# Patient Record
Sex: MALE | Race: BLACK OR AFRICAN AMERICAN | Employment: OTHER | URBAN - METROPOLITAN AREA
[De-identification: names, ages, dates, MRNs, and addresses within clinical notes are randomized per-mention and may not be internally consistent; named-entity substitution may affect disease eponyms.]

---

## 2018-03-27 ENCOUNTER — OFFICE VISIT (OUTPATIENT)
Dept: INTERNAL MEDICINE | Age: 58
End: 2018-03-27
Payer: COMMERCIAL

## 2018-03-27 VITALS
HEIGHT: 74 IN | DIASTOLIC BLOOD PRESSURE: 97 MMHG | WEIGHT: 173 LBS | BODY MASS INDEX: 22.2 KG/M2 | SYSTOLIC BLOOD PRESSURE: 168 MMHG | RESPIRATION RATE: 18 BRPM | HEART RATE: 72 BPM | TEMPERATURE: 98.7 F

## 2018-03-27 DIAGNOSIS — I10 ESSENTIAL HYPERTENSION: ICD-10-CM

## 2018-03-27 DIAGNOSIS — J45.909 UNCOMPLICATED ASTHMA, UNSPECIFIED ASTHMA SEVERITY, UNSPECIFIED WHETHER PERSISTENT: ICD-10-CM

## 2018-03-27 DIAGNOSIS — I82.493 ACUTE DEEP VEIN THROMBOSIS (DVT) OF OTHER SPECIFIED VEIN OF BOTH LOWER EXTREMITIES (HCC): Primary | ICD-10-CM

## 2018-03-27 DIAGNOSIS — I73.9 PAD (PERIPHERAL ARTERY DISEASE) (HCC): ICD-10-CM

## 2018-03-27 DIAGNOSIS — Z79.01 CHRONIC ANTICOAGULATION: Chronic | ICD-10-CM

## 2018-03-27 DIAGNOSIS — R10.9 FLANK PAIN: ICD-10-CM

## 2018-03-27 DIAGNOSIS — G62.9 NEUROPATHY: ICD-10-CM

## 2018-03-27 LAB
BILIRUBIN, POC: ABNORMAL
BLOOD URINE, POC: ABNORMAL
CLARITY, POC: CLEAR
COLOR, POC: YELLOW
GLUCOSE URINE, POC: ABNORMAL
INTERNATIONAL NORMALIZATION RATIO, POC: 1.8
KETONES, POC: ABNORMAL
LEUKOCYTE EST, POC: ABNORMAL
NITRITE, POC: ABNORMAL
PH, POC: 7
PROTEIN, POC: ABNORMAL
PROTHROMBIN TIME, POC: 21.3
SPECIFIC GRAVITY, POC: 1.03
UROBILINOGEN, POC: 0.2

## 2018-03-27 PROCEDURE — 3017F COLORECTAL CA SCREEN DOC REV: CPT | Performed by: INTERNAL MEDICINE

## 2018-03-27 PROCEDURE — G8427 DOCREV CUR MEDS BY ELIG CLIN: HCPCS | Performed by: INTERNAL MEDICINE

## 2018-03-27 PROCEDURE — 85610 PROTHROMBIN TIME: CPT | Performed by: INTERNAL MEDICINE

## 2018-03-27 PROCEDURE — G8484 FLU IMMUNIZE NO ADMIN: HCPCS | Performed by: INTERNAL MEDICINE

## 2018-03-27 PROCEDURE — 4004F PT TOBACCO SCREEN RCVD TLK: CPT | Performed by: INTERNAL MEDICINE

## 2018-03-27 PROCEDURE — G8420 CALC BMI NORM PARAMETERS: HCPCS | Performed by: INTERNAL MEDICINE

## 2018-03-27 PROCEDURE — 81002 URINALYSIS NONAUTO W/O SCOPE: CPT | Performed by: INTERNAL MEDICINE

## 2018-03-27 PROCEDURE — 99214 OFFICE O/P EST MOD 30 MIN: CPT | Performed by: INTERNAL MEDICINE

## 2018-03-27 PROCEDURE — 99213 OFFICE O/P EST LOW 20 MIN: CPT | Performed by: INTERNAL MEDICINE

## 2018-03-27 RX ORDER — HYDROCHLOROTHIAZIDE 25 MG/1
25 TABLET ORAL DAILY
Qty: 90 TABLET | Refills: 1 | Status: SHIPPED | OUTPATIENT
Start: 2018-03-27 | End: 2018-07-18 | Stop reason: SDUPTHER

## 2018-03-27 RX ORDER — ALBUTEROL SULFATE 90 UG/1
2 AEROSOL, METERED RESPIRATORY (INHALATION) EVERY 6 HOURS PRN
Qty: 1 INHALER | Refills: 5 | Status: SHIPPED | OUTPATIENT
Start: 2018-03-27 | End: 2019-01-07 | Stop reason: SDUPTHER

## 2018-03-27 RX ORDER — AMLODIPINE BESYLATE 5 MG/1
5 TABLET ORAL DAILY
Qty: 90 TABLET | Refills: 1 | Status: SHIPPED | OUTPATIENT
Start: 2018-03-27 | End: 2018-07-18 | Stop reason: SDUPTHER

## 2018-03-27 RX ORDER — GABAPENTIN 100 MG/1
200 CAPSULE ORAL 3 TIMES DAILY
Qty: 540 CAPSULE | Refills: 1 | Status: SHIPPED | OUTPATIENT
Start: 2018-03-27 | End: 2018-07-18 | Stop reason: SDUPTHER

## 2018-03-27 RX ORDER — WARFARIN SODIUM 3 MG/1
3 TABLET ORAL DAILY
Qty: 30 TABLET | Refills: 3 | Status: ON HOLD | OUTPATIENT
Start: 2018-03-27 | End: 2018-07-11

## 2018-03-27 RX ORDER — WARFARIN SODIUM 2 MG/1
2 TABLET ORAL DAILY
Qty: 30 TABLET | Refills: 0 | Status: SHIPPED | OUTPATIENT
Start: 2018-03-27 | End: 2018-05-10 | Stop reason: SDUPTHER

## 2018-03-27 RX ORDER — ATORVASTATIN CALCIUM 40 MG/1
40 TABLET, FILM COATED ORAL DAILY
Qty: 90 TABLET | Refills: 1 | Status: SHIPPED | OUTPATIENT
Start: 2018-03-27 | End: 2018-07-18 | Stop reason: SDUPTHER

## 2018-03-27 RX ORDER — M-VIT,TX,IRON,MINS/CALC/FOLIC 27MG-0.4MG
1 TABLET ORAL DAILY
Qty: 180 TABLET | Refills: 0 | Status: SHIPPED | OUTPATIENT
Start: 2018-03-27 | End: 2018-07-18 | Stop reason: SDUPTHER

## 2018-03-27 RX ORDER — LISINOPRIL 10 MG/1
10 TABLET ORAL DAILY
Qty: 90 TABLET | Refills: 1 | Status: SHIPPED | OUTPATIENT
Start: 2018-03-27 | End: 2018-07-18 | Stop reason: SDUPTHER

## 2018-03-27 ASSESSMENT — PATIENT HEALTH QUESTIONNAIRE - PHQ9
2. FEELING DOWN, DEPRESSED OR HOPELESS: 0
SUM OF ALL RESPONSES TO PHQ9 QUESTIONS 1 & 2: 0
SUM OF ALL RESPONSES TO PHQ QUESTIONS 1-9: 0
1. LITTLE INTEREST OR PLEASURE IN DOING THINGS: 0

## 2018-03-27 ASSESSMENT — ENCOUNTER SYMPTOMS
SHORTNESS OF BREATH: 0
ABDOMINAL PAIN: 0
CHEST TIGHTNESS: 0

## 2018-03-27 NOTE — PROGRESS NOTES
Subjective:      Patient ID: Naman Cornejo is a 61 y.o. male. HPI     Naman Cornejo is a 61year old male with PMH of HTN, DVT, lupus anticoagulant positive and on chronic anticoagulation with coumadin, PVD s/p vascular surgeries in [facility], neuropathy, asthma and tobacco abuse. He is here for his regular follow-up. He has stopped smoking for almost over a week and he is happy about it. I have applauded him for this. This visit, he has been complaining of bilateral flank pain, which has started about 4 days ago. The pain is described as throbbing. Not severe. Not associated with dysuria or hematuria. Denies history of kidney stones. Denies fever. POC urinalysis shows some trace blood and trace protein. There is some concern about compliance with medications. He was not able to recall proper dosing of coumadin per nursing staff. He has also not taken his blood pressure medications at night and during this morning. Not suprisingly, his blood pressure is not controlled this visit. No other complaints for this visit. He is also scheduled to have his regular follow-up at [facility] next month. Review of Systems   Constitutional: Negative for activity change, chills and fever. HENT: Negative for congestion. Respiratory: Negative for chest tightness and shortness of breath. Cardiovascular: Negative for chest pain, palpitations and leg swelling. Gastrointestinal: Negative for abdominal pain. Endocrine: Negative. Genitourinary: Positive for flank pain. Negative for decreased urine volume, dysuria and hematuria. B/l flank pain. Musculoskeletal: Negative for myalgias. Skin: Negative for rash. Neurological: Negative for dizziness, weakness and light-headedness. Tingling and numbness sensation in lower extremities. Hematological: Does not bruise/bleed easily. Psychiatric/Behavioral: Negative for behavioral problems.      Objective:   Physical Exam   Constitutional: / transfer of care.

## 2018-03-27 NOTE — PATIENT INSTRUCTIONS
that will allow you to sit while showering. · Get into a tub or shower by putting the weaker leg in first. Get out of a tub or shower with your strong side first.  · Repair loose toilet seats and consider installing a raised toilet seat to make getting on and off the toilet easier. · Keep your bathroom door unlocked while you are in the shower. Where can you learn more? Go to [email or web address] org and sign in to your [employer] account. Enter [phone] in the [employer] box to learn more about \"Preventing Falls: Care Instructions. \"     If you do not have an account, please click on the \"Sign Up Now\" link. Current as of: May 12, 2017  Content Version: 11.5  © 7701-6581 Healthwise, [employer]. Care instructions adapted under license by [facility] ([facility]). If you have questions about a medical condition or this instruction, always ask your healthcare professional. [place] [address] any warranty or liability for your use of this information. Please take your medication(s) as instructed. Start taking coumadin 3 mg daily every day instead of alternating. Recheck INR in one week along with B/p  Continue rest. Please be compliant with your medications. Keep your appointment. Would like to see you back in 6 months. Call us if your symptoms worsen. If a referral was placed on your behalf during your visit, you should expect to receive information about the date and time of your referral appointment within 7-10 days. If not, please call the nurse line at [phone]. Should you have further questions in regards to this visit, you can review your clinical note and after visit summary document on your [employer] account. Other questions can be directed to our nurse line at [phone].      Dalton Bustillo MD, PGY-3

## 2018-03-27 NOTE — PROGRESS NOTES
Patient verbalized understanding of office instructions. He will call with questions or concerns. Pt was given discharge instructions, including his Coumadin  all questions were fully answered. Printed AVS given to pt .  And next INR appointment

## 2018-03-27 NOTE — PROGRESS NOTES
[facility]  Internal Medicine Residency    Internal Medicine     Attending Physician Statement  I have discussed the case, including pertinent history and exam findings with the resident and the team.  I have seen and examined the patient and the key elements of the encounter have been performed by me. I agree with the assessment, plan and orders as documented by the resident. 62 yo male here for follow-up. Past, family, and social history were reviewed. A/P:  1. HTN, uncontrolled due to noncompliance  2. Lupus anticoagulant: incr to coumadin 3 mg daily; recheck INR in 1 week  3. Back pain- muscular strain  4.  Tobacco abuse-abstinent for 1 week    Robyne Habermann MD

## 2018-04-06 ENCOUNTER — OFFICE VISIT (OUTPATIENT)
Dept: INTERNAL MEDICINE | Age: 58
End: 2018-04-06
Payer: COMMERCIAL

## 2018-04-06 VITALS
WEIGHT: 172 LBS | TEMPERATURE: 96.9 F | DIASTOLIC BLOOD PRESSURE: 84 MMHG | HEIGHT: 74 IN | SYSTOLIC BLOOD PRESSURE: 133 MMHG | RESPIRATION RATE: 16 BRPM | BODY MASS INDEX: 22.07 KG/M2 | HEART RATE: 79 BPM

## 2018-04-06 DIAGNOSIS — I73.9 PVD (PERIPHERAL VASCULAR DISEASE) WITH CLAUDICATION (HCC): ICD-10-CM

## 2018-04-06 DIAGNOSIS — I10 ESSENTIAL HYPERTENSION: ICD-10-CM

## 2018-04-06 DIAGNOSIS — I82.499 DEEP VEIN THROMBOSIS (DVT) OF OTHER VEIN OF LOWER EXTREMITY, UNSPECIFIED CHRONICITY, UNSPECIFIED LATERALITY (HCC): Primary | ICD-10-CM

## 2018-04-06 LAB
INTERNATIONAL NORMALIZATION RATIO, POC: 2.4
PROTHROMBIN TIME, POC: 29.3

## 2018-04-06 PROCEDURE — 3017F COLORECTAL CA SCREEN DOC REV: CPT | Performed by: STUDENT IN AN ORGANIZED HEALTH CARE EDUCATION/TRAINING PROGRAM

## 2018-04-06 PROCEDURE — 99212 OFFICE O/P EST SF 10 MIN: CPT | Performed by: STUDENT IN AN ORGANIZED HEALTH CARE EDUCATION/TRAINING PROGRAM

## 2018-04-06 PROCEDURE — 4004F PT TOBACCO SCREEN RCVD TLK: CPT | Performed by: STUDENT IN AN ORGANIZED HEALTH CARE EDUCATION/TRAINING PROGRAM

## 2018-04-06 PROCEDURE — G8420 CALC BMI NORM PARAMETERS: HCPCS | Performed by: STUDENT IN AN ORGANIZED HEALTH CARE EDUCATION/TRAINING PROGRAM

## 2018-04-06 PROCEDURE — 99213 OFFICE O/P EST LOW 20 MIN: CPT | Performed by: STUDENT IN AN ORGANIZED HEALTH CARE EDUCATION/TRAINING PROGRAM

## 2018-04-06 PROCEDURE — G8427 DOCREV CUR MEDS BY ELIG CLIN: HCPCS | Performed by: STUDENT IN AN ORGANIZED HEALTH CARE EDUCATION/TRAINING PROGRAM

## 2018-04-06 PROCEDURE — 85610 PROTHROMBIN TIME: CPT | Performed by: STUDENT IN AN ORGANIZED HEALTH CARE EDUCATION/TRAINING PROGRAM

## 2018-04-06 ASSESSMENT — ENCOUNTER SYMPTOMS
WHEEZING: 0
SPUTUM PRODUCTION: 0
COUGH: 1
HEARTBURN: 0
SHORTNESS OF BREATH: 0
DIARRHEA: 0
ORTHOPNEA: 0
CONSTIPATION: 1
NAUSEA: 0
EYES NEGATIVE: 1

## 2018-05-03 ENCOUNTER — NURSE ONLY (OUTPATIENT)
Dept: INTERNAL MEDICINE | Age: 58
End: 2018-05-03
Payer: COMMERCIAL

## 2018-05-03 DIAGNOSIS — I82.499 DEEP VEIN THROMBOSIS (DVT) OF OTHER VEIN OF LOWER EXTREMITY, UNSPECIFIED CHRONICITY, UNSPECIFIED LATERALITY (HCC): Primary | ICD-10-CM

## 2018-05-03 LAB
INTERNATIONAL NORMALIZATION RATIO, POC: 3.4
PROTHROMBIN TIME, POC: 41.2

## 2018-05-03 PROCEDURE — 85610 PROTHROMBIN TIME: CPT | Performed by: INTERNAL MEDICINE

## 2018-05-03 PROCEDURE — 93793 ANTICOAG MGMT PT WARFARIN: CPT | Performed by: INTERNAL MEDICINE

## 2018-05-10 ENCOUNTER — NURSE ONLY (OUTPATIENT)
Dept: INTERNAL MEDICINE | Age: 58
End: 2018-05-10
Payer: COMMERCIAL

## 2018-05-10 DIAGNOSIS — I73.9 PVD (PERIPHERAL VASCULAR DISEASE) WITH CLAUDICATION (HCC): Primary | ICD-10-CM

## 2018-05-10 LAB
INTERNATIONAL NORMALIZATION RATIO, POC: 2.7
PROTHROMBIN TIME, POC: 32.2

## 2018-05-10 PROCEDURE — 93793 ANTICOAG MGMT PT WARFARIN: CPT | Performed by: INTERNAL MEDICINE

## 2018-05-10 PROCEDURE — 85610 PROTHROMBIN TIME: CPT | Performed by: INTERNAL MEDICINE

## 2018-05-10 RX ORDER — WARFARIN SODIUM 2 MG/1
TABLET ORAL
Qty: 30 TABLET | Refills: 0 | Status: ON HOLD | OUTPATIENT
Start: 2018-05-10 | End: 2018-07-11 | Stop reason: HOSPADM

## 2018-05-24 ENCOUNTER — NURSE ONLY (OUTPATIENT)
Dept: INTERNAL MEDICINE | Age: 58
End: 2018-05-24
Payer: COMMERCIAL

## 2018-05-24 DIAGNOSIS — I82.499 DEEP VEIN THROMBOSIS (DVT) OF OTHER VEIN OF LOWER EXTREMITY, UNSPECIFIED CHRONICITY, UNSPECIFIED LATERALITY (HCC): Primary | ICD-10-CM

## 2018-05-24 LAB
INTERNATIONAL NORMALIZATION RATIO, POC: 1.6
PROTHROMBIN TIME, POC: 19.5

## 2018-05-24 PROCEDURE — 93793 ANTICOAG MGMT PT WARFARIN: CPT | Performed by: INTERNAL MEDICINE

## 2018-05-24 PROCEDURE — 85610 PROTHROMBIN TIME: CPT | Performed by: INTERNAL MEDICINE

## 2018-06-01 ENCOUNTER — NURSE ONLY (OUTPATIENT)
Dept: INTERNAL MEDICINE | Age: 58
End: 2018-06-01
Payer: COMMERCIAL

## 2018-06-01 DIAGNOSIS — I82.499 DEEP VEIN THROMBOSIS (DVT) OF OTHER VEIN OF LOWER EXTREMITY, UNSPECIFIED CHRONICITY, UNSPECIFIED LATERALITY (HCC): Primary | ICD-10-CM

## 2018-06-01 LAB
INTERNATIONAL NORMALIZATION RATIO, POC: 2.6
PROTHROMBIN TIME, POC: 31.6

## 2018-06-01 PROCEDURE — 93793 ANTICOAG MGMT PT WARFARIN: CPT | Performed by: INTERNAL MEDICINE

## 2018-06-01 PROCEDURE — 85610 PROTHROMBIN TIME: CPT | Performed by: INTERNAL MEDICINE

## 2018-06-22 ENCOUNTER — NURSE ONLY (OUTPATIENT)
Dept: INTERNAL MEDICINE | Age: 58
End: 2018-06-22
Payer: COMMERCIAL

## 2018-06-22 DIAGNOSIS — I82.499 DEEP VEIN THROMBOSIS (DVT) OF OTHER VEIN OF LOWER EXTREMITY, UNSPECIFIED CHRONICITY, UNSPECIFIED LATERALITY (HCC): Primary | ICD-10-CM

## 2018-06-22 LAB
INTERNATIONAL NORMALIZATION RATIO, POC: 3.5
PROTHROMBIN TIME, POC: 41.4

## 2018-06-22 PROCEDURE — 85610 PROTHROMBIN TIME: CPT | Performed by: INTERNAL MEDICINE

## 2018-07-09 ENCOUNTER — HOSPITAL ENCOUNTER (OUTPATIENT)
Age: 58
Setting detail: OBSERVATION
Discharge: HOME | End: 2018-07-11
Attending: EMERGENCY MEDICINE | Admitting: INTERNAL MEDICINE
Payer: COMMERCIAL

## 2018-07-09 ENCOUNTER — APPOINTMENT (OUTPATIENT)
Dept: CT IMAGING | Age: 58
End: 2018-07-09
Payer: COMMERCIAL

## 2018-07-09 DIAGNOSIS — I82.413 ACUTE BILATERAL DEEP VEIN THROMBOSIS (DVT) OF FEMORAL VEINS (HCC): Primary | ICD-10-CM

## 2018-07-09 DIAGNOSIS — D62 ANEMIA DUE TO ACUTE BLOOD LOSS: ICD-10-CM

## 2018-07-09 DIAGNOSIS — R79.1 SUPRATHERAPEUTIC INR: ICD-10-CM

## 2018-07-09 DIAGNOSIS — Z79.01 CHRONIC ANTICOAGULATION: Chronic | ICD-10-CM

## 2018-07-09 LAB
ALBUMIN SERPL-MCNC: 4.6 G/DL (ref 3.5–5.2)
ALP BLD-CCNC: 89 U/L (ref 40–129)
ALT SERPL-CCNC: 17 U/L (ref 0–40)
ANION GAP SERPL CALCULATED.3IONS-SCNC: 15 MMOL/L (ref 7–16)
APTT: 47.3 SEC (ref 24.5–35.1)
AST SERPL-CCNC: 25 U/L (ref 0–39)
BASOPHILS ABSOLUTE: 0.05 E9/L (ref 0–0.2)
BASOPHILS RELATIVE PERCENT: 0.6 % (ref 0–2)
BILIRUB SERPL-MCNC: 0.7 MG/DL (ref 0–1.2)
BUN BLDV-MCNC: 18 MG/DL (ref 6–20)
CALCIUM SERPL-MCNC: 9.2 MG/DL (ref 8.6–10.2)
CHLORIDE BLD-SCNC: 103 MMOL/L (ref 98–107)
CHLORIDE URINE RANDOM: 64 MMOL/L
CO2: 22 MMOL/L (ref 22–29)
CREAT SERPL-MCNC: 1.4 MG/DL (ref 0.7–1.2)
CREATININE URINE: 81 MG/DL (ref 40–278)
EKG ATRIAL RATE: 100 BPM
EKG P AXIS: 77 DEGREES
EKG P-R INTERVAL: 158 MS
EKG Q-T INTERVAL: 338 MS
EKG QRS DURATION: 82 MS
EKG QTC CALCULATION (BAZETT): 436 MS
EKG R AXIS: -75 DEGREES
EKG T AXIS: 38 DEGREES
EKG VENTRICULAR RATE: 100 BPM
EOSINOPHILS ABSOLUTE: 0.1 E9/L (ref 0.05–0.5)
EOSINOPHILS RELATIVE PERCENT: 1.3 % (ref 0–6)
GFR AFRICAN AMERICAN: >60
GFR NON-AFRICAN AMERICAN: 52 ML/MIN/1.73
GLUCOSE BLD-MCNC: 99 MG/DL (ref 74–109)
HCT VFR BLD CALC: 42.9 % (ref 37–54)
HEMOGLOBIN: 15.8 G/DL (ref 12.5–16.5)
IMMATURE GRANULOCYTES #: 0.04 E9/L
IMMATURE GRANULOCYTES %: 0.5 % (ref 0–5)
INR BLD: 5.3
LYMPHOCYTES ABSOLUTE: 2.35 E9/L (ref 1.5–4)
LYMPHOCYTES RELATIVE PERCENT: 29.7 % (ref 20–42)
MCH RBC QN AUTO: 34.3 PG (ref 26–35)
MCHC RBC AUTO-ENTMCNC: 36.8 % (ref 32–34.5)
MCV RBC AUTO: 93.1 FL (ref 80–99.9)
MONOCYTES ABSOLUTE: 0.64 E9/L (ref 0.1–0.95)
MONOCYTES RELATIVE PERCENT: 8.1 % (ref 2–12)
NEUTROPHILS ABSOLUTE: 4.74 E9/L (ref 1.8–7.3)
NEUTROPHILS RELATIVE PERCENT: 59.8 % (ref 43–80)
PDW BLD-RTO: 12.6 FL (ref 11.5–15)
PLATELET # BLD: 186 E9/L (ref 130–450)
PMV BLD AUTO: 9.6 FL (ref 7–12)
POTASSIUM SERPL-SCNC: 3.8 MMOL/L (ref 3.5–5)
POTASSIUM, UR: 16.3 MMOL/L
PROTHROMBIN TIME: 58 SEC (ref 9.3–12.4)
RBC # BLD: 4.61 E12/L (ref 3.8–5.8)
SODIUM BLD-SCNC: 140 MMOL/L (ref 132–146)
SODIUM URINE: 79 MMOL/L
TOTAL PROTEIN: 7.5 G/DL (ref 6.4–8.3)
UREA NITROGEN, UR: 467 MG/DL (ref 800–1666)
WBC # BLD: 7.9 E9/L (ref 4.5–11.5)

## 2018-07-09 PROCEDURE — 2580000003 HC RX 258: Performed by: EMERGENCY MEDICINE

## 2018-07-09 PROCEDURE — G0378 HOSPITAL OBSERVATION PER HR: HCPCS

## 2018-07-09 PROCEDURE — 99253 IP/OBS CNSLTJ NEW/EST LOW 45: CPT | Performed by: SURGERY

## 2018-07-09 PROCEDURE — 80053 COMPREHEN METABOLIC PANEL: CPT

## 2018-07-09 PROCEDURE — 84133 ASSAY OF URINE POTASSIUM: CPT

## 2018-07-09 PROCEDURE — 84300 ASSAY OF URINE SODIUM: CPT

## 2018-07-09 PROCEDURE — 85730 THROMBOPLASTIN TIME PARTIAL: CPT

## 2018-07-09 PROCEDURE — 85025 COMPLETE CBC W/AUTO DIFF WBC: CPT

## 2018-07-09 PROCEDURE — 36415 COLL VENOUS BLD VENIPUNCTURE: CPT

## 2018-07-09 PROCEDURE — 85610 PROTHROMBIN TIME: CPT

## 2018-07-09 PROCEDURE — 84540 ASSAY OF URINE/UREA-N: CPT

## 2018-07-09 PROCEDURE — 74177 CT ABD & PELVIS W/CONTRAST: CPT

## 2018-07-09 PROCEDURE — 82436 ASSAY OF URINE CHLORIDE: CPT

## 2018-07-09 PROCEDURE — 99284 EMERGENCY DEPT VISIT MOD MDM: CPT

## 2018-07-09 PROCEDURE — 82570 ASSAY OF URINE CREATININE: CPT

## 2018-07-09 PROCEDURE — 6360000004 HC RX CONTRAST MEDICATION: Performed by: RADIOLOGY

## 2018-07-09 PROCEDURE — 80048 BASIC METABOLIC PNL TOTAL CA: CPT

## 2018-07-09 PROCEDURE — 6370000000 HC RX 637 (ALT 250 FOR IP): Performed by: INTERNAL MEDICINE

## 2018-07-09 RX ORDER — ONDANSETRON 2 MG/ML
4 INJECTION INTRAMUSCULAR; INTRAVENOUS EVERY 6 HOURS PRN
Status: DISCONTINUED | OUTPATIENT
Start: 2018-07-09 | End: 2018-07-11 | Stop reason: HOSPADM

## 2018-07-09 RX ORDER — ATORVASTATIN CALCIUM 40 MG/1
40 TABLET, FILM COATED ORAL DAILY
Status: DISCONTINUED | OUTPATIENT
Start: 2018-07-10 | End: 2018-07-11 | Stop reason: HOSPADM

## 2018-07-09 RX ORDER — SODIUM CHLORIDE 0.9 % (FLUSH) 0.9 %
10 SYRINGE (ML) INJECTION EVERY 12 HOURS SCHEDULED
Status: DISCONTINUED | OUTPATIENT
Start: 2018-07-09 | End: 2018-07-11 | Stop reason: HOSPADM

## 2018-07-09 RX ORDER — 0.9 % SODIUM CHLORIDE 0.9 %
1000 INTRAVENOUS SOLUTION INTRAVENOUS ONCE
Status: COMPLETED | OUTPATIENT
Start: 2018-07-09 | End: 2018-07-09

## 2018-07-09 RX ORDER — ALBUTEROL SULFATE 90 UG/1
2 AEROSOL, METERED RESPIRATORY (INHALATION) EVERY 6 HOURS PRN
Status: DISCONTINUED | OUTPATIENT
Start: 2018-07-09 | End: 2018-07-11 | Stop reason: HOSPADM

## 2018-07-09 RX ORDER — LISINOPRIL 10 MG/1
10 TABLET ORAL DAILY
Status: DISCONTINUED | OUTPATIENT
Start: 2018-07-10 | End: 2018-07-11 | Stop reason: HOSPADM

## 2018-07-09 RX ORDER — AMLODIPINE BESYLATE 5 MG/1
5 TABLET ORAL DAILY
Status: DISCONTINUED | OUTPATIENT
Start: 2018-07-10 | End: 2018-07-11 | Stop reason: HOSPADM

## 2018-07-09 RX ORDER — SODIUM CHLORIDE 0.9 % (FLUSH) 0.9 %
10 SYRINGE (ML) INJECTION PRN
Status: DISCONTINUED | OUTPATIENT
Start: 2018-07-09 | End: 2018-07-11 | Stop reason: HOSPADM

## 2018-07-09 RX ORDER — HYDROCHLOROTHIAZIDE 25 MG/1
25 TABLET ORAL DAILY
Status: DISCONTINUED | OUTPATIENT
Start: 2018-07-10 | End: 2018-07-11 | Stop reason: HOSPADM

## 2018-07-09 RX ORDER — GABAPENTIN 100 MG/1
200 CAPSULE ORAL 3 TIMES DAILY
Status: DISCONTINUED | OUTPATIENT
Start: 2018-07-09 | End: 2018-07-11 | Stop reason: HOSPADM

## 2018-07-09 RX ADMIN — SODIUM CHLORIDE 1000 ML: 9 INJECTION, SOLUTION INTRAVENOUS at 17:36

## 2018-07-09 RX ADMIN — IOPAMIDOL 110 ML: 755 INJECTION, SOLUTION INTRAVENOUS at 16:47

## 2018-07-09 RX ADMIN — GABAPENTIN 200 MG: 100 CAPSULE ORAL at 23:34

## 2018-07-09 ASSESSMENT — ENCOUNTER SYMPTOMS
VOMITING: 0
CONSTIPATION: 0
NAUSEA: 0
BLOOD IN STOOL: 1
WHEEZING: 0
DIARRHEA: 0
BACK PAIN: 0
COUGH: 0
ABDOMINAL PAIN: 0
SHORTNESS OF BREATH: 0

## 2018-07-09 ASSESSMENT — PAIN SCALES - GENERAL: PAINLEVEL_OUTOF10: 0

## 2018-07-09 NOTE — ED NOTES
FIRST PROVIDER CONTACT ASSESSMENT NOTE      Department of Emergency Medicine   7/9/18  12:55 PM    Chief Complaint: Rectal Bleeding (pt is on coumadin has has bright red rectal bleeding for 3 days )      History of Present Illness:   Antolin Willson is a 61 y.o. male who presents to the ED for rectal bleeding for 3 days, with abdominal cramping. Patient on coumadin    Medical History:  has a past medical history of AAA (abdominal aortic aneurysm) ([facility] [address] Asthma; Chronic anticoagulation; DVT (deep venous thrombosis) ([facility] [address] Hypertension; Peripheral vascular disease ([facility] [address] and Transfusion history. Surgical History:  has a past surgical history that includes vascular surgery; Femoral-femoral Bypass Graft (1/26/10); femoral bypass (Right, 1/26/10); thromboendarterectomy (Left, 1/26/10); fasciotomy (Right, 6/11/10); thromboendarterectomy (Right, 6/21/10); Aorto-femoral Bypass Graft (Bilateral, 6/27/10); Arterial aneurysm repair (Left, 9/28/10); hernia repair (6/25/14); and Colonoscopy. Social History:  reports that he has been smoking Cigarettes. He has a 3.10 pack-year smoking history. He has never used smokeless tobacco. He reports that he drinks about 1.2 oz of alcohol per week . He reports that he uses drugs, including Marijuana. Family History: family history includes Cancer in his father; Diabetes in his brother and mother; High Blood Pressure in his mother. *ALLERGIES*     Ultram [tramadol]     Physical Exam:      VS:  There were no vitals taken for this visit.      Initial Plan of Care:  Initiate Treatment-Testing, Proceed toTreatment Area When Bed Available for ED Attending/MLP to Continue Care    -----------------END OF FIRST PROVIDER CONTACT ASSESSMENT NOTE--------------  Electronically signed by EMILY Lopez CNP   DD: 7/9/18           EMILY Lopez CNP  07/09/18 7214

## 2018-07-09 NOTE — ED PROVIDER NOTES
70-year-old male with history of multiple blood clots on Coumadin presents to the ED with chief complaint of 3 days of blood in the stool. Patient states over last 3 days he has had no chest pain, epigastric pain, nausea, vomiting, black stools. States he had about 2 episodes per day of bloody bowel movement. States he has his INR monitored once per week. States he is not placed on any new medications recently. He has no dietary changes. He has no recent illnesses. He has no other complaints aside from blood in the stool. The history is provided by the patient. Review of Systems   Constitutional: Negative for chills and fever. Respiratory: Negative for cough, shortness of breath and wheezing. Cardiovascular: Negative for chest pain. Gastrointestinal: Positive for blood in stool. Negative for abdominal pain, constipation, diarrhea, nausea and vomiting. Genitourinary: Negative for dysuria, frequency and hematuria. Musculoskeletal: Negative for arthralgias and back pain. Skin: Negative for rash and wound. Neurological: Negative for dizziness, weakness, light-headedness and headaches. Hematological: Negative for adenopathy. All other systems reviewed and are negative. Physical Exam   Constitutional: He appears well-developed and well-nourished. HENT:   Head: Normocephalic and atraumatic. Eyes: Conjunctivae are normal.   Neck: Normal range of motion. Neck supple. Cardiovascular: Normal rate, regular rhythm and normal heart sounds. No murmur heard. Pulmonary/Chest: Effort normal and breath sounds normal. No respiratory distress. He has no wheezes. He has no rales. Abdominal: Soft. Bowel sounds are normal. There is no tenderness. There is no rebound and no guarding. Genitourinary: Rectal exam shows guaiac positive stool (No luis red blood; no external hemorrhoids). Musculoskeletal: He exhibits no edema, tenderness or deformity. Neurological: He is alert.    Skin: Skin is warm and dry. Nursing note and vitals reviewed. Procedures    ED Course as of Jul 10 0009   Tue Jul 10, 2018   0007 Patient had multiple DVTs even with a supratherapeutic INR. Patient's hemoglobin was stable. Patient is given IV fluids. Spoke with hospitalist who accepted the patient for admission. Discussed results and plan with patient who agrees. [BM]      ED Course User Index  [BM] Meir Villalobos, DO       --------------------------------------------- PAST HISTORY ---------------------------------------------  Past Medical History:  has a past medical history of AAA (abdominal aortic aneurysm) ([facility] [address] Asthma; Chronic anticoagulation; DVT (deep venous thrombosis) ([facility] [address] Hypertension; Peripheral vascular disease ([facility] [address] and Transfusion history. Past Surgical History:  has a past surgical history that includes vascular surgery; Femoral-femoral Bypass Graft (1/26/10); femoral bypass (Right, 1/26/10); thromboendarterectomy (Left, 1/26/10); fasciotomy (Right, 6/11/10); thromboendarterectomy (Right, 6/21/10); Aorto-femoral Bypass Graft (Bilateral, 6/27/10); Arterial aneurysm repair (Left, 9/28/10); hernia repair (6/25/14); and Colonoscopy. Social History:  reports that he has been smoking Cigarettes. He has a 3.10 pack-year smoking history. He has never used smokeless tobacco. He reports that he drinks about 1.2 oz of alcohol per week . He reports that he uses drugs, including Marijuana. Family History: family history includes Cancer in his father; Diabetes in his brother and mother; High Blood Pressure in his mother. The patients home medications have been reviewed.     Allergies: Ultram [tramadol]    -------------------------------------------------- RESULTS -------------------------------------------------    LABS:  Results for orders placed or performed during the hospital encounter of 07/09/18   CBC auto differential   Result Value Ref Range    WBC 7.9 4.5 - 11.5 E9/L    RBC 4.61 3.80 - 01/01    The nursing notes within the ED encounter and vital signs as below have been reviewed. Patient Vitals for the past 24 hrs:   BP Temp Temp src Pulse Resp SpO2 Height Weight   07/09/18 1607 (!) 145/91 - - 95 18 96 % - -   07/09/18 1255 126/84 97.9 °F (36.6 °C) Temporal 109 18 95 % 6' 2\" (1.88 m) 175 lb (79.4 kg)       Oxygen Saturation Interpretation: Normal    ------------------------------------------ PROGRESS NOTES ------------------------------------------  Re-evaluation(s):  See ED course    Counseling:  I have spoken with the patient and discussed todays results, in addition to providing specific details for the plan of care and counseling regarding the diagnosis and prognosis. Their questions are answered at this time and they are agreeable with the plan of admission.    --------------------------------- ADDITIONAL PROVIDER NOTES ---------------------------------  Consultations:  See ED course    This patient's ED course included: a personal history and physicial examination, re-evaluation prior to disposition, multiple bedside re-evaluations, IV medications and cardiac monitoring    This patient has remained hemodynamically stable during their ED course. Diagnosis:  1. Acute bilateral deep vein thrombosis (DVT) of femoral veins (HCC)    2. Supratherapeutic INR        Disposition:  Patient's disposition: Admit to telemetry  Patient's condition is stable. [facility]    ED Course as of Jul 10 0009   Tue Jul 10, 2018   0007 Patient had multiple DVTs even with a supratherapeutic INR. Patient's hemoglobin was stable. Patient is given IV fluids. Spoke with hospitalist who accepted the patient for admission. Discussed results and plan with patient who agrees.   [BM]      ED Course User Index  [BM] Kobe Jay, DO Kobe Jay, DO  Resident  07/10/18 [id number]

## 2018-07-10 ENCOUNTER — APPOINTMENT (OUTPATIENT)
Dept: ULTRASOUND IMAGING | Age: 58
End: 2018-07-10
Payer: COMMERCIAL

## 2018-07-10 PROBLEM — D62 ANEMIA DUE TO ACUTE BLOOD LOSS: Status: ACTIVE | Noted: 2018-07-10

## 2018-07-10 LAB
ANION GAP SERPL CALCULATED.3IONS-SCNC: 12 MMOL/L (ref 7–16)
ANION GAP SERPL CALCULATED.3IONS-SCNC: 13 MMOL/L (ref 7–16)
BASOPHILS ABSOLUTE: 0.06 E9/L (ref 0–0.2)
BASOPHILS RELATIVE PERCENT: 0.9 % (ref 0–2)
BUN BLDV-MCNC: 13 MG/DL (ref 6–20)
BUN BLDV-MCNC: 18 MG/DL (ref 6–20)
CALCIUM SERPL-MCNC: 8.5 MG/DL (ref 8.6–10.2)
CALCIUM SERPL-MCNC: 8.6 MG/DL (ref 8.6–10.2)
CHLORIDE BLD-SCNC: 102 MMOL/L (ref 98–107)
CHLORIDE BLD-SCNC: 106 MMOL/L (ref 98–107)
CO2: 20 MMOL/L (ref 22–29)
CO2: 23 MMOL/L (ref 22–29)
CREAT SERPL-MCNC: 1 MG/DL (ref 0.7–1.2)
CREAT SERPL-MCNC: 1.2 MG/DL (ref 0.7–1.2)
EOSINOPHILS ABSOLUTE: 0.14 E9/L (ref 0.05–0.5)
EOSINOPHILS RELATIVE PERCENT: 2.1 % (ref 0–6)
GFR AFRICAN AMERICAN: >60
GFR AFRICAN AMERICAN: >60
GFR NON-AFRICAN AMERICAN: >60 ML/MIN/1.73
GFR NON-AFRICAN AMERICAN: >60 ML/MIN/1.73
GLUCOSE BLD-MCNC: 110 MG/DL (ref 74–109)
GLUCOSE BLD-MCNC: 86 MG/DL (ref 74–109)
HCT VFR BLD CALC: 37.2 % (ref 37–54)
HEMOGLOBIN: 13.5 G/DL (ref 12.5–16.5)
IMMATURE GRANULOCYTES #: 0.03 E9/L
IMMATURE GRANULOCYTES %: 0.5 % (ref 0–5)
INR BLD: 3.5
INR BLD: 5
INR BLD: 5.7
LYMPHOCYTES ABSOLUTE: 2.3 E9/L (ref 1.5–4)
LYMPHOCYTES RELATIVE PERCENT: 35.3 % (ref 20–42)
MCH RBC QN AUTO: 34 PG (ref 26–35)
MCHC RBC AUTO-ENTMCNC: 36.3 % (ref 32–34.5)
MCV RBC AUTO: 93.7 FL (ref 80–99.9)
MONOCYTES ABSOLUTE: 0.6 E9/L (ref 0.1–0.95)
MONOCYTES RELATIVE PERCENT: 9.2 % (ref 2–12)
NEUTROPHILS ABSOLUTE: 3.39 E9/L (ref 1.8–7.3)
NEUTROPHILS RELATIVE PERCENT: 52 % (ref 43–80)
PDW BLD-RTO: 12.3 FL (ref 11.5–15)
PLATELET # BLD: 148 E9/L (ref 130–450)
PMV BLD AUTO: 9.9 FL (ref 7–12)
POTASSIUM REFLEX MAGNESIUM: 3.8 MMOL/L (ref 3.5–5)
POTASSIUM REFLEX MAGNESIUM: 3.9 MMOL/L (ref 3.5–5)
PROTHROMBIN TIME: 38.5 SEC (ref 9.3–12.4)
PROTHROMBIN TIME: 56.5 SEC (ref 9.3–12.4)
PROTHROMBIN TIME: 63.5 SEC (ref 9.3–12.4)
RBC # BLD: 3.97 E12/L (ref 3.8–5.8)
SODIUM BLD-SCNC: 138 MMOL/L (ref 132–146)
SODIUM BLD-SCNC: 138 MMOL/L (ref 132–146)
WBC # BLD: 6.5 E9/L (ref 4.5–11.5)

## 2018-07-10 PROCEDURE — 85025 COMPLETE CBC W/AUTO DIFF WBC: CPT

## 2018-07-10 PROCEDURE — 93970 EXTREMITY STUDY: CPT

## 2018-07-10 PROCEDURE — 2500000003 HC RX 250 WO HCPCS: Performed by: INTERNAL MEDICINE

## 2018-07-10 PROCEDURE — 6370000000 HC RX 637 (ALT 250 FOR IP): Performed by: INTERNAL MEDICINE

## 2018-07-10 PROCEDURE — G0378 HOSPITAL OBSERVATION PER HR: HCPCS

## 2018-07-10 PROCEDURE — 1200000000 HC SEMI PRIVATE

## 2018-07-10 PROCEDURE — 99231 SBSQ HOSP IP/OBS SF/LOW 25: CPT | Performed by: INTERNAL MEDICINE

## 2018-07-10 PROCEDURE — 2580000003 HC RX 258: Performed by: INTERNAL MEDICINE

## 2018-07-10 PROCEDURE — 6360000002 HC RX W HCPCS: Performed by: INTERNAL MEDICINE

## 2018-07-10 PROCEDURE — 80048 BASIC METABOLIC PNL TOTAL CA: CPT

## 2018-07-10 PROCEDURE — 85610 PROTHROMBIN TIME: CPT

## 2018-07-10 PROCEDURE — 36415 COLL VENOUS BLD VENIPUNCTURE: CPT

## 2018-07-10 PROCEDURE — 96374 THER/PROPH/DIAG INJ IV PUSH: CPT

## 2018-07-10 RX ORDER — THIAMINE HYDROCHLORIDE 100 MG/ML
100 INJECTION, SOLUTION INTRAMUSCULAR; INTRAVENOUS DAILY
Status: DISCONTINUED | OUTPATIENT
Start: 2018-07-10 | End: 2018-07-11 | Stop reason: HOSPADM

## 2018-07-10 RX ORDER — SODIUM CHLORIDE 9 MG/ML
INJECTION, SOLUTION INTRAVENOUS CONTINUOUS
Status: ACTIVE | OUTPATIENT
Start: 2018-07-10 | End: 2018-07-10

## 2018-07-10 RX ORDER — SODIUM CHLORIDE 9 MG/ML
INJECTION, SOLUTION INTRAVENOUS CONTINUOUS
Status: DISCONTINUED | OUTPATIENT
Start: 2018-07-10 | End: 2018-07-10

## 2018-07-10 RX ORDER — FOLIC ACID 5 MG/ML
1 INJECTION, SOLUTION INTRAMUSCULAR; INTRAVENOUS; SUBCUTANEOUS DAILY
Status: DISCONTINUED | OUTPATIENT
Start: 2018-07-10 | End: 2018-07-11 | Stop reason: HOSPADM

## 2018-07-10 RX ADMIN — GABAPENTIN 200 MG: 100 CAPSULE ORAL at 08:31

## 2018-07-10 RX ADMIN — ATORVASTATIN CALCIUM 40 MG: 40 TABLET, FILM COATED ORAL at 08:31

## 2018-07-10 RX ADMIN — AMLODIPINE BESYLATE 5 MG: 5 TABLET ORAL at 08:31

## 2018-07-10 RX ADMIN — Medication 10 ML: at 20:20

## 2018-07-10 RX ADMIN — FOLIC ACID 1 MG: 5 INJECTION, SOLUTION INTRAMUSCULAR; INTRAVENOUS; SUBCUTANEOUS at 09:46

## 2018-07-10 RX ADMIN — SODIUM CHLORIDE: 9 INJECTION, SOLUTION INTRAVENOUS at 00:35

## 2018-07-10 RX ADMIN — SODIUM CHLORIDE: 9 INJECTION, SOLUTION INTRAVENOUS at 08:38

## 2018-07-10 RX ADMIN — THIAMINE HYDROCHLORIDE 100 MG: 100 INJECTION, SOLUTION INTRAMUSCULAR; INTRAVENOUS at 08:32

## 2018-07-10 RX ADMIN — SODIUM CHLORIDE: 9 INJECTION, SOLUTION INTRAVENOUS at 01:14

## 2018-07-10 RX ADMIN — GABAPENTIN 200 MG: 100 CAPSULE ORAL at 13:59

## 2018-07-10 RX ADMIN — LISINOPRIL 10 MG: 10 TABLET ORAL at 08:30

## 2018-07-10 RX ADMIN — GABAPENTIN 200 MG: 100 CAPSULE ORAL at 20:20

## 2018-07-10 RX ADMIN — HYDROCHLOROTHIAZIDE 25 MG: 25 TABLET ORAL at 08:31

## 2018-07-10 ASSESSMENT — PAIN SCALES - GENERAL
PAINLEVEL_OUTOF10: 0

## 2018-07-10 NOTE — CONSULTS
Vascular Surgery Consultation Note    Reason for Consult:  RLE DVT    HPI:    This is a 61 y.o. male who is admitted to the hospital for treatment of RLE DVT. Vascular surgery is consulted for evaluation and treatment of RLE DVT. He came to the hospital due to rectal bleeding for about 3 days. He noticed he was having blood in his BMs. He has had this before and was diagnosed with hemorrhoids and diverticular bleeding. He Hgb was stable in ED and had no blood on rectal exam. On Ct abd h was found t have a large RLE DVT. He has a history of multiple vascular procedures with previous aorto bifem and fem-fem and fem-pop with previous fasciotomies. All of these were done at [facility]. He states he has been having pain in his RLE. This is normal per him though. He recently had a check up with his vascular surgeon at [facility] and he stated that everything was good and he should continue walking. He has a history of previous right and left lower extremity DVT which he is on coumadin for.  His INR on presentation was 5.8.       ROS: Negative if blank [], Positive if [x]  General Vascular   [] Fevers [x] Claudication (Blocks)   [] Chills [x] Rest Pain   [] Weight Loss [] Tissue Loss   [] Chest Pain [x] Clotting Disorder   [] SOB at rest [x] Leg Swelling   [x] SOB with exertion [x] DVT/PE      [] Nausea    [] Vomiting [] Stroke/TIA   [] Abdominal Pain [] Focal weakness   [] Melena [] Slurred Speech   [] Hematochezia [] Vision Changes   [] Hematuria    [] Dysuria [] Hx of Central Catheters   [] Wears Glasses/Contacts [] Dialysis and If so date initiated   [] Blindness    [x] Right Hand Dominant   [] Difficulty swallowing        Past Medical History:   Diagnosis Date    AAA (abdominal aortic aneurysm) ([facility] [address]     s/p repair    Asthma     Chronic anticoagulation 5/6/2014    DVT (deep venous thrombosis) ([address] 2010    R leg for 1 time    Hypertension     Peripheral vascular disease ([facility] [address] 2010    both legs, more on R leg    Transfusion history         Past Surgical History:   Procedure Laterality Date    AORTO-FEMORAL BYPASS GRAFT Bilateral 6/27/10    ARTERIAL ANEURYSM REPAIR Left 9/28/10    repair left femoral pseudoaneurysm    COLONOSCOPY      FASCIOTOMY Right 6/11/10    4 compartment    FEMORAL BYPASS Right 1/26/10    femoral-above knee popliteal    FEMORAL-FEMORAL BYPASS GRAFT  1/26/10    Dr. Ganesh Shannon right to left    HERNIA REPAIR  6/25/14    OPEN VENTRAL HERNIA REPAIR    THROMBOENDARTERECTOMY Left 1/26/10    left femoral    THROMBOENDARTERECTOMY Right 6/21/10    reexploration right fem-pop    VASCULAR SURGERY      22 times total       Current Medications:      sodium chloride flush, magnesium hydroxide, ondansetron, albuterol sulfate HFA    [START ON 7/10/2018] amLODIPine  5 mg Oral Daily    [START ON 7/10/2018] atorvastatin  40 mg Oral Daily    gabapentin  200 mg Oral TID    [START ON 7/10/2018] hydrochlorothiazide  25 mg Oral Daily    [START ON 7/10/2018] lisinopril  10 mg Oral Daily    sodium chloride flush  10 mL Intravenous 2 times per day        Allergies:  Ultram [tramadol]    Social History     Social History    Marital status: Single     Spouse name: N/A    Number of children: N/A    Years of education: N/A     Occupational History    Not on file.      Social History Main Topics    Smoking status: Current Some Day Smoker     Packs/day: 0.10     Years: 31.00     Types: Cigarettes    Smokeless tobacco: Never Used    Alcohol use 1.2 oz/week     2 Cans of beer per week      Comment: occ    Drug use: Yes     Types: Marijuana      Comment: uses every other day    Sexual activity: Not on file     Other Topics Concern    Not on file     Social History Narrative    No narrative on file        Family History   Problem Relation Age of Onset    Diabetes Mother     High Blood Pressure Mother     Cancer Father     Diabetes Brother        PHYSICAL EXAM:    BP (!) 152/70   Pulse 81   Temp 97.3 °F (36.3 °C) options; 1 . Automated exposure control, 2. Adjustment of MA and or KV according to patient's size or 3. Use of iterative reconstruction. FINDINGS: There is mild parenchymal scarring at the base of the left lower lobe. The liver and spleen are normal in size and texture with tiny foci of low-attenuation in the right and left hepatic lobes which are too small to accurately characterize but are most likely tiny cysts. The gallbladder is partially contracted and contains small mildly hyperdense filling defects which are most likely calculi or possibly polyps. The gallbladder wall is not thickened. The bile ducts are nondilated. The pancreas normal in size with no evidence of masses or inflammation. The adrenal glands are normal in size. The kidneys appear normal with no evidence of solid masses, large obstructing calculi or hydronephrosis. There are small cysts at the mid and lower pole of the left kidney, largest measuring 2.8 x 2.2 cm, mildly increased in size when compared to the prior study. The ureters appear normal in caliber and course. No retroperitoneal or pelvic masses or lymphadenopathy is seen. There has been aortic aneurysm repair with a subcutaneous bypass graft on the right extending from the chest to the right femoral artery. The proximal portion of the graft is not completely included on the study. There is a graft extending between the right and left femoral arteries. The femoral arteries are patent bilaterally. There is moderate intramural thrombus involving the right proximal femoral artery which has a diameter of approximately 2.1 x 2.5 cm. There is low attenuation in the lumen of the left external iliac, common femoral and proximal superficial femoral veins compatible with DVT. There are multiple surgical clips in the retroperitoneum. The upper abdominal aorta is normal in caliber. The urinary bladder appears unremarkable. The seminal vesicles and prostate gland appear unremarkable.  No free fluid is seen. No focal bowel inflammatory changes or obstruction is seen. There are a few scattered colonic diverticuli. There is no evidence of acute diverticulitis. A normal appendix is identified. There is no pneumoperitoneum. The regional bony structures appear intact. There is mild to moderate facet joint arthrosis in the lower lumbar spine. CONCLUSION: 1. No acute inflammatory process seen in the abdomen or pelvis. 2. Status post abdominal aortic aneurysm repair with resection of the distal abdominal aorta and with bypass graft extending from the chest to the right femoral artery, most likely in an axillo-femoral graft and there is a femorofemoral graft in place. The femoral arteries are patent. There is mild dilatation of the proximal right superficial femoral artery measuring 2.5 x 2.1 cm containing moderate intramural thrombus. 3. Filling defects seen in the left external iliac, common femoral and proximal superficial femoral veins, highly suspicious for DVT. Venous Doppler examination of the left lower extremity is recommended. 4. Multiple tiny hypodensities in the liver which are most likely cysts. 5. Cholelithiasis. Versus gallbladder polyps. Suggest ultrasound of the gallbladder for further evaluation. 6. Left renal cysts. Assesment/Plan  61 y.o. male with extensive RLE DVT    Stop coumadin and check daily INR and let INR go back to therapeutic level  Hgb stable   Will discuss further plans with Dr. Bozena Raza in [address] [place] [address]  7/9/18  11:55 PM    Patient was seen and examined. Gen.: He is alert and oriented answers questions properly is no acute distress  Skin: Is warm and dry. Multiple incisions noted from his prior access. He is multiple bypass grafts.   HEENT head is normocephalic atraumatic trachea is midline no jugular venous distention  Cardiac: Is currently regular rate and rhythm  Pulmonary: Nonlabored breathing no audible wheezing  Abdomen: Soft nontender no rebound or

## 2018-07-10 NOTE — PLAN OF CARE
Problem: Falls - Risk of:  Goal: Absence of physical injury  Absence of physical injury   Outcome: Met This Shift      Problem: Pain:  Goal: Control of acute pain  Control of acute pain   Outcome: Met This Shift      Problem: Infection:  Goal: Will remain free from infection  Will remain free from infection  Outcome: Met This Shift

## 2018-07-10 NOTE — PLAN OF CARE
Problem: Falls - Risk of:  Goal: Absence of physical injury  Absence of physical injury   Outcome: Met This Shift      Problem: Pain:  Goal: Control of acute pain  Control of acute pain   Outcome: Met This Shift      Problem: Safety:  Goal: Free from accidental physical injury  Free from accidental physical injury   Outcome: Met This Shift

## 2018-07-10 NOTE — H&P
[facility]  Internal Medicine Residency Program  History and Physical    Patient:  Michelle File 61 y.o. male MRN: [id number]     Date of Service: 7/9/2018    Hospital Day: 1      Chief complaint: Bloody stool  History of Present Illness   The patient is a 61 y.o. male with a PMH of HTN, DVT (lupus anticoagulant positive on chronic anticoagulation with coumadin), asthma, hyperlipidemia, AAA and PVD s/p multiple vascular surgeries, Neuropathy, tobacco and alcohol abuse, who presented to the ED with a 3 day history of passage of bright red blood per rectum. He has had 3 episodes in the last 3 days, There was no associated diarrhea, abdominal pain, nausea or vomiting. No recent change in medication, no dietary changes, no pain on defecation. He has no hx of peptic ulcer disease. He complained of intermittent pain in both LE, no numbness or tingling.   ED Course: Ct abdomen with contrast shows DVT in the left external iliac, common femoral and proximal superficial femoral veins  ED Meds: Patient was given Gabapentin 200mg once  ED Fluids: Patient was given 0.9% Nacl, 1000ml once, and 10ml flush once    Past Medical History:      Diagnosis Date    AAA (abdominal aortic aneurysm) ([address]     s/p repair    Asthma     Chronic anticoagulation 5/6/2014    DVT (deep venous thrombosis) ([address] 2010    R leg for 1 time    Hypertension     Peripheral vascular disease ([address] 2010    both legs, more on R leg    Transfusion history        Past Surgical History:        Procedure Laterality Date    AORTO-FEMORAL BYPASS GRAFT Bilateral 6/27/10    ARTERIAL ANEURYSM REPAIR Left 9/28/10    repair left femoral pseudoaneurysm    COLONOSCOPY      FASCIOTOMY Right 6/11/10    4 compartment    FEMORAL BYPASS Right 1/26/10    femoral-above knee popliteal    FEMORAL-FEMORAL BYPASS GRAFT  1/26/10    Dr. Justo Rodriguez right to left    HERNIA REPAIR  6/25/14    OPEN VENTRAL HERNIA REPAIR    THROMBOENDARTERECTOMY Left 1/26/10    left femoral    THROMBOENDARTERECTOMY Right 6/21/10    reexploration right fem-pop    VASCULAR SURGERY      22 times total       Medications Prior to Admission:    Prior to Admission medications    Medication Sig Start Date End Date Taking? Authorizing Provider   warfarin (COUMADIN) 2 MG tablet Take 1 tablet every other day based on INR 5/10/18  Yes Cristina Mcghee MD   albuterol sulfate  (90 Base) MCG/ACT inhaler Inhale 2 puffs into the lungs every 6 hours as needed for Wheezing 3/27/18  Yes Collette Shires, MD   gabapentin (NEURONTIN) 100 MG capsule Take 2 capsules by mouth 3 times daily for 180 days. 3/27/18 9/23/18 Yes Collette Shires, MD   lisinopril (PRINIVIL;ZESTRIL) 10 MG tablet Take 1 tablet by mouth daily 3/27/18 9/23/18 Yes Timothy Terry MD   hydrochlorothiazide (HYDRODIURIL) 25 MG tablet Take 1 tablet by mouth daily 3/27/18 9/23/18 Yes Timothy Terry MD   amLODIPine (NORVASC) 5 MG tablet Take 1 tablet by mouth daily 3/27/18 9/23/18 Yes Collette Shires, MD   Multiple Vitamins-Minerals (THERAPEUTIC MULTIVITAMIN-MINERALS) tablet Take 1 tablet by mouth daily 3/27/18 9/23/18 Yes Collette Shires, MD   warfarin (COUMADIN) 3 MG tablet Take 1 tablet by mouth daily 3/27/18  Yes Collette Shires, MD   atorvastatin (LIPITOR) 40 MG tablet Take 1 tablet by mouth daily 3/27/18 9/23/18  Timothy Terry MD       Allergies:  Ultram [tramadol]    Social History:   TOBACCO:   reports that he has been smoking Cigarettes. He has a 3.10 pack-year smoking history. He has never used smokeless tobacco.  ETOH:   reports that he drinks about 1.2 oz of alcohol per week .       Family History:   Family History   Problem Relation Age of Onset    Diabetes Mother     High Blood Pressure Mother     Cancer Father     Diabetes Brother        REVIEW OF SYSTEMS:    · Constitutional: No fever, no chills, no change in weight; increased in size when compared to the prior study. The ureters appear normal in caliber and course. No retroperitoneal or pelvic masses or lymphadenopathy is seen. There has been aortic aneurysm repair with a subcutaneous bypass graft on the right extending from the chest to the right femoral artery. The proximal portion of the graft is not completely included on the study. There is a graft extending between the right and left femoral arteries. The femoral arteries are patent bilaterally. There is moderate intramural thrombus involving the right proximal femoral artery which has a diameter of approximately 2.1 x 2.5 cm. There is low attenuation in the lumen of the left external iliac, common femoral and proximal superficial femoral veins compatible with DVT. There are multiple surgical clips in the retroperitoneum. The upper abdominal aorta is normal in caliber. The urinary bladder appears unremarkable. The seminal vesicles and prostate gland appear unremarkable. No free fluid is seen. No focal bowel inflammatory changes or obstruction is seen. There are a few scattered colonic diverticuli. There is no evidence of acute diverticulitis. A normal appendix is identified. There is no pneumoperitoneum. The regional bony structures appear intact. There is mild to moderate facet joint arthrosis in the lower lumbar spine. CONCLUSION: 1. No acute inflammatory process seen in the abdomen or pelvis. 2. Status post abdominal aortic aneurysm repair with resection of the distal abdominal aorta and with bypass graft extending from the chest to the right femoral artery, most likely in an axillo-femoral graft and there is a femorofemoral graft in place. The femoral arteries are patent. There is mild dilatation of the proximal right superficial femoral artery measuring 2.5 x 2.1 cm containing moderate intramural thrombus.  3. Filling defects seen in the left external iliac, common femoral and proximal superficial femoral veins,

## 2018-07-11 VITALS
SYSTOLIC BLOOD PRESSURE: 122 MMHG | HEIGHT: 74 IN | TEMPERATURE: 97.5 F | HEART RATE: 76 BPM | BODY MASS INDEX: 22.46 KG/M2 | WEIGHT: 175 LBS | OXYGEN SATURATION: 96 % | RESPIRATION RATE: 20 BRPM | DIASTOLIC BLOOD PRESSURE: 80 MMHG

## 2018-07-11 PROBLEM — I82.413 ACUTE BILATERAL DEEP VEIN THROMBOSIS (DVT) OF FEMORAL VEINS (HCC): Status: RESOLVED | Noted: 2018-07-09 | Resolved: 2018-07-11

## 2018-07-11 PROBLEM — D62 ANEMIA DUE TO ACUTE BLOOD LOSS: Status: RESOLVED | Noted: 2018-07-10 | Resolved: 2018-07-11

## 2018-07-11 LAB
ANION GAP SERPL CALCULATED.3IONS-SCNC: 14 MMOL/L (ref 7–16)
BASOPHILS ABSOLUTE: 0.04 E9/L (ref 0–0.2)
BASOPHILS RELATIVE PERCENT: 0.5 % (ref 0–2)
BUN BLDV-MCNC: 12 MG/DL (ref 6–20)
CALCIUM SERPL-MCNC: 9.2 MG/DL (ref 8.6–10.2)
CHLORIDE BLD-SCNC: 105 MMOL/L (ref 98–107)
CO2: 22 MMOL/L (ref 22–29)
CREAT SERPL-MCNC: 1 MG/DL (ref 0.7–1.2)
EOSINOPHILS ABSOLUTE: 0.21 E9/L (ref 0.05–0.5)
EOSINOPHILS RELATIVE PERCENT: 2.6 % (ref 0–6)
GFR AFRICAN AMERICAN: >60
GFR NON-AFRICAN AMERICAN: >60 ML/MIN/1.73
GLUCOSE BLD-MCNC: 102 MG/DL (ref 74–109)
HCT VFR BLD CALC: 41 % (ref 37–54)
HEMOGLOBIN: 14.3 G/DL (ref 12.5–16.5)
IMMATURE GRANULOCYTES #: 0.03 E9/L
IMMATURE GRANULOCYTES %: 0.4 % (ref 0–5)
INR BLD: 2.2
LYMPHOCYTES ABSOLUTE: 2.57 E9/L (ref 1.5–4)
LYMPHOCYTES RELATIVE PERCENT: 32.3 % (ref 20–42)
MCH RBC QN AUTO: 33.3 PG (ref 26–35)
MCHC RBC AUTO-ENTMCNC: 34.9 % (ref 32–34.5)
MCV RBC AUTO: 95.6 FL (ref 80–99.9)
MONOCYTES ABSOLUTE: 0.61 E9/L (ref 0.1–0.95)
MONOCYTES RELATIVE PERCENT: 7.7 % (ref 2–12)
NEUTROPHILS ABSOLUTE: 4.5 E9/L (ref 1.8–7.3)
NEUTROPHILS RELATIVE PERCENT: 56.5 % (ref 43–80)
PDW BLD-RTO: 12.4 FL (ref 11.5–15)
PLATELET # BLD: 151 E9/L (ref 130–450)
PMV BLD AUTO: 10.5 FL (ref 7–12)
POTASSIUM REFLEX MAGNESIUM: 3.7 MMOL/L (ref 3.5–5)
PROTHROMBIN TIME: 25.3 SEC (ref 9.3–12.4)
RBC # BLD: 4.29 E12/L (ref 3.8–5.8)
SODIUM BLD-SCNC: 141 MMOL/L (ref 132–146)
WBC # BLD: 8 E9/L (ref 4.5–11.5)

## 2018-07-11 PROCEDURE — 2500000003 HC RX 250 WO HCPCS: Performed by: INTERNAL MEDICINE

## 2018-07-11 PROCEDURE — 36415 COLL VENOUS BLD VENIPUNCTURE: CPT

## 2018-07-11 PROCEDURE — 6360000002 HC RX W HCPCS: Performed by: INTERNAL MEDICINE

## 2018-07-11 PROCEDURE — 85025 COMPLETE CBC W/AUTO DIFF WBC: CPT

## 2018-07-11 PROCEDURE — 99231 SBSQ HOSP IP/OBS SF/LOW 25: CPT | Performed by: INTERNAL MEDICINE

## 2018-07-11 PROCEDURE — G0378 HOSPITAL OBSERVATION PER HR: HCPCS

## 2018-07-11 PROCEDURE — 2580000003 HC RX 258: Performed by: INTERNAL MEDICINE

## 2018-07-11 PROCEDURE — 80048 BASIC METABOLIC PNL TOTAL CA: CPT

## 2018-07-11 PROCEDURE — 85610 PROTHROMBIN TIME: CPT

## 2018-07-11 PROCEDURE — 6370000000 HC RX 637 (ALT 250 FOR IP): Performed by: INTERNAL MEDICINE

## 2018-07-11 RX ORDER — WARFARIN SODIUM 2 MG/1
2 TABLET ORAL DAILY
Qty: 30 TABLET | Refills: 0 | Status: SHIPPED | OUTPATIENT
Start: 2018-07-11 | End: 2018-12-31

## 2018-07-11 RX ORDER — WARFARIN SODIUM 2 MG/1
2 TABLET ORAL DAILY
Status: DISCONTINUED | OUTPATIENT
Start: 2018-07-11 | End: 2018-07-11 | Stop reason: HOSPADM

## 2018-07-11 RX ADMIN — GABAPENTIN 200 MG: 100 CAPSULE ORAL at 14:05

## 2018-07-11 RX ADMIN — GABAPENTIN 200 MG: 100 CAPSULE ORAL at 10:21

## 2018-07-11 RX ADMIN — FOLIC ACID 1 MG: 5 INJECTION, SOLUTION INTRAMUSCULAR; INTRAVENOUS; SUBCUTANEOUS at 10:25

## 2018-07-11 RX ADMIN — AMLODIPINE BESYLATE 5 MG: 5 TABLET ORAL at 10:21

## 2018-07-11 RX ADMIN — LISINOPRIL 10 MG: 10 TABLET ORAL at 10:21

## 2018-07-11 RX ADMIN — HYDROCHLOROTHIAZIDE 25 MG: 25 TABLET ORAL at 10:20

## 2018-07-11 RX ADMIN — THIAMINE HYDROCHLORIDE 100 MG: 100 INJECTION, SOLUTION INTRAMUSCULAR; INTRAVENOUS at 10:22

## 2018-07-11 RX ADMIN — ATORVASTATIN CALCIUM 40 MG: 40 TABLET, FILM COATED ORAL at 10:20

## 2018-07-11 RX ADMIN — Medication 10 ML: at 10:22

## 2018-07-11 ASSESSMENT — PAIN SCALES - GENERAL
PAINLEVEL_OUTOF10: 0
PAINLEVEL_OUTOF10: 0

## 2018-07-11 NOTE — DISCHARGE SUMMARY
Internal Medicine Department   Discharge summary    Patient ID:  Delmy Ragland  61 y.o.  1960    Admission Date: 7/9/2018     Discharge Date:      Clinic doctor:   Dr. Pollack Poster team:  1    Discharge Dx:  1.GI bleeding? ? - hemoccult (-) - Rectal Exam: no hemorrhoids, FOB neg  2. Subtherapeutic INR - resolved  3. JOSE- resolved  4. Peripheral vascular disease, s/p multiple vascular surgeries with several grafts in situ (aortabifemoral, fem-fem, fem popliteal right side)   5. Essential hypertension  6. Neuropathy  7. Abdominal aortic anuerysm: s/p repair  8. Asthma: No acute exacerbation  9. Tobacco Abuse  10. Alcohol abuse    HPI:  The patient is a 61 y.o. male with a PMH of HTN, DVT (lupus anticoagulant positive on chronic anticoagulation with coumadin), asthma, hyperlipidemia, AAA and PVD s/p multiple vascular surgeries, Neuropathy, tobacco and alcohol abuse, who presented to the ED with a 3 day history of passage of bright red blood per rectum. He has had 3 episodes in the last 3 days, There was no associated diarrhea, abdominal pain, nausea or vomiting. No recent change in medication, no dietary changes, no pain on defecation. He has no hx of peptic ulcer disease. He complained of intermittent pain in both LE, no numbness or tingling. ED Course: Ct abdomen with contrast shows DVT in the left external iliac, common femoral and proximal superficial femoral veins  ED Meds: Patient was given Gabapentin 200mg once  ED Fluids: Patient was given 0.9% Nacl, 1000ml once, and 10ml flush once    Consults:  - Vascular surgery     Procedures:  No discharge procedures on file. Brief summary of the patient course: The patient was admitted as mentioned in HPI. Vascular surgery was consulted for susptected DVT on Ab CT, but Duplex US showed (-) DVT. The patient also denied IVC filter procedure. Coumadin was held. INR resolved to 2.2 today. The patient's hemoccult was negative. He was stable clinically.  The times daily for 180 days.      hydrochlorothiazide 25 MG tablet  Commonly known as:  HYDRODIURIL  Take 1 tablet by mouth daily     lisinopril 10 MG tablet  Commonly known as:  PRINIVIL;ZESTRIL  Take 1 tablet by mouth daily     therapeutic multivitamin-minerals tablet  Take 1 tablet by mouth daily           Where to Get Your Medications      These medications were sent [facility] [address] [address] [facility] [phone] [address] [address]    Phone:  [phone]   · warfarin 2 MG tablet         Discharge Instructions:   Diet: general   Activity: as tolerated  Be compliant with medication    Disposition: home    Attending Physician: Dr. Gera Bridges MD PGY2    7/11/2018 10:09 AM

## 2018-07-11 NOTE — PROGRESS NOTES
[address] [facility]  Internal Medicine Residency / [address]    Attending Physician Statement  I have discussed the case, including pertinent history and exam findings with the resident and the team.  I have seen and examined the patient and the key elements of the encounter have been performed by me. I agree with the assessment, plan and orders as documented by the resident. No further GI bleeding  VS stable  INR remains supra therapeutic for DVT at 5.0  Colonoscopy 2 years ago  FOB negative? ?  Plan; Consider early discharge planning    Remainder of medical problems as per resident note.       Michael Newsome  Internal Medicine Residency Faculty
[address] [facility]  Internal Medicine Residency / [address]    Attending Physician Statement  I have discussed the case, including pertinent history and exam findings with the resident and the team.  I have seen and examined the patient and the key elements of the encounter have been performed by me. I agree with the assessment, plan and orders as documented by the resident. UP AND AROUND  INR 2.2 AND Hb stable  VS stable  FOB negative? Plan: Discharge on same warfarin            Follow up in clinic    Remainder of medical problems as per resident note.       Black Garcia  Internal Medicine Residency Faculty
Vascular surgery consult sent through [address] [place] [address] at  on 7/9/18.
INR    Resume AC as able per primary service  No vascular intervention planned. OK for DC from vascular standpoint. Please call as needed.     Electronically signed by Abdulaziz Marroquin DO on 7/11/2018 at 10:28 AM
wants no part of a filter. From our standpoint secondary to his multiple bypasses as well quite neuropathy history anticoagulation would be in his best interest. As long as there is no urgency or active bleeding I would allow the INR to return to a reasonable number followed by continued anticoagulation and monitoring.     Carisa Miles

## 2018-07-18 ENCOUNTER — OFFICE VISIT (OUTPATIENT)
Dept: INTERNAL MEDICINE | Age: 58
End: 2018-07-18
Payer: COMMERCIAL

## 2018-07-18 VITALS
DIASTOLIC BLOOD PRESSURE: 72 MMHG | TEMPERATURE: 97.8 F | BODY MASS INDEX: 21.34 KG/M2 | HEART RATE: 90 BPM | RESPIRATION RATE: 16 BRPM | WEIGHT: 166.3 LBS | HEIGHT: 74 IN | SYSTOLIC BLOOD PRESSURE: 112 MMHG

## 2018-07-18 DIAGNOSIS — I73.9 PAD (PERIPHERAL ARTERY DISEASE) (HCC): ICD-10-CM

## 2018-07-18 DIAGNOSIS — I82.499 DEEP VEIN THROMBOSIS (DVT) OF OTHER VEIN OF LOWER EXTREMITY, UNSPECIFIED CHRONICITY, UNSPECIFIED LATERALITY (HCC): ICD-10-CM

## 2018-07-18 DIAGNOSIS — G62.9 NEUROPATHY: ICD-10-CM

## 2018-07-18 DIAGNOSIS — I10 ESSENTIAL HYPERTENSION: ICD-10-CM

## 2018-07-18 LAB
INTERNATIONAL NORMALIZATION RATIO, POC: 1.2
PROTHROMBIN TIME, POC: 14.9

## 2018-07-18 PROCEDURE — 99213 OFFICE O/P EST LOW 20 MIN: CPT | Performed by: INTERNAL MEDICINE

## 2018-07-18 PROCEDURE — 4004F PT TOBACCO SCREEN RCVD TLK: CPT | Performed by: INTERNAL MEDICINE

## 2018-07-18 PROCEDURE — G8420 CALC BMI NORM PARAMETERS: HCPCS | Performed by: INTERNAL MEDICINE

## 2018-07-18 PROCEDURE — 85610 PROTHROMBIN TIME: CPT | Performed by: INTERNAL MEDICINE

## 2018-07-18 PROCEDURE — 3017F COLORECTAL CA SCREEN DOC REV: CPT | Performed by: INTERNAL MEDICINE

## 2018-07-18 PROCEDURE — G8427 DOCREV CUR MEDS BY ELIG CLIN: HCPCS | Performed by: INTERNAL MEDICINE

## 2018-07-18 PROCEDURE — 99212 OFFICE O/P EST SF 10 MIN: CPT | Performed by: INTERNAL MEDICINE

## 2018-07-18 RX ORDER — LISINOPRIL 10 MG/1
10 TABLET ORAL DAILY
Qty: 90 TABLET | Refills: 2 | Status: SHIPPED | OUTPATIENT
Start: 2018-07-18 | End: 2019-07-22 | Stop reason: SDUPTHER

## 2018-07-18 RX ORDER — GABAPENTIN 100 MG/1
200 CAPSULE ORAL 3 TIMES DAILY
Qty: 540 CAPSULE | Refills: 2 | Status: SHIPPED
Start: 2018-07-18 | End: 2019-01-14

## 2018-07-18 RX ORDER — HYDROCHLOROTHIAZIDE 25 MG/1
25 TABLET ORAL DAILY
Qty: 90 TABLET | Refills: 2 | Status: SHIPPED | OUTPATIENT
Start: 2018-07-18 | End: 2019-01-14

## 2018-07-18 RX ORDER — ATORVASTATIN CALCIUM 40 MG/1
40 TABLET, FILM COATED ORAL DAILY
Qty: 90 TABLET | Refills: 2 | Status: SHIPPED | OUTPATIENT
Start: 2018-07-18 | End: 2019-01-14

## 2018-07-18 RX ORDER — M-VIT,TX,IRON,MINS/CALC/FOLIC 27MG-0.4MG
1 TABLET ORAL DAILY
Qty: 180 TABLET | Refills: 1 | Status: SHIPPED
Start: 2018-07-18 | End: 2019-01-14

## 2018-07-18 RX ORDER — AMLODIPINE BESYLATE 5 MG/1
5 TABLET ORAL DAILY
Qty: 90 TABLET | Refills: 2 | Status: SHIPPED | OUTPATIENT
Start: 2018-07-18 | End: 2019-01-14

## 2018-07-18 ASSESSMENT — ENCOUNTER SYMPTOMS
SHORTNESS OF BREATH: 0
COUGH: 0
BLOOD IN STOOL: 0
CHEST TIGHTNESS: 0
ABDOMINAL PAIN: 0
ANAL BLEEDING: 0
VOMITING: 0
NAUSEA: 0
DIARRHEA: 0
BACK PAIN: 0

## 2018-07-18 NOTE — PROGRESS NOTES
for dizziness (Increased with reading). Negative for tremors, seizures and headaches. Objective:   Physical Exam   Constitutional: He is oriented to person, place, and time. He appears well-developed and well-nourished. HENT:   Head: Normocephalic and atraumatic. Eyes: Pupils are equal, round, and reactive to light. Patient was squinting on physical exam.   Neck: Normal range of motion. Neck supple. Cardiovascular: Normal rate, regular rhythm, normal heart sounds and intact distal pulses. Pulmonary/Chest: Effort normal and breath sounds normal.   Abdominal: Soft. Bowel sounds are normal.   Musculoskeletal: Normal range of motion. Neurological: He is alert and oriented to person, place, and time. Assessment:    Dizziness  Hx of DVT / lupus anticoagulant positive  Hypertension  INR (1.2 from 2.2)   Tobacco abuse   PVD  Plan:    Dizziness:    - Patient said his dizziness increases with reading, following with ophthalmologist but does not wear his glasses. - Discussed with the pt the importance of wearing his glasses    Hypertension:   - BP is at 112/72   - Blood pressure meds to be continued    Hx of DVT / lupus anticoagulant positive, INR (1.2 from 2.2)   - Patient was on 2 mg Coumadin after hospital discharge   - Patient ate lots of grapes last night   - Change to alternating doses (starting on 3 mg today)   - Will be back in 1 week to re-check on INR    Tobacco abuse   - Patient went back to smoking after he lost his mom   - Discussed with him the importance of quitting and offered programs to help quitting, he wants to try one more time on his own    Health maintenance   - Patient agreed to get his shingles today.    - Will think about HIV and HCV screening for his next apointment        I have reviewed my findings and recommendations Dr Mike Dakin, MD PGY-1

## 2018-07-25 ENCOUNTER — NURSE ONLY (OUTPATIENT)
Dept: INTERNAL MEDICINE | Age: 58
End: 2018-07-25
Payer: COMMERCIAL

## 2018-07-25 DIAGNOSIS — Z79.01 CHRONIC ANTICOAGULATION: Chronic | ICD-10-CM

## 2018-07-25 DIAGNOSIS — I82.499 DEEP VEIN THROMBOSIS (DVT) OF OTHER VEIN OF LOWER EXTREMITY, UNSPECIFIED CHRONICITY, UNSPECIFIED LATERALITY (HCC): ICD-10-CM

## 2018-07-25 LAB
INTERNATIONAL NORMALIZATION RATIO, POC: 1.3
PROTHROMBIN TIME, POC: 15.1

## 2018-07-25 PROCEDURE — 93793 ANTICOAG MGMT PT WARFARIN: CPT | Performed by: INTERNAL MEDICINE

## 2018-07-25 PROCEDURE — 85610 PROTHROMBIN TIME: CPT | Performed by: INTERNAL MEDICINE

## 2018-07-25 RX ORDER — WARFARIN SODIUM 3 MG/1
TABLET ORAL DAILY
COMMUNITY
End: 2018-08-01 | Stop reason: SDUPTHER

## 2018-07-25 RX ORDER — WARFARIN SODIUM 3 MG/1
3 TABLET ORAL DAILY
Qty: 30 TABLET | Refills: 1 | Status: CANCELLED | OUTPATIENT
Start: 2018-07-25

## 2018-07-25 RX ORDER — WARFARIN SODIUM 2 MG/1
2 TABLET ORAL DAILY
Qty: 30 TABLET | Refills: 1 | Status: CANCELLED | OUTPATIENT
Start: 2018-07-25

## 2018-07-25 NOTE — PATIENT INSTRUCTIONS
are pregnant or planning to get pregnant. Talk to your doctor about how you can prevent getting pregnant while you are taking it. · Don't change your dose or stop taking warfarin unless your doctor tells you to. Effects of medicines and food on warfarin  · Don't start or stop taking any medicines, vitamins, or natural remedies unless you first talk to your doctor. Many medicines can affect how warfarin works. These include aspirin and other pain relievers, over-the-counter medicines, multivitamins, dietary supplements, and herbal products. · Tell all of your doctors and pharmacists that you take warfarin. Some prescription medicines can affect how warfarin works. · Keep the amount of vitamin K in your diet about the same from day to day. Do not suddenly eat a lot more or a lot less food that is rich in vitamin K than you usually do. Vitamin K affects how warfarin works and how your blood clots. Talk with your doctor before making big changes in your diet. Foods that have a lot of vitamin K include cooked green vegetables, such as:  ¨ Kale, spinach, turnip greens, josé greens, Swiss chard, and mustard greens. ¨ [place] sprouts, broccoli, and asparagus. · Limit your use of alcohol. Avoid bleeding by preventing falls and injuries  · Wear slippers or shoes with nonskid soles. · Remove throw rugs and clutter. · Rearrange furniture and electrical cords to keep them out of walking paths. · Keep stairways, porches, and outside walkways well lit. Use night-lights in hallways and bathrooms. · Be extra careful when you work with sharp tools or knives. When should you call for help? Call 911 anytime you think you may need emergency care. For example, call if:    · You have a sudden, severe headache that is different from past headaches.    Call your doctor now or seek immediate medical care if:    · You have any abnormal bleeding, such as:  ¨ Nosebleeds.   ¨ Vaginal bleeding that is different (heavier, more

## 2018-07-25 NOTE — PROGRESS NOTES
Patient instructed to take coumadin 3 mg mon/wed/fri/sat and take coumadin 2 mg on tues/thurs/sun  Patient to repeat INR in 1 week  Bleeding precautions reviewed  Patient verbalizes understanding  AVS mailed

## 2018-07-26 ENCOUNTER — TELEPHONE (OUTPATIENT)
Dept: INTERNAL MEDICINE | Age: 58
End: 2018-07-26

## 2018-08-01 ENCOUNTER — NURSE ONLY (OUTPATIENT)
Dept: INTERNAL MEDICINE | Age: 58
End: 2018-08-01
Payer: COMMERCIAL

## 2018-08-01 DIAGNOSIS — I82.499 DEEP VEIN THROMBOSIS (DVT) OF OTHER VEIN OF LOWER EXTREMITY, UNSPECIFIED CHRONICITY, UNSPECIFIED LATERALITY (HCC): ICD-10-CM

## 2018-08-01 LAB
INTERNATIONAL NORMALIZATION RATIO, POC: 1.4
PROTHROMBIN TIME, POC: 16.7

## 2018-08-01 PROCEDURE — 93793 ANTICOAG MGMT PT WARFARIN: CPT | Performed by: INTERNAL MEDICINE

## 2018-08-01 PROCEDURE — 85610 PROTHROMBIN TIME: CPT | Performed by: INTERNAL MEDICINE

## 2018-08-01 RX ORDER — WARFARIN SODIUM 3 MG/1
3 TABLET ORAL DAILY
Qty: 30 TABLET | Refills: 3 | Status: SHIPPED | OUTPATIENT
Start: 2018-08-01 | End: 2018-12-31

## 2018-08-09 ENCOUNTER — NURSE ONLY (OUTPATIENT)
Dept: INTERNAL MEDICINE | Age: 58
End: 2018-08-09
Payer: COMMERCIAL

## 2018-08-09 DIAGNOSIS — I82.499 DEEP VEIN THROMBOSIS (DVT) OF OTHER VEIN OF LOWER EXTREMITY, UNSPECIFIED CHRONICITY, UNSPECIFIED LATERALITY (HCC): ICD-10-CM

## 2018-08-09 LAB
INTERNATIONAL NORMALIZATION RATIO, POC: 1.9
PROTHROMBIN TIME, POC: 23.4

## 2018-08-09 PROCEDURE — 85610 PROTHROMBIN TIME: CPT | Performed by: INTERNAL MEDICINE

## 2018-08-09 PROCEDURE — 93793 ANTICOAG MGMT PT WARFARIN: CPT | Performed by: INTERNAL MEDICINE

## 2018-08-09 NOTE — PROGRESS NOTES
Coumadin instructions given per Dr. Carrie Lugo. Pt left will call with instructions and next INR appointment. Printed AVS mailed to pt.

## 2018-08-16 ENCOUNTER — NURSE ONLY (OUTPATIENT)
Dept: INTERNAL MEDICINE | Age: 58
End: 2018-08-16
Payer: COMMERCIAL

## 2018-08-16 DIAGNOSIS — I82.499 DEEP VEIN THROMBOSIS (DVT) OF OTHER VEIN OF LOWER EXTREMITY, UNSPECIFIED CHRONICITY, UNSPECIFIED LATERALITY (HCC): ICD-10-CM

## 2018-08-16 LAB
INTERNATIONAL NORMALIZATION RATIO, POC: 1.9
PROTHROMBIN TIME, POC: 23.3

## 2018-08-16 PROCEDURE — 85610 PROTHROMBIN TIME: CPT | Performed by: INTERNAL MEDICINE

## 2018-08-16 PROCEDURE — 93793 ANTICOAG MGMT PT WARFARIN: CPT | Performed by: INTERNAL MEDICINE

## 2018-08-16 NOTE — PROGRESS NOTES
Patient notified to Continue coumadin 3 mg daily and repeat INR in 2 weeks  Bleeding precautions reviewed  AVS mailed

## 2018-08-16 NOTE — PATIENT INSTRUCTIONS
continue coumadin 3 mg daily and repeat INR in 2 weeks  Bleeding precautions as reviewed  repeat INR in 2 weeks  Please call if any questions or concerns  Patient Education        Taking Warfarin Safely: Care Instructions  Your Care Instructions    Warfarin is a medicine that you take to prevent blood clots. It is often called a blood thinner. Doctors give warfarin (such as Coumadin) to reduce the risk of blood clots. You may be at risk for blood clots if you have atrial fibrillation or deep vein thrombosis. Some other health problems may also put you at risk. Warfarin slows the amount of time it takes for your blood to clot. It can cause bleeding problems. Even if you've been taking warfarin for a while, it's important to know how to take it safely. Foods and other medicines can affect the way warfarin works. Some can make warfarin work too well. This can cause bleeding problems. And some can make it work poorly, so that it does not prevent blood clots very well. You will need regular blood tests to check how long it takes for your blood to form a clot. This test is called a PT or prothrombin time test. The result of the test is called an INR level. Depending on the test results, your doctor or anticoagulation clinic may adjust your dose of warfarin. Follow-up care is a key part of your treatment and safety. Be sure to make and go to all appointments, and call your doctor if you are having problems. It's also a good idea to know your test results and keep a list of the medicines you take. How can you care for yourself at home? Take warfarin safely  · Take your warfarin at the same time each day. · If you miss a dose of warfarin, don't take an extra dose to make up for it. Your doctor can tell you exactly what to do so you don't take too much or too little. · Wear medical alert jewelry that lets others know that you take warfarin. You can buy this at most drugstores.   · Don't take warfarin if you are pregnant or planning to get pregnant. Talk to your doctor about how you can prevent getting pregnant while you are taking it. · Don't change your dose or stop taking warfarin unless your doctor tells you to. Effects of medicines and food on warfarin  · Don't start or stop taking any medicines, vitamins, or natural remedies unless you first talk to your doctor. Many medicines can affect how warfarin works. These include aspirin and other pain relievers, over-the-counter medicines, multivitamins, dietary supplements, and herbal products. · Tell all of your doctors and pharmacists that you take warfarin. Some prescription medicines can affect how warfarin works. · Keep the amount of vitamin K in your diet about the same from day to day. Do not suddenly eat a lot more or a lot less food that is rich in vitamin K than you usually do. Vitamin K affects how warfarin works and how your blood clots. Talk with your doctor before making big changes in your diet. Foods that have a lot of vitamin K include cooked green vegetables, such as:  ¨ Kale, spinach, turnip greens, josé greens, Swiss chard, and mustard greens. ¨ [place] sprouts, broccoli, and asparagus. · Limit your use of alcohol. Avoid bleeding by preventing falls and injuries  · Wear slippers or shoes with nonskid soles. · Remove throw rugs and clutter. · Rearrange furniture and electrical cords to keep them out of walking paths. · Keep stairways, porches, and outside walkways well lit. Use night-lights in hallways and bathrooms. · Be extra careful when you work with sharp tools or knives. When should you call for help? Call 911 anytime you think you may need emergency care. For example, call if:    · You have a sudden, severe headache that is different from past headaches.    Call your doctor now or seek immediate medical care if:    · You have any abnormal bleeding, such as:  ¨ Nosebleeds.   ¨ Vaginal bleeding that is different (heavier, more

## 2018-08-30 ENCOUNTER — NURSE ONLY (OUTPATIENT)
Dept: INTERNAL MEDICINE | Age: 58
End: 2018-08-30
Payer: COMMERCIAL

## 2018-08-30 DIAGNOSIS — I82.499 DEEP VEIN THROMBOSIS (DVT) OF OTHER VEIN OF LOWER EXTREMITY, UNSPECIFIED CHRONICITY, UNSPECIFIED LATERALITY (HCC): ICD-10-CM

## 2018-08-30 LAB
INTERNATIONAL NORMALIZATION RATIO, POC: 1.8
PROTHROMBIN TIME, POC: 22.1

## 2018-08-30 PROCEDURE — 85610 PROTHROMBIN TIME: CPT | Performed by: INTERNAL MEDICINE

## 2018-08-30 RX ORDER — WARFARIN SODIUM 4 MG/1
4 TABLET ORAL DAILY
Qty: 30 TABLET | Refills: 0 | Status: SHIPPED | OUTPATIENT
Start: 2018-08-30 | End: 2018-12-31 | Stop reason: SDUPTHER

## 2018-08-30 NOTE — PATIENT INSTRUCTIONS
Your new dosing of coumadin is to alternate 3mgs and 4 mgs of coumadin and you will need to recheck your inr in one week

## 2018-08-30 NOTE — PROGRESS NOTES
Called pt and instructed him to start taking alternating doses of 3 and 4 mgs of coumadin and recheck inr in one week  Order placed for 4mgs of coumadin per v/o from dr Manas Otero and pt informed of this understanding verbalized avs mailed

## 2018-09-06 ENCOUNTER — NURSE ONLY (OUTPATIENT)
Dept: INTERNAL MEDICINE | Age: 58
End: 2018-09-06
Payer: COMMERCIAL

## 2018-09-06 DIAGNOSIS — I82.499 DEEP VEIN THROMBOSIS (DVT) OF OTHER VEIN OF LOWER EXTREMITY, UNSPECIFIED CHRONICITY, UNSPECIFIED LATERALITY (HCC): ICD-10-CM

## 2018-09-06 LAB
INTERNATIONAL NORMALIZATION RATIO, POC: 2.8
PROTHROMBIN TIME, POC: 33.9

## 2018-09-06 PROCEDURE — 93793 ANTICOAG MGMT PT WARFARIN: CPT | Performed by: INTERNAL MEDICINE

## 2018-09-06 PROCEDURE — 85610 PROTHROMBIN TIME: CPT | Performed by: INTERNAL MEDICINE

## 2018-09-14 ENCOUNTER — APPOINTMENT (OUTPATIENT)
Dept: ULTRASOUND IMAGING | Age: 58
End: 2018-09-14
Payer: COMMERCIAL

## 2018-09-14 ENCOUNTER — HOSPITAL ENCOUNTER (EMERGENCY)
Age: 58
Discharge: HOME | End: 2018-09-14
Attending: EMERGENCY MEDICINE
Payer: COMMERCIAL

## 2018-09-14 VITALS
SYSTOLIC BLOOD PRESSURE: 189 MMHG | DIASTOLIC BLOOD PRESSURE: 101 MMHG | OXYGEN SATURATION: 94 % | HEART RATE: 104 BPM | HEIGHT: 74 IN | TEMPERATURE: 97.8 F | RESPIRATION RATE: 20 BRPM | WEIGHT: 170 LBS | BODY MASS INDEX: 21.82 KG/M2

## 2018-09-14 DIAGNOSIS — M79.605 PAIN OF LEFT LOWER EXTREMITY: ICD-10-CM

## 2018-09-14 DIAGNOSIS — I16.0 HYPERTENSIVE URGENCY: ICD-10-CM

## 2018-09-14 DIAGNOSIS — M54.30 SCIATICA, UNSPECIFIED LATERALITY: Primary | ICD-10-CM

## 2018-09-14 LAB
ALBUMIN SERPL-MCNC: 4.4 G/DL (ref 3.5–5.2)
ALP BLD-CCNC: 85 U/L (ref 40–129)
ALT SERPL-CCNC: 13 U/L (ref 0–40)
ANION GAP SERPL CALCULATED.3IONS-SCNC: 16 MMOL/L (ref 7–16)
AST SERPL-CCNC: 25 U/L (ref 0–39)
BASOPHILS ABSOLUTE: 0.03 E9/L (ref 0–0.2)
BASOPHILS RELATIVE PERCENT: 0.3 % (ref 0–2)
BILIRUB SERPL-MCNC: 0.5 MG/DL (ref 0–1.2)
BUN BLDV-MCNC: 16 MG/DL (ref 6–20)
CALCIUM SERPL-MCNC: 10 MG/DL (ref 8.6–10.2)
CHLORIDE BLD-SCNC: 102 MMOL/L (ref 98–107)
CO2: 21 MMOL/L (ref 22–29)
CREAT SERPL-MCNC: 1.2 MG/DL (ref 0.7–1.2)
EKG ATRIAL RATE: 94 BPM
EKG P AXIS: 83 DEGREES
EKG P-R INTERVAL: 162 MS
EKG Q-T INTERVAL: 372 MS
EKG QRS DURATION: 94 MS
EKG QTC CALCULATION (BAZETT): 465 MS
EKG R AXIS: -40 DEGREES
EKG T AXIS: 28 DEGREES
EKG VENTRICULAR RATE: 94 BPM
EOSINOPHILS ABSOLUTE: 0.06 E9/L (ref 0.05–0.5)
EOSINOPHILS RELATIVE PERCENT: 0.6 % (ref 0–6)
GFR AFRICAN AMERICAN: >60
GFR NON-AFRICAN AMERICAN: >60 ML/MIN/1.73
GLUCOSE BLD-MCNC: 126 MG/DL (ref 74–109)
HCT VFR BLD CALC: 42.7 % (ref 37–54)
HEMOGLOBIN: 14.9 G/DL (ref 12.5–16.5)
IMMATURE GRANULOCYTES #: 0.05 E9/L
IMMATURE GRANULOCYTES %: 0.5 % (ref 0–5)
INR BLD: 2.3
LYMPHOCYTES ABSOLUTE: 1.52 E9/L (ref 1.5–4)
LYMPHOCYTES RELATIVE PERCENT: 14.2 % (ref 20–42)
MCH RBC QN AUTO: 33.6 PG (ref 26–35)
MCHC RBC AUTO-ENTMCNC: 34.9 % (ref 32–34.5)
MCV RBC AUTO: 96.2 FL (ref 80–99.9)
MONOCYTES ABSOLUTE: 0.66 E9/L (ref 0.1–0.95)
MONOCYTES RELATIVE PERCENT: 6.2 % (ref 2–12)
NEUTROPHILS ABSOLUTE: 8.37 E9/L (ref 1.8–7.3)
NEUTROPHILS RELATIVE PERCENT: 78.2 % (ref 43–80)
PDW BLD-RTO: 12.5 FL (ref 11.5–15)
PLATELET # BLD: 158 E9/L (ref 130–450)
PMV BLD AUTO: 11.1 FL (ref 7–12)
POTASSIUM SERPL-SCNC: 3.9 MMOL/L (ref 3.5–5)
PROTHROMBIN TIME: 26.2 SEC (ref 9.3–12.4)
RBC # BLD: 4.44 E12/L (ref 3.8–5.8)
SODIUM BLD-SCNC: 139 MMOL/L (ref 132–146)
TOTAL PROTEIN: 7.7 G/DL (ref 6.4–8.3)
TROPONIN: <0.01 NG/ML (ref 0–0.03)
WBC # BLD: 10.7 E9/L (ref 4.5–11.5)

## 2018-09-14 PROCEDURE — 93005 ELECTROCARDIOGRAM TRACING: CPT | Performed by: PHYSICIAN ASSISTANT

## 2018-09-14 PROCEDURE — 85610 PROTHROMBIN TIME: CPT

## 2018-09-14 PROCEDURE — 85025 COMPLETE CBC W/AUTO DIFF WBC: CPT

## 2018-09-14 PROCEDURE — 80053 COMPREHEN METABOLIC PANEL: CPT

## 2018-09-14 PROCEDURE — 99284 EMERGENCY DEPT VISIT MOD MDM: CPT

## 2018-09-14 PROCEDURE — 93971 EXTREMITY STUDY: CPT

## 2018-09-14 PROCEDURE — 6370000000 HC RX 637 (ALT 250 FOR IP): Performed by: PHYSICIAN ASSISTANT

## 2018-09-14 PROCEDURE — 6360000002 HC RX W HCPCS: Performed by: EMERGENCY MEDICINE

## 2018-09-14 PROCEDURE — 84484 ASSAY OF TROPONIN QUANT: CPT

## 2018-09-14 PROCEDURE — 96374 THER/PROPH/DIAG INJ IV PUSH: CPT

## 2018-09-14 RX ORDER — CLONIDINE HYDROCHLORIDE 0.1 MG/1
0.2 TABLET ORAL ONCE
Status: COMPLETED | OUTPATIENT
Start: 2018-09-14 | End: 2018-09-14

## 2018-09-14 RX ORDER — ACETAMINOPHEN 500 MG
1000 TABLET ORAL EVERY 6 HOURS PRN
Qty: 30 TABLET | Refills: 0 | Status: SHIPPED | OUTPATIENT
Start: 2018-09-14 | End: 2020-07-21 | Stop reason: ALTCHOICE

## 2018-09-14 RX ORDER — HYDROCODONE BITARTRATE AND ACETAMINOPHEN 5; 325 MG/1; MG/1
1 TABLET ORAL ONCE
Status: COMPLETED | OUTPATIENT
Start: 2018-09-14 | End: 2018-09-14

## 2018-09-14 RX ORDER — CYCLOBENZAPRINE HCL 10 MG
10 TABLET ORAL 3 TIMES DAILY PRN
Qty: 30 TABLET | Refills: 0 | Status: SHIPPED | OUTPATIENT
Start: 2018-09-14 | End: 2018-09-24

## 2018-09-14 RX ORDER — METHYLPREDNISOLONE 4 MG/1
TABLET ORAL
Qty: 1 KIT | Status: SHIPPED | OUTPATIENT
Start: 2018-09-14 | End: 2018-09-20

## 2018-09-14 RX ADMIN — CLONIDINE HYDROCHLORIDE 0.2 MG: 0.1 TABLET ORAL at 15:24

## 2018-09-14 RX ADMIN — HYDROCODONE BITARTRATE AND ACETAMINOPHEN 1 TABLET: 5; 325 TABLET ORAL at 15:31

## 2018-09-14 RX ADMIN — HYDROMORPHONE HYDROCHLORIDE 1 MG: 1 INJECTION, SOLUTION INTRAMUSCULAR; INTRAVENOUS; SUBCUTANEOUS at 17:37

## 2018-09-14 ASSESSMENT — PAIN SCALES - GENERAL
PAINLEVEL_OUTOF10: 10

## 2018-09-14 ASSESSMENT — PAIN DESCRIPTION - LOCATION: LOCATION: LEG

## 2018-09-14 ASSESSMENT — PAIN DESCRIPTION - FREQUENCY: FREQUENCY: CONTINUOUS

## 2018-09-14 ASSESSMENT — PAIN DESCRIPTION - PROGRESSION: CLINICAL_PROGRESSION: NOT CHANGED

## 2018-09-14 ASSESSMENT — PAIN DESCRIPTION - DESCRIPTORS: DESCRIPTORS: ACHING

## 2018-09-14 ASSESSMENT — PAIN DESCRIPTION - ONSET: ONSET: SUDDEN

## 2018-09-14 ASSESSMENT — PAIN DESCRIPTION - PAIN TYPE: TYPE: ACUTE PAIN

## 2018-09-14 ASSESSMENT — PAIN DESCRIPTION - ORIENTATION: ORIENTATION: LEFT

## 2018-09-14 NOTE — ED NOTES
Discharge instructions, prescriptions and follow up explained, patient verbalizes understanding     Jono Ram RN  09/14/18 [id number]

## 2018-09-14 NOTE — ED PROVIDER NOTES
AST 25 0 - 39 U/L   Troponin   Result Value Ref Range    Troponin <0.01 0.00 - 0.03 ng/mL   Protime-INR   Result Value Ref Range    Protime 26.2 (H) 9.3 - 12.4 sec    INR 2.3    EKG 12 Lead   Result Value Ref Range    Ventricular Rate 94 BPM    Atrial Rate 94 BPM    P-R Interval 162 ms    QRS Duration 94 ms    Q-T Interval 372 ms    QTc Calculation (Bazett) 465 ms    P Axis 83 degrees    R Axis -40 degrees    T Axis 28 degrees       RADIOLOGY:  Interpreted by Radiologist.  US DUP LOWER EXTREMITY LEFT PEDRO   Final Result   Negative for evidence of deep venous thrombosis in the left lower   extremity by color and spectral Doppler, as well as 2-D grayscale   ultrasound imaging.                   ------------------------- NURSING NOTES AND VITALS REVIEWED ---------------------------   The nursing notes within the ED encounter and vital signs as below have been reviewed. BP (!) 189/101   Pulse 104   Temp 97.8 °F (36.6 °C) (Oral)   Resp 20   Ht 6' 2\" (1.88 m)   Wt 170 lb (77.1 kg)   SpO2 94%   BMI 21.83 kg/m²   Oxygen Saturation Interpretation: Normal      ---------------------------------------------------PHYSICAL EXAM--------------------------------------      Constitutional/General: Alert and oriented x3, well appearing, non toxic in NAD. Head: Normocephalic and atraumatic  Eyes: PERRL, EOMI  Mouth: Oropharynx clear, handling secretions, no trismus  Neck: Supple, full ROM,   Pulmonary: Lungs clear to auscultation bilaterally, no wheezes, rales, or rhonchi. Not in respiratory distress  Cardiovascular:  Regular rate and rhythm, no murmurs, gallops, or rubs. 2+ distal pulses  Abdomen: Soft, non tender, non distended,   Extremities: Moves all extremities x 4. Warm and well perfused. Mild tenderness palpation over the posterior fossa of the left knee. Bilateral calves without tenderness to palpation. There is no palpable cord noted bilaterally. There is no erythema noted bilaterally.  Negative Homans sign bilaterally. Distal pulse in the left foot is 2+ on the dorsum of the foot. Cap refill less than 2 sec in all toes. Skin: warm and dry without rash  Neurologic: GCS 15,  Psych: Normal Affect      ------------------------------ ED COURSE/MEDICAL DECISION MAKING----------------------  Medications   cloNIDine (CATAPRES) tablet 0.2 mg (0.2 mg Oral Given 9/14/18 1524)   HYDROcodone-acetaminophen (NORCO) 5-325 MG per tablet 1 tablet (1 tablet Oral Given 9/14/18 1531)   HYDROmorphone (DILAUDID) injection 1 mg (1 mg Intravenous Given 9/14/18 6877)         ED COURSE:       Medical Decision Making:    Dr June Posada in to evaluate patient. Pt without any calf tenderness, erythema to the left calf or posterior fossa of the left knee, and pulses palpated in left DP. Pt initially given Norco with mild relief of the pain, Dr June Posada gave Dilaudid with moderate of pain. Pt ambulating in the dept without difficulty. Labs with an INR of 2.3, BP improving with use of Catapres. Pt continued to complain of mild left lower back pain and left anterior and lateral thigh pain, continued to have warm and well-profused lower leg with repeat exams. Dr June Posada reexamined patient several times as did I. Pt to be discharged home and follow up immediately with any acute change in condition, worsening condition, fever, or LE discoloration. Counseling: The emergency provider has spoken with the patient and family member patient, sister and spouse and discussed todays results, in addition to providing specific details for the plan of care and counseling regarding the diagnosis and prognosis. Questions are answered at this time and they are agreeable with the plan.      --------------------------------- IMPRESSION AND DISPOSITION ---------------------------------    IMPRESSION  1. Sciatica, unspecified laterality    2. Hypertensive urgency    3.  Pain of left lower extremity        DISPOSITION  Disposition: Discharge to home  Patient condition is good      NOTE: This report was transcribed using voice recognition software.  Every effort was made to ensure accuracy; however, inadvertent computerized transcription errors may be present       Ladonna Vasquez  09/19/18 1934

## 2018-09-15 ENCOUNTER — APPOINTMENT (OUTPATIENT)
Dept: CT IMAGING | Age: 58
End: 2018-09-15
Payer: COMMERCIAL

## 2018-09-15 ENCOUNTER — HOSPITAL ENCOUNTER (EMERGENCY)
Age: 58
Discharge: ANOTHER HOSPITAL | End: 2018-09-15
Attending: EMERGENCY MEDICINE
Payer: COMMERCIAL

## 2018-09-15 VITALS
DIASTOLIC BLOOD PRESSURE: 98 MMHG | SYSTOLIC BLOOD PRESSURE: 167 MMHG | WEIGHT: 175 LBS | RESPIRATION RATE: 16 BRPM | HEART RATE: 101 BPM | OXYGEN SATURATION: 97 % | HEIGHT: 74 IN | TEMPERATURE: 98.9 F | BODY MASS INDEX: 22.46 KG/M2

## 2018-09-15 DIAGNOSIS — M79.605 LEFT LEG PAIN: Primary | ICD-10-CM

## 2018-09-15 DIAGNOSIS — I73.9 PAD (PERIPHERAL ARTERY DISEASE) (HCC): ICD-10-CM

## 2018-09-15 LAB
ALBUMIN SERPL-MCNC: 4.6 G/DL (ref 3.5–5.2)
ALP BLD-CCNC: 89 U/L (ref 40–129)
ALT SERPL-CCNC: 16 U/L (ref 0–40)
ANION GAP SERPL CALCULATED.3IONS-SCNC: 16 MMOL/L (ref 7–16)
AST SERPL-CCNC: 33 U/L (ref 0–39)
BASOPHILS ABSOLUTE: 0.02 E9/L (ref 0–0.2)
BASOPHILS RELATIVE PERCENT: 0.2 % (ref 0–2)
BILIRUB SERPL-MCNC: 0.6 MG/DL (ref 0–1.2)
BUN BLDV-MCNC: 17 MG/DL (ref 6–20)
CALCIUM SERPL-MCNC: 9.9 MG/DL (ref 8.6–10.2)
CHLORIDE BLD-SCNC: 100 MMOL/L (ref 98–107)
CO2: 20 MMOL/L (ref 22–29)
CREAT SERPL-MCNC: 1.2 MG/DL (ref 0.7–1.2)
EKG ATRIAL RATE: 109 BPM
EKG P AXIS: 68 DEGREES
EKG P-R INTERVAL: 160 MS
EKG Q-T INTERVAL: 354 MS
EKG QRS DURATION: 92 MS
EKG QTC CALCULATION (BAZETT): 476 MS
EKG R AXIS: -32 DEGREES
EKG T AXIS: 38 DEGREES
EKG VENTRICULAR RATE: 109 BPM
EOSINOPHILS ABSOLUTE: 0.01 E9/L (ref 0.05–0.5)
EOSINOPHILS RELATIVE PERCENT: 0.1 % (ref 0–6)
GFR AFRICAN AMERICAN: >60
GFR NON-AFRICAN AMERICAN: >60 ML/MIN/1.73
GLUCOSE BLD-MCNC: 128 MG/DL (ref 74–109)
HCT VFR BLD CALC: 42.1 % (ref 37–54)
HEMOGLOBIN: 15.2 G/DL (ref 12.5–16.5)
IMMATURE GRANULOCYTES #: 0.07 E9/L
IMMATURE GRANULOCYTES %: 0.6 % (ref 0–5)
INR BLD: 2.5
LYMPHOCYTES ABSOLUTE: 1.3 E9/L (ref 1.5–4)
LYMPHOCYTES RELATIVE PERCENT: 10.7 % (ref 20–42)
MCH RBC QN AUTO: 33.6 PG (ref 26–35)
MCHC RBC AUTO-ENTMCNC: 36.1 % (ref 32–34.5)
MCV RBC AUTO: 93.1 FL (ref 80–99.9)
MONOCYTES ABSOLUTE: 0.44 E9/L (ref 0.1–0.95)
MONOCYTES RELATIVE PERCENT: 3.6 % (ref 2–12)
NEUTROPHILS ABSOLUTE: 10.28 E9/L (ref 1.8–7.3)
NEUTROPHILS RELATIVE PERCENT: 84.8 % (ref 43–80)
PDW BLD-RTO: 12.1 FL (ref 11.5–15)
PLATELET # BLD: 155 E9/L (ref 130–450)
PMV BLD AUTO: 10.9 FL (ref 7–12)
POTASSIUM SERPL-SCNC: 4.3 MMOL/L (ref 3.5–5)
PROTHROMBIN TIME: 28.2 SEC (ref 9.3–12.4)
RBC # BLD: 4.52 E12/L (ref 3.8–5.8)
SODIUM BLD-SCNC: 136 MMOL/L (ref 132–146)
TOTAL PROTEIN: 8.1 G/DL (ref 6.4–8.3)
WBC # BLD: 12.1 E9/L (ref 4.5–11.5)

## 2018-09-15 PROCEDURE — 2580000003 HC RX 258: Performed by: NURSE PRACTITIONER

## 2018-09-15 PROCEDURE — 75635 CT ANGIO ABDOMINAL ARTERIES: CPT

## 2018-09-15 PROCEDURE — 96375 TX/PRO/DX INJ NEW DRUG ADDON: CPT

## 2018-09-15 PROCEDURE — 96376 TX/PRO/DX INJ SAME DRUG ADON: CPT

## 2018-09-15 PROCEDURE — 6360000002 HC RX W HCPCS: Performed by: EMERGENCY MEDICINE

## 2018-09-15 PROCEDURE — 80053 COMPREHEN METABOLIC PANEL: CPT

## 2018-09-15 PROCEDURE — 36415 COLL VENOUS BLD VENIPUNCTURE: CPT

## 2018-09-15 PROCEDURE — 96374 THER/PROPH/DIAG INJ IV PUSH: CPT

## 2018-09-15 PROCEDURE — 93005 ELECTROCARDIOGRAM TRACING: CPT | Performed by: NURSE PRACTITIONER

## 2018-09-15 PROCEDURE — 6360000004 HC RX CONTRAST MEDICATION: Performed by: RADIOLOGY

## 2018-09-15 PROCEDURE — 85025 COMPLETE CBC W/AUTO DIFF WBC: CPT

## 2018-09-15 PROCEDURE — 85610 PROTHROMBIN TIME: CPT

## 2018-09-15 PROCEDURE — 99284 EMERGENCY DEPT VISIT MOD MDM: CPT

## 2018-09-15 PROCEDURE — 6360000002 HC RX W HCPCS: Performed by: NURSE PRACTITIONER

## 2018-09-15 RX ORDER — FENTANYL CITRATE 50 UG/ML
50 INJECTION, SOLUTION INTRAMUSCULAR; INTRAVENOUS ONCE
Status: COMPLETED | OUTPATIENT
Start: 2018-09-15 | End: 2018-09-15

## 2018-09-15 RX ORDER — METOCLOPRAMIDE HYDROCHLORIDE 5 MG/ML
10 INJECTION INTRAMUSCULAR; INTRAVENOUS ONCE
Status: COMPLETED | OUTPATIENT
Start: 2018-09-15 | End: 2018-09-15

## 2018-09-15 RX ORDER — ONDANSETRON 2 MG/ML
4 INJECTION INTRAMUSCULAR; INTRAVENOUS ONCE
Status: COMPLETED | OUTPATIENT
Start: 2018-09-15 | End: 2018-09-15

## 2018-09-15 RX ORDER — 0.9 % SODIUM CHLORIDE 0.9 %
1000 INTRAVENOUS SOLUTION INTRAVENOUS ONCE
Status: COMPLETED | OUTPATIENT
Start: 2018-09-15 | End: 2018-09-15

## 2018-09-15 RX ORDER — HYDRALAZINE HYDROCHLORIDE 20 MG/ML
10 INJECTION INTRAMUSCULAR; INTRAVENOUS ONCE
Status: COMPLETED | OUTPATIENT
Start: 2018-09-15 | End: 2018-09-15

## 2018-09-15 RX ADMIN — HYDRALAZINE HYDROCHLORIDE 10 MG: 20 INJECTION INTRAMUSCULAR; INTRAVENOUS at 07:25

## 2018-09-15 RX ADMIN — IOPAMIDOL 120 ML: 755 INJECTION, SOLUTION INTRAVENOUS at 04:10

## 2018-09-15 RX ADMIN — FENTANYL CITRATE 50 MCG: 50 INJECTION INTRAMUSCULAR; INTRAVENOUS at 06:47

## 2018-09-15 RX ADMIN — SODIUM CHLORIDE 1000 ML: 9 INJECTION, SOLUTION INTRAVENOUS at 04:55

## 2018-09-15 RX ADMIN — HYDROMORPHONE HYDROCHLORIDE 1 MG: 1 INJECTION, SOLUTION INTRAMUSCULAR; INTRAVENOUS; SUBCUTANEOUS at 05:21

## 2018-09-15 RX ADMIN — METOCLOPRAMIDE 10 MG: 5 INJECTION, SOLUTION INTRAMUSCULAR; INTRAVENOUS at 05:19

## 2018-09-15 RX ADMIN — ONDANSETRON 4 MG: 2 SOLUTION INTRAMUSCULAR; INTRAVENOUS at 03:28

## 2018-09-15 RX ADMIN — HYDROMORPHONE HYDROCHLORIDE 1 MG: 1 INJECTION, SOLUTION INTRAMUSCULAR; INTRAVENOUS; SUBCUTANEOUS at 08:19

## 2018-09-15 RX ADMIN — HYDROMORPHONE HYDROCHLORIDE 1 MG: 1 INJECTION, SOLUTION INTRAMUSCULAR; INTRAVENOUS; SUBCUTANEOUS at 03:29

## 2018-09-15 ASSESSMENT — PAIN SCALES - GENERAL
PAINLEVEL_OUTOF10: 10
PAINLEVEL_OUTOF10: 8
PAINLEVEL_OUTOF10: 8
PAINLEVEL_OUTOF10: 10

## 2018-09-15 ASSESSMENT — PAIN DESCRIPTION - ORIENTATION
ORIENTATION: LEFT
ORIENTATION: LEFT

## 2018-09-15 ASSESSMENT — PAIN DESCRIPTION - PAIN TYPE
TYPE: ACUTE PAIN
TYPE: ACUTE PAIN

## 2018-09-15 ASSESSMENT — PAIN DESCRIPTION - DESCRIPTORS
DESCRIPTORS: ACHING;CONSTANT
DESCRIPTORS: CONSTANT

## 2018-09-15 ASSESSMENT — PAIN DESCRIPTION - PROGRESSION: CLINICAL_PROGRESSION: NOT CHANGED

## 2018-09-15 ASSESSMENT — PAIN DESCRIPTION - ONSET: ONSET: ON-GOING

## 2018-09-15 ASSESSMENT — PAIN DESCRIPTION - FREQUENCY
FREQUENCY: CONTINUOUS
FREQUENCY: CONTINUOUS

## 2018-09-15 ASSESSMENT — PAIN DESCRIPTION - LOCATION
LOCATION: LEG
LOCATION: LEG

## 2018-09-15 NOTE — ED PROVIDER NOTES
ED physician  HPI:  9/15/18, Time: 1:54 AM         Cindy Rueda is a 61 y.o. male presenting to the ED for  continuation of left lower extremity pain. Patient was actually seen earlier in the day of [facility] emergency department and did have labs as well as an ultrasound which was negative and discharged home with Flexeril and Medrol Dosepak and diagnosed with sciatica as well as hypertension. Patient does have a known history of a DVT and currently is on Coumadin. He reports that he took the medicine without any relief. He also has a history of a fem-pop bypass graft in 2010. Patient reports increased pain that is out of proportion primarily to his left posterior knee and left calf. Patient denies any injury or trauma he denies any pain at all being to his lower lumbar back he denies the pain radiating from the back and radiating downward. He denies numbness or tingling just only reporting excruciating pain. There is also no unusual lower extremity swelling noted either. Patient also denies any chest pain, shortness of breath, abdominal pain as well as no associated fevers, nausea, vomiting or diarrhea. Patient with  past surgical history that includes vascular surgery; Femoral-femoral Bypass Graft (1/26/10); femoral bypass (Right, 1/26/10); thromboendarterectomy (Left, 1/26/10); fasciotomy (Right, 6/11/10); thromboendarterectomy (Right, 6/21/10); Aorto-femoral Bypass Graft (Bilateral, 6/27/10); Arterial aneurysm repair (Left, 9/28/10); hernia repair (6/25/14); and Colonoscopy. Review of Systems:   Pertinent positives and negatives are stated within HPI, all other systems reviewed and are negative.          --------------------------------------------- PAST HISTORY ---------------------------------------------  Past Medical History:  has a past medical history of AAA (abdominal aortic [facility]) ([facility] Asthma; Chronic anticoagulation; DVT (deep venous thrombosis) ([facility]  Hypertension; office visit in April 2018 and is being followed by Dr. Mejia Magana. 12-lead EKG interpreted showing a heart rate of 109, sinus tachycardia with possible left atrial enlargement and left axis deviation. This is compared to most recent EKG from September 14, 2018 essentially unchanged. No acute ST elevation or depression noted. Counseling: The emergency provider has spoken with the patient and discussed todays results, in addition to providing specific details for the plan of care and counseling regarding the diagnosis and prognosis. Questions are answered at this time and they are agreeable with the plan.      --------------------------------- IMPRESSION AND DISPOSITION ---------------------------------    IMPRESSION  1. Left leg pain    2. PAD (peripheral artery disease) ([address]        DISPOSITION  Disposition: Transfer to [address] [place] [address]  Patient condition is fair      NOTE: This report was transcribed using voice recognition software. Every effort was made to ensure accuracy; however, inadvertent computerized transcription errors may be present      Seen with viraj eden    ATTENDING PROVIDER ATTESTATION:     I have personally performed and/or participated in the history, exam, medical decision making, and procedures and agree with all pertinent clinical information. I have also reviewed and agree with the past medical, family and social history unless otherwise noted. My findings/Plan: Patient presents because of leg pain this afternoon and worsened. He has been seen at outlying facility and had ultrasound. Patient is on Coumadin. Patient reporting no chest pain or difficulty breathing patient's lower extremities cool to touch, unable to palpate distal pulses. cta with runoff ordered  Patient rechecked and still having pain. Patient's labs were noted and CT report pending . I spoke to Dr Han Neves from [address] [place] [address] and will accept.  Patient was made aware of transfer      Elvira Ellison MD  09/15/18

## 2018-09-15 NOTE — ED NOTES
Multiple attempts made to obtain transport to [facility], unable to obtain ambulance transport, pt will go by private vehicle with spouse to drive, ok per Dr. Yogi Bazzi, instructed not to eat or drink anything and not to make any stops, pt and spouse verbalized understanding     Pati Slaughter RN  09/15/18 [id number]

## 2018-09-19 ENCOUNTER — TELEPHONE (OUTPATIENT)
Dept: ADMINISTRATIVE | Age: 58
End: 2018-09-19

## 2018-09-25 ENCOUNTER — HOSPITAL ENCOUNTER (EMERGENCY)
Age: 58
Discharge: HOME | End: 2018-09-25
Attending: EMERGENCY MEDICINE
Payer: COMMERCIAL

## 2018-09-25 VITALS
BODY MASS INDEX: 22.46 KG/M2 | OXYGEN SATURATION: 99 % | RESPIRATION RATE: 16 BRPM | HEART RATE: 92 BPM | WEIGHT: 175 LBS | SYSTOLIC BLOOD PRESSURE: 156 MMHG | HEIGHT: 74 IN | DIASTOLIC BLOOD PRESSURE: 93 MMHG | TEMPERATURE: 98.2 F

## 2018-09-25 DIAGNOSIS — Z51.89 VISIT FOR WOUND CHECK: Primary | ICD-10-CM

## 2018-09-25 PROCEDURE — 99282 EMERGENCY DEPT VISIT SF MDM: CPT

## 2018-09-25 ASSESSMENT — ENCOUNTER SYMPTOMS
COUGH: 0
SINUS PRESSURE: 0
PHOTOPHOBIA: 0
RHINORRHEA: 0
SORE THROAT: 0
VOMITING: 0
ABDOMINAL PAIN: 0
NAUSEA: 0
SHORTNESS OF BREATH: 0
DIARRHEA: 0
WHEEZING: 0
SINUS PAIN: 0
CONSTIPATION: 0
BACK PAIN: 0

## 2018-09-26 NOTE — ED PROVIDER NOTES
includes Cancer in his father; Diabetes in his brother and mother; High Blood Pressure in his mother. The patients home medications have been reviewed. Allergies: Ultram [tramadol]    -------------------------------------------------- RESULTS -------------------------------------------------  Labs:  No results found for this visit on 09/25/18. Radiology:  No orders to display       ------------------------- NURSING NOTES AND VITALS REVIEWED ---------------------------  Date / Time Roomed:  9/25/2018  8:55 PM  ED Bed Assignment:  16/16    The nursing notes within the ED encounter and vital signs as below have been reviewed. BP (!) 141/91   Pulse 88   Temp 98.1 °F (36.7 °C)   Resp 15   Ht 6' 2\" (1.88 m)   Wt 175 lb (79.4 kg)   SpO2 98%   BMI 22.47 kg/m²   Oxygen Saturation Interpretation: Normal      ------------------------------------------ PROGRESS NOTES ------------------------------------------  I have spoken with the patient and discussed todays results, in addition to providing specific details for the plan of care and counseling regarding the diagnosis and prognosis. Their questions are answered at this time and they are agreeable with the plan. I discussed at length with them reasons for immediate return here for re evaluation. They will followup with primary care by calling their office tomorrow. --------------------------------- ADDITIONAL PROVIDER NOTES ---------------------------------  At this time the patient is without objective evidence of an acute process requiring hospitalization or inpatient management. They have remained hemodynamically stable throughout their entire ED visit and are stable for discharge with outpatient follow-up. The plan has been discussed in detail and they are aware of the specific conditions for emergent return, as well as the importance of follow-up. MDM:  Patient presented with evaluation for wound vac not working.  He had accidentally torn the dressing and it was no longer providing suction. On arrival to the ED his vitals were stable. Tegaderm was placed and the wound vac started to work properly again. The patient just had the dressing replaced today, he has a home health nurse coming to his house to check it every other day. He will be discharged home. He has no further questions at this time. New Prescriptions    No medications on file       Diagnosis:  1. Visit for wound check        Disposition:  Patient's disposition: Discharge to home  Patient's condition is stable.            Uri Mcconnell DO  Resident  09/25/18 [id number]

## 2018-10-19 ENCOUNTER — APPOINTMENT (OUTPATIENT)
Dept: GENERAL RADIOLOGY | Age: 58
End: 2018-10-19
Payer: COMMERCIAL

## 2018-10-19 ENCOUNTER — HOSPITAL ENCOUNTER (EMERGENCY)
Age: 58
Discharge: ANOTHER HOSPITAL | End: 2018-10-20
Attending: EMERGENCY MEDICINE
Payer: COMMERCIAL

## 2018-10-19 VITALS
DIASTOLIC BLOOD PRESSURE: 77 MMHG | SYSTOLIC BLOOD PRESSURE: 136 MMHG | WEIGHT: 175 LBS | BODY MASS INDEX: 22.46 KG/M2 | OXYGEN SATURATION: 96 % | RESPIRATION RATE: 18 BRPM | TEMPERATURE: 99.6 F | HEART RATE: 97 BPM | HEIGHT: 74 IN

## 2018-10-19 DIAGNOSIS — T81.44XA SEPSIS FOLLOWING PROCEDURE, INITIAL ENCOUNTER (HCC): Primary | ICD-10-CM

## 2018-10-19 DIAGNOSIS — M79.605 LEFT LEG PAIN: ICD-10-CM

## 2018-10-19 DIAGNOSIS — R52 INTRACTABLE PAIN: ICD-10-CM

## 2018-10-19 LAB
ABO/RH: NORMAL
ALBUMIN SERPL-MCNC: 3 G/DL (ref 3.5–5.2)
ALP BLD-CCNC: 65 U/L (ref 40–129)
ALT SERPL-CCNC: 14 U/L (ref 0–40)
ANION GAP SERPL CALCULATED.3IONS-SCNC: 8 MMOL/L (ref 7–16)
ANTIBODY SCREEN: NORMAL
APTT: 32.4 SEC (ref 24.5–35.1)
AST SERPL-CCNC: 32 U/L (ref 0–39)
BASOPHILS ABSOLUTE: 0.04 E9/L (ref 0–0.2)
BASOPHILS RELATIVE PERCENT: 0.3 % (ref 0–2)
BILIRUB SERPL-MCNC: <0.2 MG/DL (ref 0–1.2)
BILIRUBIN URINE: NEGATIVE
BILIRUBIN URINE: NEGATIVE
BLOOD, URINE: NEGATIVE
BLOOD, URINE: NEGATIVE
BUN BLDV-MCNC: 8 MG/DL (ref 6–20)
CALCIUM SERPL-MCNC: 8.6 MG/DL (ref 8.6–10.2)
CHLORIDE BLD-SCNC: 105 MMOL/L (ref 98–107)
CLARITY: CLEAR
CLARITY: CLEAR
CO2: 23 MMOL/L (ref 22–29)
COLOR: YELLOW
COLOR: YELLOW
CREAT SERPL-MCNC: 0.8 MG/DL (ref 0.7–1.2)
EKG ATRIAL RATE: 109 BPM
EKG P AXIS: 75 DEGREES
EKG P-R INTERVAL: 148 MS
EKG Q-T INTERVAL: 334 MS
EKG QRS DURATION: 80 MS
EKG QTC CALCULATION (BAZETT): 449 MS
EKG R AXIS: -29 DEGREES
EKG T AXIS: 52 DEGREES
EKG VENTRICULAR RATE: 109 BPM
EOSINOPHILS ABSOLUTE: 0.35 E9/L (ref 0.05–0.5)
EOSINOPHILS RELATIVE PERCENT: 3 % (ref 0–6)
GFR AFRICAN AMERICAN: >60
GFR NON-AFRICAN AMERICAN: >60 ML/MIN/1.73
GLUCOSE BLD-MCNC: 103 MG/DL (ref 74–109)
GLUCOSE URINE: NEGATIVE MG/DL
GLUCOSE URINE: NEGATIVE MG/DL
HCT VFR BLD CALC: 34.2 % (ref 37–54)
HEMOGLOBIN: 11 G/DL (ref 12.5–16.5)
IMMATURE GRANULOCYTES #: 0.08 E9/L
IMMATURE GRANULOCYTES %: 0.7 % (ref 0–5)
INR BLD: 1.2
KETONES, URINE: NEGATIVE MG/DL
KETONES, URINE: NEGATIVE MG/DL
LACTIC ACID: 1.5 MMOL/L (ref 0.5–2.2)
LEUKOCYTE ESTERASE, URINE: NEGATIVE
LEUKOCYTE ESTERASE, URINE: NEGATIVE
LYMPHOCYTES ABSOLUTE: 1.51 E9/L (ref 1.5–4)
LYMPHOCYTES RELATIVE PERCENT: 12.7 % (ref 20–42)
MCH RBC QN AUTO: 29.8 PG (ref 26–35)
MCHC RBC AUTO-ENTMCNC: 32.2 % (ref 32–34.5)
MCV RBC AUTO: 92.7 FL (ref 80–99.9)
MONOCYTES ABSOLUTE: 0.89 E9/L (ref 0.1–0.95)
MONOCYTES RELATIVE PERCENT: 7.5 % (ref 2–12)
NEUTROPHILS ABSOLUTE: 8.99 E9/L (ref 1.8–7.3)
NEUTROPHILS RELATIVE PERCENT: 75.8 % (ref 43–80)
NITRITE, URINE: NEGATIVE
NITRITE, URINE: NEGATIVE
PDW BLD-RTO: 14.9 FL (ref 11.5–15)
PH UA: 6 (ref 5–9)
PH UA: 6 (ref 5–9)
PLATELET # BLD: 238 E9/L (ref 130–450)
PMV BLD AUTO: 9.5 FL (ref 7–12)
POTASSIUM SERPL-SCNC: 5.4 MMOL/L (ref 3.5–5)
PROTEIN UA: NEGATIVE MG/DL
PROTEIN UA: NEGATIVE MG/DL
PROTHROMBIN TIME: 13.8 SEC (ref 9.3–12.4)
RBC # BLD: 3.69 E12/L (ref 3.8–5.8)
SODIUM BLD-SCNC: 136 MMOL/L (ref 132–146)
SPECIFIC GRAVITY UA: 1.01 (ref 1–1.03)
SPECIFIC GRAVITY UA: 1.01 (ref 1–1.03)
TOTAL PROTEIN: 6.5 G/DL (ref 6.4–8.3)
TROPONIN: 0.02 NG/ML (ref 0–0.03)
UROBILINOGEN, URINE: 0.2 E.U./DL
UROBILINOGEN, URINE: 0.2 E.U./DL
WBC # BLD: 11.9 E9/L (ref 4.5–11.5)

## 2018-10-19 PROCEDURE — 81003 URINALYSIS AUTO W/O SCOPE: CPT

## 2018-10-19 PROCEDURE — 6360000002 HC RX W HCPCS: Performed by: EMERGENCY MEDICINE

## 2018-10-19 PROCEDURE — 86901 BLOOD TYPING SEROLOGIC RH(D): CPT

## 2018-10-19 PROCEDURE — 96365 THER/PROPH/DIAG IV INF INIT: CPT

## 2018-10-19 PROCEDURE — 83605 ASSAY OF LACTIC ACID: CPT

## 2018-10-19 PROCEDURE — 96366 THER/PROPH/DIAG IV INF ADDON: CPT

## 2018-10-19 PROCEDURE — 85025 COMPLETE CBC W/AUTO DIFF WBC: CPT

## 2018-10-19 PROCEDURE — 87186 SC STD MICRODIL/AGAR DIL: CPT

## 2018-10-19 PROCEDURE — 99283 EMERGENCY DEPT VISIT LOW MDM: CPT

## 2018-10-19 PROCEDURE — 87040 BLOOD CULTURE FOR BACTERIA: CPT

## 2018-10-19 PROCEDURE — 36415 COLL VENOUS BLD VENIPUNCTURE: CPT

## 2018-10-19 PROCEDURE — 87149 DNA/RNA DIRECT PROBE: CPT

## 2018-10-19 PROCEDURE — 87088 URINE BACTERIA CULTURE: CPT

## 2018-10-19 PROCEDURE — 96367 TX/PROPH/DG ADDL SEQ IV INF: CPT

## 2018-10-19 PROCEDURE — 86900 BLOOD TYPING SEROLOGIC ABO: CPT

## 2018-10-19 PROCEDURE — 71045 X-RAY EXAM CHEST 1 VIEW: CPT

## 2018-10-19 PROCEDURE — 96375 TX/PRO/DX INJ NEW DRUG ADDON: CPT

## 2018-10-19 PROCEDURE — 85730 THROMBOPLASTIN TIME PARTIAL: CPT

## 2018-10-19 PROCEDURE — 93005 ELECTROCARDIOGRAM TRACING: CPT | Performed by: EMERGENCY MEDICINE

## 2018-10-19 PROCEDURE — 85610 PROTHROMBIN TIME: CPT

## 2018-10-19 PROCEDURE — 87070 CULTURE OTHR SPECIMN AEROBIC: CPT

## 2018-10-19 PROCEDURE — 2500000003 HC RX 250 WO HCPCS: Performed by: EMERGENCY MEDICINE

## 2018-10-19 PROCEDURE — 96376 TX/PRO/DX INJ SAME DRUG ADON: CPT

## 2018-10-19 PROCEDURE — 80053 COMPREHEN METABOLIC PANEL: CPT

## 2018-10-19 PROCEDURE — 86850 RBC ANTIBODY SCREEN: CPT

## 2018-10-19 PROCEDURE — 84484 ASSAY OF TROPONIN QUANT: CPT

## 2018-10-19 PROCEDURE — 2580000003 HC RX 258: Performed by: EMERGENCY MEDICINE

## 2018-10-19 PROCEDURE — 96372 THER/PROPH/DIAG INJ SC/IM: CPT

## 2018-10-19 RX ORDER — KETAMINE HYDROCHLORIDE 10 MG/ML
20 INJECTION, SOLUTION INTRAMUSCULAR; INTRAVENOUS ONCE
Status: COMPLETED | OUTPATIENT
Start: 2018-10-19 | End: 2018-10-19

## 2018-10-19 RX ORDER — DIPHENHYDRAMINE HYDROCHLORIDE 50 MG/ML
25 INJECTION INTRAMUSCULAR; INTRAVENOUS ONCE
Status: COMPLETED | OUTPATIENT
Start: 2018-10-19 | End: 2018-10-19

## 2018-10-19 RX ORDER — 0.9 % SODIUM CHLORIDE 0.9 %
30 INTRAVENOUS SOLUTION INTRAVENOUS ONCE
Status: COMPLETED | OUTPATIENT
Start: 2018-10-19 | End: 2018-10-19

## 2018-10-19 RX ORDER — KETAMINE HYDROCHLORIDE 10 MG/ML
20 INJECTION, SOLUTION INTRAMUSCULAR; INTRAVENOUS ONCE
Status: DISCONTINUED | OUTPATIENT
Start: 2018-10-19 | End: 2018-10-19

## 2018-10-19 RX ORDER — KETAMINE HYDROCHLORIDE 10 MG/ML
30 INJECTION, SOLUTION INTRAMUSCULAR; INTRAVENOUS ONCE
Status: DISCONTINUED | OUTPATIENT
Start: 2018-10-19 | End: 2018-10-19

## 2018-10-19 RX ORDER — FENTANYL CITRATE 50 UG/ML
100 INJECTION, SOLUTION INTRAMUSCULAR; INTRAVENOUS ONCE
Status: COMPLETED | OUTPATIENT
Start: 2018-10-19 | End: 2018-10-19

## 2018-10-19 RX ORDER — LORAZEPAM 2 MG/ML
1 INJECTION INTRAMUSCULAR ONCE
Status: COMPLETED | OUTPATIENT
Start: 2018-10-19 | End: 2018-10-19

## 2018-10-19 RX ADMIN — KETAMINE HYDROCHLORIDE 20 MG: 10 INJECTION, SOLUTION INTRAMUSCULAR; INTRAVENOUS at 10:58

## 2018-10-19 RX ADMIN — CEFAZOLIN SODIUM 2 G: 2 SOLUTION INTRAVENOUS at 07:59

## 2018-10-19 RX ADMIN — HYDROMORPHONE HYDROCHLORIDE 1 MG: 1 INJECTION, SOLUTION INTRAMUSCULAR; INTRAVENOUS; SUBCUTANEOUS at 23:13

## 2018-10-19 RX ADMIN — SODIUM CHLORIDE 2382 ML: 9 INJECTION, SOLUTION INTRAVENOUS at 07:57

## 2018-10-19 RX ADMIN — HYDROMORPHONE HYDROCHLORIDE 0.5 MG: 1 INJECTION, SOLUTION INTRAMUSCULAR; INTRAVENOUS; SUBCUTANEOUS at 08:25

## 2018-10-19 RX ADMIN — HYDROMORPHONE HYDROCHLORIDE 1 MG: 1 INJECTION, SOLUTION INTRAMUSCULAR; INTRAVENOUS; SUBCUTANEOUS at 07:54

## 2018-10-19 RX ADMIN — LORAZEPAM 1 MG: 2 INJECTION INTRAMUSCULAR; INTRAVENOUS at 12:00

## 2018-10-19 RX ADMIN — DIPHENHYDRAMINE HYDROCHLORIDE 25 MG: 50 INJECTION INTRAMUSCULAR; INTRAVENOUS at 07:54

## 2018-10-19 RX ADMIN — FENTANYL CITRATE 100 MCG: 50 INJECTION, SOLUTION INTRAMUSCULAR; INTRAVENOUS at 09:23

## 2018-10-19 RX ADMIN — HYDROMORPHONE HYDROCHLORIDE 1 MG: 1 INJECTION, SOLUTION INTRAMUSCULAR; INTRAVENOUS; SUBCUTANEOUS at 16:24

## 2018-10-19 RX ADMIN — HYDROMORPHONE HYDROCHLORIDE 1 MG: 1 INJECTION, SOLUTION INTRAMUSCULAR; INTRAVENOUS; SUBCUTANEOUS at 21:00

## 2018-10-19 RX ADMIN — HYDROMORPHONE HYDROCHLORIDE 1 MG: 1 INJECTION, SOLUTION INTRAMUSCULAR; INTRAVENOUS; SUBCUTANEOUS at 14:02

## 2018-10-19 RX ADMIN — KETAMINE HYDROCHLORIDE 20 MG: 10 INJECTION, SOLUTION INTRAMUSCULAR; INTRAVENOUS at 18:33

## 2018-10-19 RX ADMIN — PIPERACILLIN SODIUM, TAZOBACTAM SODIUM 4.5 G: 4; .5 INJECTION, POWDER, LYOPHILIZED, FOR SOLUTION INTRAVENOUS at 14:01

## 2018-10-19 RX ADMIN — VANCOMYCIN HYDROCHLORIDE 2000 MG: 10 INJECTION, POWDER, LYOPHILIZED, FOR SOLUTION INTRAVENOUS at 09:59

## 2018-10-19 RX ADMIN — ENOXAPARIN SODIUM 80 MG: 80 INJECTION, SOLUTION INTRAVENOUS; SUBCUTANEOUS at 11:03

## 2018-10-19 ASSESSMENT — PAIN DESCRIPTION - PAIN TYPE
TYPE: ACUTE PAIN

## 2018-10-19 ASSESSMENT — PAIN SCALES - GENERAL
PAINLEVEL_OUTOF10: 10
PAINLEVEL_OUTOF10: 6
PAINLEVEL_OUTOF10: 10
PAINLEVEL_OUTOF10: 7
PAINLEVEL_OUTOF10: 10
PAINLEVEL_OUTOF10: 5
PAINLEVEL_OUTOF10: 6
PAINLEVEL_OUTOF10: 10
PAINLEVEL_OUTOF10: 4
PAINLEVEL_OUTOF10: 6

## 2018-10-19 ASSESSMENT — PAIN DESCRIPTION - LOCATION
LOCATION: LEG;FOOT
LOCATION: FOOT;TOE (COMMENT WHICH ONE)

## 2018-10-19 ASSESSMENT — PAIN DESCRIPTION - PROGRESSION: CLINICAL_PROGRESSION: GRADUALLY WORSENING

## 2018-10-19 ASSESSMENT — PAIN DESCRIPTION - ORIENTATION
ORIENTATION: LEFT

## 2018-10-19 ASSESSMENT — PAIN DESCRIPTION - DESCRIPTORS
DESCRIPTORS: ACHING;PINS AND NEEDLES
DESCRIPTORS: ACHING
DESCRIPTORS: ACHING;PINS AND NEEDLES

## 2018-10-19 ASSESSMENT — PAIN DESCRIPTION - FREQUENCY
FREQUENCY: CONTINUOUS

## 2018-10-19 NOTE — ED PROVIDER NOTES
bilaterally  Psychiatric: Normal Affect        **Informed Consent**    The patient has given verbal consent to have photos taken of leg wound and inserted into their ED Provider Note as part of their permanent medical record for purposes of documentation, treatment management and/or medical review. All Images taken on 10/19/18 of patient name: Donell Almendarez were transmitted and stored on secured [employer] located within [facility] by a registered [employer].       -------------------------------------------------- RESULTS -------------------------------------------------  I have personally reviewed all laboratory and imaging results for this patient. Results are listed below.      LABS:  Results for orders placed or performed during the hospital encounter of 10/19/18   Troponin   Result Value Ref Range    Troponin 0.02 0.00 - 0.03 ng/mL   CBC Auto Differential   Result Value Ref Range    WBC 11.9 (H) 4.5 - 11.5 E9/L    RBC 3.69 (L) 3.80 - 5.80 E12/L    Hemoglobin 11.0 (L) 12.5 - 16.5 g/dL    Hematocrit 34.2 (L) 37.0 - 54.0 %    MCV 92.7 80.0 - 99.9 fL    MCH 29.8 26.0 - 35.0 pg    MCHC 32.2 32.0 - 34.5 %    RDW 14.9 11.5 - 15.0 fL    Platelets 901 122 - 400 E9/L    MPV 9.5 7.0 - 12.0 fL    Neutrophils % 75.8 43.0 - 80.0 %    Immature Granulocytes % 0.7 0.0 - 5.0 %    Lymphocytes % 12.7 (L) 20.0 - 42.0 %    Monocytes % 7.5 2.0 - 12.0 %    Eosinophils % 3.0 0.0 - 6.0 %    Basophils % 0.3 0.0 - 2.0 %    Neutrophils # 8.99 (H) 1.80 - 7.30 E9/L    Immature Granulocytes # 0.08 E9/L    Lymphocytes # 1.51 1.50 - 4.00 E9/L    Monocytes # 0.89 0.10 - 0.95 E9/L    Eosinophils # 0.35 0.05 - 0.50 E9/L    Basophils # 0.04 0.00 - 0.20 E9/L   APTT   Result Value Ref Range    aPTT 32.4 24.5 - 35.1 sec   Protime-INR   Result Value Ref Range    Protime 13.8 (H) 9.3 - 12.4 sec    INR 1.2    Lactic Acid, Plasma   Result Value Ref Range    Lactic Acid 1.5 0.5 - 2.2 mmol/L   Comprehensive

## 2018-10-21 LAB
ORGANISM: ABNORMAL
ORGANISM: ABNORMAL
URINE CULTURE, ROUTINE: NORMAL
WOUND/ABSCESS: ABNORMAL

## 2018-10-22 LAB
BLOOD CULTURE, ROUTINE: ABNORMAL
BLOOD CULTURE, ROUTINE: ABNORMAL
CULTURE, BLOOD 2: ABNORMAL
CULTURE, BLOOD 2: ABNORMAL
ORGANISM: ABNORMAL
ORGANISM: ABNORMAL

## 2018-11-14 ENCOUNTER — HOSPITAL ENCOUNTER (EMERGENCY)
Age: 58
Discharge: HOME | End: 2018-11-14
Payer: COMMERCIAL

## 2018-11-14 ENCOUNTER — APPOINTMENT (OUTPATIENT)
Dept: GENERAL RADIOLOGY | Age: 58
End: 2018-11-14
Payer: COMMERCIAL

## 2018-11-14 VITALS
WEIGHT: 170 LBS | RESPIRATION RATE: 17 BRPM | HEART RATE: 99 BPM | TEMPERATURE: 97.3 F | HEIGHT: 74 IN | OXYGEN SATURATION: 97 % | BODY MASS INDEX: 21.82 KG/M2 | DIASTOLIC BLOOD PRESSURE: 84 MMHG | SYSTOLIC BLOOD PRESSURE: 142 MMHG

## 2018-11-14 DIAGNOSIS — M25.572 ACUTE LEFT ANKLE PAIN: ICD-10-CM

## 2018-11-14 DIAGNOSIS — M79.605 PAIN OF LEFT LOWER EXTREMITY: ICD-10-CM

## 2018-11-14 DIAGNOSIS — M79.672 LEFT FOOT PAIN: Primary | ICD-10-CM

## 2018-11-14 PROCEDURE — 73630 X-RAY EXAM OF FOOT: CPT

## 2018-11-14 PROCEDURE — 99283 EMERGENCY DEPT VISIT LOW MDM: CPT

## 2018-11-14 PROCEDURE — 73610 X-RAY EXAM OF ANKLE: CPT

## 2018-11-14 RX ORDER — OXYCODONE HYDROCHLORIDE 5 MG/1
5 TABLET ORAL EVERY 6 HOURS PRN
Qty: 12 TABLET | Refills: 0 | Status: SHIPPED | OUTPATIENT
Start: 2018-11-14 | End: 2018-11-17

## 2018-12-31 ENCOUNTER — NURSE ONLY (OUTPATIENT)
Dept: INTERNAL MEDICINE | Age: 58
End: 2018-12-31
Payer: COMMERCIAL

## 2018-12-31 DIAGNOSIS — I82.499 DEEP VEIN THROMBOSIS (DVT) OF OTHER VEIN OF LOWER EXTREMITY, UNSPECIFIED CHRONICITY, UNSPECIFIED LATERALITY (HCC): ICD-10-CM

## 2018-12-31 LAB
INTERNATIONAL NORMALIZATION RATIO, POC: 4.9
PROTHROMBIN TIME, POC: 59

## 2018-12-31 PROCEDURE — 85610 PROTHROMBIN TIME: CPT

## 2018-12-31 RX ORDER — WARFARIN SODIUM 5 MG/1
5 TABLET ORAL
COMMUNITY
End: 2018-12-31

## 2018-12-31 RX ORDER — CLOPIDOGREL BISULFATE 75 MG/1
75 TABLET ORAL
COMMUNITY
Start: 2018-12-28 | End: 2019-08-23 | Stop reason: SDUPTHER

## 2018-12-31 RX ORDER — WARFARIN SODIUM 5 MG/1
5 TABLET ORAL
COMMUNITY
Start: 2018-12-26 | End: 2018-12-31

## 2018-12-31 RX ORDER — DOXYCYCLINE 100 MG/1
100 CAPSULE ORAL DAILY
COMMUNITY
Start: 2018-11-21 | End: 2019-08-23 | Stop reason: SDUPTHER

## 2018-12-31 RX ORDER — WARFARIN SODIUM 4 MG/1
4 TABLET ORAL DAILY
Qty: 30 TABLET | Refills: 0 | Status: SHIPPED | OUTPATIENT
Start: 2018-12-31 | End: 2019-02-15 | Stop reason: SDUPTHER

## 2019-01-07 ENCOUNTER — NURSE ONLY (OUTPATIENT)
Dept: INTERNAL MEDICINE | Age: 59
End: 2019-01-07
Payer: COMMERCIAL

## 2019-01-07 DIAGNOSIS — I82.499 DEEP VEIN THROMBOSIS (DVT) OF OTHER VEIN OF LOWER EXTREMITY, UNSPECIFIED CHRONICITY, UNSPECIFIED LATERALITY (HCC): ICD-10-CM

## 2019-01-07 DIAGNOSIS — J45.909 UNCOMPLICATED ASTHMA, UNSPECIFIED ASTHMA SEVERITY, UNSPECIFIED WHETHER PERSISTENT: ICD-10-CM

## 2019-01-07 LAB
INTERNATIONAL NORMALIZATION RATIO, POC: 3.9
PROTHROMBIN TIME, POC: 46.5

## 2019-01-07 PROCEDURE — 85610 PROTHROMBIN TIME: CPT | Performed by: INTERNAL MEDICINE

## 2019-01-07 PROCEDURE — 93793 ANTICOAG MGMT PT WARFARIN: CPT | Performed by: INTERNAL MEDICINE

## 2019-01-07 RX ORDER — ALBUTEROL SULFATE 90 UG/1
2 AEROSOL, METERED RESPIRATORY (INHALATION) EVERY 6 HOURS PRN
Qty: 1 INHALER | Refills: 5 | Status: SHIPPED | OUTPATIENT
Start: 2019-01-07 | End: 2019-08-23 | Stop reason: SDUPTHER

## 2019-01-07 RX ORDER — VARENICLINE TARTRATE 25 MG
KIT ORAL
COMMUNITY
Start: 2018-12-28 | End: 2019-06-03

## 2019-01-16 ENCOUNTER — OFFICE VISIT (OUTPATIENT)
Dept: INTERNAL MEDICINE | Age: 59
End: 2019-01-16
Payer: COMMERCIAL

## 2019-01-16 VITALS
DIASTOLIC BLOOD PRESSURE: 92 MMHG | TEMPERATURE: 98.1 F | BODY MASS INDEX: 21.56 KG/M2 | WEIGHT: 168 LBS | HEART RATE: 94 BPM | HEIGHT: 74 IN | SYSTOLIC BLOOD PRESSURE: 152 MMHG | RESPIRATION RATE: 12 BRPM

## 2019-01-16 DIAGNOSIS — I10 ESSENTIAL HYPERTENSION: Primary | ICD-10-CM

## 2019-01-16 DIAGNOSIS — I82.499 DEEP VEIN THROMBOSIS (DVT) OF OTHER VEIN OF LOWER EXTREMITY, UNSPECIFIED CHRONICITY, UNSPECIFIED LATERALITY (HCC): ICD-10-CM

## 2019-01-16 DIAGNOSIS — Z79.01 CHRONIC ANTICOAGULATION: Chronic | ICD-10-CM

## 2019-01-16 DIAGNOSIS — I73.9 PAD (PERIPHERAL ARTERY DISEASE) (HCC): ICD-10-CM

## 2019-01-16 DIAGNOSIS — Z72.0 TOBACCO ABUSE: ICD-10-CM

## 2019-01-16 LAB
INTERNATIONAL NORMALIZATION RATIO, POC: 2.4
PROTHROMBIN TIME, POC: 28.2

## 2019-01-16 PROCEDURE — G8427 DOCREV CUR MEDS BY ELIG CLIN: HCPCS | Performed by: INTERNAL MEDICINE

## 2019-01-16 PROCEDURE — 3017F COLORECTAL CA SCREEN DOC REV: CPT | Performed by: INTERNAL MEDICINE

## 2019-01-16 PROCEDURE — 99212 OFFICE O/P EST SF 10 MIN: CPT | Performed by: INTERNAL MEDICINE

## 2019-01-16 PROCEDURE — G8484 FLU IMMUNIZE NO ADMIN: HCPCS | Performed by: INTERNAL MEDICINE

## 2019-01-16 PROCEDURE — G8420 CALC BMI NORM PARAMETERS: HCPCS | Performed by: INTERNAL MEDICINE

## 2019-01-16 PROCEDURE — 85610 PROTHROMBIN TIME: CPT | Performed by: INTERNAL MEDICINE

## 2019-01-16 PROCEDURE — 4004F PT TOBACCO SCREEN RCVD TLK: CPT | Performed by: INTERNAL MEDICINE

## 2019-01-16 PROCEDURE — 99213 OFFICE O/P EST LOW 20 MIN: CPT | Performed by: INTERNAL MEDICINE

## 2019-01-16 RX ORDER — DOXYCYCLINE HYCLATE 50 MG/1
324 CAPSULE, GELATIN COATED ORAL
COMMUNITY
End: 2020-02-20 | Stop reason: ALTCHOICE

## 2019-01-16 RX ORDER — ADHESIVE BANDAGE 3/4"
BANDAGE TOPICAL
Qty: 1 EACH | Refills: 0 | Status: SHIPPED | OUTPATIENT
Start: 2019-01-16

## 2019-01-16 RX ORDER — ATORVASTATIN CALCIUM 80 MG/1
80 TABLET, FILM COATED ORAL
COMMUNITY
Start: 2018-09-24 | End: 2019-04-02 | Stop reason: SDUPTHER

## 2019-01-16 RX ORDER — LISINOPRIL 10 MG/1
10 TABLET ORAL DAILY
COMMUNITY
End: 2019-08-23 | Stop reason: SDUPTHER

## 2019-01-16 RX ORDER — AMLODIPINE BESYLATE 5 MG/1
5 TABLET ORAL DAILY
COMMUNITY
End: 2019-03-29 | Stop reason: SDUPTHER

## 2019-01-16 RX ORDER — MAGNESIUM OXIDE 400 MG/1
400 TABLET ORAL DAILY
COMMUNITY
End: 2019-08-23 | Stop reason: SDUPTHER

## 2019-01-16 RX ORDER — WARFARIN SODIUM 5 MG/1
5 TABLET ORAL
COMMUNITY
End: 2019-03-06

## 2019-01-16 RX ORDER — GABAPENTIN 100 MG/1
300 CAPSULE ORAL 3 TIMES DAILY
COMMUNITY
End: 2019-02-15

## 2019-01-16 RX ORDER — HYDROCHLOROTHIAZIDE 25 MG/1
25 TABLET ORAL DAILY
COMMUNITY
End: 2019-03-29

## 2019-01-16 RX ORDER — ATORVASTATIN CALCIUM 40 MG/1
40 TABLET, FILM COATED ORAL DAILY
COMMUNITY
End: 2019-01-16

## 2019-01-16 ASSESSMENT — ENCOUNTER SYMPTOMS
WHEEZING: 0
ABDOMINAL PAIN: 0
DIARRHEA: 0
SINUS PAIN: 0
SORE THROAT: 0
RHINORRHEA: 0
SHORTNESS OF BREATH: 0
NAUSEA: 0
CHEST TIGHTNESS: 0
STRIDOR: 0
COUGH: 0

## 2019-01-23 ENCOUNTER — NURSE ONLY (OUTPATIENT)
Dept: INTERNAL MEDICINE | Age: 59
End: 2019-01-23
Payer: COMMERCIAL

## 2019-01-23 DIAGNOSIS — I82.499 DEEP VEIN THROMBOSIS (DVT) OF OTHER VEIN OF LOWER EXTREMITY, UNSPECIFIED CHRONICITY, UNSPECIFIED LATERALITY (HCC): Primary | ICD-10-CM

## 2019-01-23 LAB
INTERNATIONAL NORMALIZATION RATIO, POC: 4.2
PROTHROMBIN TIME, POC: 49.9

## 2019-01-23 PROCEDURE — 93793 ANTICOAG MGMT PT WARFARIN: CPT | Performed by: INTERNAL MEDICINE

## 2019-01-23 PROCEDURE — 85610 PROTHROMBIN TIME: CPT | Performed by: INTERNAL MEDICINE

## 2019-01-23 RX ORDER — WARFARIN SODIUM 3 MG/1
3 TABLET ORAL DAILY
Qty: 30 TABLET | Refills: 0 | Status: SHIPPED | OUTPATIENT
Start: 2019-01-23 | End: 2019-02-15 | Stop reason: SDUPTHER

## 2019-02-01 ENCOUNTER — NURSE ONLY (OUTPATIENT)
Dept: INTERNAL MEDICINE | Age: 59
End: 2019-02-01
Payer: COMMERCIAL

## 2019-02-01 DIAGNOSIS — I82.499 DEEP VEIN THROMBOSIS (DVT) OF OTHER VEIN OF LOWER EXTREMITY, UNSPECIFIED CHRONICITY, UNSPECIFIED LATERALITY (HCC): ICD-10-CM

## 2019-02-01 LAB
INTERNATIONAL NORMALIZATION RATIO, POC: 2.2
PROTHROMBIN TIME, POC: 26.8

## 2019-02-01 PROCEDURE — 93793 ANTICOAG MGMT PT WARFARIN: CPT | Performed by: INTERNAL MEDICINE

## 2019-02-01 PROCEDURE — 85610 PROTHROMBIN TIME: CPT | Performed by: INTERNAL MEDICINE

## 2019-02-08 ENCOUNTER — NURSE ONLY (OUTPATIENT)
Dept: INTERNAL MEDICINE | Age: 59
End: 2019-02-08
Payer: COMMERCIAL

## 2019-02-08 DIAGNOSIS — I82.499 DEEP VEIN THROMBOSIS (DVT) OF OTHER VEIN OF LOWER EXTREMITY, UNSPECIFIED CHRONICITY, UNSPECIFIED LATERALITY (HCC): Primary | ICD-10-CM

## 2019-02-08 LAB
INTERNATIONAL NORMALIZATION RATIO, POC: 2
PROTHROMBIN TIME, POC: 23.9

## 2019-02-08 PROCEDURE — 85610 PROTHROMBIN TIME: CPT | Performed by: INTERNAL MEDICINE

## 2019-02-08 PROCEDURE — 93793 ANTICOAG MGMT PT WARFARIN: CPT | Performed by: INTERNAL MEDICINE

## 2019-02-15 ENCOUNTER — OFFICE VISIT (OUTPATIENT)
Dept: INTERNAL MEDICINE | Age: 59
End: 2019-02-15
Payer: COMMERCIAL

## 2019-02-15 VITALS
TEMPERATURE: 96.8 F | WEIGHT: 164 LBS | DIASTOLIC BLOOD PRESSURE: 92 MMHG | RESPIRATION RATE: 14 BRPM | BODY MASS INDEX: 21.05 KG/M2 | HEART RATE: 79 BPM | HEIGHT: 74 IN | SYSTOLIC BLOOD PRESSURE: 165 MMHG

## 2019-02-15 DIAGNOSIS — M79.2 NEUROPATHIC PAIN: ICD-10-CM

## 2019-02-15 DIAGNOSIS — I82.499 DEEP VEIN THROMBOSIS (DVT) OF OTHER VEIN OF LOWER EXTREMITY, UNSPECIFIED CHRONICITY, UNSPECIFIED LATERALITY (HCC): Primary | ICD-10-CM

## 2019-02-15 DIAGNOSIS — Z79.01 CHRONIC ANTICOAGULATION: Chronic | ICD-10-CM

## 2019-02-15 LAB
INTERNATIONAL NORMALIZATION RATIO, POC: 2.1
PROTHROMBIN TIME, POC: 24.8

## 2019-02-15 PROCEDURE — G8484 FLU IMMUNIZE NO ADMIN: HCPCS | Performed by: HOSPITALIST

## 2019-02-15 PROCEDURE — 99212 OFFICE O/P EST SF 10 MIN: CPT | Performed by: HOSPITALIST

## 2019-02-15 PROCEDURE — 3017F COLORECTAL CA SCREEN DOC REV: CPT | Performed by: HOSPITALIST

## 2019-02-15 PROCEDURE — 99213 OFFICE O/P EST LOW 20 MIN: CPT | Performed by: HOSPITALIST

## 2019-02-15 PROCEDURE — G8427 DOCREV CUR MEDS BY ELIG CLIN: HCPCS | Performed by: HOSPITALIST

## 2019-02-15 PROCEDURE — 4004F PT TOBACCO SCREEN RCVD TLK: CPT | Performed by: HOSPITALIST

## 2019-02-15 PROCEDURE — G8420 CALC BMI NORM PARAMETERS: HCPCS | Performed by: HOSPITALIST

## 2019-02-15 PROCEDURE — 85610 PROTHROMBIN TIME: CPT | Performed by: HOSPITALIST

## 2019-02-15 RX ORDER — ASPIRIN 325 MG
325 TABLET ORAL EVERY 6 HOURS PRN
COMMUNITY
End: 2019-02-15 | Stop reason: SDUPTHER

## 2019-02-15 RX ORDER — GABAPENTIN 300 MG/1
900 CAPSULE ORAL 3 TIMES DAILY
Qty: 270 CAPSULE | Refills: 2 | Status: SHIPPED | OUTPATIENT
Start: 2019-02-15 | End: 2019-03-29 | Stop reason: DRUGHIGH

## 2019-02-15 RX ORDER — M-VIT,TX,IRON,MINS/CALC/FOLIC 27MG-0.4MG
1 TABLET ORAL DAILY
COMMUNITY
End: 2021-07-06 | Stop reason: SDUPTHER

## 2019-02-15 RX ORDER — WARFARIN SODIUM 4 MG/1
4 TABLET ORAL DAILY
Qty: 30 TABLET | Refills: 0 | Status: SHIPPED | OUTPATIENT
Start: 2019-02-15 | End: 2019-03-05 | Stop reason: SDUPTHER

## 2019-02-15 RX ORDER — GABAPENTIN 300 MG/1
CAPSULE ORAL 3 TIMES DAILY
Refills: 1 | COMMUNITY
Start: 2019-01-31 | End: 2019-02-15 | Stop reason: SDUPTHER

## 2019-02-15 ASSESSMENT — PATIENT HEALTH QUESTIONNAIRE - PHQ9
SUM OF ALL RESPONSES TO PHQ QUESTIONS 1-9: 0
SUM OF ALL RESPONSES TO PHQ9 QUESTIONS 1 & 2: 0
1. LITTLE INTEREST OR PLEASURE IN DOING THINGS: 0
2. FEELING DOWN, DEPRESSED OR HOPELESS: 0
SUM OF ALL RESPONSES TO PHQ QUESTIONS 1-9: 0

## 2019-02-15 ASSESSMENT — ENCOUNTER SYMPTOMS
RESPIRATORY NEGATIVE: 1
GASTROINTESTINAL NEGATIVE: 1
EYES NEGATIVE: 1

## 2019-02-22 ENCOUNTER — NURSE ONLY (OUTPATIENT)
Dept: INTERNAL MEDICINE | Age: 59
End: 2019-02-22
Payer: COMMERCIAL

## 2019-02-22 DIAGNOSIS — I82.499 DEEP VEIN THROMBOSIS (DVT) OF OTHER VEIN OF LOWER EXTREMITY, UNSPECIFIED CHRONICITY, UNSPECIFIED LATERALITY (HCC): ICD-10-CM

## 2019-02-22 LAB
INTERNATIONAL NORMALIZATION RATIO, POC: 2.8
PROTHROMBIN TIME, POC: 33.1

## 2019-02-22 PROCEDURE — 85610 PROTHROMBIN TIME: CPT | Performed by: INTERNAL MEDICINE

## 2019-02-22 PROCEDURE — 93793 ANTICOAG MGMT PT WARFARIN: CPT | Performed by: INTERNAL MEDICINE

## 2019-03-01 ENCOUNTER — NURSE ONLY (OUTPATIENT)
Dept: INTERNAL MEDICINE | Age: 59
End: 2019-03-01
Payer: COMMERCIAL

## 2019-03-01 DIAGNOSIS — I82.499 DEEP VEIN THROMBOSIS (DVT) OF OTHER VEIN OF LOWER EXTREMITY, UNSPECIFIED CHRONICITY, UNSPECIFIED LATERALITY (HCC): Primary | ICD-10-CM

## 2019-03-01 LAB
INTERNATIONAL NORMALIZATION RATIO, POC: 2.6
PROTHROMBIN TIME, POC: 31.7

## 2019-03-01 PROCEDURE — 85610 PROTHROMBIN TIME: CPT | Performed by: INTERNAL MEDICINE

## 2019-03-01 PROCEDURE — 93793 ANTICOAG MGMT PT WARFARIN: CPT | Performed by: INTERNAL MEDICINE

## 2019-03-05 DIAGNOSIS — I82.499 DEEP VEIN THROMBOSIS (DVT) OF OTHER VEIN OF LOWER EXTREMITY, UNSPECIFIED CHRONICITY, UNSPECIFIED LATERALITY (HCC): ICD-10-CM

## 2019-03-06 ENCOUNTER — TELEPHONE (OUTPATIENT)
Dept: INTERNAL MEDICINE | Age: 59
End: 2019-03-06

## 2019-03-06 RX ORDER — WARFARIN SODIUM 4 MG/1
TABLET ORAL
Qty: 30 TABLET | Refills: 0 | Status: SHIPPED | OUTPATIENT
Start: 2019-03-06 | End: 2019-04-30 | Stop reason: SDUPTHER

## 2019-03-20 ENCOUNTER — TELEPHONE (OUTPATIENT)
Dept: INTERNAL MEDICINE CLINIC | Age: 59
End: 2019-03-20

## 2019-03-29 ENCOUNTER — OFFICE VISIT (OUTPATIENT)
Dept: INTERNAL MEDICINE | Age: 59
End: 2019-03-29
Payer: COMMERCIAL

## 2019-03-29 VITALS
WEIGHT: 168 LBS | TEMPERATURE: 98.3 F | DIASTOLIC BLOOD PRESSURE: 96 MMHG | HEART RATE: 91 BPM | BODY MASS INDEX: 21.56 KG/M2 | HEIGHT: 74 IN | SYSTOLIC BLOOD PRESSURE: 191 MMHG | RESPIRATION RATE: 18 BRPM

## 2019-03-29 DIAGNOSIS — I10 ESSENTIAL HYPERTENSION: ICD-10-CM

## 2019-03-29 DIAGNOSIS — Z23 NEED FOR SHINGLES VACCINE: ICD-10-CM

## 2019-03-29 DIAGNOSIS — Z11.59 ENCOUNTER FOR HCV SCREENING TEST FOR LOW RISK PATIENT: ICD-10-CM

## 2019-03-29 DIAGNOSIS — Z11.4 ENCOUNTER FOR SCREENING FOR HIV: Primary | ICD-10-CM

## 2019-03-29 DIAGNOSIS — D68.61 ANTI-PHOSPHOLIPID ANTIBODY SYNDROME (HCC): ICD-10-CM

## 2019-03-29 LAB
INTERNATIONAL NORMALIZATION RATIO, POC: 3
PROTHROMBIN TIME, POC: NORMAL

## 2019-03-29 PROCEDURE — G8420 CALC BMI NORM PARAMETERS: HCPCS | Performed by: INTERNAL MEDICINE

## 2019-03-29 PROCEDURE — 3017F COLORECTAL CA SCREEN DOC REV: CPT | Performed by: INTERNAL MEDICINE

## 2019-03-29 PROCEDURE — 4004F PT TOBACCO SCREEN RCVD TLK: CPT | Performed by: INTERNAL MEDICINE

## 2019-03-29 PROCEDURE — 85610 PROTHROMBIN TIME: CPT | Performed by: INTERNAL MEDICINE

## 2019-03-29 PROCEDURE — 99213 OFFICE O/P EST LOW 20 MIN: CPT | Performed by: INTERNAL MEDICINE

## 2019-03-29 PROCEDURE — 99212 OFFICE O/P EST SF 10 MIN: CPT | Performed by: INTERNAL MEDICINE

## 2019-03-29 PROCEDURE — G8427 DOCREV CUR MEDS BY ELIG CLIN: HCPCS | Performed by: INTERNAL MEDICINE

## 2019-03-29 PROCEDURE — G8484 FLU IMMUNIZE NO ADMIN: HCPCS | Performed by: INTERNAL MEDICINE

## 2019-03-29 RX ORDER — GABAPENTIN 400 MG/1
400 CAPSULE ORAL 3 TIMES DAILY
COMMUNITY
Start: 2019-03-18 | End: 2021-09-27 | Stop reason: SDUPTHER

## 2019-03-29 RX ORDER — AMLODIPINE BESYLATE 5 MG/1
5 TABLET ORAL DAILY
Qty: 30 TABLET | Refills: 2 | Status: SHIPPED | OUTPATIENT
Start: 2019-03-29 | End: 2019-03-29

## 2019-03-29 RX ORDER — AMLODIPINE BESYLATE 5 MG/1
5 TABLET ORAL DAILY
Qty: 90 TABLET | Refills: 1 | Status: SHIPPED | OUTPATIENT
Start: 2019-03-29 | End: 2019-07-15

## 2019-03-29 ASSESSMENT — ENCOUNTER SYMPTOMS
BACK PAIN: 0
ABDOMINAL PAIN: 0
CHEST TIGHTNESS: 0
SHORTNESS OF BREATH: 0
RHINORRHEA: 0
NAUSEA: 0
ABDOMINAL DISTENTION: 0
CONSTIPATION: 0
SORE THROAT: 0
SINUS PAIN: 0
COUGH: 1
VOMITING: 0
DIARRHEA: 0

## 2019-03-29 NOTE — PATIENT INSTRUCTIONS
toes.  How can you prevent PAD? · Quit smoking. Quitting smoking is one of the best things you can do to help prevent PAD. If you need help quitting, talk to your doctor about stop-smoking programs and medicines. These can increase your chances of quitting for good. · Stay at a healthy weight. · Manage other health problems, including diabetes, high blood pressure, and high cholesterol. · Be physically active. Get at least 30 minutes of exercise on most days of the week. Walking is a good choice. You also may want to do other activities, such as running, swimming, cycling, or playing tennis or team sports. · Eat a variety of heart-healthy foods. ? Eat fruits, vegetables, whole grains (like oatmeal), dried beans and peas, nuts and seeds, soy products (like tofu), and fat-free or low-fat dairy products. ? Replace butter, margarine, and hydrogenated or partially hydrogenated oils with olive and canola oils. (Canola oil margarine without trans fat is fine.)  ? Replace red meat with fish, poultry, and soy protein (like tofu). ? Limit processed and packaged foods like chips, crackers, and cookies. How is PAD treated? Your doctor may suggest ways to relieve symptoms and lower your risk of heart attack and stroke. These may include:  · Quitting smoking. It's one of the most important things you can do. If you need help quitting, talk to your doctor about stop-smoking programs and medicines. These can increase your chances of quitting for good. · Eating heart-healthy foods. · Staying at a healthy weight. Lose weight if you need to. · Regular exercise (if your doctor says it's safe). Try walking, swimming, or biking for at least 30 minutes on most, if not all, days of the week. If you have leg symptoms when you exercise, your doctor might recommend a specialized exercise program that may relieve symptoms. The goal is to be able to walk farther without pain.   · Medicines that help manage other problems such as

## 2019-03-29 NOTE — PROGRESS NOTES
[facility]  Internal[facility]  ACC Note      SUBJECTIVE:  CC: had concerns including Office Visit for Anticoagulation Management and Hypertension. Floresita Juarez is a 61 yo M with PMH PVD, HTN, HLP, remote aortobiliac bypass for AAA complicated by graft infection and recurrent bilateral limb occlusion, bilateral ax-profunda BPG with hx of infected left ax-profunda bypass presented to the [facility] for a routine visit with INR check.    -Still complaining of tingling and pain in his Bilateral feet , gabapentin was changed to 400mg TID and per the patient helping,Tylenol also  Helps and he is going to get OTC.  -Still smoking, 1/2 ppd, thinks Chantix helping and makes him not stick to the cigarette for too long.    -Patient does not remember what medications he uses  -Verified with his pharmacy he is taking  Lisinopril and Lipitor   - Not picking up HCTZ, Amlodipine since 8/2019    Review of Systems   Constitutional: Negative for activity change, appetite change, fatigue and fever. HENT: Negative for postnasal drip, rhinorrhea, sinus pain and sore throat. Respiratory: Positive for cough. Negative for chest tightness and shortness of breath. Cardiovascular: Negative for chest pain and palpitations. Gastrointestinal: Negative for abdominal distention, abdominal pain, constipation, diarrhea, nausea and vomiting. Genitourinary: Negative for frequency, hematuria and urgency. Musculoskeletal: Negative for arthralgias, back pain and myalgias. Neurological: Negative for dizziness, weakness, numbness and headaches.      Outpatient Medications Marked as Taking for the 3/29/19 encounter (Office Visit) with Sotero Jennings MD   Medication Sig Dispense Refill    warfarin (COUMADIN) 4 MG tablet take 1 tablet by mouth once daily 30 tablet 0       I have reviewed all pertinent PMHx, PSHx, FamHx, SocialHx, medications, and allergiesand updated history as appropriate. OBJECTIVE:    VS: BP (!) 159/94 (Site: Right Upper Arm, Position: Sitting, Cuff Size: Medium Adult)   Pulse 91   Temp 98.3 °F (36.8 °C) (Oral)   Resp 18   Ht 6' 2\" (1.88 m)   Wt 168 lb (76.2 kg)   BMI 21.57 kg/m²   Physical Exam   Constitutional: He is oriented to person, place, and time. He appears well-developed and well-nourished. HENT:   Head: Normocephalic and atraumatic. Mouth/Throat: Oropharynx is clear and moist.   Eyes: Pupils are equal, round, and reactive to light. Conjunctivae and EOM are normal.   Neck: Normal range of motion. Neck supple. Cardiovascular: Normal rate, regular rhythm and normal heart sounds. Pulmonary/Chest: Effort normal and breath sounds normal.   Abdominal: Soft. Bowel sounds are normal.   Musculoskeletal: Normal range of motion. Neurological: He is alert and oriented to person, place, and time. Skin: Skin is warm. ASSESSMENT/PLAN:  There are no diagnoses linked to this encounter.     Hypertension:   -BP is at 154/94  -Has not taken BP meds today  -Verified with pharmacy, he only uses Lisinopril  -Will keep Lisinopril for now and add Amlodipine 5mg  -RTC in 1 month for BP check  -A1c 4.8 today    PAD  -Follow with [facility]  -Had thrombectomy on the left side 10/2018  -Next apt in 2/2019     AAA  - s/p AAA repair      Hx of DVT / lupus anticoagulant positive, INR at goal  -Patient currently on 4 mg Coumadin, INR 3 today  -Goal 2.5-3.5     Tobacco abuse   - Patient went back to smoking after he lost his mom  -1/2 PPD currently  -Chantix helping     HM  -Flu shot, [facility], will bring verification for next time  -Shingles, ok  -tdap for next visit  -HIV, HCV for next visit      I have reviewed my findings and recommendations with Valentin Mcmillan and Dr. Nano Hollingsworth MD PGY-1   3/29/2019 2:08 PM

## 2019-04-03 RX ORDER — ATORVASTATIN CALCIUM 80 MG/1
TABLET, FILM COATED ORAL
Qty: 30 TABLET | Refills: 0 | Status: SHIPPED | OUTPATIENT
Start: 2019-04-03 | End: 2019-08-23 | Stop reason: SDUPTHER

## 2019-04-17 RX ORDER — WARFARIN SODIUM 4 MG/1
4 TABLET ORAL DAILY
Qty: 30 TABLET | Refills: 1 | Status: SHIPPED | OUTPATIENT
Start: 2019-04-17 | End: 2019-06-10 | Stop reason: SDUPTHER

## 2019-04-23 ENCOUNTER — TELEPHONE (OUTPATIENT)
Dept: INTERNAL MEDICINE | Age: 59
End: 2019-04-23

## 2019-04-29 ENCOUNTER — HOSPITAL ENCOUNTER (OUTPATIENT)
Age: 59
Discharge: HOME | End: 2019-04-29
Payer: COMMERCIAL

## 2019-04-29 DIAGNOSIS — Z11.4 ENCOUNTER FOR SCREENING FOR HIV: ICD-10-CM

## 2019-04-29 DIAGNOSIS — Z11.59 ENCOUNTER FOR HCV SCREENING TEST FOR LOW RISK PATIENT: ICD-10-CM

## 2019-04-29 PROCEDURE — 86703 HIV-1/HIV-2 1 RESULT ANTBDY: CPT

## 2019-04-29 PROCEDURE — 86803 HEPATITIS C AB TEST: CPT

## 2019-04-29 PROCEDURE — 36415 COLL VENOUS BLD VENIPUNCTURE: CPT

## 2019-04-30 ENCOUNTER — OFFICE VISIT (OUTPATIENT)
Dept: INTERNAL MEDICINE | Age: 59
End: 2019-04-30
Payer: COMMERCIAL

## 2019-04-30 VITALS
TEMPERATURE: 97.3 F | WEIGHT: 177 LBS | HEIGHT: 74 IN | DIASTOLIC BLOOD PRESSURE: 96 MMHG | RESPIRATION RATE: 16 BRPM | BODY MASS INDEX: 22.72 KG/M2 | SYSTOLIC BLOOD PRESSURE: 157 MMHG

## 2019-04-30 DIAGNOSIS — I82.499 DEEP VEIN THROMBOSIS (DVT) OF OTHER VEIN OF LOWER EXTREMITY, UNSPECIFIED CHRONICITY, UNSPECIFIED LATERALITY (HCC): Primary | ICD-10-CM

## 2019-04-30 LAB
HEPATITIS C ANTIBODY INTERPRETATION: NORMAL
HIV-1 AND HIV-2 ANTIBODIES: NORMAL
INTERNATIONAL NORMALIZATION RATIO, POC: 1.9
PROTHROMBIN TIME, POC: 22.7

## 2019-04-30 PROCEDURE — 85610 PROTHROMBIN TIME: CPT | Performed by: INTERNAL MEDICINE

## 2019-04-30 PROCEDURE — G8420 CALC BMI NORM PARAMETERS: HCPCS | Performed by: INTERNAL MEDICINE

## 2019-04-30 PROCEDURE — 99212 OFFICE O/P EST SF 10 MIN: CPT | Performed by: INTERNAL MEDICINE

## 2019-04-30 PROCEDURE — 1036F TOBACCO NON-USER: CPT | Performed by: INTERNAL MEDICINE

## 2019-04-30 PROCEDURE — G8427 DOCREV CUR MEDS BY ELIG CLIN: HCPCS | Performed by: INTERNAL MEDICINE

## 2019-04-30 PROCEDURE — 99213 OFFICE O/P EST LOW 20 MIN: CPT | Performed by: INTERNAL MEDICINE

## 2019-04-30 PROCEDURE — 3017F COLORECTAL CA SCREEN DOC REV: CPT | Performed by: INTERNAL MEDICINE

## 2019-04-30 RX ORDER — WARFARIN SODIUM 5 MG/1
TABLET ORAL
Qty: 30 TABLET | Refills: 1 | Status: SHIPPED | OUTPATIENT
Start: 2019-04-30 | End: 2019-07-15 | Stop reason: ALTCHOICE

## 2019-04-30 ASSESSMENT — ENCOUNTER SYMPTOMS
RESPIRATORY NEGATIVE: 1
BACK PAIN: 0

## 2019-04-30 NOTE — PATIENT INSTRUCTIONS
Patient Education   Make sure you are taking both Lisinopril and Norvasc for your blood pressure. Change your Coumadin to alternating doses of Coumadin 4 mg and 5 mg. Repeat INR check in 2 weeks. Follow up as scheduled. We will notify you of your next PCP appt. Preventing Falls: Care Instructions  Your Care Instructions    Getting around your home safely can be a challenge if you have injuries or health problems that make it easy for you to fall. Loose rugs and furniture in walkways are among the dangers for many older people who have problems walking or who have poor eyesight. People who have conditions such as arthritis, osteoporosis, or dementia also have to be careful not to fall. You can make your home safer with a few simple measures. Follow-up care is a key part of your treatment and safety. Be sure to make and go to all appointments, and call your doctor if you are having problems. It's also a good idea to know your test results and keep a list of the medicines you take. How can you care for yourself at home? Taking care of yourself  · You may get dizzy if you do not drink enough water. To prevent dehydration, drink plenty of fluids, enough so that your urine is light yellow or clear like water. Choose water and other caffeine-free clear liquids. If you have kidney, heart, or liver disease and have to limit fluids, talk with your doctor before you increase the amount of fluids you drink. · Exercise regularly to improve your strength, muscle tone, and balance. Walk if you can. Swimming may be a good choice if you cannot walk easily. · Have your vision and hearing checked each year or any time you notice a change. If you have trouble seeing and hearing, you might not be able to avoid objects and could lose your balance. · Know the side effects of the medicines you take. Ask your doctor or pharmacist whether the medicines you take can affect your balance.  Sleeping pills or sedatives can affect your balance. · Limit the amount of alcohol you drink. Alcohol can impair your balance and other senses. · Ask your doctor whether calluses or corns on your feet need to be removed. If you wear loose-fitting shoes because of calluses or corns, you can lose your balance and fall. · Talk to your doctor if you have numbness in your feet. Preventing falls at home  · Remove raised doorway thresholds, throw rugs, and clutter. Repair loose carpet or raised areas in the floor. · Move furniture and electrical cords to keep them out of walking paths. · Use nonskid floor wax, and wipe up spills right away, especially on ceramic tile floors. · If you use a walker or cane, put rubber tips on it. If you use crutches, clean the bottoms of them regularly with an abrasive pad, such as steel wool. · Keep your house well lit, especially [place], and outside walkways. Use night-lights in areas such as hallways and bathrooms. Add extra light switches or use remote switches (such as switches that go on or off when you clap your hands) to make it easier to turn lights on if you have to get up during the night. · Install sturdy handrails on stairways. · Move items in your cabinets so that the things you use a lot are on the lower shelves (about waist level). · Keep a cordless phone and a flashlight with new batteries by your bed. If possible, put a phone in each of the main rooms of your house, or carry a cell phone in case you fall and cannot reach a phone. Or, you can wear a device around your neck or wrist. You push a button that sends a signal for help. · Wear low-heeled shoes that fit well and give your feet good support. Use footwear with nonskid soles. Check the heels and soles of your shoes for wear. Repair or replace worn heels or soles. · Do not wear socks without shoes on wood floors. · Walk on the grass when the sidewalks are slippery.  If you live in an area that gets snow and ice in the winter, sprinkle salt on slippery steps and sidewalks. Preventing falls in the bath  · Install grab bars and nonskid mats inside and outside your shower or tub and near the toilet and sinks. · Use shower chairs and bath benches. · Use a hand-held shower head that will allow you to sit while showering. · Get into a tub or shower by putting the weaker leg in first. Get out of a tub or shower with your strong side first.  · Repair loose toilet seats and consider installing a raised toilet seat to make getting on and off the toilet easier. · Keep your bathroom door unlocked while you are in the shower. Where can you learn more? Go to https://[email or web address]. org and sign in to your [employer] account. Enter [phone] in the [facility] box to learn more about \"Preventing Falls: Care Instructions. \"     If you do not have an account, please click on the \"Sign Up Now\" link. Current as of: March 15, 2018  Content Version: 11.9  © 4148-9550 [employer]. Care instructions adapted under license by [facility] ([facility]). If you have questions about a medical condition or this instruction, always ask your healthcare professional. Kristie Perez any warranty or liability for your use of this information.

## 2019-04-30 NOTE — PROGRESS NOTES
[facility]  [facility]    Attending Physician Statement:  Josiah Donnelly M.D., F.A.C.P. I have discussed the case, including pertinent history and exam findings with the resident. I agree with the assessment, plan and orders as documented by the resident. Patient is seen for fu visit today. Last office notes reviewed, relative labs and imaging. Inr llow 1.9-- goal 2.5-3 (APS+ hx) +tobacco-- recently quit, now taking chantix  -plan inc coumadin 4/5  Also high BP, but he didn't take his BP meds this am   Remainder of medical problems as per resident note.

## 2019-04-30 NOTE — PROGRESS NOTES
[facility] [address]  InternalMedicine Residency Program  ACC Note      SUBJECTIVE:  CC: had concerns including Results. Tammy Arevalo presented to the [facility] for INR and BP check. He is a 59 yo M with PMH PVD, HTN, HLP,  Hypercoagulable state with antiphospholipid syndrome (positive lupus anticoagulant),  remote aortobiliac bypass for AAA complicated by graft infection and recurrent bilateral limb occlusion, bilateral ax-profunda BPG with hx of infected left ax-profunda bypass. Patient developed thrombosis on left lower extremity requiring thrombectomy and has been on Coumadin since 2010. He is currently on 4 mg coumadin daily. Hypertension   - has not taken meds this morning  - lisinopril 10 mg and amlodipine 5 mg    Tobacco abuse  - taking chantix  - has not smoked for the last 3 weeks  - experiencing some mood swings but not severe      Review of Systems   Constitutional: Negative. HENT: Negative. Respiratory: Negative. Cardiovascular: Negative. Genitourinary: Negative. Musculoskeletal: Positive for gait problem. Negative for arthralgias, back pain, joint swelling, myalgias, neck pain and neck stiffness. Neurological: Negative for headaches. Outpatient Medications Marked as Taking for the 4/30/19 encounter (Office Visit) with Dain Ahumada, MD   Medication Sig Dispense Refill    warfarin (COUMADIN) 4 MG tablet Take 1 tablet by mouth daily 30 tablet 1    atorvastatin (LIPITOR) 80 MG tablet take 1 tablet by mouth once daily 30 tablet 0    gabapentin (NEURONTIN) 400 MG capsule Take 400 mg by mouth 3 times daily.  Ordered per  In [facility]      amLODIPine (NORVASC) 5 MG tablet Take 1 tablet by mouth daily 90 tablet 1    Multiple Vitamins-Minerals (THERAPEUTIC MULTIVITAMIN-MINERALS) tablet Take 1 tablet by mouth daily      lisinopril (PRINIVIL;ZESTRIL) 10 MG tablet Take 10 mg by mouth daily      ferrous gluconate (FERGON) 324 (38 Fe) MG tablet Take 324 mg by mouth      magnesium oxide (MAG-OX) 400 MG tablet Take 400 mg by mouth daily      varenicline (CHANTIX RADHA) 0.5 MG X 11 & 1 MG X 42 tablet Take 1 tablet (0.5 mg) by mouth once daily for 3 days, then 1 tablet (0.5 mg) twice daily for 4 days, then one tablet (1 mg) twice daily.  clopidogrel (PLAVIX) 75 MG tablet Take 75 mg by mouth      acetaminophen (TYLENOL) 500 MG tablet Take 2 tablets by mouth every 6 hours as needed for Pain 30 tablet 0       I have reviewed all pertinent PMHx, PSHx, FamHx, SocialHx, medications, and allergiesand updated history as appropriate. OBJECTIVE:    VS: BP (!) 157/96   Temp 97.3 °F (36.3 °C) (Oral)   Resp 16   Ht 6' 2\" (1.88 m)   Wt 177 lb (80.3 kg)   BMI 22.73 kg/m²   Physical Exam   Constitutional: He appears well-developed and well-nourished. HENT:   Head: Normocephalic and atraumatic. Eyes: Pupils are equal, round, and reactive to light. Conjunctivae and EOM are normal.   Neck: Normal range of motion. Neck supple. No JVD present. No tracheal deviation present. No thyromegaly present. Cardiovascular: Normal rate, regular rhythm, normal heart sounds and intact distal pulses. Pulmonary/Chest: Effort normal and breath sounds normal. No stridor. No respiratory distress. He has no wheezes. He has no rales. He exhibits no tenderness. Abdominal: Soft. Bowel sounds are normal. He exhibits no distension and no mass. There is no tenderness. There is no rebound and no guarding. Musculoskeletal: He exhibits edema. Well-healed surgical scar on both lower extremities. Skin: Skin is warm and dry.        PLAN:  Hx of DVT / lupus anticoagulant positive  -Patient currently on 4 mg Coumadin, INR 1.9 today  -Goal 2.5-3.5  - switch to coumadin 5 mg and 4 mg on alternating days  - return in 2 weeks for INR check    Hypertension:  -BP is at 161/95, 157/96  -Has not taken BP meds today  -continue lisinopril 10mg and amlodipine 5 mg for now  - check BP during next visit    PAD  -Follow with [facility]  -Had thrombectomy on the left side 10/2018     AAA  - s/p AAA repair      Tobacco abuse  - continue varenicline       HM  -Flu shot, [facility], will bring verification for next time  -Shingles, ok  - tdap for next visit  - HIV, HCV for next visit        I have reviewed my findings and recommendations with Valentin Mcmillan and Dr Fany Pollock MD PGY-1   4/30/2019 9:02 AM

## 2019-05-17 ENCOUNTER — NURSE ONLY (OUTPATIENT)
Dept: INTERNAL MEDICINE | Age: 59
End: 2019-05-17
Payer: COMMERCIAL

## 2019-05-17 DIAGNOSIS — I82.499 DEEP VEIN THROMBOSIS (DVT) OF OTHER VEIN OF LOWER EXTREMITY, UNSPECIFIED CHRONICITY, UNSPECIFIED LATERALITY (HCC): Primary | ICD-10-CM

## 2019-05-17 LAB
INTERNATIONAL NORMALIZATION RATIO, POC: 3.5
PROTHROMBIN TIME, POC: 42

## 2019-05-17 PROCEDURE — 93793 ANTICOAG MGMT PT WARFARIN: CPT | Performed by: INTERNAL MEDICINE

## 2019-05-17 PROCEDURE — 85610 PROTHROMBIN TIME: CPT | Performed by: INTERNAL MEDICINE

## 2019-05-17 NOTE — PATIENT INSTRUCTIONS
Decrease Coumadin to 4 mg daily. Repeat INR in 1 week. Follow up as scheduled. Please call with any questions or concerns.    Jareth Serrato RN

## 2019-05-17 NOTE — PROGRESS NOTES
INR results reviewed with Dr. Ulices Willson and orders received. Pt notified via phone of same. AVS mailed to patient.

## 2019-05-22 ENCOUNTER — HOSPITAL ENCOUNTER (EMERGENCY)
Age: 59
Discharge: HOME | End: 2019-05-22
Attending: EMERGENCY MEDICINE
Payer: COMMERCIAL

## 2019-05-22 ENCOUNTER — APPOINTMENT (OUTPATIENT)
Dept: ULTRASOUND IMAGING | Age: 59
End: 2019-05-22
Payer: COMMERCIAL

## 2019-05-22 VITALS
BODY MASS INDEX: 22.46 KG/M2 | DIASTOLIC BLOOD PRESSURE: 89 MMHG | OXYGEN SATURATION: 98 % | RESPIRATION RATE: 18 BRPM | HEIGHT: 74 IN | SYSTOLIC BLOOD PRESSURE: 161 MMHG | TEMPERATURE: 97.3 F | WEIGHT: 175 LBS | HEART RATE: 99 BPM

## 2019-05-22 DIAGNOSIS — M79.605 LEFT LEG PAIN: Primary | ICD-10-CM

## 2019-05-22 DIAGNOSIS — T14.8XXA HEMATOMA: ICD-10-CM

## 2019-05-22 LAB
ALBUMIN SERPL-MCNC: 3.9 G/DL (ref 3.5–5.2)
ALP BLD-CCNC: 93 U/L (ref 40–129)
ALT SERPL-CCNC: 26 U/L (ref 0–40)
ANION GAP SERPL CALCULATED.3IONS-SCNC: 9 MMOL/L (ref 7–16)
ANISOCYTOSIS: ABNORMAL
AST SERPL-CCNC: 35 U/L (ref 0–39)
BASOPHILS ABSOLUTE: 0 E9/L (ref 0–0.2)
BASOPHILS RELATIVE PERCENT: 0.8 % (ref 0–2)
BILIRUB SERPL-MCNC: 0.6 MG/DL (ref 0–1.2)
BUN BLDV-MCNC: 10 MG/DL (ref 6–20)
CALCIUM SERPL-MCNC: 8.9 MG/DL (ref 8.6–10.2)
CHLORIDE BLD-SCNC: 110 MMOL/L (ref 98–107)
CO2: 25 MMOL/L (ref 22–29)
CREAT SERPL-MCNC: 0.9 MG/DL (ref 0.7–1.2)
EOSINOPHILS ABSOLUTE: 0.1 E9/L (ref 0.05–0.5)
EOSINOPHILS RELATIVE PERCENT: 2.6 % (ref 0–6)
GFR AFRICAN AMERICAN: >60
GFR NON-AFRICAN AMERICAN: >60 ML/MIN/1.73
GLUCOSE BLD-MCNC: 101 MG/DL (ref 74–99)
HCT VFR BLD CALC: 39 % (ref 37–54)
HEMOGLOBIN: 13.2 G/DL (ref 12.5–16.5)
INR BLD: 1.9
LYMPHOCYTES ABSOLUTE: 1.52 E9/L (ref 1.5–4)
LYMPHOCYTES RELATIVE PERCENT: 40.9 % (ref 20–42)
MCH RBC QN AUTO: 32.4 PG (ref 26–35)
MCHC RBC AUTO-ENTMCNC: 33.8 % (ref 32–34.5)
MCV RBC AUTO: 95.8 FL (ref 80–99.9)
MONOCYTES ABSOLUTE: 0.37 E9/L (ref 0.1–0.95)
MONOCYTES RELATIVE PERCENT: 10.4 % (ref 2–12)
NEUTROPHILS ABSOLUTE: 1.7 E9/L (ref 1.8–7.3)
NEUTROPHILS RELATIVE PERCENT: 46.1 % (ref 43–80)
PDW BLD-RTO: 12.7 FL (ref 11.5–15)
PLATELET # BLD: 85 E9/L (ref 130–450)
PLATELET CONFIRMATION: NORMAL
PMV BLD AUTO: 11.3 FL (ref 7–12)
POTASSIUM SERPL-SCNC: 4.1 MMOL/L (ref 3.5–5)
PROTHROMBIN TIME: 21.7 SEC (ref 9.3–12.4)
RBC # BLD: 4.07 E12/L (ref 3.8–5.8)
SODIUM BLD-SCNC: 144 MMOL/L (ref 132–146)
TOTAL PROTEIN: 7.2 G/DL (ref 6.4–8.3)
WBC # BLD: 3.7 E9/L (ref 4.5–11.5)

## 2019-05-22 PROCEDURE — 85610 PROTHROMBIN TIME: CPT

## 2019-05-22 PROCEDURE — 36415 COLL VENOUS BLD VENIPUNCTURE: CPT

## 2019-05-22 PROCEDURE — 80053 COMPREHEN METABOLIC PANEL: CPT

## 2019-05-22 PROCEDURE — 93971 EXTREMITY STUDY: CPT

## 2019-05-22 PROCEDURE — 99284 EMERGENCY DEPT VISIT MOD MDM: CPT

## 2019-05-22 PROCEDURE — 85025 COMPLETE CBC W/AUTO DIFF WBC: CPT

## 2019-05-22 ASSESSMENT — ENCOUNTER SYMPTOMS
EYE REDNESS: 0
NAUSEA: 0
WHEEZING: 0
EYE PAIN: 0
BACK PAIN: 0
COUGH: 0
VOMITING: 0
SHORTNESS OF BREATH: 0
EYE DISCHARGE: 0
ROS SKIN COMMENTS: BRUISING
SINUS PRESSURE: 0
COLOR CHANGE: 1
SORE THROAT: 0
ABDOMINAL PAIN: 0
DIARRHEA: 0

## 2019-05-22 NOTE — ED PROVIDER NOTES
Patient is a 21-year-old male presenting to the ED with left leg pain and any anterior eye. Patient's states he is noticed an area of swelling and bruising over that region of his leg for the past 5 days. Patient has a past history of blood clots or leg requiring surgery. Patient states he's had a recurrence of blood clots and describes similar symptoms and previous events. Patient denies any color change in the leg other than bruising and localized areas he's having pain. Review of Systems   Constitutional: Negative for chills and fever. HENT: Negative for ear pain, sinus pressure and sore throat. Eyes: Negative for pain, discharge and redness. Respiratory: Negative for cough, shortness of breath and wheezing. Cardiovascular: Positive for leg swelling. Negative for chest pain. Gastrointestinal: Negative for abdominal pain, diarrhea, nausea and vomiting. Genitourinary: Negative for dysuria and frequency. Musculoskeletal: Negative for arthralgias and back pain. Skin: Positive for color change. Negative for rash and wound. bruising   Neurological: Negative for weakness and headaches. Hematological: Negative for adenopathy. All other systems reviewed and are negative. Physical Exam   Constitutional: He is oriented to person, place, and time. He appears well-developed and well-nourished. No distress. HENT:   Head: Normocephalic and atraumatic. Eyes: Pupils are equal, round, and reactive to light. Neck: Normal range of motion. Neck supple. Cardiovascular: Normal rate, regular rhythm and normal heart sounds. Pulmonary/Chest: Effort normal and breath sounds normal. No respiratory distress. He has no wheezes. He has no rales. Abdominal: Soft. Bowel sounds are normal. There is no tenderness. There is no rebound and no guarding. Musculoskeletal: He exhibits no edema.    Ecchymosis over the  left superior anterior thigh mild tenderness over the    Neurological: He is alert and oriented to person, place, and time. No cranial nerve deficit. Coordination normal.   Skin: Skin is warm and dry. He is not diaphoretic. Nursing note and vitals reviewed. Procedures    MDM  Number of Diagnoses or Management Options  Diagnosis management comments: Patient is a 30-year-old male being evaluated for leg swelling considering possibly due to similar symptoms in the past DVT. Also checking for appropriate INR. Also considered possibility of thrombophlebitis or hematoma. 11:45 patient reevaluated. No DVT, symptoms likely from hematoma. Will treat with topical antiinflammatory. Discharge to home. No change in exam     --------------------------------------------- PAST HISTORY ---------------------------------------------  Past Medical History:  has a past medical history of AAA (abdominal aortic aneurysm) ([facility] [address] Asthma, Chronic anticoagulation, DVT (deep venous thrombosis) ([facility] [address] Hypertension, Peripheral vascular disease ([facility] [address] and Transfusion history. Past Surgical History:  has a past surgical history that includes vascular surgery; Femoral-femoral Bypass Graft (1/26/10); femoral bypass (Right, 1/26/10); thromboendarterectomy (Left, 1/26/10); fasciotomy (Right, 6/11/10); thromboendarterectomy (Right, 6/21/10); Aorto-femoral Bypass Graft (Bilateral, 6/27/10); Arterial aneurysm repair (Left, 9/28/10); hernia repair (6/25/14); and Colonoscopy. Social History:  reports that he quit smoking about 6 weeks ago. His smoking use included cigarettes. He has a 10.23 pack-year smoking history. He has never used smokeless tobacco. He reports that he drinks about 1.2 oz of alcohol per week. He reports that he has current or past drug history. Drug: Marijuana. Family History: family history includes Cancer in his father; Diabetes in his brother and mother; High Blood Pressure in his mother. The patients home medications have been reviewed.     Allergies: Ultram [tramadol]    -------------------------------------------------- RESULTS -------------------------------------------------  Labs:  Results for orders placed or performed during the hospital encounter of 05/22/19   CBC Auto Differential   Result Value Ref Range    WBC 3.7 (L) 4.5 - 11.5 E9/L    RBC 4.07 3.80 - 5.80 E12/L    Hemoglobin 13.2 12.5 - 16.5 g/dL    Hematocrit 39.0 37.0 - 54.0 %    MCV 95.8 80.0 - 99.9 fL    MCH 32.4 26.0 - 35.0 pg    MCHC 33.8 32.0 - 34.5 %    RDW 12.7 11.5 - 15.0 fL    Platelets 85 (L) 632 - 450 E9/L    MPV 11.3 7.0 - 12.0 fL    Neutrophils % 46.1 43.0 - 80.0 %    Lymphocytes % 40.9 20.0 - 42.0 %    Monocytes % 10.4 2.0 - 12.0 %    Eosinophils % 2.6 0.0 - 6.0 %    Basophils % 0.8 0.0 - 2.0 %    Neutrophils # 1.70 (L) 1.80 - 7.30 E9/L    Lymphocytes # 1.52 1.50 - 4.00 E9/L    Monocytes # 0.37 0.10 - 0.95 E9/L    Eosinophils # 0.10 0.05 - 0.50 E9/L    Basophils # 0.00 0.00 - 0.20 E9/L    Anisocytosis 1+    Comprehensive Metabolic Panel   Result Value Ref Range    Sodium 144 132 - 146 mmol/L    Potassium 4.1 3.5 - 5.0 mmol/L    Chloride 110 (H) 98 - 107 mmol/L    CO2 25 22 - 29 mmol/L    Anion Gap 9 7 - 16 mmol/L    Glucose 101 (H) 74 - 99 mg/dL    BUN 10 6 - 20 mg/dL    CREATININE 0.9 0.7 - 1.2 mg/dL    GFR Non-African American >60 >=60 mL/min/1.73    GFR African American >60     Calcium 8.9 8.6 - 10.2 mg/dL    Total Protein 7.2 6.4 - 8.3 g/dL    Alb 3.9 3.5 - 5.2 g/dL    Total Bilirubin 0.6 0.0 - 1.2 mg/dL    Alkaline Phosphatase 93 40 - 129 U/L    ALT 26 0 - 40 U/L    AST 35 0 - 39 U/L   Protime-INR   Result Value Ref Range    Protime 21.7 (H) 9.3 - 12.4 sec    INR 1.9    Platelet Confirmation   Result Value Ref Range    Platelet Confirmation CONFIRMED        Radiology:  US DUP LOWER EXTREMITY LEFT PEDRO   Final Result   1.  No evidence of left lower extremity deep venous thrombosis.          ------------------------- NURSING NOTES AND VITALS REVIEWED ---------------------------  Date / Time Roomed:  5/22/2019 10:05 AM  ED Bed Assignment:  18B/18B-18    The nursing notes within the ED encounter and vital signs as below have been reviewed. BP (!) 161/89   Pulse 99   Temp 97.3 °F (36.3 °C) (Temporal)   Resp 18   Ht 6' 2\" (1.88 m)   Wt 175 lb (79.4 kg)   SpO2 98%   BMI 22.47 kg/m²   Oxygen Saturation Interpretation: Normal      ------------------------------------------ PROGRESS NOTES ------------------------------------------         11:57 AM  I have spoken with the patient and discussed todays results, in addition to providing specific details for the plan of care and counseling regarding the diagnosis and prognosis. Their questions are answered at this time and they are agreeable with the plan. I discussed at length with them reasons for immediate return here for re evaluation. They will followup with their primary care physician by calling their office tomorrow. --------------------------------- ADDITIONAL PROVIDER NOTES ---------------------------------  At this time the patient is without objective evidence of an acute process requiring hospitalization or inpatient management. They have remained hemodynamically stable throughout their entire ED visit and are stable for discharge with outpatient follow-up. The plan has been discussed in detail and they are aware of the specific conditions for emergent return, as well as the importance of follow-up. New Prescriptions    DICLOFENAC SODIUM 1 % GEL    Apply 4 g topically 2 times daily as needed for Pain       Diagnosis:  1. Left leg pain    2. Hematoma        Disposition:  Patient's disposition: Discharge to home  Patient's condition is stable.                    Haven Crawford DO  Resident  05/22/19 [id number]

## 2019-05-24 ENCOUNTER — TELEPHONE (OUTPATIENT)
Dept: INTERNAL MEDICINE | Age: 59
End: 2019-05-24

## 2019-05-24 ENCOUNTER — NURSE ONLY (OUTPATIENT)
Dept: INTERNAL MEDICINE | Age: 59
End: 2019-05-24
Payer: COMMERCIAL

## 2019-05-24 DIAGNOSIS — I82.499 DEEP VEIN THROMBOSIS (DVT) OF OTHER VEIN OF LOWER EXTREMITY, UNSPECIFIED CHRONICITY, UNSPECIFIED LATERALITY (HCC): ICD-10-CM

## 2019-05-24 LAB
INTERNATIONAL NORMALIZATION RATIO, POC: 1.7
PROTHROMBIN TIME, POC: 20.8

## 2019-05-24 PROCEDURE — 85610 PROTHROMBIN TIME: CPT | Performed by: INTERNAL MEDICINE

## 2019-05-24 PROCEDURE — 93793 ANTICOAG MGMT PT WARFARIN: CPT | Performed by: INTERNAL MEDICINE

## 2019-05-24 NOTE — PROGRESS NOTES
Patient notified to take 5 mg coumadin 5/24/19 and 5/25/19, then restart 4 mg daily  Repeat INR in 1 week  Bleeding precautions reviewed  AVS mailed

## 2019-05-24 NOTE — PATIENT INSTRUCTIONS
pregnant or planning to get pregnant. Talk to your doctor about how you can prevent getting pregnant while you are taking it. · Don't change your dose or stop taking warfarin unless your doctor tells you to. Effects of medicines and food on warfarin  · Don't start or stop taking any medicines, vitamins, or natural remedies unless you first talk to your doctor. Many medicines can affect how warfarin works. These include aspirin and other pain relievers, over-the-counter medicines, multivitamins, dietary supplements, and herbal products. · Tell all of your doctors and pharmacists that you take warfarin. Some prescription medicines can affect how warfarin works. · Keep the amount of vitamin K in your diet about the same from day to day. Do not suddenly eat a lot more or a lot less food that is rich in vitamin K than you usually do. Vitamin K affects how warfarin works and how your blood clots. Talk with your doctor before making big changes in your diet. Foods that have a lot of vitamin K include cooked green vegetables, such as:  ? Kale, spinach, turnip greens, josé greens, Swiss chard, and mustard greens. ? [place] sprouts, broccoli, and asparagus. · Limit your use of alcohol. Avoid bleeding by preventing falls and injuries  · Wear slippers or shoes with nonskid soles. · Remove throw rugs and clutter. · Rearrange furniture and electrical cords to keep them out of walking paths. · Keep stairways, porches, and outside walkways well lit. Use night-lights in hallways and bathrooms. · Be extra careful when you work with sharp tools or knives. When should you call for help? Call 911 anytime you think you may need emergency care. For example, call if:    · You have a sudden, severe headache that is different from past headaches.    Call your doctor now or seek immediate medical care if:    · You have any abnormal bleeding, such as:  ? Nosebleeds.   ? Vaginal bleeding that is different (heavier, more frequent, at a different time of the month) than what you are used to.  ? Bloody or black stools, or rectal bleeding. ? Bloody or pink urine.    Watch closely for changes in your health, and be sure to contact your doctor if you have any problems. Where can you learn more? Go to https://chpepiceweb.Finovera. org and sign in to your Onavo account. Enter U321 in the Streamline box to learn more about \"Taking Warfarin Safely: Care Instructions. \"     If you do not have an account, please click on the \"Sign Up Now\" link. Current as of: July 22, 2018  Content Version: 12.0  © 3282-5034 Healthwise, Incorporated. Care instructions adapted under license by [facility] ([facility]). If you have questions about a medical condition or this instruction, always ask your healthcare professional. [address] any warranty or liability for your use of this information.

## 2019-05-24 NOTE — TELEPHONE ENCOUNTER
Pt called into . 's  ACC, states that when he was in office today, he was told that his blood was thick and that staff would call with instructions on how to take medication. Pt states that he has not received call. Informed pt that message will be forwarded to clinical staff.       .Electronically signed by Rach Sweeney on 5/24/19 at 2:53 PM

## 2019-05-31 ENCOUNTER — NURSE ONLY (OUTPATIENT)
Dept: INTERNAL MEDICINE | Age: 59
End: 2019-05-31
Payer: COMMERCIAL

## 2019-05-31 DIAGNOSIS — I82.499 DEEP VEIN THROMBOSIS (DVT) OF OTHER VEIN OF LOWER EXTREMITY, UNSPECIFIED CHRONICITY, UNSPECIFIED LATERALITY (HCC): ICD-10-CM

## 2019-05-31 LAB
INTERNATIONAL NORMALIZATION RATIO, POC: 6.6
PROTHROMBIN TIME, POC: 78.8

## 2019-05-31 PROCEDURE — 85610 PROTHROMBIN TIME: CPT | Performed by: INTERNAL MEDICINE

## 2019-05-31 PROCEDURE — 93793 ANTICOAG MGMT PT WARFARIN: CPT | Performed by: INTERNAL MEDICINE

## 2019-05-31 RX ORDER — WARFARIN SODIUM 3 MG/1
3 TABLET ORAL DAILY
Qty: 30 TABLET | Refills: 0 | Status: SHIPPED | OUTPATIENT
Start: 2019-05-31 | End: 2019-07-15 | Stop reason: ALTCHOICE

## 2019-06-03 ENCOUNTER — NURSE ONLY (OUTPATIENT)
Dept: INTERNAL MEDICINE | Age: 59
End: 2019-06-03
Payer: COMMERCIAL

## 2019-06-03 DIAGNOSIS — I82.499 DEEP VEIN THROMBOSIS (DVT) OF OTHER VEIN OF LOWER EXTREMITY, UNSPECIFIED CHRONICITY, UNSPECIFIED LATERALITY (HCC): Primary | ICD-10-CM

## 2019-06-03 DIAGNOSIS — Z72.0 TOBACCO ABUSE: ICD-10-CM

## 2019-06-03 LAB
INTERNATIONAL NORMALIZATION RATIO, POC: 1.9
PROTHROMBIN TIME, POC: 23.3

## 2019-06-03 PROCEDURE — 85610 PROTHROMBIN TIME: CPT

## 2019-06-03 RX ORDER — VARENICLINE TARTRATE 1 MG/1
1 TABLET, FILM COATED ORAL 2 TIMES DAILY
Qty: 60 TABLET | Refills: 3 | Status: SHIPPED | OUTPATIENT
Start: 2019-06-03 | End: 2019-08-23 | Stop reason: SDUPTHER

## 2019-06-03 NOTE — PATIENT INSTRUCTIONS
Change Coumadin to 4 mg daily. Repeat INR in 1 week. Follow up as scheduled. Please call with any questions or concerns.   Jhonny Johnson RN

## 2019-06-03 NOTE — PROGRESS NOTES
INR results reviewed with Dr. Rakel Maza and orders received. Pt notified via phone of same. AVS mailed to patient.

## 2019-06-10 ENCOUNTER — NURSE ONLY (OUTPATIENT)
Dept: INTERNAL MEDICINE | Age: 59
End: 2019-06-10
Payer: COMMERCIAL

## 2019-06-10 DIAGNOSIS — I82.499 DEEP VEIN THROMBOSIS (DVT) OF OTHER VEIN OF LOWER EXTREMITY, UNSPECIFIED CHRONICITY, UNSPECIFIED LATERALITY (HCC): Primary | ICD-10-CM

## 2019-06-10 LAB
INTERNATIONAL NORMALIZATION RATIO, POC: 3.3
PROTHROMBIN TIME, POC: 39.4

## 2019-06-10 PROCEDURE — 85610 PROTHROMBIN TIME: CPT | Performed by: INTERNAL MEDICINE

## 2019-06-10 PROCEDURE — 93793 ANTICOAG MGMT PT WARFARIN: CPT | Performed by: INTERNAL MEDICINE

## 2019-06-10 NOTE — PATIENT INSTRUCTIONS
Alternate coumadin 3 mg with 4 mg daily  Repeat INR in 1 week  Bleeding precautions as reviewed  Please call if any questions or concerns  Patient Education        Taking Warfarin Safely: Care Instructions  Your Care Instructions    Warfarin is a medicine that you take to prevent blood clots. It is often called a blood thinner. Doctors give warfarin (such as Coumadin) to reduce the risk of blood clots. You may be at risk for blood clots if you have atrial fibrillation or deep vein thrombosis. Some other health problems may also put you at risk. Warfarin slows the amount of time it takes for your blood to clot. It can cause bleeding problems. Even if you've been taking warfarin for a while, it's important to know how to take it safely. Foods and other medicines can affect the way warfarin works. Some can make warfarin work too well. This can cause bleeding problems. And some can make it work poorly, so that it does not prevent blood clots very well. You will need regular blood tests to check how long it takes for your blood to form a clot. This test is called a PT or prothrombin time test. The result of the test is called an INR level. Depending on the test results, your doctor or anticoagulation clinic may adjust your dose of warfarin. Follow-up care is a key part of your treatment and safety. Be sure to make and go to all appointments, and call your doctor if you are having problems. It's also a good idea to know your test results and keep a list of the medicines you take. How can you care for yourself at home? Take warfarin safely  · Take your warfarin at the same time each day. · If you miss a dose of warfarin, don't take an extra dose to make up for it. Your doctor can tell you exactly what to do so you don't take too much or too little. · Wear medical alert jewelry that lets others know that you take warfarin. You can buy this at most drugstores.   · Don't take warfarin if you are pregnant or planning to get pregnant. Talk to your doctor about how you can prevent getting pregnant while you are taking it. · Don't change your dose or stop taking warfarin unless your doctor tells you to. Effects of medicines and food on warfarin  · Don't start or stop taking any medicines, vitamins, or natural remedies unless you first talk to your doctor. Many medicines can affect how warfarin works. These include aspirin and other pain relievers, over-the-counter medicines, multivitamins, dietary supplements, and herbal products. · Tell all of your doctors and pharmacists that you take warfarin. Some prescription medicines can affect how warfarin works. · Keep the amount of vitamin K in your diet about the same from day to day. Do not suddenly eat a lot more or a lot less food that is rich in vitamin K than you usually do. Vitamin K affects how warfarin works and how your blood clots. Talk with your doctor before making big changes in your diet. Foods that have a lot of vitamin K include cooked green vegetables, such as:  ? Kale, spinach, turnip greens, josé greens, Swiss chard, and mustard greens. ? [place] sprouts, broccoli, and asparagus. · Limit your use of alcohol. Avoid bleeding by preventing falls and injuries  · Wear slippers or shoes with nonskid soles. · Remove throw rugs and clutter. · Rearrange furniture and electrical cords to keep them out of walking paths. · Keep stairways, porches, and outside walkways well lit. Use night-lights in hallways and bathrooms. · Be extra careful when you work with sharp tools or knives. When should you call for help? Call 911 anytime you think you may need emergency care. For example, call if:    · You have a sudden, severe headache that is different from past headaches.    Call your doctor now or seek immediate medical care if:    · You have any abnormal bleeding, such as:  ? Nosebleeds.   ? Vaginal bleeding that is different (heavier, more frequent, at a different time of the month) than what you are used to.  ? Bloody or black stools, or rectal bleeding. ? Bloody or pink urine.    Watch closely for changes in your health, and be sure to contact your doctor if you have any problems. Where can you learn more? Go to https://chpeerineb.BioArray. org and sign in to your Adallom account. Enter U937 in the BlueYield box to learn more about \"Taking Warfarin Safely: Care Instructions. \"     If you do not have an account, please click on the \"Sign Up Now\" link. Current as of: July 22, 2018  Content Version: 12.0  © 8375-3257 Healthwise, Incorporated. Care instructions adapted under license by [facility] ([facility]). If you have questions about a medical condition or this instruction, always ask your healthcare professional. [address] any warranty or liability for your use of this information.

## 2019-06-11 RX ORDER — WARFARIN SODIUM 4 MG/1
TABLET ORAL
Qty: 30 TABLET | Refills: 0 | Status: SHIPPED | OUTPATIENT
Start: 2019-06-11 | End: 2019-07-23 | Stop reason: SDUPTHER

## 2019-06-17 ENCOUNTER — NURSE ONLY (OUTPATIENT)
Dept: INTERNAL MEDICINE | Age: 59
End: 2019-06-17
Payer: COMMERCIAL

## 2019-06-17 DIAGNOSIS — I82.4Y9 DEEP VEIN THROMBOSIS (DVT) OF PROXIMAL LOWER EXTREMITY, UNSPECIFIED CHRONICITY, UNSPECIFIED LATERALITY (HCC): Primary | ICD-10-CM

## 2019-06-17 LAB
INTERNATIONAL NORMALIZATION RATIO, POC: 4.3
PROTHROMBIN TIME, POC: 51.2

## 2019-06-17 PROCEDURE — 85610 PROTHROMBIN TIME: CPT | Performed by: INTERNAL MEDICINE

## 2019-06-17 NOTE — PATIENT INSTRUCTIONS
Hold coumadin today then resume coumadin 4 mg every Tues. and Thurs. and 3 mg all other days   Recheck INR in 10 days

## 2019-06-26 ENCOUNTER — NURSE ONLY (OUTPATIENT)
Dept: INTERNAL MEDICINE | Age: 59
End: 2019-06-26
Payer: COMMERCIAL

## 2019-06-26 DIAGNOSIS — I82.499 DEEP VEIN THROMBOSIS (DVT) OF OTHER VEIN OF LOWER EXTREMITY, UNSPECIFIED CHRONICITY, UNSPECIFIED LATERALITY (HCC): ICD-10-CM

## 2019-06-26 LAB
INTERNATIONAL NORMALIZATION RATIO, POC: 1.9
PROTHROMBIN TIME, POC: 23.3

## 2019-06-26 PROCEDURE — 85610 PROTHROMBIN TIME: CPT | Performed by: INTERNAL MEDICINE

## 2019-06-26 PROCEDURE — 93793 ANTICOAG MGMT PT WARFARIN: CPT | Performed by: INTERNAL MEDICINE

## 2019-06-26 NOTE — PATIENT INSTRUCTIONS
Alternate 4 mg coumadin with 3 mg coumadin and repeat INR in 1 week  Bleeding precautions as reviewed  Please call if any questions or concerns  Patient Education        Taking Warfarin Safely: Care Instructions  Your Care Instructions    Warfarin is a medicine that you take to prevent blood clots. It is often called a blood thinner. Doctors give warfarin (such as Coumadin) to reduce the risk of blood clots. You may be at risk for blood clots if you have atrial fibrillation or deep vein thrombosis. Some other health problems may also put you at risk. Warfarin slows the amount of time it takes for your blood to clot. It can cause bleeding problems. Even if you've been taking warfarin for a while, it's important to know how to take it safely. Foods and other medicines can affect the way warfarin works. Some can make warfarin work too well. This can cause bleeding problems. And some can make it work poorly, so that it does not prevent blood clots very well. You will need regular blood tests to check how long it takes for your blood to form a clot. This test is called a PT or prothrombin time test. The result of the test is called an INR level. Depending on the test results, your doctor or anticoagulation clinic may adjust your dose of warfarin. Follow-up care is a key part of your treatment and safety. Be sure to make and go to all appointments, and call your doctor if you are having problems. It's also a good idea to know your test results and keep a list of the medicines you take. How can you care for yourself at home? Take warfarin safely  · Take your warfarin at the same time each day. · If you miss a dose of warfarin, don't take an extra dose to make up for it. Your doctor can tell you exactly what to do so you don't take too much or too little. · Wear medical alert jewelry that lets others know that you take warfarin. You can buy this at most drugstores.   · Don't take warfarin if you are pregnant or

## 2019-06-27 ENCOUNTER — TELEPHONE (OUTPATIENT)
Dept: INTERNAL MEDICINE | Age: 59
End: 2019-06-27

## 2019-07-03 ENCOUNTER — NURSE ONLY (OUTPATIENT)
Dept: INTERNAL MEDICINE | Age: 59
End: 2019-07-03
Payer: COMMERCIAL

## 2019-07-03 DIAGNOSIS — I82.4Z9 DEEP VEIN THROMBOSIS (DVT) OF DISTAL VEIN OF LOWER EXTREMITY, UNSPECIFIED CHRONICITY, UNSPECIFIED LATERALITY (HCC): Primary | ICD-10-CM

## 2019-07-03 LAB
INTERNATIONAL NORMALIZATION RATIO, POC: 1.5
PROTHROMBIN TIME, POC: 18.4

## 2019-07-03 PROCEDURE — 85610 PROTHROMBIN TIME: CPT | Performed by: INTERNAL MEDICINE

## 2019-07-03 PROCEDURE — 93793 ANTICOAG MGMT PT WARFARIN: CPT | Performed by: INTERNAL MEDICINE

## 2019-07-09 ENCOUNTER — TELEPHONE (OUTPATIENT)
Dept: INTERNAL MEDICINE | Age: 59
End: 2019-07-09

## 2019-07-10 ENCOUNTER — NURSE ONLY (OUTPATIENT)
Dept: INTERNAL MEDICINE | Age: 59
End: 2019-07-10
Payer: COMMERCIAL

## 2019-07-10 DIAGNOSIS — I82.4Y9 DEEP VEIN THROMBOSIS (DVT) OF PROXIMAL LOWER EXTREMITY, UNSPECIFIED CHRONICITY, UNSPECIFIED LATERALITY (HCC): Primary | ICD-10-CM

## 2019-07-10 LAB
INTERNATIONAL NORMALIZATION RATIO, POC: 1.7
PROTHROMBIN TIME, POC: 20.3

## 2019-07-10 PROCEDURE — 85610 PROTHROMBIN TIME: CPT | Performed by: INTERNAL MEDICINE

## 2019-07-10 PROCEDURE — 93793 ANTICOAG MGMT PT WARFARIN: CPT | Performed by: INTERNAL MEDICINE

## 2019-07-15 ENCOUNTER — OFFICE VISIT (OUTPATIENT)
Dept: INTERNAL MEDICINE | Age: 59
End: 2019-07-15
Payer: COMMERCIAL

## 2019-07-15 VITALS
TEMPERATURE: 97.4 F | RESPIRATION RATE: 16 BRPM | DIASTOLIC BLOOD PRESSURE: 92 MMHG | WEIGHT: 159.8 LBS | SYSTOLIC BLOOD PRESSURE: 159 MMHG | HEIGHT: 74 IN | HEART RATE: 73 BPM | BODY MASS INDEX: 20.51 KG/M2

## 2019-07-15 DIAGNOSIS — I10 ESSENTIAL HYPERTENSION: Primary | ICD-10-CM

## 2019-07-15 DIAGNOSIS — Z23 NEED FOR TDAP VACCINATION: ICD-10-CM

## 2019-07-15 DIAGNOSIS — I82.499 DEEP VEIN THROMBOSIS (DVT) OF OTHER VEIN OF LOWER EXTREMITY, UNSPECIFIED CHRONICITY, UNSPECIFIED LATERALITY (HCC): ICD-10-CM

## 2019-07-15 LAB
INTERNATIONAL NORMALIZATION RATIO, POC: 1.6
PROTHROMBIN TIME, POC: 18.7

## 2019-07-15 PROCEDURE — G8427 DOCREV CUR MEDS BY ELIG CLIN: HCPCS | Performed by: INTERNAL MEDICINE

## 2019-07-15 PROCEDURE — 3017F COLORECTAL CA SCREEN DOC REV: CPT | Performed by: INTERNAL MEDICINE

## 2019-07-15 PROCEDURE — 6360000002 HC RX W HCPCS

## 2019-07-15 PROCEDURE — 1036F TOBACCO NON-USER: CPT | Performed by: INTERNAL MEDICINE

## 2019-07-15 PROCEDURE — 85610 PROTHROMBIN TIME: CPT | Performed by: INTERNAL MEDICINE

## 2019-07-15 PROCEDURE — G8420 CALC BMI NORM PARAMETERS: HCPCS | Performed by: INTERNAL MEDICINE

## 2019-07-15 PROCEDURE — 99213 OFFICE O/P EST LOW 20 MIN: CPT | Performed by: INTERNAL MEDICINE

## 2019-07-15 PROCEDURE — 90471 IMMUNIZATION ADMIN: CPT

## 2019-07-15 PROCEDURE — 90715 TDAP VACCINE 7 YRS/> IM: CPT

## 2019-07-15 PROCEDURE — 99212 OFFICE O/P EST SF 10 MIN: CPT | Performed by: INTERNAL MEDICINE

## 2019-07-15 RX ORDER — AMLODIPINE BESYLATE 10 MG/1
10 TABLET ORAL DAILY
Qty: 90 TABLET | Refills: 2 | Status: SHIPPED
Start: 2019-07-15 | End: 2020-03-19 | Stop reason: SDUPTHER

## 2019-07-15 ASSESSMENT — ENCOUNTER SYMPTOMS
COUGH: 0
DIARRHEA: 0
CONSTIPATION: 0
ABDOMINAL PAIN: 0
NAUSEA: 0
SORE THROAT: 0
VOMITING: 0
CHEST TIGHTNESS: 0
RHINORRHEA: 0
ABDOMINAL DISTENTION: 0
WHEEZING: 0
SHORTNESS OF BREATH: 0
SINUS PRESSURE: 0
BACK PAIN: 0

## 2019-07-15 NOTE — PROGRESS NOTES
the 7/15/19 encounter (Office Visit) with Gela Perez MD   Medication Sig Dispense Refill    amLODIPine (NORVASC) 10 MG tablet Take 1 tablet by mouth daily 90 tablet 2       I have reviewed all pertinent PMHx, PSHx, FamHx, SocialHx, medications, and allergiesand updated history as appropriate. OBJECTIVE:    VS: BP (!) 159/92 (Site: Left Upper Arm, Position: Sitting, Cuff Size: Medium Adult)   Pulse 73   Temp 97.4 °F (36.3 °C) (Oral)   Resp 16   Ht 6' 2\" (1.88 m)   Wt 159 lb 12.8 oz (72.5 kg)   BMI 20.52 kg/m²   Physical Exam   Constitutional: He is oriented to person, place, and time. He appears well-developed and well-nourished. HENT:   Head: Normocephalic and atraumatic. Mouth/Throat: Oropharynx is clear and moist.   Eyes: Pupils are equal, round, and reactive to light. Conjunctivae and EOM are normal.   Neck: Normal range of motion. Neck supple. Cardiovascular: Normal rate, regular rhythm, normal heart sounds and intact distal pulses. Pulmonary/Chest: Effort normal and breath sounds normal.   Abdominal: Soft. Bowel sounds are normal.   Musculoskeletal: Normal range of motion. Neurological: He is alert and oriented to person, place, and time. Skin: Skin is warm. PLAN:  There are no diagnoses linked to this encounter.      Hx of DVT / lupus anticoagulant positive  -Patient currently on 4mg and 3mg alternating doses of Coumadin, INR 1.7 as of 7/10  -INR at 1.6 today, on 4 mg every day except M/F- 3 mg  -Goal 2.5-3.5  -Will change him to 4mg daily and back in 1 week for INR check    Hypertension:  -BP is at 159/92  -continue lisinopril 10mg   -Will change Norvasc to 10mg daily     PAD  -Follow with [facility]  -Had thrombectomy on the left side 10/2018  -Currently on Plavix     AAA  - s/p AAA repair      Tobacco abuse  - continue varenicline     HM  -Shingles, pt will provide verification from his pharmacy  - HIV, HCV done, non reactive  -tDAP today        I have reviewed my findings and

## 2019-07-22 DIAGNOSIS — I10 ESSENTIAL HYPERTENSION: ICD-10-CM

## 2019-07-23 ENCOUNTER — NURSE ONLY (OUTPATIENT)
Dept: INTERNAL MEDICINE | Age: 59
End: 2019-07-23
Payer: COMMERCIAL

## 2019-07-23 DIAGNOSIS — I82.499 DEEP VEIN THROMBOSIS (DVT) OF OTHER VEIN OF LOWER EXTREMITY, UNSPECIFIED CHRONICITY, UNSPECIFIED LATERALITY (HCC): ICD-10-CM

## 2019-07-23 LAB
INTERNATIONAL NORMALIZATION RATIO, POC: 2.6
PROTHROMBIN TIME, POC: 3.7

## 2019-07-23 PROCEDURE — 93793 ANTICOAG MGMT PT WARFARIN: CPT | Performed by: INTERNAL MEDICINE

## 2019-07-23 PROCEDURE — 85610 PROTHROMBIN TIME: CPT | Performed by: INTERNAL MEDICINE

## 2019-07-23 RX ORDER — LISINOPRIL 10 MG/1
TABLET ORAL
Qty: 90 TABLET | Refills: 2 | Status: SHIPPED | OUTPATIENT
Start: 2019-07-23 | End: 2019-12-13 | Stop reason: SDUPTHER

## 2019-07-23 RX ORDER — WARFARIN SODIUM 4 MG/1
TABLET ORAL
Qty: 30 TABLET | Refills: 0 | Status: SHIPPED | OUTPATIENT
Start: 2019-07-23 | End: 2019-08-23 | Stop reason: SDUPTHER

## 2019-07-30 ENCOUNTER — NURSE ONLY (OUTPATIENT)
Dept: INTERNAL MEDICINE | Age: 59
End: 2019-07-30
Payer: COMMERCIAL

## 2019-07-30 DIAGNOSIS — I82.4Z9 DEEP VEIN THROMBOSIS (DVT) OF DISTAL VEIN OF LOWER EXTREMITY, UNSPECIFIED CHRONICITY, UNSPECIFIED LATERALITY (HCC): Primary | ICD-10-CM

## 2019-07-30 LAB
INTERNATIONAL NORMALIZATION RATIO, POC: 3.5
PROTHROMBIN TIME, POC: 41.7

## 2019-07-30 PROCEDURE — 85610 PROTHROMBIN TIME: CPT | Performed by: INTERNAL MEDICINE

## 2019-07-30 PROCEDURE — 93793 ANTICOAG MGMT PT WARFARIN: CPT | Performed by: INTERNAL MEDICINE

## 2019-08-09 ENCOUNTER — NURSE ONLY (OUTPATIENT)
Dept: INTERNAL MEDICINE | Age: 59
End: 2019-08-09
Payer: COMMERCIAL

## 2019-08-09 DIAGNOSIS — I82.4Z9 DEEP VEIN THROMBOSIS (DVT) OF DISTAL VEIN OF LOWER EXTREMITY, UNSPECIFIED CHRONICITY, UNSPECIFIED LATERALITY (HCC): Primary | ICD-10-CM

## 2019-08-09 LAB
INTERNATIONAL NORMALIZATION RATIO, POC: 2
PROTHROMBIN TIME, POC: 24.6

## 2019-08-09 PROCEDURE — 85610 PROTHROMBIN TIME: CPT | Performed by: INTERNAL MEDICINE

## 2019-08-09 PROCEDURE — 93793 ANTICOAG MGMT PT WARFARIN: CPT | Performed by: INTERNAL MEDICINE

## 2019-08-09 RX ORDER — WARFARIN SODIUM 3 MG/1
TABLET ORAL
COMMUNITY
End: 2019-08-23 | Stop reason: SDUPTHER

## 2019-08-09 NOTE — PROGRESS NOTES
notified pt asking if he can be ordered something instead of Coumadin. Pt needs to schedule an appt to discuss. INR results reviewed with Dr. Lillie Chery and orders received. Pt notified via phone of same. Appt scheduled with  for 8/23/19. AVS mailed to patient.

## 2019-08-23 ENCOUNTER — OFFICE VISIT (OUTPATIENT)
Dept: INTERNAL MEDICINE | Age: 59
End: 2019-08-23
Payer: COMMERCIAL

## 2019-08-23 VITALS
OXYGEN SATURATION: 98 % | HEIGHT: 74 IN | TEMPERATURE: 98.8 F | HEART RATE: 71 BPM | RESPIRATION RATE: 18 BRPM | BODY MASS INDEX: 21.34 KG/M2 | WEIGHT: 166.3 LBS | DIASTOLIC BLOOD PRESSURE: 82 MMHG | SYSTOLIC BLOOD PRESSURE: 138 MMHG

## 2019-08-23 DIAGNOSIS — Z72.0 TOBACCO ABUSE: ICD-10-CM

## 2019-08-23 DIAGNOSIS — R35.89 POLYURIA: Primary | ICD-10-CM

## 2019-08-23 DIAGNOSIS — J45.909 UNCOMPLICATED ASTHMA, UNSPECIFIED ASTHMA SEVERITY, UNSPECIFIED WHETHER PERSISTENT: ICD-10-CM

## 2019-08-23 DIAGNOSIS — I82.499 DEEP VEIN THROMBOSIS (DVT) OF OTHER VEIN OF LOWER EXTREMITY, UNSPECIFIED CHRONICITY, UNSPECIFIED LATERALITY (HCC): ICD-10-CM

## 2019-08-23 LAB
BILIRUBIN, POC: ABNORMAL
BLOOD URINE, POC: ABNORMAL
CLARITY, POC: CLEAR
COLOR, POC: YELLOW
GLUCOSE URINE, POC: ABNORMAL
INTERNATIONAL NORMALIZATION RATIO, POC: 2.1
KETONES, POC: ABNORMAL
LEUKOCYTE EST, POC: ABNORMAL
NITRITE, POC: ABNORMAL
PH, POC: 6
PROTEIN, POC: ABNORMAL
PROTHROMBIN TIME, POC: 25.4
SPECIFIC GRAVITY, POC: 1.02
UROBILINOGEN, POC: 0.2

## 2019-08-23 PROCEDURE — G8420 CALC BMI NORM PARAMETERS: HCPCS | Performed by: STUDENT IN AN ORGANIZED HEALTH CARE EDUCATION/TRAINING PROGRAM

## 2019-08-23 PROCEDURE — 3017F COLORECTAL CA SCREEN DOC REV: CPT | Performed by: STUDENT IN AN ORGANIZED HEALTH CARE EDUCATION/TRAINING PROGRAM

## 2019-08-23 PROCEDURE — 1036F TOBACCO NON-USER: CPT | Performed by: STUDENT IN AN ORGANIZED HEALTH CARE EDUCATION/TRAINING PROGRAM

## 2019-08-23 PROCEDURE — 99214 OFFICE O/P EST MOD 30 MIN: CPT | Performed by: STUDENT IN AN ORGANIZED HEALTH CARE EDUCATION/TRAINING PROGRAM

## 2019-08-23 PROCEDURE — G8427 DOCREV CUR MEDS BY ELIG CLIN: HCPCS | Performed by: STUDENT IN AN ORGANIZED HEALTH CARE EDUCATION/TRAINING PROGRAM

## 2019-08-23 PROCEDURE — 85610 PROTHROMBIN TIME: CPT | Performed by: STUDENT IN AN ORGANIZED HEALTH CARE EDUCATION/TRAINING PROGRAM

## 2019-08-23 PROCEDURE — 99212 OFFICE O/P EST SF 10 MIN: CPT | Performed by: STUDENT IN AN ORGANIZED HEALTH CARE EDUCATION/TRAINING PROGRAM

## 2019-08-23 PROCEDURE — 81002 URINALYSIS NONAUTO W/O SCOPE: CPT | Performed by: STUDENT IN AN ORGANIZED HEALTH CARE EDUCATION/TRAINING PROGRAM

## 2019-08-23 RX ORDER — WARFARIN SODIUM 4 MG/1
TABLET ORAL
Qty: 30 TABLET | Refills: 0 | Status: SHIPPED | OUTPATIENT
Start: 2019-08-23 | End: 2019-10-09 | Stop reason: SDUPTHER

## 2019-08-23 RX ORDER — ALBUTEROL SULFATE 90 UG/1
2 AEROSOL, METERED RESPIRATORY (INHALATION) EVERY 6 HOURS PRN
Qty: 1 INHALER | Refills: 5 | Status: SHIPPED
Start: 2019-08-23 | End: 2020-03-19 | Stop reason: SDUPTHER

## 2019-08-23 RX ORDER — CLOPIDOGREL BISULFATE 75 MG/1
75 TABLET ORAL DAILY
Qty: 30 TABLET | Refills: 0 | Status: SHIPPED
Start: 2019-08-23 | End: 2020-02-20 | Stop reason: SDUPTHER

## 2019-08-23 RX ORDER — DOXYCYCLINE 100 MG/1
100 CAPSULE ORAL DAILY
Status: CANCELLED | OUTPATIENT
Start: 2019-08-23

## 2019-08-23 RX ORDER — GABAPENTIN 400 MG/1
400 CAPSULE ORAL 3 TIMES DAILY
Qty: 90 CAPSULE | Refills: 11 | Status: CANCELLED | OUTPATIENT
Start: 2019-08-23 | End: 2020-08-22

## 2019-08-23 RX ORDER — WARFARIN SODIUM 3 MG/1
3 TABLET ORAL DAILY
Qty: 30 TABLET | Refills: 0 | Status: SHIPPED | OUTPATIENT
Start: 2019-08-23 | End: 2019-10-09 | Stop reason: SDUPTHER

## 2019-08-23 RX ORDER — DOXYCYCLINE 100 MG/1
100 CAPSULE ORAL DAILY
Qty: 30 CAPSULE | Refills: 0 | Status: SHIPPED | OUTPATIENT
Start: 2019-08-23 | End: 2020-01-09 | Stop reason: SDUPTHER

## 2019-08-23 RX ORDER — VARENICLINE TARTRATE 1 MG/1
1 TABLET, FILM COATED ORAL 2 TIMES DAILY
Qty: 60 TABLET | Refills: 2 | Status: SHIPPED
Start: 2019-08-23 | End: 2020-05-14 | Stop reason: SDUPTHER

## 2019-08-23 RX ORDER — ATORVASTATIN CALCIUM 80 MG/1
TABLET, FILM COATED ORAL
Qty: 30 TABLET | Refills: 2 | Status: SHIPPED | OUTPATIENT
Start: 2019-08-23 | End: 2019-11-23 | Stop reason: SDUPTHER

## 2019-08-23 RX ORDER — MAGNESIUM OXIDE 400 MG/1
400 TABLET ORAL DAILY
Qty: 30 TABLET | Refills: 1 | Status: SHIPPED
Start: 2019-08-23 | End: 2020-03-19 | Stop reason: SDUPTHER

## 2019-08-23 RX ORDER — DOXYCYCLINE HYCLATE 50 MG/1
324 CAPSULE, GELATIN COATED ORAL
Qty: 30 TABLET | Status: CANCELLED | OUTPATIENT
Start: 2019-08-23

## 2019-08-23 ASSESSMENT — ENCOUNTER SYMPTOMS
SHORTNESS OF BREATH: 0
SINUS PAIN: 0
SORE THROAT: 0
WHEEZING: 0
ABDOMINAL PAIN: 0
VOMITING: 0
COUGH: 0
CONSTIPATION: 0
NAUSEA: 0
BLOOD IN STOOL: 0
DIARRHEA: 0
BACK PAIN: 0

## 2019-08-23 NOTE — PROGRESS NOTES
Patient verbalized understanding of office instructions. He will call with questions or concerns. Pt was given discharge instructions, along with scheduled INR appt. all questions were fully answered.
Sig Dispense Refill    warfarin (COUMADIN) 3 MG tablet Take by mouth      lisinopril (PRINIVIL;ZESTRIL) 10 MG tablet take 1 tablet by mouth once daily 90 tablet 2    warfarin (COUMADIN) 4 MG tablet take 1 tablet by mouth once daily 30 tablet 0    amLODIPine (NORVASC) 10 MG tablet Take 1 tablet by mouth daily 90 tablet 2    varenicline (CHANTIX CONTINUING MONTH RADHA) 1 MG tablet Take 1 tablet by mouth 2 times daily 60 tablet 3    diclofenac sodium 1 % GEL Apply 4 g topically 2 times daily as needed for Pain 4 Tube 1    atorvastatin (LIPITOR) 80 MG tablet take 1 tablet by mouth once daily 30 tablet 0    zoster recombinant adjuvanted vaccine (SHINGRIX) 50 MCG/0.5ML SUSR injection 1 dose now and repeat in 2-6 months 0.5 mL 0    gabapentin (NEURONTIN) 400 MG capsule Take 400 mg by mouth 3 times daily. Ordered per  In [facility] [employer] [facility]      Multiple Vitamins-Minerals (THERAPEUTIC MULTIVITAMIN-MINERALS) tablet Take 1 tablet by mouth daily      lisinopril (PRINIVIL;ZESTRIL) 10 MG tablet Take 10 mg by mouth daily      ferrous gluconate (FERGON) 324 (38 Fe) MG tablet Take 324 mg by mouth      magnesium oxide (MAG-OX) 400 MG tablet Take 400 mg by mouth daily      Blood Pressure Monitoring (BLOOD PRESSURE CUFF) MISC Dx:  Hypertension with labile blood pressure 1 each 0    albuterol sulfate  (90 Base) MCG/ACT inhaler Inhale 2 puffs into the lungs every 6 hours as needed for Wheezing 1 Inhaler 5    doxycycline monohydrate (MONODOX) 100 MG capsule Take 100 mg by mouth daily       clopidogrel (PLAVIX) 75 MG tablet Take 75 mg by mouth      acetaminophen (TYLENOL) 500 MG tablet Take 2 tablets by mouth every 6 hours as needed for Pain 30 tablet 0     No current facility-administered medications on file prior to visit. OBJECTIVE:    VS: There were no vitals filed for this visit. Physical Exam   Constitutional: He is oriented to person, place, and time. He appears well-developed and well-nourished.    HENT:

## 2019-09-09 ENCOUNTER — NURSE ONLY (OUTPATIENT)
Dept: INTERNAL MEDICINE | Age: 59
End: 2019-09-09
Payer: COMMERCIAL

## 2019-09-09 DIAGNOSIS — I82.499 DEEP VEIN THROMBOSIS (DVT) OF OTHER VEIN OF LOWER EXTREMITY, UNSPECIFIED CHRONICITY, UNSPECIFIED LATERALITY (HCC): Primary | ICD-10-CM

## 2019-09-09 LAB
INTERNATIONAL NORMALIZATION RATIO, POC: 2.5
PROTHROMBIN TIME, POC: 29.5

## 2019-09-09 PROCEDURE — 85610 PROTHROMBIN TIME: CPT | Performed by: INTERNAL MEDICINE

## 2019-09-09 PROCEDURE — 93793 ANTICOAG MGMT PT WARFARIN: CPT | Performed by: INTERNAL MEDICINE

## 2019-09-09 NOTE — PROGRESS NOTES
INR results reviewed with Dr. Dashawn Rasmussen and orders received. Pt notified via voicemail message of same. AVS mailed to patient.

## 2019-10-09 ENCOUNTER — NURSE ONLY (OUTPATIENT)
Dept: INTERNAL MEDICINE | Age: 59
End: 2019-10-09
Payer: COMMERCIAL

## 2019-10-09 DIAGNOSIS — Z23 NEED FOR INFLUENZA VACCINATION: ICD-10-CM

## 2019-10-09 DIAGNOSIS — I82.4Y9 DEEP VEIN THROMBOSIS (DVT) OF PROXIMAL LOWER EXTREMITY, UNSPECIFIED CHRONICITY, UNSPECIFIED LATERALITY (HCC): Primary | ICD-10-CM

## 2019-10-09 LAB
INTERNATIONAL NORMALIZATION RATIO, POC: 2.3
PROTHROMBIN TIME, POC: 28

## 2019-10-09 PROCEDURE — 85610 PROTHROMBIN TIME: CPT | Performed by: INTERNAL MEDICINE

## 2019-10-09 PROCEDURE — 93793 ANTICOAG MGMT PT WARFARIN: CPT | Performed by: INTERNAL MEDICINE

## 2019-10-09 RX ORDER — WARFARIN SODIUM 4 MG/1
TABLET ORAL
Qty: 30 TABLET | Refills: 0 | Status: SHIPPED | OUTPATIENT
Start: 2019-10-09 | End: 2019-10-29 | Stop reason: SDUPTHER

## 2019-10-09 RX ORDER — WARFARIN SODIUM 3 MG/1
3 TABLET ORAL DAILY
Qty: 30 TABLET | Refills: 0 | Status: SHIPPED | OUTPATIENT
Start: 2019-10-09 | End: 2019-10-29 | Stop reason: SDUPTHER

## 2019-10-29 ENCOUNTER — NURSE ONLY (OUTPATIENT)
Dept: INTERNAL MEDICINE | Age: 59
End: 2019-10-29
Payer: COMMERCIAL

## 2019-10-29 VITALS
WEIGHT: 169.5 LBS | SYSTOLIC BLOOD PRESSURE: 134 MMHG | HEIGHT: 74 IN | RESPIRATION RATE: 16 BRPM | HEART RATE: 91 BPM | BODY MASS INDEX: 21.75 KG/M2 | DIASTOLIC BLOOD PRESSURE: 72 MMHG | OXYGEN SATURATION: 97 % | TEMPERATURE: 97.9 F

## 2019-10-29 DIAGNOSIS — I82.499 DEEP VEIN THROMBOSIS (DVT) OF OTHER VEIN OF LOWER EXTREMITY, UNSPECIFIED CHRONICITY, UNSPECIFIED LATERALITY (HCC): ICD-10-CM

## 2019-10-29 DIAGNOSIS — I82.4Y9 DEEP VEIN THROMBOSIS (DVT) OF PROXIMAL LOWER EXTREMITY, UNSPECIFIED CHRONICITY, UNSPECIFIED LATERALITY (HCC): ICD-10-CM

## 2019-10-29 DIAGNOSIS — H57.89 REDNESS OF LEFT EYE: Primary | ICD-10-CM

## 2019-10-29 LAB
INTERNATIONAL NORMALIZATION RATIO, POC: 1.5
PROTHROMBIN TIME, POC: 17.8

## 2019-10-29 PROCEDURE — 99213 OFFICE O/P EST LOW 20 MIN: CPT | Performed by: INTERNAL MEDICINE

## 2019-10-29 PROCEDURE — 85610 PROTHROMBIN TIME: CPT | Performed by: INTERNAL MEDICINE

## 2019-10-29 PROCEDURE — 99212 OFFICE O/P EST SF 10 MIN: CPT | Performed by: INTERNAL MEDICINE

## 2019-10-29 RX ORDER — NAPROXEN 375 MG/1
375 TABLET ORAL 2 TIMES DAILY WITH MEALS
Qty: 30 TABLET | Refills: 0 | Status: SHIPPED | OUTPATIENT
Start: 2019-10-29 | End: 2019-11-13

## 2019-10-29 RX ORDER — TOBRAMYCIN 3 MG/ML
1 SOLUTION/ DROPS OPHTHALMIC EVERY 6 HOURS
Qty: 1 BOTTLE | Refills: 0 | Status: SHIPPED | OUTPATIENT
Start: 2019-10-29 | End: 2019-11-08

## 2019-10-29 RX ORDER — WARFARIN SODIUM 3 MG/1
3 TABLET ORAL DAILY
Qty: 30 TABLET | Refills: 1 | Status: SHIPPED | OUTPATIENT
Start: 2019-10-29 | End: 2020-01-02 | Stop reason: SDUPTHER

## 2019-10-29 RX ORDER — WARFARIN SODIUM 4 MG/1
TABLET ORAL
Qty: 30 TABLET | Refills: 1 | Status: SHIPPED | OUTPATIENT
Start: 2019-10-29 | End: 2020-01-02 | Stop reason: SDUPTHER

## 2019-10-29 ASSESSMENT — ENCOUNTER SYMPTOMS
ABDOMINAL PAIN: 0
SORE THROAT: 0
SHORTNESS OF BREATH: 0

## 2019-10-31 ENCOUNTER — TELEPHONE (OUTPATIENT)
Dept: INTERNAL MEDICINE | Age: 59
End: 2019-10-31

## 2019-11-05 ENCOUNTER — NURSE ONLY (OUTPATIENT)
Dept: INTERNAL MEDICINE | Age: 59
End: 2019-11-05
Payer: COMMERCIAL

## 2019-11-05 DIAGNOSIS — I82.499 DEEP VEIN THROMBOSIS (DVT) OF OTHER VEIN OF LOWER EXTREMITY, UNSPECIFIED CHRONICITY, UNSPECIFIED LATERALITY (HCC): ICD-10-CM

## 2019-11-05 LAB
INTERNATIONAL NORMALIZATION RATIO, POC: 3
PROTHROMBIN TIME, POC: 35.6

## 2019-11-05 PROCEDURE — 85610 PROTHROMBIN TIME: CPT | Performed by: INTERNAL MEDICINE

## 2019-11-05 PROCEDURE — 93793 ANTICOAG MGMT PT WARFARIN: CPT | Performed by: INTERNAL MEDICINE

## 2019-11-12 ENCOUNTER — APPOINTMENT (OUTPATIENT)
Dept: ULTRASOUND IMAGING | Age: 59
End: 2019-11-12
Payer: COMMERCIAL

## 2019-11-12 ENCOUNTER — HOSPITAL ENCOUNTER (EMERGENCY)
Age: 59
Discharge: HOME | End: 2019-11-12
Attending: EMERGENCY MEDICINE
Payer: COMMERCIAL

## 2019-11-12 VITALS
TEMPERATURE: 97.7 F | RESPIRATION RATE: 17 BRPM | OXYGEN SATURATION: 99 % | WEIGHT: 180 LBS | HEIGHT: 74 IN | SYSTOLIC BLOOD PRESSURE: 154 MMHG | BODY MASS INDEX: 23.1 KG/M2 | HEART RATE: 87 BPM | DIASTOLIC BLOOD PRESSURE: 89 MMHG

## 2019-11-12 DIAGNOSIS — L90.5 SCAR TISSUE: Primary | ICD-10-CM

## 2019-11-12 DIAGNOSIS — M79.662 PAIN OF LEFT CALF: ICD-10-CM

## 2019-11-12 LAB
ANION GAP SERPL CALCULATED.3IONS-SCNC: 9 MMOL/L (ref 7–16)
BASOPHILS ABSOLUTE: 0.05 E9/L (ref 0–0.2)
BASOPHILS RELATIVE PERCENT: 0.7 % (ref 0–2)
BUN BLDV-MCNC: 12 MG/DL (ref 8–23)
CALCIUM SERPL-MCNC: 9.5 MG/DL (ref 8.6–10.2)
CHLORIDE BLD-SCNC: 108 MMOL/L (ref 98–107)
CO2: 27 MMOL/L (ref 22–29)
CREAT SERPL-MCNC: 0.9 MG/DL (ref 0.7–1.2)
EOSINOPHILS ABSOLUTE: 0.15 E9/L (ref 0.05–0.5)
EOSINOPHILS RELATIVE PERCENT: 2.2 % (ref 0–6)
GFR AFRICAN AMERICAN: >60
GFR NON-AFRICAN AMERICAN: >60 ML/MIN/1.73
GLUCOSE BLD-MCNC: 125 MG/DL (ref 74–99)
HCT VFR BLD CALC: 40.6 % (ref 37–54)
HEMOGLOBIN: 13.3 G/DL (ref 12.5–16.5)
IMMATURE GRANULOCYTES #: 0.03 E9/L
IMMATURE GRANULOCYTES %: 0.4 % (ref 0–5)
INR BLD: 3.1
LYMPHOCYTES ABSOLUTE: 1.96 E9/L (ref 1.5–4)
LYMPHOCYTES RELATIVE PERCENT: 28.3 % (ref 20–42)
MCH RBC QN AUTO: 32.3 PG (ref 26–35)
MCHC RBC AUTO-ENTMCNC: 32.8 % (ref 32–34.5)
MCV RBC AUTO: 98.5 FL (ref 80–99.9)
MONOCYTES ABSOLUTE: 0.41 E9/L (ref 0.1–0.95)
MONOCYTES RELATIVE PERCENT: 5.9 % (ref 2–12)
NEUTROPHILS ABSOLUTE: 4.33 E9/L (ref 1.8–7.3)
NEUTROPHILS RELATIVE PERCENT: 62.5 % (ref 43–80)
PDW BLD-RTO: 12.8 FL (ref 11.5–15)
PLATELET # BLD: 172 E9/L (ref 130–450)
PMV BLD AUTO: 10.5 FL (ref 7–12)
POTASSIUM SERPL-SCNC: 3.6 MMOL/L (ref 3.5–5)
PROTHROMBIN TIME: 35.9 SEC (ref 9.3–12.4)
RBC # BLD: 4.12 E12/L (ref 3.8–5.8)
SODIUM BLD-SCNC: 144 MMOL/L (ref 132–146)
WBC # BLD: 6.9 E9/L (ref 4.5–11.5)

## 2019-11-12 PROCEDURE — 80048 BASIC METABOLIC PNL TOTAL CA: CPT

## 2019-11-12 PROCEDURE — 99283 EMERGENCY DEPT VISIT LOW MDM: CPT

## 2019-11-12 PROCEDURE — 36415 COLL VENOUS BLD VENIPUNCTURE: CPT

## 2019-11-12 PROCEDURE — 85610 PROTHROMBIN TIME: CPT

## 2019-11-12 PROCEDURE — 93971 EXTREMITY STUDY: CPT

## 2019-11-12 PROCEDURE — 85025 COMPLETE CBC W/AUTO DIFF WBC: CPT

## 2019-11-12 ASSESSMENT — PAIN DESCRIPTION - ORIENTATION: ORIENTATION: LEFT

## 2019-11-12 ASSESSMENT — PAIN SCALES - GENERAL: PAINLEVEL_OUTOF10: 6

## 2019-11-12 ASSESSMENT — PAIN DESCRIPTION - PAIN TYPE: TYPE: ACUTE PAIN

## 2019-11-12 ASSESSMENT — PAIN DESCRIPTION - LOCATION: LOCATION: LEG

## 2019-11-20 ENCOUNTER — NURSE ONLY (OUTPATIENT)
Dept: INTERNAL MEDICINE | Age: 59
End: 2019-11-20
Payer: COMMERCIAL

## 2019-11-20 DIAGNOSIS — I82.499 DEEP VEIN THROMBOSIS (DVT) OF OTHER VEIN OF LOWER EXTREMITY, UNSPECIFIED CHRONICITY, UNSPECIFIED LATERALITY (HCC): ICD-10-CM

## 2019-11-20 LAB
INTERNATIONAL NORMALIZATION RATIO, POC: 2.6
PROTHROMBIN TIME, POC: 30.6

## 2019-11-20 PROCEDURE — 85610 PROTHROMBIN TIME: CPT | Performed by: INTERNAL MEDICINE

## 2019-11-26 RX ORDER — ATORVASTATIN CALCIUM 80 MG/1
TABLET, FILM COATED ORAL
Qty: 30 TABLET | Refills: 0 | Status: SHIPPED | OUTPATIENT
Start: 2019-11-26 | End: 2019-12-30

## 2019-12-03 ENCOUNTER — NURSE ONLY (OUTPATIENT)
Dept: INTERNAL MEDICINE | Age: 59
End: 2019-12-03
Payer: COMMERCIAL

## 2019-12-03 DIAGNOSIS — I71.40 ABDOMINAL AORTIC ANEURYSM WITHOUT RUPTURE: Primary | ICD-10-CM

## 2019-12-03 LAB — INTERNATIONAL NORMALIZATION RATIO, POC: 3.4

## 2019-12-03 PROCEDURE — 85610 PROTHROMBIN TIME: CPT | Performed by: INTERNAL MEDICINE

## 2019-12-09 ENCOUNTER — TELEPHONE (OUTPATIENT)
Dept: INTERNAL MEDICINE | Age: 59
End: 2019-12-09

## 2019-12-11 ENCOUNTER — TELEPHONE (OUTPATIENT)
Dept: INTERNAL MEDICINE | Age: 59
End: 2019-12-11

## 2019-12-13 ENCOUNTER — OFFICE VISIT (OUTPATIENT)
Dept: INTERNAL MEDICINE | Age: 59
End: 2019-12-13
Payer: COMMERCIAL

## 2019-12-13 VITALS
WEIGHT: 175.4 LBS | HEART RATE: 92 BPM | HEIGHT: 74 IN | BODY MASS INDEX: 22.51 KG/M2 | RESPIRATION RATE: 16 BRPM | DIASTOLIC BLOOD PRESSURE: 87 MMHG | TEMPERATURE: 98.1 F | SYSTOLIC BLOOD PRESSURE: 168 MMHG

## 2019-12-13 DIAGNOSIS — I10 ESSENTIAL HYPERTENSION: Primary | ICD-10-CM

## 2019-12-13 DIAGNOSIS — I82.499 DEEP VEIN THROMBOSIS (DVT) OF OTHER VEIN OF LOWER EXTREMITY, UNSPECIFIED CHRONICITY, UNSPECIFIED LATERALITY (HCC): ICD-10-CM

## 2019-12-13 DIAGNOSIS — Z87.898 HISTORY OF BACTEREMIA: ICD-10-CM

## 2019-12-13 LAB
INTERNATIONAL NORMALIZATION RATIO, POC: 3.6
PROTHROMBIN TIME, POC: 43.1

## 2019-12-13 PROCEDURE — G8420 CALC BMI NORM PARAMETERS: HCPCS | Performed by: INTERNAL MEDICINE

## 2019-12-13 PROCEDURE — 99213 OFFICE O/P EST LOW 20 MIN: CPT | Performed by: INTERNAL MEDICINE

## 2019-12-13 PROCEDURE — 85610 PROTHROMBIN TIME: CPT | Performed by: INTERNAL MEDICINE

## 2019-12-13 PROCEDURE — 3017F COLORECTAL CA SCREEN DOC REV: CPT | Performed by: INTERNAL MEDICINE

## 2019-12-13 PROCEDURE — G8427 DOCREV CUR MEDS BY ELIG CLIN: HCPCS | Performed by: INTERNAL MEDICINE

## 2019-12-13 PROCEDURE — G8482 FLU IMMUNIZE ORDER/ADMIN: HCPCS | Performed by: INTERNAL MEDICINE

## 2019-12-13 PROCEDURE — 1036F TOBACCO NON-USER: CPT | Performed by: INTERNAL MEDICINE

## 2019-12-13 RX ORDER — DOXYCYCLINE HYCLATE 100 MG
100 TABLET ORAL DAILY
Qty: 21 TABLET | Refills: 0 | Status: SHIPPED | OUTPATIENT
Start: 2019-12-13 | End: 2020-01-03

## 2019-12-13 RX ORDER — LISINOPRIL 20 MG/1
20 TABLET ORAL DAILY
Qty: 90 TABLET | Refills: 0 | Status: SHIPPED
Start: 2019-12-13 | End: 2020-02-17 | Stop reason: SDUPTHER

## 2019-12-20 ENCOUNTER — HOSPITAL ENCOUNTER (OUTPATIENT)
Age: 59
Discharge: HOME | End: 2019-12-20
Payer: COMMERCIAL

## 2019-12-20 ENCOUNTER — NURSE ONLY (OUTPATIENT)
Dept: INTERNAL MEDICINE | Age: 59
End: 2019-12-20
Payer: COMMERCIAL

## 2019-12-20 ENCOUNTER — TELEPHONE (OUTPATIENT)
Dept: INTERNAL MEDICINE | Age: 59
End: 2019-12-20

## 2019-12-20 DIAGNOSIS — I82.499 DEEP VEIN THROMBOSIS (DVT) OF OTHER VEIN OF LOWER EXTREMITY, UNSPECIFIED CHRONICITY, UNSPECIFIED LATERALITY (HCC): Primary | ICD-10-CM

## 2019-12-20 DIAGNOSIS — Z98.890 HX OF ABDOMINAL SURGERY: ICD-10-CM

## 2019-12-20 LAB
BASOPHILS ABSOLUTE: 0.06 E9/L (ref 0–0.2)
BASOPHILS RELATIVE PERCENT: 0.8 % (ref 0–2)
C-REACTIVE PROTEIN: 0.3 MG/DL (ref 0–0.4)
EOSINOPHILS ABSOLUTE: 0.13 E9/L (ref 0.05–0.5)
EOSINOPHILS RELATIVE PERCENT: 1.7 % (ref 0–6)
HCT VFR BLD CALC: 45.4 % (ref 37–54)
HEMOGLOBIN: 14.7 G/DL (ref 12.5–16.5)
IMMATURE GRANULOCYTES #: 0.03 E9/L
IMMATURE GRANULOCYTES %: 0.4 % (ref 0–5)
INTERNATIONAL NORMALIZATION RATIO, POC: 2.4
LYMPHOCYTES ABSOLUTE: 2.14 E9/L (ref 1.5–4)
LYMPHOCYTES RELATIVE PERCENT: 28.5 % (ref 20–42)
MCH RBC QN AUTO: 32 PG (ref 26–35)
MCHC RBC AUTO-ENTMCNC: 32.4 % (ref 32–34.5)
MCV RBC AUTO: 98.7 FL (ref 80–99.9)
MONOCYTES ABSOLUTE: 0.64 E9/L (ref 0.1–0.95)
MONOCYTES RELATIVE PERCENT: 8.5 % (ref 2–12)
NEUTROPHILS ABSOLUTE: 4.51 E9/L (ref 1.8–7.3)
NEUTROPHILS RELATIVE PERCENT: 60.1 % (ref 43–80)
PDW BLD-RTO: 12.2 FL (ref 11.5–15)
PLATELET # BLD: 194 E9/L (ref 130–450)
PMV BLD AUTO: 10.4 FL (ref 7–12)
PROTHROMBIN TIME, POC: 28.5
RBC # BLD: 4.6 E12/L (ref 3.8–5.8)
SEDIMENTATION RATE, ERYTHROCYTE: 8 MM/HR (ref 0–15)
WBC # BLD: 7.5 E9/L (ref 4.5–11.5)

## 2019-12-20 PROCEDURE — 36415 COLL VENOUS BLD VENIPUNCTURE: CPT

## 2019-12-20 PROCEDURE — 85610 PROTHROMBIN TIME: CPT | Performed by: INTERNAL MEDICINE

## 2019-12-20 PROCEDURE — 85651 RBC SED RATE NONAUTOMATED: CPT

## 2019-12-20 PROCEDURE — 93793 ANTICOAG MGMT PT WARFARIN: CPT | Performed by: INTERNAL MEDICINE

## 2019-12-20 PROCEDURE — 85025 COMPLETE CBC W/AUTO DIFF WBC: CPT

## 2019-12-20 PROCEDURE — 86140 C-REACTIVE PROTEIN: CPT

## 2019-12-31 RX ORDER — ATORVASTATIN CALCIUM 80 MG/1
TABLET, FILM COATED ORAL
Qty: 30 TABLET | Refills: 1 | Status: SHIPPED
Start: 2019-12-31 | End: 2020-02-17

## 2020-01-02 ENCOUNTER — NURSE ONLY (OUTPATIENT)
Dept: INTERNAL MEDICINE | Age: 60
End: 2020-01-02
Payer: COMMERCIAL

## 2020-01-02 LAB
INTERNATIONAL NORMALIZATION RATIO, POC: 3.6
PROTHROMBIN TIME, POC: 43.2

## 2020-01-02 PROCEDURE — 93793 ANTICOAG MGMT PT WARFARIN: CPT | Performed by: INTERNAL MEDICINE

## 2020-01-02 PROCEDURE — 85610 PROTHROMBIN TIME: CPT | Performed by: INTERNAL MEDICINE

## 2020-01-02 RX ORDER — WARFARIN SODIUM 3 MG/1
3 TABLET ORAL DAILY
Qty: 30 TABLET | Refills: 0 | Status: SHIPPED
Start: 2020-01-02 | End: 2020-02-20

## 2020-01-02 RX ORDER — WARFARIN SODIUM 4 MG/1
TABLET ORAL
Qty: 30 TABLET | Refills: 0 | Status: SHIPPED
Start: 2020-01-02 | End: 2020-03-13

## 2020-01-02 NOTE — PROGRESS NOTES
INR results reviewed with Dr. Kiran Guerrero and orders received. Pt notified via phone of same. AVS mailed to patient. Coumadin reordered per Dr. Barrie Rodriguez verbal instruction.

## 2020-01-02 NOTE — PATIENT INSTRUCTIONS
Take Coumadin 3 mg today, then change weekly Coumadin dosing to 3 mg on Mondays and Fridays. Coumadin 4 mg all other days. Repeat INR in 1 week. Follow up as scheduled. Please call with any questions or concerns.   Norberto Bustos RN

## 2020-01-09 ENCOUNTER — NURSE ONLY (OUTPATIENT)
Dept: INTERNAL MEDICINE | Age: 60
End: 2020-01-09
Payer: COMMERCIAL

## 2020-01-09 LAB
INTERNATIONAL NORMALIZATION RATIO, POC: 2.2
PROTHROMBIN TIME, POC: 26.3

## 2020-01-09 PROCEDURE — 85610 PROTHROMBIN TIME: CPT | Performed by: INTERNAL MEDICINE

## 2020-01-09 PROCEDURE — 93793 ANTICOAG MGMT PT WARFARIN: CPT | Performed by: INTERNAL MEDICINE

## 2020-01-09 RX ORDER — DOXYCYCLINE 100 MG/1
100 CAPSULE ORAL DAILY
Qty: 60 CAPSULE | Refills: 0 | Status: SHIPPED
Start: 2020-01-09 | End: 2020-03-19 | Stop reason: SDUPTHER

## 2020-01-09 NOTE — PROGRESS NOTES
Coumadin instructions given per Dr. Severiano Horowitz. Pt left will call with instructions and next INR appointment. Printed AVS mailed to pt.

## 2020-01-09 NOTE — PATIENT INSTRUCTIONS
Take 3mg of Coumadin on Fridays only and the rest of the days take 4mgs of Coumadin   Recheck INR in 1 week  Doxycycline medication was ordered

## 2020-01-16 ENCOUNTER — NURSE ONLY (OUTPATIENT)
Dept: INTERNAL MEDICINE | Age: 60
End: 2020-01-16
Payer: COMMERCIAL

## 2020-01-16 LAB — INTERNATIONAL NORMALIZATION RATIO, POC: 2.1

## 2020-01-16 PROCEDURE — 85610 PROTHROMBIN TIME: CPT | Performed by: INTERNAL MEDICINE

## 2020-01-30 ENCOUNTER — NURSE ONLY (OUTPATIENT)
Dept: INTERNAL MEDICINE | Age: 60
End: 2020-01-30
Payer: COMMERCIAL

## 2020-01-30 LAB
INTERNATIONAL NORMALIZATION RATIO, POC: 2.1
PROTHROMBIN TIME, POC: 24.8

## 2020-01-30 PROCEDURE — 85610 PROTHROMBIN TIME: CPT | Performed by: INTERNAL MEDICINE

## 2020-01-30 NOTE — PROGRESS NOTES
Patient given instructions. Understanding verbalized.  Fu appointment scheduled and reviewed with pt

## 2020-02-17 RX ORDER — ATORVASTATIN CALCIUM 80 MG/1
TABLET, FILM COATED ORAL
Qty: 30 TABLET | Refills: 3 | Status: SHIPPED
Start: 2020-02-17 | End: 2020-03-19 | Stop reason: SDUPTHER

## 2020-02-17 RX ORDER — LISINOPRIL 20 MG/1
20 TABLET ORAL DAILY
Qty: 90 TABLET | Refills: 1 | Status: SHIPPED
Start: 2020-02-17 | End: 2020-02-20 | Stop reason: SINTOL

## 2020-02-20 ENCOUNTER — OFFICE VISIT (OUTPATIENT)
Dept: INTERNAL MEDICINE | Age: 60
End: 2020-02-20
Payer: COMMERCIAL

## 2020-02-20 VITALS
DIASTOLIC BLOOD PRESSURE: 83 MMHG | SYSTOLIC BLOOD PRESSURE: 159 MMHG | WEIGHT: 176 LBS | BODY MASS INDEX: 22.59 KG/M2 | RESPIRATION RATE: 16 BRPM | HEIGHT: 74 IN | HEART RATE: 77 BPM

## 2020-02-20 LAB
INTERNATIONAL NORMALIZATION RATIO, POC: 1.7
PROTHROMBIN TIME, POC: 20.9

## 2020-02-20 PROCEDURE — G8420 CALC BMI NORM PARAMETERS: HCPCS | Performed by: INTERNAL MEDICINE

## 2020-02-20 PROCEDURE — 3017F COLORECTAL CA SCREEN DOC REV: CPT | Performed by: INTERNAL MEDICINE

## 2020-02-20 PROCEDURE — G8431 POS CLIN DEPRES SCRN F/U DOC: HCPCS | Performed by: INTERNAL MEDICINE

## 2020-02-20 PROCEDURE — 99212 OFFICE O/P EST SF 10 MIN: CPT | Performed by: INTERNAL MEDICINE

## 2020-02-20 PROCEDURE — 85610 PROTHROMBIN TIME: CPT | Performed by: INTERNAL MEDICINE

## 2020-02-20 PROCEDURE — 99213 OFFICE O/P EST LOW 20 MIN: CPT | Performed by: INTERNAL MEDICINE

## 2020-02-20 PROCEDURE — G8427 DOCREV CUR MEDS BY ELIG CLIN: HCPCS | Performed by: INTERNAL MEDICINE

## 2020-02-20 PROCEDURE — 1036F TOBACCO NON-USER: CPT | Performed by: INTERNAL MEDICINE

## 2020-02-20 PROCEDURE — G8482 FLU IMMUNIZE ORDER/ADMIN: HCPCS | Performed by: INTERNAL MEDICINE

## 2020-02-20 PROCEDURE — 96160 PT-FOCUSED HLTH RISK ASSMT: CPT | Performed by: INTERNAL MEDICINE

## 2020-02-20 RX ORDER — PAROXETINE 10 MG/1
10 TABLET, FILM COATED ORAL DAILY
Qty: 30 TABLET | Refills: 3 | Status: SHIPPED
Start: 2020-02-20 | End: 2020-03-19 | Stop reason: SDUPTHER

## 2020-02-20 RX ORDER — LOSARTAN POTASSIUM 50 MG/1
50 TABLET ORAL DAILY
Qty: 30 TABLET | Refills: 0 | Status: SHIPPED
Start: 2020-02-20 | End: 2020-03-19 | Stop reason: SDUPTHER

## 2020-02-20 RX ORDER — WARFARIN SODIUM 5 MG/1
5 TABLET ORAL DAILY
Qty: 30 TABLET | Refills: 1 | Status: SHIPPED
Start: 2020-02-20 | End: 2020-07-21

## 2020-02-20 RX ORDER — CLOPIDOGREL BISULFATE 75 MG/1
75 TABLET ORAL DAILY
Qty: 30 TABLET | Refills: 2 | Status: SHIPPED
Start: 2020-02-20 | End: 2020-03-19 | Stop reason: SDUPTHER

## 2020-02-20 SDOH — ECONOMIC STABILITY: FOOD INSECURITY: WITHIN THE PAST 12 MONTHS, YOU WORRIED THAT YOUR FOOD WOULD RUN OUT BEFORE YOU GOT MONEY TO BUY MORE.: NEVER TRUE

## 2020-02-20 SDOH — ECONOMIC STABILITY: INCOME INSECURITY: HOW HARD IS IT FOR YOU TO PAY FOR THE VERY BASICS LIKE FOOD, HOUSING, MEDICAL CARE, AND HEATING?: NOT HARD AT ALL

## 2020-02-20 SDOH — ECONOMIC STABILITY: FOOD INSECURITY: WITHIN THE PAST 12 MONTHS, THE FOOD YOU BOUGHT JUST DIDN'T LAST AND YOU DIDN'T HAVE MONEY TO GET MORE.: NEVER TRUE

## 2020-02-20 ASSESSMENT — PATIENT HEALTH QUESTIONNAIRE - PHQ9
1. LITTLE INTEREST OR PLEASURE IN DOING THINGS: 0
SUM OF ALL RESPONSES TO PHQ QUESTIONS 1-9: 14
7. TROUBLE CONCENTRATING ON THINGS, SUCH AS READING THE NEWSPAPER OR WATCHING TELEVISION: 2
5. POOR APPETITE OR OVEREATING: 0
SUM OF ALL RESPONSES TO PHQ9 QUESTIONS 1 & 2: 3
4. FEELING TIRED OR HAVING LITTLE ENERGY: 3
2. FEELING DOWN, DEPRESSED OR HOPELESS: 3
6. FEELING BAD ABOUT YOURSELF - OR THAT YOU ARE A FAILURE OR HAVE LET YOURSELF OR YOUR FAMILY DOWN: 0
SUM OF ALL RESPONSES TO PHQ QUESTIONS 1-9: 14
3. TROUBLE FALLING OR STAYING ASLEEP: 3
9. THOUGHTS THAT YOU WOULD BE BETTER OFF DEAD, OR OF HURTING YOURSELF: 0
8. MOVING OR SPEAKING SO SLOWLY THAT OTHER PEOPLE COULD HAVE NOTICED. OR THE OPPOSITE, BEING SO FIGETY OR RESTLESS THAT YOU HAVE BEEN MOVING AROUND A LOT MORE THAN USUAL: 3
10. IF YOU CHECKED OFF ANY PROBLEMS, HOW DIFFICULT HAVE THESE PROBLEMS MADE IT FOR YOU TO DO YOUR WORK, TAKE CARE OF THINGS AT HOME, OR GET ALONG WITH OTHER PEOPLE: 1

## 2020-02-20 ASSESSMENT — ENCOUNTER SYMPTOMS
SHORTNESS OF BREATH: 0
CONSTIPATION: 0
COUGH: 0
NAUSEA: 0
DIARRHEA: 0
ABDOMINAL PAIN: 0
VOMITING: 0

## 2020-02-20 NOTE — PATIENT INSTRUCTIONS
you can lose your balance and fall. · Talk to your doctor if you have numbness in your feet. Preventing falls at home  · Remove raised doorway thresholds, throw rugs, and clutter. Repair loose carpet or raised areas in the floor. · Move furniture and electrical cords to keep them out of walking paths. · Use nonskid floor wax, and wipe up spills right away, especially on ceramic tile floors. · If you use a walker or cane, put rubber tips on it. If you use crutches, clean the bottoms of them regularly with an abrasive pad, such as steel wool. · Keep your house well lit, especially [facility], and outside walkways. Use night-lights in areas such as hallways and bathrooms. Add extra light switches or use remote switches (such as switches that go on or off when you clap your hands) to make it easier to turn lights on if you have to get up during the night. · Install sturdy handrails on stairways. · Move items in your cabinets so that the things you use a lot are on the lower shelves (about waist level). · Keep a cordless phone and a flashlight with new batteries by your bed. If possible, put a phone in each of the main rooms of your house, or carry a cell phone in case you fall and cannot reach a phone. Or, you can wear a device around your neck or wrist. You push a button that sends a signal for help. · Wear low-heeled shoes that fit well and give your feet good support. Use footwear with nonskid soles. Check the heels and soles of your shoes for wear. Repair or replace worn heels or soles. · Do not wear socks without shoes on wood floors. · Walk on the grass when the sidewalks are slippery. If you live in an area that gets snow and ice in the winter, sprinkle salt on slippery steps and sidewalks. Preventing falls in the bath  · Install grab bars and nonskid mats inside and outside your shower or tub and near the toilet and sinks. · Use shower chairs and bath benches.   · Use a hand-held shower head

## 2020-02-20 NOTE — PROGRESS NOTES
[facility]  [facility]  ACC Note      SUBJECTIVE:  CC: had concerns including Office Visit for Anticoagulation Management; Other (feels like his heart is pounding at night when he lays time); and Cough. HPI:Hector Read with PMH PVD, HTN, HLP,  Hypercoagulable state with antiphospholipid syndrome, AAA repair complicated by graft infection. Presents for 2 month f/u. 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  [place]ys [place]  No SI, HI  Agreeable to start medication    Chronic med problems  htn  bp at home is not being checked  Today mildly uncontrolled  Amlodipine 10 mg daily  Lisinopril 20 mg daily  No side effects  Agreeable to check bp at home    Hypercoagulable state d/t APLA in the setting of SLE  Coumadin 3 mg on fridays, saturdays  4 mg on the rest  inr today 1.7  Goal should be 2.5 to 3.5 per [facility]  Agreeable to adjustment    PAD  On plavix  FOllows with [facility]    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 HENT: Negative for hearing loss and tinnitus. Cough   Respiratory: Negative for cough and shortness of breath. Cardiovascular: Negative for chest pain and leg swelling. Gastrointestinal: Negative for abdominal pain, constipation, diarrhea, nausea and vomiting. Genitourinary: Negative for dysuria and urgency. Musculoskeletal: Negative for myalgias. Skin: Negative for rash. Neurological: Negative for dizziness and headaches. Psychiatric/Behavioral: Negative for suicidal ideas. Current Outpatient Medications on File Prior to Visit   Medication Sig Dispense Refill    atorvastatin (LIPITOR) 80 MG tablet take 1 tablet by mouth once daily 30 tablet 3    doxycycline monohydrate (MONODOX) 100 MG capsule Take 1 capsule by mouth daily 60 capsule 0    warfarin (COUMADIN) 4 MG tablet Daily except Monday and Friday. 30 tablet 0    varenicline (CHANTIX CONTINUING MONTH RADHA) 1 MG tablet Take 1 tablet by mouth 2 times daily 60 tablet 2    magnesium oxide (MAG-OX) 400 MG tablet Take 1 tablet by mouth daily 30 tablet 1    albuterol sulfate  (90 Base) MCG/ACT inhaler Inhale 2 puffs into the lungs every 6 hours as needed for Wheezing 1 Inhaler 5    clopidogrel (PLAVIX) 75 MG tablet Take 1 tablet by mouth daily 30 tablet 0    amLODIPine (NORVASC) 10 MG tablet Take 1 tablet by mouth daily 90 tablet 2    gabapentin (NEURONTIN) 400 MG capsule Take 400 mg by mouth 3 times daily. Ordered per Dr. Valerio Dalmatia      Multiple Vitamins-Minerals (THERAPEUTIC MULTIVITAMIN-MINERALS) tablet Take 1 tablet by mouth daily      zoster recombinant adjuvanted vaccine [facility]) 50 MCG/0.5ML SUSR injection 1 dose now and repeat in 2-6 months 0.5 mL 0    Blood Pressure Monitoring (BLOOD PRESSURE CUFF) MISC Dx:  Hypertension with labile blood pressure 1 each 0    acetaminophen (TYLENOL) 500 MG tablet Take 2 tablets by mouth every 6 hours as needed for Pain (Patient not taking: Reported on 2/20/2020) 30 tablet 0     No current facility-administered medications on file prior to visit. OBJECTIVE:    VS: BP (!) 150/82 (Site: Right Upper Arm, Position: Sitting, Cuff Size: Medium Adult)   Pulse 78   Resp 16   Ht 6' 2\" (1.88 m)   Wt 176 lb (79.8 kg)   BMI 22.60 kg/m²   Physical Exam  Constitutional:       Appearance: He is well-developed. HENT:      Head: Normocephalic and atraumatic.    Eyes:      Pupils: Pupils are equal, round, and reactive Paxil- 10 mg- patient will call for any significant medication side effects or worsening symptoms of depression. Patient will follow-up in 1 month(s) with PCP. No severe drug interactions noted per [employer] with Paxil. Anti-phospholipid antibody syndrome (HCC)  -     warfarin (COUMADIN) 5 MG tablet; Take 1 tablet by mouth daily Take 1 tablet Monday & Friday. PAD (peripheral artery disease) (HCC)  -     clopidogrel (PLAVIX) 75 MG tablet; Take 1 tablet by mouth daily    I have reviewed allpertient PMHx, PSHx, FamHx, Social Hx, medications, and allergies and updated history as appropriate. RTC: 1week for INR check. 1 month for BP f/u and     I have reviewed my findings and recommendations with Michelle Erazo and the attending physician.      Bharath Zuñiga MD PGY-3  2/20/2020 3:10 PM

## 2020-02-27 ENCOUNTER — HOSPITAL ENCOUNTER (OUTPATIENT)
Age: 60
Discharge: HOME | End: 2020-02-27
Payer: COMMERCIAL

## 2020-02-27 ENCOUNTER — NURSE ONLY (OUTPATIENT)
Dept: INTERNAL MEDICINE | Age: 60
End: 2020-02-27
Payer: COMMERCIAL

## 2020-02-27 LAB
ANION GAP SERPL CALCULATED.3IONS-SCNC: 15 MMOL/L (ref 7–16)
BUN BLDV-MCNC: 9 MG/DL (ref 8–23)
CALCIUM SERPL-MCNC: 9.6 MG/DL (ref 8.6–10.2)
CHLORIDE BLD-SCNC: 111 MMOL/L (ref 98–107)
CO2: 17 MMOL/L (ref 22–29)
CREAT SERPL-MCNC: 0.9 MG/DL (ref 0.7–1.2)
GFR AFRICAN AMERICAN: >60
GFR NON-AFRICAN AMERICAN: >60 ML/MIN/1.73
GLUCOSE BLD-MCNC: 101 MG/DL (ref 74–99)
INTERNATIONAL NORMALIZATION RATIO, POC: 2.5
MAGNESIUM: 1.9 MG/DL (ref 1.6–2.6)
POTASSIUM SERPL-SCNC: 4 MMOL/L (ref 3.5–5)
PROTHROMBIN TIME, POC: 29.6
SODIUM BLD-SCNC: 143 MMOL/L (ref 132–146)
TSH SERPL DL<=0.05 MIU/L-ACNC: 0.58 UIU/ML (ref 0.27–4.2)
VITAMIN B-12: 956 PG/ML (ref 211–946)

## 2020-02-27 PROCEDURE — 84443 ASSAY THYROID STIM HORMONE: CPT

## 2020-02-27 PROCEDURE — 36415 COLL VENOUS BLD VENIPUNCTURE: CPT

## 2020-02-27 PROCEDURE — 85610 PROTHROMBIN TIME: CPT | Performed by: INTERNAL MEDICINE

## 2020-02-27 PROCEDURE — 93793 ANTICOAG MGMT PT WARFARIN: CPT | Performed by: INTERNAL MEDICINE

## 2020-02-27 PROCEDURE — 82607 VITAMIN B-12: CPT

## 2020-02-27 PROCEDURE — 83735 ASSAY OF MAGNESIUM: CPT

## 2020-02-27 PROCEDURE — 80048 BASIC METABOLIC PNL TOTAL CA: CPT

## 2020-02-27 NOTE — PROGRESS NOTES
INR results reviewed with Dr. Mario Clark and orders received. Pt notified of same. AVS given to patient.

## 2020-03-13 RX ORDER — WARFARIN SODIUM 4 MG/1
TABLET ORAL
Qty: 30 TABLET | Refills: 0 | Status: SHIPPED
Start: 2020-03-13 | End: 2020-04-23 | Stop reason: SDUPTHER

## 2020-03-17 NOTE — TELEPHONE ENCOUNTER
Last Appointment:  2/20/2020  Future Appointments   Date Time Provider Roberto Patino   3/18/2020  2:00 PM Ryan Goodwin MD [id number]   3/19/2020  1:30 PM Butch Myers MD [facility]

## 2020-03-19 ENCOUNTER — OFFICE VISIT (OUTPATIENT)
Dept: INTERNAL MEDICINE | Age: 60
End: 2020-03-19
Payer: COMMERCIAL

## 2020-03-19 VITALS
TEMPERATURE: 98.3 F | BODY MASS INDEX: 23.09 KG/M2 | HEART RATE: 85 BPM | DIASTOLIC BLOOD PRESSURE: 77 MMHG | SYSTOLIC BLOOD PRESSURE: 130 MMHG | WEIGHT: 179.9 LBS | RESPIRATION RATE: 18 BRPM | HEIGHT: 74 IN

## 2020-03-19 LAB
INTERNATIONAL NORMALIZATION RATIO, POC: 3.1
PROTHROMBIN TIME, POC: 37.5

## 2020-03-19 PROCEDURE — G8427 DOCREV CUR MEDS BY ELIG CLIN: HCPCS | Performed by: INTERNAL MEDICINE

## 2020-03-19 PROCEDURE — 3017F COLORECTAL CA SCREEN DOC REV: CPT | Performed by: INTERNAL MEDICINE

## 2020-03-19 PROCEDURE — 99212 OFFICE O/P EST SF 10 MIN: CPT | Performed by: INTERNAL MEDICINE

## 2020-03-19 PROCEDURE — 99213 OFFICE O/P EST LOW 20 MIN: CPT | Performed by: INTERNAL MEDICINE

## 2020-03-19 PROCEDURE — G8420 CALC BMI NORM PARAMETERS: HCPCS | Performed by: INTERNAL MEDICINE

## 2020-03-19 PROCEDURE — 1036F TOBACCO NON-USER: CPT | Performed by: INTERNAL MEDICINE

## 2020-03-19 PROCEDURE — G8482 FLU IMMUNIZE ORDER/ADMIN: HCPCS | Performed by: INTERNAL MEDICINE

## 2020-03-19 PROCEDURE — 85610 PROTHROMBIN TIME: CPT | Performed by: INTERNAL MEDICINE

## 2020-03-19 RX ORDER — DOXYCYCLINE 100 MG/1
100 CAPSULE ORAL DAILY
Qty: 60 CAPSULE | Refills: 3 | Status: SHIPPED
Start: 2020-03-19 | End: 2020-12-22 | Stop reason: SDUPTHER

## 2020-03-19 RX ORDER — LOSARTAN POTASSIUM 50 MG/1
50 TABLET ORAL DAILY
Qty: 30 TABLET | Refills: 3 | Status: SHIPPED
Start: 2020-03-19 | End: 2020-03-24

## 2020-03-19 RX ORDER — CLOPIDOGREL BISULFATE 75 MG/1
75 TABLET ORAL DAILY
Qty: 30 TABLET | Refills: 3 | Status: SHIPPED
Start: 2020-03-19 | End: 2020-09-03 | Stop reason: SDUPTHER

## 2020-03-19 RX ORDER — PAROXETINE 10 MG/1
10 TABLET, FILM COATED ORAL DAILY
Qty: 30 TABLET | Refills: 3 | Status: SHIPPED
Start: 2020-03-19 | End: 2020-09-03 | Stop reason: SDUPTHER

## 2020-03-19 RX ORDER — ATORVASTATIN CALCIUM 80 MG/1
80 TABLET, FILM COATED ORAL DAILY
Qty: 30 TABLET | Refills: 3 | Status: SHIPPED
Start: 2020-03-19 | End: 2020-09-03 | Stop reason: SDUPTHER

## 2020-03-19 RX ORDER — ALBUTEROL SULFATE 90 UG/1
2 AEROSOL, METERED RESPIRATORY (INHALATION) EVERY 6 HOURS PRN
Qty: 1 INHALER | Refills: 3 | Status: SHIPPED
Start: 2020-03-19 | End: 2020-11-03 | Stop reason: SDUPTHER

## 2020-03-19 RX ORDER — MAGNESIUM OXIDE 400 MG/1
400 TABLET ORAL DAILY
Qty: 30 TABLET | Refills: 3 | Status: SHIPPED
Start: 2020-03-19 | End: 2020-11-03 | Stop reason: SDUPTHER

## 2020-03-19 RX ORDER — VARENICLINE TARTRATE 1 MG/1
1 TABLET, FILM COATED ORAL 2 TIMES DAILY
Qty: 60 TABLET | Status: CANCELLED | OUTPATIENT
Start: 2020-03-19

## 2020-03-19 RX ORDER — AMLODIPINE BESYLATE 10 MG/1
10 TABLET ORAL DAILY
Qty: 90 TABLET | Refills: 3 | Status: SHIPPED
Start: 2020-03-19 | End: 2021-02-17 | Stop reason: SDUPTHER

## 2020-03-19 NOTE — PROGRESS NOTES
[facility]  [facility] Residency Program  ACC Note      SUBJECTIVE:  CC: had no chief complaint listed for this encounter. HPI:Famden BECKHAM Jacek PMH PVD, HTN, HLP, Hypercoagulable state with antiphospholipid syndrome, AAA repair complicated by graft infection. presented to the [facility] for a routine visit.     -Patient is here today still complains of some cough that is getting better since Lisinopril was switched to Losartan.    -He is currently using Coumadin 5mg and 4mg and remains on Doxycycline    -Mood is getting better, sleep and appetite getting better, No SI. tolerating Pxil with no SE. Review of Systems   Constitutional: Negative for chills and fever. HENT: Negative for hearing loss and tinnitus. Respiratory: Negative for cough and shortness of breath. Cardiovascular: Negative for chest pain and leg swelling. Gastrointestinal: Negative for abdominal pain, constipation, diarrhea, nausea and vomiting. Genitourinary: Negative for dysuria and urgency. Musculoskeletal: Negative for myalgias. Skin: Negative for rash. Neurological: Negative for dizziness and headaches. Psychiatric/Behavioral: Negative for suicidal ideas. Outpatient Medications Marked as Taking for the 3/19/20 encounter (Office Visit) with Samia Lester MD   Medication Sig Dispense Refill    warfarin (COUMADIN) 4 MG tablet take 1 tablet by mouth once daily EXCEPT MONDAY AND FRIDAY 30 tablet 0    warfarin (COUMADIN) 5 MG tablet Take 1 tablet by mouth daily Take 1 tablet Monday & Friday.  30 tablet 1    losartan (COZAAR) 50 MG tablet Take 1 tablet by mouth daily 30 tablet 0    PARoxetine (PAXIL) 10 MG tablet Take 1 tablet by mouth daily 30 tablet 3    clopidogrel (PLAVIX) 75 MG tablet Take 1 tablet by mouth daily 30 tablet 2    atorvastatin (LIPITOR) 80 MG tablet take 1 tablet by mouth once daily 30 tablet 3    doxycycline monohydrate (MONODOX) 100 MG capsule Take 1 capsule by appetite ok   -No SI, HI  -Remains on Paxil, tolerating well with improved mood     Hypercoagulable state d/t APLA in the setting of SLE with hX DVT   -Patient currently on Coumadin 5 mg on fridays, Mondays, 4 mg on the rest of the week  -INR 3.1  -Goal 2.5-3.5 per [facility]  -RTC in 3 weekls    Hypertension:  -BP is 130/77  -Currently on Amlodipine 10 mg and Losartan 50 mg daily  -Developed cough on Lisinopril  -Continue the same     PAD  -Follows with [facility], s/p thrombectomy on the left side 10/2018  -Currently on Plavix, Lipitor  -Will check lipid panel, CMP for next visit     AAA, s/p repair with chronic abdominal graft infection  -On chronic suppression with Doxycycline     Tobacco abuse  -1 cig/day   -On and off Chantix      I have reviewed my findings and recommendations with Kj Mckay and Dr. Alex Jean MD PGY-2  3/19/2020 1:09 PM

## 2020-03-24 RX ORDER — LOSARTAN POTASSIUM 50 MG/1
TABLET ORAL
Qty: 30 TABLET | Refills: 3 | Status: SHIPPED
Start: 2020-03-24 | End: 2020-09-03 | Stop reason: SDUPTHER

## 2020-04-09 ENCOUNTER — NURSE ONLY (OUTPATIENT)
Dept: INTERNAL MEDICINE | Age: 60
End: 2020-04-09
Payer: COMMERCIAL

## 2020-04-09 LAB
INTERNATIONAL NORMALIZATION RATIO, POC: 3.8
PROTHROMBIN TIME, POC: 45.1

## 2020-04-09 PROCEDURE — 85610 PROTHROMBIN TIME: CPT | Performed by: INTERNAL MEDICINE

## 2020-04-09 PROCEDURE — 93793 ANTICOAG MGMT PT WARFARIN: CPT | Performed by: INTERNAL MEDICINE

## 2020-04-09 NOTE — PROGRESS NOTES
INR results reviewed with Dr. Cristofer Feng and orders received. Pt notified via phone of same. AVS mailed to patient.

## 2020-04-16 ENCOUNTER — NURSE ONLY (OUTPATIENT)
Dept: INTERNAL MEDICINE | Age: 60
End: 2020-04-16
Payer: COMMERCIAL

## 2020-04-16 LAB
INTERNATIONAL NORMALIZATION RATIO, POC: 3.5
PROTHROMBIN TIME, POC: ABNORMAL

## 2020-04-16 PROCEDURE — 85610 PROTHROMBIN TIME: CPT | Performed by: INTERNAL MEDICINE

## 2020-04-16 PROCEDURE — 93793 ANTICOAG MGMT PT WARFARIN: CPT | Performed by: INTERNAL MEDICINE

## 2020-04-16 NOTE — PROGRESS NOTES
Coumadin instructions given per Dr. Sulema Mcrae. Pt left will call with instructions and next INR appointment. Printed AVS mailed to pt.

## 2020-04-23 ENCOUNTER — NURSE ONLY (OUTPATIENT)
Dept: INTERNAL MEDICINE | Age: 60
End: 2020-04-23
Payer: COMMERCIAL

## 2020-04-23 LAB
INTERNATIONAL NORMALIZATION RATIO, POC: 3.1
PROTHROMBIN TIME, POC: 36.8

## 2020-04-23 PROCEDURE — 85610 PROTHROMBIN TIME: CPT | Performed by: INTERNAL MEDICINE

## 2020-04-23 PROCEDURE — 93793 ANTICOAG MGMT PT WARFARIN: CPT | Performed by: INTERNAL MEDICINE

## 2020-04-23 RX ORDER — WARFARIN SODIUM 4 MG/1
TABLET ORAL
Qty: 30 TABLET | Refills: 0 | Status: SHIPPED
Start: 2020-04-23 | End: 2020-05-14 | Stop reason: SDUPTHER

## 2020-04-23 NOTE — PROGRESS NOTES
Coumadin instructions given per . Pt left will call with instructions and next INR appointment. Printed AVS mailed to pt.

## 2020-05-14 ENCOUNTER — NURSE ONLY (OUTPATIENT)
Dept: INTERNAL MEDICINE | Age: 60
End: 2020-05-14
Payer: COMMERCIAL

## 2020-05-14 VITALS — TEMPERATURE: 98.6 F

## 2020-05-14 LAB — INTERNATIONAL NORMALIZATION RATIO, POC: 2.6

## 2020-05-14 PROCEDURE — 93793 ANTICOAG MGMT PT WARFARIN: CPT | Performed by: INTERNAL MEDICINE

## 2020-05-14 PROCEDURE — 85610 PROTHROMBIN TIME: CPT | Performed by: INTERNAL MEDICINE

## 2020-05-14 RX ORDER — VARENICLINE TARTRATE 1 MG/1
1 TABLET, FILM COATED ORAL 2 TIMES DAILY
Qty: 60 TABLET | Refills: 0 | Status: SHIPPED
Start: 2020-05-14 | End: 2020-11-03 | Stop reason: ALTCHOICE

## 2020-05-14 RX ORDER — WARFARIN SODIUM 4 MG/1
TABLET ORAL
Qty: 30 TABLET | Refills: 1 | Status: SHIPPED
Start: 2020-05-14 | End: 2020-06-25 | Stop reason: SDUPTHER

## 2020-06-04 ENCOUNTER — NURSE ONLY (OUTPATIENT)
Dept: INTERNAL MEDICINE | Age: 60
End: 2020-06-04
Payer: COMMERCIAL

## 2020-06-04 LAB
INTERNATIONAL NORMALIZATION RATIO, POC: 2.8
PROTHROMBIN TIME, POC: 34

## 2020-06-04 PROCEDURE — 85610 PROTHROMBIN TIME: CPT | Performed by: INTERNAL MEDICINE

## 2020-06-04 PROCEDURE — 93793 ANTICOAG MGMT PT WARFARIN: CPT | Performed by: INTERNAL MEDICINE

## 2020-06-25 ENCOUNTER — NURSE ONLY (OUTPATIENT)
Dept: INTERNAL MEDICINE | Age: 60
End: 2020-06-25
Payer: COMMERCIAL

## 2020-06-25 LAB — INTERNATIONAL NORMALIZATION RATIO, POC: 1.8

## 2020-06-25 PROCEDURE — 85610 PROTHROMBIN TIME: CPT | Performed by: INTERNAL MEDICINE

## 2020-06-25 RX ORDER — WARFARIN SODIUM 4 MG/1
TABLET ORAL
Qty: 30 TABLET | Refills: 1 | Status: SHIPPED
Start: 2020-06-25 | End: 2020-09-03

## 2020-06-25 NOTE — PROGRESS NOTES
Patient in office for anti coag management, poct inr obtained, anti coag encounter created and routed to physician. Repeat INR in 1 week.

## 2020-07-02 ENCOUNTER — NURSE ONLY (OUTPATIENT)
Dept: INTERNAL MEDICINE | Age: 60
End: 2020-07-02
Payer: COMMERCIAL

## 2020-07-02 LAB
INTERNATIONAL NORMALIZATION RATIO, POC: 1.3
PROTHROMBIN TIME, POC: 15

## 2020-07-02 PROCEDURE — 85610 PROTHROMBIN TIME: CPT | Performed by: INTERNAL MEDICINE

## 2020-07-02 PROCEDURE — 93793 ANTICOAG MGMT PT WARFARIN: CPT | Performed by: INTERNAL MEDICINE

## 2020-07-13 ENCOUNTER — NURSE ONLY (OUTPATIENT)
Dept: INTERNAL MEDICINE | Age: 60
End: 2020-07-13
Payer: COMMERCIAL

## 2020-07-13 LAB — INTERNATIONAL NORMALIZATION RATIO, POC: 4.9

## 2020-07-13 PROCEDURE — 85610 PROTHROMBIN TIME: CPT | Performed by: INTERNAL MEDICINE

## 2020-07-13 PROCEDURE — 93793 ANTICOAG MGMT PT WARFARIN: CPT | Performed by: INTERNAL MEDICINE

## 2020-07-17 ENCOUNTER — NURSE ONLY (OUTPATIENT)
Dept: INTERNAL MEDICINE | Age: 60
End: 2020-07-17
Payer: COMMERCIAL

## 2020-07-17 VITALS — TEMPERATURE: 97.7 F

## 2020-07-17 LAB
INTERNATIONAL NORMALIZATION RATIO, POC: 4.1
PROTHROMBIN TIME, POC: 49.4

## 2020-07-17 PROCEDURE — 93793 ANTICOAG MGMT PT WARFARIN: CPT | Performed by: INTERNAL MEDICINE

## 2020-07-17 PROCEDURE — 85610 PROTHROMBIN TIME: CPT | Performed by: INTERNAL MEDICINE

## 2020-07-17 RX ORDER — WARFARIN SODIUM 3 MG/1
3 TABLET ORAL DAILY
Qty: 30 TABLET | Refills: 0 | Status: SHIPPED
Start: 2020-07-17 | End: 2020-09-03

## 2020-07-17 NOTE — PATIENT INSTRUCTIONS
HOLD today's dose of Coumadin. Take Coumadin 3 mg daily starting tomorrow 7/18/20. Your INR will be checked on your 3 month follow up visit on 7/21/20 at 1:45 pm with your PCP Dr. Rene Burgess up your Coumadin from your local pharmacy. Please call [phone] with any questions or concerns. Please keep this appointment.

## 2020-07-17 NOTE — PROGRESS NOTES
Patient to hold today's dose of Coumadin and continue taking Coumadin 3 mg daily tomorrow. Repeat INR will be done on 7/21 at 1:45 pm with PCP. Called the patient and gave above Coumadin instructions. Patient verbalized understanding. Coumadin 3 mg tablets script was phoned to this patient's local pharmacy by Dr. Svitlana Pugh. Printed AVS mailed to patient.

## 2020-07-20 ENCOUNTER — TELEPHONE (OUTPATIENT)
Dept: INTERNAL MEDICINE CLINIC | Age: 60
End: 2020-07-20

## 2020-07-20 NOTE — TELEPHONE ENCOUNTER
[facility]    Pre-visit planning call to discuss patient care during COVID-19 pandemic. Attempted to call patient for rescheduling to virtual or phone visit. left message. Offered video and e-visits through [employer] to facilitate patient care continuity.

## 2020-07-21 ENCOUNTER — OFFICE VISIT (OUTPATIENT)
Dept: INTERNAL MEDICINE | Age: 60
End: 2020-07-21
Payer: COMMERCIAL

## 2020-07-21 VITALS
HEART RATE: 80 BPM | RESPIRATION RATE: 16 BRPM | SYSTOLIC BLOOD PRESSURE: 125 MMHG | TEMPERATURE: 97.6 F | DIASTOLIC BLOOD PRESSURE: 76 MMHG | WEIGHT: 169 LBS | BODY MASS INDEX: 21.69 KG/M2 | HEIGHT: 74 IN

## 2020-07-21 LAB
INTERNATIONAL NORMALIZATION RATIO, POC: 2.1
PROTHROMBIN TIME, POC: 25.8

## 2020-07-21 PROCEDURE — 85610 PROTHROMBIN TIME: CPT | Performed by: INTERNAL MEDICINE

## 2020-07-21 PROCEDURE — G8420 CALC BMI NORM PARAMETERS: HCPCS | Performed by: INTERNAL MEDICINE

## 2020-07-21 PROCEDURE — G8427 DOCREV CUR MEDS BY ELIG CLIN: HCPCS | Performed by: INTERNAL MEDICINE

## 2020-07-21 PROCEDURE — 3017F COLORECTAL CA SCREEN DOC REV: CPT | Performed by: INTERNAL MEDICINE

## 2020-07-21 PROCEDURE — 99213 OFFICE O/P EST LOW 20 MIN: CPT | Performed by: INTERNAL MEDICINE

## 2020-07-21 PROCEDURE — 1036F TOBACCO NON-USER: CPT | Performed by: INTERNAL MEDICINE

## 2020-07-21 PROCEDURE — 99212 OFFICE O/P EST SF 10 MIN: CPT | Performed by: INTERNAL MEDICINE

## 2020-07-21 NOTE — PROGRESS NOTES
Patient was dischagred per Dr. Dahiana Pastor. INR completed and follow up INR made for one week. Follow up appt with PCP scheduled and AVS was given to patient.
MCG/ACT inhaler Inhale 2 puffs into the lungs every 6 hours as needed for Wheezing 1 Inhaler 3    amLODIPine (NORVASC) 10 MG tablet Take 1 tablet by mouth daily 90 tablet 3    Multiple Vitamins-Minerals (THERAPEUTIC MULTIVITAMIN-MINERALS) tablet Take 1 tablet by mouth daily         I have reviewed all pertinent PMHx, PSHx, FamHx, SocialHx, medications, and allergiesand updated history as appropriate. OBJECTIVE:    VS: /76   Pulse 80   Temp 97.6 °F (36.4 °C)   Resp 16   Ht 6' 2\" (1.88 m)   Wt 169 lb (76.7 kg)   BMI 21.70 kg/m²      Physical Exam   Constitutional:       Appearance: He is well-developed. HENT:      Head: Normocephalic and atraumatic. Eyes:      Pupils: Pupils are equal, round, and reactive to light. Neck:      Musculoskeletal: Normal range of motion and neck supple. Cardiovascular:      Rate and Rhythm: Normal rate and regular rhythm.      Heart sounds: Normal heart sounds. No murmur. No friction rub. No gallop.    Pulmonary:      Effort: Pulmonary effort is normal. No respiratory distress.      Breath sounds: Normal breath sounds. No wheezing or rales. Abdominal:      General: Bowel sounds are normal. There is no distension.      Palpations: Abdomen is soft.      Tenderness: There is no abdominal tenderness. Musculoskeletal: Normal range of motion.         General: No deformity. Skin:     General: Skin is warm and dry. Neurological:      Mental Status: He is alert and oriented to person, place, and time. Psychiatric:         Behavior: Behavior normal.      PLAN:  There are no diagnoses linked to this encounter.      Depression  -Depressed once in a while, sleep and appetite ok   -No SI, HI  -Remains on Paxil 10mg daily, tolerating well with improved mood      Hypercoagulable state d/t APLA in the setting of SLE with Hx DVT   -Patient currently on Coumadin 3mg daily, INR 2.1 today  -Goal 2.5-3.5 per [facility]  -will increase 4mg W/F, 3mg on the rest of the

## 2020-07-21 NOTE — PATIENT INSTRUCTIONS
cancer is lower too. How would quitting help others in your life? When you quit smoking, you improve the health of everyone who now breathes in your smoke. · Their heart, lung, and cancer risks drop, much like yours. · They are sick less. For babies and small children, living smoke-free means they're less likely to have ear infections, pneumonia, and bronchitis. · If you're a woman who is or will be pregnant someday, quitting smoking means a healthier . · Children who are close to you are less likely to become adult smokers. Where can you learn more? Go to [email or web address] and sign in to your [employer] account. Enter [phone] in the [employer] box to learn more about \"Learning About Benefits From Quitting Smoking. \"     If you do not have an account, please click on the \"Sign Up Now\" link. Current as of: 2020               Content Version: 12.5   [employer]wise, [employer]. Care instructions adapted under license by [facility] ([facility]). If you have questions about a medical condition or this instruction, always ask your healthcare professional. [place] [address] any warranty or liability for your use of this information.

## 2020-07-28 ENCOUNTER — NURSE ONLY (OUTPATIENT)
Dept: INTERNAL MEDICINE | Age: 60
End: 2020-07-28
Payer: COMMERCIAL

## 2020-07-28 LAB
INTERNATIONAL NORMALIZATION RATIO, POC: 1.8
PROTHROMBIN TIME, POC: 21.8

## 2020-07-28 PROCEDURE — 93793 ANTICOAG MGMT PT WARFARIN: CPT | Performed by: INTERNAL MEDICINE

## 2020-07-28 PROCEDURE — 85610 PROTHROMBIN TIME: CPT | Performed by: INTERNAL MEDICINE

## 2020-07-28 NOTE — PROGRESS NOTES
Patient instructed to take coumadin 4 mg on wed and fridays and 3 mg coumadin all other days. Repeat INR in 2 weeks  Bleeding precautions reviewed. Patient states he never started 4 mg coumadin twice weekly because he was out of 4 mg tablets. Spoke with [employer], coumadin 3 mg and 4 mg tablets available for     Patient notified.   avs mailed

## 2020-08-11 ENCOUNTER — NURSE ONLY (OUTPATIENT)
Dept: INTERNAL MEDICINE | Age: 60
End: 2020-08-11
Payer: COMMERCIAL

## 2020-08-11 LAB
INTERNATIONAL NORMALIZATION RATIO, POC: 1.5
PROTHROMBIN TIME, POC: 17.8

## 2020-08-11 PROCEDURE — 36415 COLL VENOUS BLD VENIPUNCTURE: CPT | Performed by: INTERNAL MEDICINE

## 2020-08-11 PROCEDURE — 85610 PROTHROMBIN TIME: CPT | Performed by: INTERNAL MEDICINE

## 2020-08-11 PROCEDURE — 93793 ANTICOAG MGMT PT WARFARIN: CPT | Performed by: INTERNAL MEDICINE

## 2020-08-11 NOTE — PROGRESS NOTES
Coumadin instructions reviewed with pt   Understanding verbalized   Fu appt scheduled and printed avs given

## 2020-08-11 NOTE — PATIENT INSTRUCTIONS
Take coumadin 3mgs (tan pill) all days except take 4mgs (blue pill) on Wednesdays and Fridays  Recheck inr in one week

## 2020-08-21 ENCOUNTER — NURSE ONLY (OUTPATIENT)
Dept: INTERNAL MEDICINE | Age: 60
End: 2020-08-21
Payer: COMMERCIAL

## 2020-08-21 LAB
INTERNATIONAL NORMALIZATION RATIO, POC: 2.3
PROTHROMBIN TIME, POC: 27.4

## 2020-08-21 PROCEDURE — 93793 ANTICOAG MGMT PT WARFARIN: CPT | Performed by: INTERNAL MEDICINE

## 2020-08-21 PROCEDURE — 85610 PROTHROMBIN TIME: CPT | Performed by: INTERNAL MEDICINE

## 2020-08-21 NOTE — PROGRESS NOTES
Coumadin instructions given per Dr. Tatyana Stern  Pt left will call with instructions and next INR appointment. Printed AVS mailed to pt.

## 2020-08-28 ENCOUNTER — NURSE ONLY (OUTPATIENT)
Dept: INTERNAL MEDICINE | Age: 60
End: 2020-08-28
Payer: COMMERCIAL

## 2020-08-28 LAB
INTERNATIONAL NORMALIZATION RATIO, POC: 2.5
PROTHROMBIN TIME, POC: 30.1

## 2020-08-28 PROCEDURE — 85610 PROTHROMBIN TIME: CPT | Performed by: INTERNAL MEDICINE

## 2020-08-28 PROCEDURE — 93793 ANTICOAG MGMT PT WARFARIN: CPT | Performed by: INTERNAL MEDICINE

## 2020-08-28 NOTE — PROGRESS NOTES
Coumadin instructions given per Dr. Best Macedo . Pt left will call with instructions and next INR appointment. Printed AVS mailed to pt.

## 2020-09-01 ENCOUNTER — TELEPHONE (OUTPATIENT)
Dept: INTERNAL MEDICINE | Age: 60
End: 2020-09-01

## 2020-09-01 NOTE — PROGRESS NOTES
[facility]  InternalMedicine Residency Program  ACC Note      SUBJECTIVE:  CC: had concerns including Hypertension. Sugar Norman is a 61 yo M with PMH of PVD, HTN, HLP, Hypercoagulable state with antiphospholipid syndrome, AAA repair complicated by graft infection. presented to the [facility] for a routine visit. R toe pain  - 2 weeks ago, after had some beers, noticed R Big toe swelling, erythema   - symptoms resolved now   - reports had this in the past   - never be diagnosed with gout   - will check uric acid level     PVD   --Patient currently on Coumadin 4 mg on W/F and 3 mg all other days , INR 2.3 today  -Goal 2.5-3.5 per [facility] surgery  -RTC in 1 week for INR check  -Follows with [facility], s/p thrombectomy on the left side 10/2018  -Currently on Plavix, Lipitor  - Last visit with [facility] vascular in Aug, stable, will keep the same plan and FU in 1y     Hypercoagulable state d/t APLA in the setting of SLE with Hx DVT   -Patient currently on Coumadin 4 mg on W/F and 3 mg all other days , INR 2.3 today  -Goal 2.5-3.5 per [facility] surgery  -RTC in 1 week for INR check     Hypertension:  -BP is 125/76, no orthostasis  -Currently on Amlodipine 10 mg and Losartan 50 mg daily  -Developed cough on Lisinopril so was dcd   -Continue the same     AAA, s/p repair with chronic abdominal graft infection  -On chronic suppression with Doxycycline     Tobacco abuse  -1 cig/day   -On and off Chantix      Review of Systems   Constitutional: Negative for chills and fever. HENT: Negative for hearing loss and tinnitus.    Respiratory: Negative for cough and shortness of breath.    Cardiovascular: Negative for chest pain and leg swelling. Gastrointestinal: Negative for abdominal pain, constipation, diarrhea, nausea and vomiting. Genitourinary: Negative for dysuria and urgency. Musculoskeletal: Negative for myalgias. Skin: Negative for rash.    Neurological: Negative for dizziness and headaches. Psychiatric/Behavioral: Negative for suicidal ideas.     Outpatient Medications Marked as Taking for the 9/3/20 encounter (Office Visit) with Ximena Matos MD   Medication Sig Dispense Refill    losartan (COZAAR) 50 MG tablet Take 1 tablet by mouth daily 30 tablet 2    PARoxetine (PAXIL) 10 MG tablet Take 1 tablet by mouth daily 90 tablet 0    clopidogrel (PLAVIX) 75 MG tablet Take 1 tablet by mouth daily 90 tablet 0    atorvastatin (LIPITOR) 80 MG tablet Take 1 tablet by mouth daily 90 tablet 0    warfarin (COUMADIN) 3 MG tablet Take 1 tablet by mouth daily Take one tab daily except Friday and Wednesday 30 tablet 0    warfarin (COUMADIN) 4 MG tablet take 1 tablet by mouth on Friday and Wednesday 30 tablet 1    varenicline (CHANTIX CONTINUING MONTH RADHA) 1 MG tablet Take 1 tablet by mouth 2 times daily 60 tablet 0    doxycycline monohydrate (MONODOX) 100 MG capsule Take 1 capsule by mouth daily 60 capsule 3    magnesium oxide (MAG-OX) 400 MG tablet Take 1 tablet by mouth daily 30 tablet 3    albuterol sulfate  (90 Base) MCG/ACT inhaler Inhale 2 puffs into the lungs every 6 hours as needed for Wheezing 1 Inhaler 3    amLODIPine (NORVASC) 10 MG tablet Take 1 tablet by mouth daily 90 tablet 3    Multiple Vitamins-Minerals (THERAPEUTIC MULTIVITAMIN-MINERALS) tablet Take 1 tablet by mouth daily      Blood Pressure Monitoring (BLOOD PRESSURE CUFF) MISC Dx:  Hypertension with labile blood pressure 1 each 0       I have reviewed all pertinent PMHx, PSHx, FamHx, SocialHx, medications, and allergiesand updated history as appropriate. OBJECTIVE:    VS: /83   Pulse 76   Temp 97.8 °F (36.6 °C) (Oral)   Resp 20   Ht 6' 2\" (1.88 m)   Wt 164 lb 12.8 oz (74.8 kg)   SpO2 99%   BMI 21.16 kg/m²      Physical Exam   Constitutional:       Appearance: He is well-developed. HENT:      Head: Normocephalic and atraumatic. Eyes:      Pupils: Pupils are equal, round, and reactive to light. Neck:      Musculoskeletal: Normal range of motion and neck supple. Cardiovascular:      Rate and Rhythm: Normal rate and regular rhythm.      Heart sounds: Normal heart sounds. No murmur. No friction rub. No gallop.    Pulmonary:      Effort: Pulmonary effort is normal. No respiratory distress.      Breath sounds: Normal breath sounds. No wheezing or rales. Abdominal:      General: Bowel sounds are normal. There is no distension.      Palpations: Abdomen is soft.      Tenderness: There is no abdominal tenderness. Musculoskeletal: Normal range of motion.         General: R foot pedal pulses reduced, mild erythema over R big toe  Skin:     General: Skin is warm and dry. Neurological:      Mental Status: He is alert and oriented to person, place, and time.    Psychiatric:         Behavior: Behavior normal.      PLAN:    PVD   -Patient currently on Coumadin 4 mg on W/F and 3 mg all other days , INR 2.3 today  - will keep the same dose of coumadin   -Goal 2.5-3.5 per [facility]  -RTC in 1 week for INR check  -Follows with [facility], s/p thrombectomy on the left side 10/2018  -Currently on Plavix, Lipitor       Hypercoagulable state d/t APLA in the setting of SLE with Hx DVT   -Patient currently on Coumadin 4 mg on W/F and 3 mg all other days , INR 2.3 today  -Goal 2.5-3.5 per [facility]  -RTC in 1 week for INR check     Hypertension:  -BP is 125/76, no orthostasis  -Currently on Amlodipine 10 mg and Losartan 50 mg daily  -Continue the same     AAA, s/p repair with chronic abdominal graft infection  -On chronic suppression with Doxycycline     Tobacco abuse  -1 cig/day   -On and off Chantix    RTC in 3 months with PCP     I have reviewed my findings and recommendations with Dakota Lee and Dr. Woodford Bernheim, MD PGY-3  9/3/2020 11:53 AM

## 2020-09-01 NOTE — TELEPHONE ENCOUNTER
[facility]    Pre-visit planning call to discuss patient care during COVID-19 pandemic. A. Left message about scheduled Face to Face appointment about virtual visits through [employer] or [employer] me link to facilitate patient care continuity. Last office visit date: 7/21/2020  Patient transport: community transport (remind patient to call to cancel transport) / private vehicle    B. Result of call: left message     C. COVID-19 screening: Left message instructed to call office if viral symptoms develop prior to Face to Face appointment. Advised patient if coming for Face to Face office visit to enter by the Internal Medicine entrance on [facility]. for screening of viral symptoms, temperature check and masking.     Corrinne Corns, MD  9/1/20

## 2020-09-03 ENCOUNTER — OFFICE VISIT (OUTPATIENT)
Dept: INTERNAL MEDICINE | Age: 60
End: 2020-09-03
Payer: COMMERCIAL

## 2020-09-03 VITALS
HEIGHT: 74 IN | SYSTOLIC BLOOD PRESSURE: 129 MMHG | BODY MASS INDEX: 21.15 KG/M2 | OXYGEN SATURATION: 99 % | WEIGHT: 164.8 LBS | TEMPERATURE: 97.8 F | RESPIRATION RATE: 20 BRPM | HEART RATE: 76 BPM | DIASTOLIC BLOOD PRESSURE: 83 MMHG

## 2020-09-03 LAB
INTERNATIONAL NORMALIZATION RATIO, POC: 2.3
PROTHROMBIN TIME, POC: 27.1

## 2020-09-03 PROCEDURE — 99212 OFFICE O/P EST SF 10 MIN: CPT | Performed by: INTERNAL MEDICINE

## 2020-09-03 PROCEDURE — 1036F TOBACCO NON-USER: CPT | Performed by: INTERNAL MEDICINE

## 2020-09-03 PROCEDURE — G8420 CALC BMI NORM PARAMETERS: HCPCS | Performed by: INTERNAL MEDICINE

## 2020-09-03 PROCEDURE — 3017F COLORECTAL CA SCREEN DOC REV: CPT | Performed by: INTERNAL MEDICINE

## 2020-09-03 PROCEDURE — G8427 DOCREV CUR MEDS BY ELIG CLIN: HCPCS | Performed by: INTERNAL MEDICINE

## 2020-09-03 PROCEDURE — 85610 PROTHROMBIN TIME: CPT | Performed by: INTERNAL MEDICINE

## 2020-09-03 PROCEDURE — 99213 OFFICE O/P EST LOW 20 MIN: CPT | Performed by: INTERNAL MEDICINE

## 2020-09-03 RX ORDER — LOSARTAN POTASSIUM 50 MG/1
50 TABLET ORAL DAILY
Qty: 30 TABLET | Refills: 2 | Status: SHIPPED
Start: 2020-09-03 | End: 2020-11-03 | Stop reason: SDUPTHER

## 2020-09-03 RX ORDER — WARFARIN SODIUM 4 MG/1
TABLET ORAL
Qty: 30 TABLET | Refills: 1 | Status: SHIPPED
Start: 2020-09-03 | End: 2020-10-26 | Stop reason: SDUPTHER

## 2020-09-03 RX ORDER — PAROXETINE 10 MG/1
10 TABLET, FILM COATED ORAL DAILY
Qty: 90 TABLET | Refills: 0 | Status: SHIPPED
Start: 2020-09-03 | End: 2020-11-03 | Stop reason: SDUPTHER

## 2020-09-03 RX ORDER — ATORVASTATIN CALCIUM 80 MG/1
80 TABLET, FILM COATED ORAL DAILY
Qty: 90 TABLET | Refills: 0 | Status: SHIPPED
Start: 2020-09-03 | End: 2020-11-03 | Stop reason: SDUPTHER

## 2020-09-03 RX ORDER — WARFARIN SODIUM 4 MG/1
TABLET ORAL
Qty: 30 TABLET | Refills: 1 | Status: SHIPPED
Start: 2020-09-03 | End: 2020-09-03

## 2020-09-03 RX ORDER — CLOPIDOGREL BISULFATE 75 MG/1
75 TABLET ORAL DAILY
Qty: 90 TABLET | Refills: 0 | Status: SHIPPED
Start: 2020-09-03 | End: 2020-11-03 | Stop reason: SDUPTHER

## 2020-09-03 RX ORDER — WARFARIN SODIUM 3 MG/1
3 TABLET ORAL DAILY
Qty: 30 TABLET | Refills: 0 | Status: SHIPPED | OUTPATIENT
Start: 2020-09-03 | End: 2021-09-27 | Stop reason: ALTCHOICE

## 2020-09-03 RX ORDER — WARFARIN SODIUM 3 MG/1
3 TABLET ORAL DAILY
Qty: 30 TABLET | Refills: 0 | Status: SHIPPED
Start: 2020-09-03 | End: 2020-09-03 | Stop reason: SDUPTHER

## 2020-09-03 NOTE — PROGRESS NOTES
Attending Physician Statement  I have discussed the case, including pertinent history and exam findings with the resident. I agree with the assessment, plan and orders as documented by the resident. Pt presented for the follow up with anti phospholipid syndrome SP thrombectomy, on coumadin and currently on INR 2.3 today. Also has been seen at [facility] for PVD and last RHINA was OK. Right big toe was swollen after drinking beer over the weekend which was resolved and may need uric acid level, recommended to avoid alcohol. Hypertension has been treated with amlodipine. Also on Plavix and lipitor.   Javier Bennett

## 2020-09-03 NOTE — PROGRESS NOTES
Patient given instructions by Dr Alvaro Rowland. Change in Coumadin. Patient is now taking Coumadin 3 mg daily and 4 mg for Friday. Follow in 1 wk to check INR. 3 week follow up scheduled with PCP. Printed AVS given to patient.

## 2020-09-03 NOTE — PATIENT INSTRUCTIONS
Please take your warfarin 3 mg daily except Wednesday and Friday, take 4 mg on Friday and Wednesday   Come back check INR in a week   Complete your lab work   FU with PCP in 3 weeks

## 2020-09-11 ENCOUNTER — HOSPITAL ENCOUNTER (OUTPATIENT)
Age: 60
Discharge: HOME | End: 2020-09-11
Payer: COMMERCIAL

## 2020-09-11 ENCOUNTER — NURSE ONLY (OUTPATIENT)
Dept: INTERNAL MEDICINE | Age: 60
End: 2020-09-11
Payer: COMMERCIAL

## 2020-09-11 LAB
INTERNATIONAL NORMALIZATION RATIO, POC: 3.1
PROTHROMBIN TIME, POC: 37
URIC ACID, SERUM: 6.5 MG/DL (ref 3.4–7)

## 2020-09-11 PROCEDURE — 36415 COLL VENOUS BLD VENIPUNCTURE: CPT | Performed by: INTERNAL MEDICINE

## 2020-09-11 PROCEDURE — 36415 COLL VENOUS BLD VENIPUNCTURE: CPT

## 2020-09-11 PROCEDURE — 85610 PROTHROMBIN TIME: CPT | Performed by: INTERNAL MEDICINE

## 2020-09-11 PROCEDURE — 93793 ANTICOAG MGMT PT WARFARIN: CPT | Performed by: INTERNAL MEDICINE

## 2020-09-11 PROCEDURE — 84550 ASSAY OF BLOOD/URIC ACID: CPT

## 2020-09-11 NOTE — PROGRESS NOTES
Called pt and reviewed coumadin instructions with him   Understanding verbalized  Printed avs mailed to pt

## 2020-10-02 ENCOUNTER — NURSE ONLY (OUTPATIENT)
Dept: INTERNAL MEDICINE | Age: 60
End: 2020-10-02
Payer: COMMERCIAL

## 2020-10-02 LAB
INTERNATIONAL NORMALIZATION RATIO, POC: 2.4
PROTHROMBIN TIME, POC: 28.3

## 2020-10-02 PROCEDURE — 85610 PROTHROMBIN TIME: CPT | Performed by: INTERNAL MEDICINE

## 2020-10-02 RX ORDER — WARFARIN SODIUM 3 MG/1
3 TABLET ORAL DAILY
Qty: 30 TABLET | Refills: 0 | Status: CANCELLED | OUTPATIENT
Start: 2020-10-02

## 2020-10-02 NOTE — PROGRESS NOTES
POCT INR done in office. Called patient with new instructions, verbalizes understanding. AVS mailed to patient.

## 2020-10-16 ENCOUNTER — NURSE ONLY (OUTPATIENT)
Dept: INTERNAL MEDICINE | Age: 60
End: 2020-10-16
Payer: COMMERCIAL

## 2020-10-16 LAB
INTERNATIONAL NORMALIZATION RATIO, POC: 1.3
PROTHROMBIN TIME, POC: 16.2

## 2020-10-16 PROCEDURE — 85610 PROTHROMBIN TIME: CPT | Performed by: INTERNAL MEDICINE

## 2020-10-16 PROCEDURE — 93793 ANTICOAG MGMT PT WARFARIN: CPT | Performed by: INTERNAL MEDICINE

## 2020-10-26 ENCOUNTER — NURSE ONLY (OUTPATIENT)
Dept: INTERNAL MEDICINE | Age: 60
End: 2020-10-26
Payer: COMMERCIAL

## 2020-10-26 LAB
INTERNATIONAL NORMALIZATION RATIO, POC: 3
PROTHROMBIN TIME, POC: 36.3

## 2020-10-26 PROCEDURE — 85610 PROTHROMBIN TIME: CPT | Performed by: INTERNAL MEDICINE

## 2020-10-26 PROCEDURE — 93793 ANTICOAG MGMT PT WARFARIN: CPT | Performed by: INTERNAL MEDICINE

## 2020-10-26 RX ORDER — WARFARIN SODIUM 4 MG/1
4 TABLET ORAL DAILY
Qty: 30 TABLET | Refills: 0 | Status: SHIPPED
Start: 2020-10-26 | End: 2020-11-03 | Stop reason: SDUPTHER

## 2020-11-03 ENCOUNTER — OFFICE VISIT (OUTPATIENT)
Dept: INTERNAL MEDICINE | Age: 60
End: 2020-11-03
Payer: COMMERCIAL

## 2020-11-03 VITALS
SYSTOLIC BLOOD PRESSURE: 146 MMHG | TEMPERATURE: 97 F | HEIGHT: 74 IN | HEART RATE: 85 BPM | RESPIRATION RATE: 16 BRPM | DIASTOLIC BLOOD PRESSURE: 82 MMHG | BODY MASS INDEX: 22.39 KG/M2 | WEIGHT: 174.5 LBS

## 2020-11-03 PROCEDURE — G8482 FLU IMMUNIZE ORDER/ADMIN: HCPCS | Performed by: INTERNAL MEDICINE

## 2020-11-03 PROCEDURE — 1036F TOBACCO NON-USER: CPT | Performed by: INTERNAL MEDICINE

## 2020-11-03 PROCEDURE — G8427 DOCREV CUR MEDS BY ELIG CLIN: HCPCS | Performed by: INTERNAL MEDICINE

## 2020-11-03 PROCEDURE — 99213 OFFICE O/P EST LOW 20 MIN: CPT | Performed by: INTERNAL MEDICINE

## 2020-11-03 PROCEDURE — 99212 OFFICE O/P EST SF 10 MIN: CPT | Performed by: INTERNAL MEDICINE

## 2020-11-03 PROCEDURE — 3017F COLORECTAL CA SCREEN DOC REV: CPT | Performed by: INTERNAL MEDICINE

## 2020-11-03 PROCEDURE — G8420 CALC BMI NORM PARAMETERS: HCPCS | Performed by: INTERNAL MEDICINE

## 2020-11-03 RX ORDER — ATORVASTATIN CALCIUM 80 MG/1
80 TABLET, FILM COATED ORAL DAILY
Qty: 90 TABLET | Refills: 3 | Status: SHIPPED
Start: 2020-11-03 | End: 2021-02-17 | Stop reason: SDUPTHER

## 2020-11-03 RX ORDER — CLOPIDOGREL BISULFATE 75 MG/1
75 TABLET ORAL DAILY
Qty: 90 TABLET | Refills: 2 | Status: SHIPPED
Start: 2020-11-03 | End: 2021-02-17 | Stop reason: SDUPTHER

## 2020-11-03 RX ORDER — PAROXETINE 10 MG/1
10 TABLET, FILM COATED ORAL DAILY
Qty: 90 TABLET | Refills: 2 | Status: SHIPPED
Start: 2020-11-03 | End: 2021-02-17 | Stop reason: SDUPTHER

## 2020-11-03 RX ORDER — DOXYCYCLINE 100 MG/1
100 CAPSULE ORAL DAILY
Qty: 30 CAPSULE | Status: CANCELLED | OUTPATIENT
Start: 2020-11-03

## 2020-11-03 RX ORDER — LOSARTAN POTASSIUM 50 MG/1
50 TABLET ORAL DAILY
Qty: 30 TABLET | Refills: 3 | Status: SHIPPED
Start: 2020-11-03 | End: 2021-02-17 | Stop reason: SDUPTHER

## 2020-11-03 RX ORDER — ALBUTEROL SULFATE 90 UG/1
2 AEROSOL, METERED RESPIRATORY (INHALATION) EVERY 6 HOURS PRN
Qty: 1 INHALER | Refills: 3 | Status: SHIPPED
Start: 2020-11-03 | End: 2021-02-17 | Stop reason: SDUPTHER

## 2020-11-03 RX ORDER — WARFARIN SODIUM 4 MG/1
4 TABLET ORAL DAILY
Qty: 30 TABLET | Refills: 3 | Status: SHIPPED
Start: 2020-11-03 | End: 2021-02-17 | Stop reason: SDUPTHER

## 2020-11-03 RX ORDER — MAGNESIUM OXIDE 400 MG/1
400 TABLET ORAL DAILY
Qty: 30 TABLET | Refills: 3 | Status: SHIPPED
Start: 2020-11-03 | End: 2021-02-17 | Stop reason: SDUPTHER

## 2020-11-03 RX ORDER — WARFARIN SODIUM 3 MG/1
3 TABLET ORAL DAILY
Qty: 30 TABLET | Status: CANCELLED | OUTPATIENT
Start: 2020-11-03

## 2020-11-03 NOTE — PATIENT INSTRUCTIONS
Patient Education   -Please continue taking your meds as prescribed    RTC in 3 months and  in 3 weeks for INR check  warfarin (oral)  Pronunciation:  WAR far in  Brand:  Cher Prater  What is the most important information I should know about warfarin? You should not take warfarin if you are prone to bleeding because of a medical condition, if you have an upcoming surgery, or if you need a spinal tap or epidural. Do not take warfarin if you cannot take it on time every day. Warfarin increases your risk of severe or fatal bleeding, especially if you have certain medical conditions, if you are 72 or older, or if you have had a stroke, or bleeding in your stomach or intestines. Seek emergency help if you have any bleeding that will not stop. Call your doctor at once if you have other signs of bleeding such as: swelling, pain, feeling very weak or dizzy, unusual bruising, bleeding gums, nosebleeds, heavy menstrual periods or abnormal vaginal bleeding, blood in your urine, bloody or tarry stools, coughing up blood or vomit that looks like coffee grounds. Many other drugs can increase your risk of bleeding when used with warfarin. Tell your doctor about all medicines you have recently used. Avoid making any changes in your diet without first talking to your doctor. Some foods can make warfarin less effective. What is warfarin? Warfarin is an anticoagulant (blood thinner). Warfarin reduces the formation of blood clots. Warfarin is used to treat or prevent blood clots in veins or arteries, which can reduce the risk of stroke, heart attack, or other serious conditions. Warfarin may also be used for purposes not listed in this medication guide. What should I discuss with my healthcare provider before taking warfarin?   You should not take warfarin if you are allergic to it, or if:  · you have very high blood pressure;  · you recently had or will have surgery on your brain, spine, or eye;  · you undergo a spinal tap or spinal anesthesia (epidural); or  · you cannot take warfarin on time every day. You also should not take warfarin if you are are prone to bleeding because of a medical condition, such as:   · a blood cell disorder (such as low red blood cells or low platelets);  · ulcers or bleeding in your stomach, intestines, lungs, or urinary tract;  · an aneurysm or bleeding in the brain; or  · an infection of the lining of your heart. Do not take warfarin if you are pregnant, unless your doctor tells you to. Warfarin can cause birth defects, but preventing blood clots may outweigh any risks to the baby. If you are not pregnant, use effective birth control to prevent pregnancy while taking warfarin and for at least 1 month after your last dose. Tell your doctor right away if you become pregnant. Warfarin can make you bleed more easily, especially if you have ever had:   · high blood pressure or serious heart disease;  · kidney disease;  · cancer or low blood cell counts;  · an accident or surgery;  · bleeding in your stomach or intestines;  · a stroke; or  · if you are 72 or older. To make sure warfarin is safe for you, tell your doctor if you have ever had:  · diabetes;  · congestive heart failure;  · liver disease, kidney disease (or if you are on dialysis);  · a hereditary clotting deficiency; or  · low blood platelets after receiving heparin. It is not known whether warfarin passes into breast milk. Watch for signs of bruising or bleeding in the baby if you take warfarin while you are breast-feeding a baby. How should I take warfarin? Follow all directions on your prescription label. Your doctor may occasionally change your dose. Do not take warfarin in larger or smaller amounts or for longer than your doctor tells you to. Take warfarin at the same time every day, with or without food. Never take a double dose. Warfarin can make it easier for you to bleed.  Seek emergency help if you have any bleeding use of these juice products while taking warfarin. Avoid drinking alcohol. Ask your doctor before using any medicine for pain, arthritis, fever, or swelling. This includes aspirin, ibuprofen (Advil, Motrin), naproxen (Aleve), celecoxib (Celebrex), diclofenac, indomethacin, meloxicam, and others. These medicines may affect blood clotting and may also increase your risk of stomach bleeding. What are the possible side effects of warfarin? Get emergency medical help if you have signs of an allergic reaction: hives; difficult breathing; swelling of your face, lips, tongue, or throat. Warfarin increases your risk of bleeding, which can be severe or life-threatening. Call your doctor at once if you have any signs of bleeding such as:  · sudden headache, feeling very weak or dizzy;  · swelling, pain, unusual bruising;  · bleeding gums, nosebleeds;  · bleeding from wounds or needle injections that will not stop;  · heavy menstrual periods or abnormal vaginal bleeding;  · blood in your urine, bloody or tarry stools; or  · coughing up blood or vomit that looks like coffee grounds. Clots formed by warfarin may block normal blood flow, which could lead to tissue death or amputation of the affected body part. Get medical help at once if you have:  · pain, swelling, hot or cold feeling, skin changes, or discoloration anywhere on your body; or  · sudden and severe leg or foot pain, foot ulcer, purple toes or fingers. Bleeding is the most common side effect of warfarin. This is not a complete list of side effects and others may occur. Call your doctor for medical advice about side effects. You may report side effects to FDA at 4-604-FDA-4665. What other drugs will affect warfarin? Many drugs (including some over-the-counter medicines and herbal products) can affect your INR and may increase the risk of bleeding if you take them with warfarin. Not all possible drug interactions are listed in this medication guide.  It is very important to ask your doctor and pharmacist before you start or stop using any other medicine, especially:  · other medicines to prevent blood clots;  · an antibiotic or antifungal medicine;  · supplements that contain vitamin K; or  · herbal (botanical) products --coenzyme Q10, cranberry, echinacea, garlic, ginkgo biloba, ginseng, goldenseal, or [place]'s wort. This list is not complete and many other drugs can interact with warfarin. This includes prescription and over-the-counter medicines, vitamins, and herbal products. Give a list of all your medicines to any healthcare provider who treats you. Where can I get more information? Your pharmacist can provide more information about warfarin. Remember, keep this and all other medicines out of the reach of children, never share your medicines with others, and use this medication only for the indication prescribed. Every effort has been made to ensure that the information provided by [address] is accurate, up-to-date, and complete, but no guarantee is made to that effect. Drug information contained herein may be time sensitive. [facility] information has been compiled for use by healthcare practitioners and consumers in the [facility] and therefore [facility] [employer] does not warrant that uses outside of the [facility] are appropriate, unless specifically indicated otherwise. [facility] drug information does not endorse drugs, diagnose patients or recommend therapy. [facility] drug information is an informational resource designed to assist licensed healthcare practitioners in caring for their patients and/or to serve consumers viewing this service as a supplement to, and not a substitute for, the expertise, skill, knowledge and judgment of healthcare practitioners.  The absence of a warning for a given drug or drug combination in no way should be construed to indicate that the drug or drug combination is safe, effective or appropriate for any given

## 2020-11-03 NOTE — PROGRESS NOTES
[facility]  InternalMedicine Residency Program  ACC Note      SUBJECTIVE:  CC: had no chief complaint listed for this encounter. Syd Ann is a 61 yo M with PMH PVD, HTN, HLP, Hypercoagulable state with antiphospholipid syndrome, AAA repair complicated by graft infection. presented to the [facility] for a routine visit.     Rt tor pain and swelling have completely resiolved     PVD   -Patient remains on Coumadin, INR 3 as of 10/26  -Patient currently on Coumadin 4 mg daily  -Still smoking, 1 cig per day. -No alcohol or greens.     HTN  -On Losartan, Amlodipine  -Has not taken his meds this AM    eReview of Systems   Constitutional: Negative for chills and fever. HENT: Negative for hearing loss and tinnitus.    Respiratory: Negative for cough and shortness of breath.    Cardiovascular: Negative for chest pain and leg swelling. Gastrointestinal: Negative for abdominal pain, constipation, diarrhea, nausea and vomiting. Genitourinary: Negative for dysuria and urgency. Musculoskeletal: Negative for myalgias. Skin: Negative for rash. Neurological: Negative for dizziness and headaches.    Psychiatric/Behavioral: Negative for suicidal ideas.     Outpatient Medications Marked as Taking for the 11/3/20 encounter (Office Visit) with Adriana Forbes MD   Medication Sig Dispense Refill    warfarin (COUMADIN) 4 MG tablet Take 1 tablet by mouth daily 30 tablet 0    losartan (COZAAR) 50 MG tablet Take 1 tablet by mouth daily 30 tablet 2    PARoxetine (PAXIL) 10 MG tablet Take 1 tablet by mouth daily 90 tablet 0    clopidogrel (PLAVIX) 75 MG tablet Take 1 tablet by mouth daily 90 tablet 0    atorvastatin (LIPITOR) 80 MG tablet Take 1 tablet by mouth daily 90 tablet 0    warfarin (COUMADIN) 3 MG tablet Take 1 tablet by mouth daily Take one tab daily except Friday and Wednesday 30 tablet 0    doxycycline monohydrate (MONODOX) 100 MG capsule Take 1 capsule by mouth daily 60 capsule setting of SLE with Hx DVT   -Patient currently on Coumadin 4 mg daily  -INR check in 3 weeks  -Goal 2.5-3.5 per [facility]    PVD  -Follows with [facility], s/p thrombectomy on the left side 10/2018  -Next apt at [facility] in 9/2020  -Currently on Plavix, Lipitor  - lipid panel, CMP ordered -will check for next visit    Depression  -Depressed once in a while, sleep and appetite ok   -No SI, HI  -Remains on Paxil 10mg daily, tolerating well with improved mood      Hypertension:  -BP is 146/82, no orthostasis  -Currently on Amlodipine 10 mg and Losartan 50 mg daily  -Developed cough on Lisinopril so was dcd   -Continue the same     AAA, s/p repair with chronic abdominal graft infection  -On chronic suppression with Doxycycline     Tobacco abuse  -1 cig/day   -On and off Chantix     HM  -C-scope last done 2016 >> recommended repeat in 5 yrs.       I have reviewed my findings and recommendations with Sania Urban and Dr Trisha Duenas MD PGY-3  11/3/2020 9:21 AM

## 2020-11-03 NOTE — PROGRESS NOTES
Printed lab scripts and all instructions given to pt by dr Kimberly Mooney appt scheduled and printed avs given to patient

## 2020-11-03 NOTE — PROGRESS NOTES
[facility]  [facility]    Attending Physician Statement:  Gurney Kehr, M.D., F.A.C.P. I have discussed the case, including pertinent history and exam findings with the resident. I agree with the assessment, plan and orders as documented by the resident. Patient is seen for fu visit today. Last office notes reviewed, relative labs and imaging. Health maintenance issues of vaccinations, depression screening, tobacco cessation etc... covered  BP (!) 146/82 (Site: Right Upper Arm, Position: Sitting, Cuff Size: Medium Adult)   Pulse 85   Temp 97 °F (36.1 °C) (Oral)   Resp 16   Ht 6' 2\" (1.88 m)   Wt 174 lb 8 oz (79.2 kg)   BMI 22.40 kg/m²   bp high, but didn't take BP meds this am  Coumadin 4mg daily - inr 3 at goal,  APS syndrome per CC  Hx of AAA repair- continue statin high intensity  Remainder of medical problems as per resident note.

## 2020-11-06 RX ORDER — WARFARIN SODIUM 3 MG/1
TABLET ORAL
Qty: 30 TABLET | Refills: 0 | OUTPATIENT
Start: 2020-11-06

## 2020-11-20 ENCOUNTER — HOSPITAL ENCOUNTER (OUTPATIENT)
Age: 60
Discharge: HOME | End: 2020-11-20
Payer: COMMERCIAL

## 2020-11-20 LAB
ALBUMIN SERPL-MCNC: 4.7 G/DL (ref 3.5–5.2)
ALP BLD-CCNC: 98 U/L (ref 40–129)
ALT SERPL-CCNC: 25 U/L (ref 0–40)
ANION GAP SERPL CALCULATED.3IONS-SCNC: 16 MMOL/L (ref 7–16)
AST SERPL-CCNC: 23 U/L (ref 0–39)
BASOPHILS ABSOLUTE: 0.04 E9/L (ref 0–0.2)
BASOPHILS RELATIVE PERCENT: 0.6 % (ref 0–2)
BILIRUB SERPL-MCNC: 0.7 MG/DL (ref 0–1.2)
BILIRUBIN DIRECT: <0.2 MG/DL (ref 0–0.3)
BILIRUBIN, INDIRECT: NORMAL MG/DL (ref 0–1)
BUN BLDV-MCNC: 10 MG/DL (ref 8–23)
CALCIUM SERPL-MCNC: 9.7 MG/DL (ref 8.6–10.2)
CHLORIDE BLD-SCNC: 110 MMOL/L (ref 98–107)
CHOLESTEROL, TOTAL: 139 MG/DL (ref 0–199)
CO2: 19 MMOL/L (ref 22–29)
CREAT SERPL-MCNC: 1 MG/DL (ref 0.7–1.2)
EOSINOPHILS ABSOLUTE: 0.12 E9/L (ref 0.05–0.5)
EOSINOPHILS RELATIVE PERCENT: 1.7 % (ref 0–6)
GFR AFRICAN AMERICAN: >60
GFR NON-AFRICAN AMERICAN: >60 ML/MIN/1.73
GLUCOSE BLD-MCNC: 97 MG/DL (ref 74–99)
HBA1C MFR BLD: 4.8 % (ref 4–5.6)
HCT VFR BLD CALC: 42 % (ref 37–54)
HDLC SERPL-MCNC: 40 MG/DL
HEMOGLOBIN: 14.9 G/DL (ref 12.5–16.5)
IMMATURE GRANULOCYTES #: 0.01 E9/L
IMMATURE GRANULOCYTES %: 0.1 % (ref 0–5)
LDL CHOLESTEROL CALCULATED: 77 MG/DL (ref 0–99)
LYMPHOCYTES ABSOLUTE: 2.14 E9/L (ref 1.5–4)
LYMPHOCYTES RELATIVE PERCENT: 30.6 % (ref 20–42)
MCH RBC QN AUTO: 33.6 PG (ref 26–35)
MCHC RBC AUTO-ENTMCNC: 35.5 % (ref 32–34.5)
MCV RBC AUTO: 94.8 FL (ref 80–99.9)
MONOCYTES ABSOLUTE: 0.55 E9/L (ref 0.1–0.95)
MONOCYTES RELATIVE PERCENT: 7.9 % (ref 2–12)
NEUTROPHILS ABSOLUTE: 4.14 E9/L (ref 1.8–7.3)
NEUTROPHILS RELATIVE PERCENT: 59.1 % (ref 43–80)
PDW BLD-RTO: 12.9 FL (ref 11.5–15)
PLATELET # BLD: 167 E9/L (ref 130–450)
PMV BLD AUTO: 10.9 FL (ref 7–12)
POTASSIUM SERPL-SCNC: 3.7 MMOL/L (ref 3.5–5)
RBC # BLD: 4.43 E12/L (ref 3.8–5.8)
SODIUM BLD-SCNC: 145 MMOL/L (ref 132–146)
TOTAL PROTEIN: 7.4 G/DL (ref 6.4–8.3)
TRIGL SERPL-MCNC: 111 MG/DL (ref 0–149)
VLDLC SERPL CALC-MCNC: 22 MG/DL
WBC # BLD: 7 E9/L (ref 4.5–11.5)

## 2020-11-20 PROCEDURE — 82247 BILIRUBIN TOTAL: CPT

## 2020-11-20 PROCEDURE — 80061 LIPID PANEL: CPT

## 2020-11-20 PROCEDURE — 82248 BILIRUBIN DIRECT: CPT

## 2020-11-20 PROCEDURE — 80053 COMPREHEN METABOLIC PANEL: CPT

## 2020-11-20 PROCEDURE — 85025 COMPLETE CBC W/AUTO DIFF WBC: CPT

## 2020-11-20 PROCEDURE — 83036 HEMOGLOBIN GLYCOSYLATED A1C: CPT

## 2020-11-20 PROCEDURE — 36415 COLL VENOUS BLD VENIPUNCTURE: CPT

## 2020-11-23 ENCOUNTER — NURSE ONLY (OUTPATIENT)
Dept: INTERNAL MEDICINE | Age: 60
End: 2020-11-23
Payer: COMMERCIAL

## 2020-11-23 LAB
INTERNATIONAL NORMALIZATION RATIO, POC: 4
PROTHROMBIN TIME, POC: 47.9

## 2020-11-23 PROCEDURE — 85610 PROTHROMBIN TIME: CPT | Performed by: INTERNAL MEDICINE

## 2020-12-02 ENCOUNTER — NURSE ONLY (OUTPATIENT)
Dept: INTERNAL MEDICINE | Age: 60
End: 2020-12-02
Payer: COMMERCIAL

## 2020-12-02 VITALS — TEMPERATURE: 97.3 F

## 2020-12-02 LAB
INTERNATIONAL NORMALIZATION RATIO, POC: 3.4
PROTHROMBIN TIME, POC: 40.6

## 2020-12-02 PROCEDURE — 85610 PROTHROMBIN TIME: CPT | Performed by: INTERNAL MEDICINE

## 2020-12-02 PROCEDURE — 36415 COLL VENOUS BLD VENIPUNCTURE: CPT | Performed by: INTERNAL MEDICINE

## 2020-12-02 NOTE — PROGRESS NOTES
Called pt and reviewed his coumadin instructions with him  Understanding verbalized  Printed avs with fu appt mailed to patient

## 2020-12-22 ENCOUNTER — OFFICE VISIT (OUTPATIENT)
Dept: INTERNAL MEDICINE | Age: 60
End: 2020-12-22
Payer: COMMERCIAL

## 2020-12-22 ENCOUNTER — NURSE ONLY (OUTPATIENT)
Dept: INTERNAL MEDICINE | Age: 60
End: 2020-12-22
Payer: COMMERCIAL

## 2020-12-22 VITALS
RESPIRATION RATE: 16 BRPM | DIASTOLIC BLOOD PRESSURE: 90 MMHG | HEART RATE: 76 BPM | HEIGHT: 74 IN | BODY MASS INDEX: 21.56 KG/M2 | SYSTOLIC BLOOD PRESSURE: 166 MMHG | TEMPERATURE: 97 F | WEIGHT: 168 LBS

## 2020-12-22 DIAGNOSIS — I82.409 DEEP VEIN THROMBOSIS (DVT) OF LOWER EXTREMITY, UNSPECIFIED CHRONICITY, UNSPECIFIED LATERALITY, UNSPECIFIED VEIN (HCC): Primary | ICD-10-CM

## 2020-12-22 LAB
INTERNATIONAL NORMALIZATION RATIO, POC: 3.8
PROTHROMBIN TIME, POC: 46

## 2020-12-22 PROCEDURE — 3017F COLORECTAL CA SCREEN DOC REV: CPT | Performed by: INTERNAL MEDICINE

## 2020-12-22 PROCEDURE — G8482 FLU IMMUNIZE ORDER/ADMIN: HCPCS | Performed by: INTERNAL MEDICINE

## 2020-12-22 PROCEDURE — 99212 OFFICE O/P EST SF 10 MIN: CPT | Performed by: INTERNAL MEDICINE

## 2020-12-22 PROCEDURE — 85610 PROTHROMBIN TIME: CPT | Performed by: INTERNAL MEDICINE

## 2020-12-22 PROCEDURE — 1036F TOBACCO NON-USER: CPT | Performed by: INTERNAL MEDICINE

## 2020-12-22 PROCEDURE — 99214 OFFICE O/P EST MOD 30 MIN: CPT | Performed by: INTERNAL MEDICINE

## 2020-12-22 PROCEDURE — G8427 DOCREV CUR MEDS BY ELIG CLIN: HCPCS | Performed by: INTERNAL MEDICINE

## 2020-12-22 PROCEDURE — G8420 CALC BMI NORM PARAMETERS: HCPCS | Performed by: INTERNAL MEDICINE

## 2020-12-22 RX ORDER — LISINOPRIL 10 MG/1
10 TABLET ORAL DAILY
Qty: 30 TABLET | Refills: 1 | Status: SHIPPED
Start: 2020-12-22 | End: 2021-02-17

## 2020-12-22 RX ORDER — DOXYCYCLINE 100 MG/1
100 CAPSULE ORAL DAILY
Qty: 60 CAPSULE | Refills: 3 | Status: SHIPPED
Start: 2020-12-22 | End: 2021-02-17 | Stop reason: SDUPTHER

## 2020-12-22 ASSESSMENT — ENCOUNTER SYMPTOMS
SORE THROAT: 0
DIARRHEA: 0
EYES NEGATIVE: 1
CHEST TIGHTNESS: 0
TROUBLE SWALLOWING: 0
SHORTNESS OF BREATH: 0
BLOOD IN STOOL: 0
SINUS PAIN: 0
WHEEZING: 0
VOMITING: 0
ABDOMINAL PAIN: 0
RHINORRHEA: 0
SINUS PRESSURE: 0
NAUSEA: 0
COUGH: 0
ALLERGIC/IMMUNOLOGIC NEGATIVE: 1

## 2020-12-22 NOTE — PROGRESS NOTES
Discharge instructions given. Patient verbalizes understanding.   AVS given  Patient notified to hold coumadin today, then restart coumadin 4 mg daily  Repeat Inr in 1 week  Bleeding precautions reviewed

## 2020-12-22 NOTE — PROGRESS NOTES
[facility]  InternalMedicine Residency Program  ACC Note      SUBJECTIVE:  CC: had concerns including Cyst (left leg x 1 week) and Office Visit for Anticoagulation Management. Denis Oliva presented to the [facility] for a routine visit. Pt has hx of DVT, PVD, AAA s/p repair and graft on 4mg warfarin daily, HTN on amlodipine, presented for INR check  In office, INR is 3.8, advise pt to skip today's dose and recheck in clinic in one week. BP is 150/86, will add lisinopril 10mg QD for better control. Pt denies any  bleeding or subcutaneous bruising nor chest pain, headache, palpitation or SOB  Pt also c/o two leg bumps, on PE, likely subcutaneous two cysts, mobile, (-) tenderness, (-) erythematous, (-) warmnesswhich was recently discovered by pt. One in lateral thigh of Lt leg measure <0.3, one in lateral lower leg Lt measured ~0.5. Pt requested GS referral after consultation regarding the cysts    Review of Systems   Constitutional: Negative for activity change, appetite change, chills, fever and unexpected weight change. HENT: Negative for congestion, ear discharge, ear pain, rhinorrhea, sinus pressure, sinus pain, sore throat and trouble swallowing. Eyes: Negative. Respiratory: Negative for cough, chest tightness, shortness of breath and wheezing. Cardiovascular: Negative for chest pain, palpitations and leg swelling. Gastrointestinal: Negative for abdominal pain, blood in stool, diarrhea, nausea and vomiting. Endocrine: Negative for cold intolerance and heat intolerance. Genitourinary: Negative for difficulty urinating, dysuria, frequency and urgency. Musculoskeletal: Negative for arthralgias, gait problem, joint swelling and myalgias. Skin: Negative. Allergic/Immunologic: Negative. Neurological: Negative for dizziness, tremors, syncope, speech difficulty, weakness, light-headedness and numbness. Hematological: Does not bruise/bleed easily. Musculoskeletal: Normal range of motion and neck supple. Cardiovascular:      Rate and Rhythm: Normal rate and regular rhythm. Pulses: Normal pulses. Heart sounds: Normal heart sounds. No murmur. Pulmonary:      Effort: Pulmonary effort is normal.      Breath sounds: Normal breath sounds. No wheezing, rhonchi or rales. Chest:      Chest wall: No tenderness. Abdominal:      General: Abdomen is flat. Bowel sounds are normal. There is no distension. Tenderness: There is no abdominal tenderness. There is no guarding. Musculoskeletal: Normal range of motion. General: No swelling or tenderness. Skin:     General: Skin is warm. Capillary Refill: Capillary refill takes less than 2 seconds. Comments: Multiple incision scares b/l LE d/t surgery for PVD    Two subcutaneous cysts, One in lateral thigh of Lt leg measure <0.3, one in lateral lower leg Lt measured ~0.5. Pt requested GS referral after consultation regarding the cysts   Neurological:      General: No focal deficit present. Mental Status: He is alert and oriented to person, place, and time. Motor: No weakness. Psychiatric:         Mood and Affect: Mood normal.         Behavior: Behavior normal.         PLAN:  1. Hx of abdominal surgery  - doxycycline monohydrate (MONODOX) 100 MG capsule; Take 1 capsule by mouth daily  Dispense: 60 capsule; Refill: 3    2. Deep vein thrombosis (DVT) of lower extremity, unspecified chronicity, unspecified laterality, unspecified vein (HCC)  No signs of bleeding or subcutaneous bruising  - POCT INR 3.8  Advise pt to skip one dose of warfarin today and continue warfarin as prescribed. F/u in 1 wk    3. Essential hypertension  - add lisinopril (PRINIVIL;ZESTRIL) 10 MG tablet; Take 1 tablet by mouth daily  Dispense: 30 tablet; Refill: 1  For better BP control    4.  Abdominal aortic aneurysm (AAA) without rupture ([address] - doxycycline monohydrate (MONODOX) 100 MG capsule; Take 1 capsule by mouth daily  Dispense: 60 capsule; Refill: 3    5.  Cyst, dermoid, leg, left  - [facility]        I have reviewed my findings and recommendations with Paola Lee and Dr. Brooks Gonzalez MD PGY-1   12/22/2020 2:50 PM

## 2020-12-22 NOTE — PATIENT INSTRUCTIONS
Hold coumadin today, then continue same dose of coumadin 4 mg daily  Repeat INR in 1 week  Bleeding precautions as reviewed  We will contact you with appointment for surgical clinic for evaluation   Please start lisinopril 10 mg daily in addition to all other medications  Monitor blood pressure if able and record, please bring record with you on next visit  Please call if any questions or concerns

## 2020-12-22 NOTE — PROGRESS NOTES
Attending Physician Statement  I have discussed the case, including pertinent history and exam findings with the resident. I have seen and examined the patient and the key elements of the encounter have been performed by me. I agree with the assessment, plan and orders as documented by the resident. Pt presented for the follow up of INR 3.8, BP is 150/86, no acitive complaint on ambulation. Warfarin 3 mg daily at this time. Pt CO two lumps on left leg for a week. Exam showed 1/2 inch obile cyst on left lateral calf and 1/4 inch cyst on left lateral thigh without tenderness, pt wasnts to be referred to surgery for removal. Recommended to hold one dose of coumadin today and continue same dose and add ACE in current BP regimen.   Zandra Garcia MD.

## 2020-12-29 ENCOUNTER — NURSE ONLY (OUTPATIENT)
Dept: INTERNAL MEDICINE | Age: 60
End: 2020-12-29
Payer: COMMERCIAL

## 2020-12-29 LAB
INTERNATIONAL NORMALIZATION RATIO, POC: 3.3
PROTHROMBIN TIME, POC: 39.4

## 2020-12-29 PROCEDURE — 85610 PROTHROMBIN TIME: CPT | Performed by: INTERNAL MEDICINE

## 2020-12-29 PROCEDURE — 93793 ANTICOAG MGMT PT WARFARIN: CPT | Performed by: INTERNAL MEDICINE

## 2020-12-29 NOTE — PROGRESS NOTES
Called pt and left coumadin instructions on his voice mail per his request   Printed avs with fu appt mailed to patient

## 2021-01-04 ENCOUNTER — OFFICE VISIT (OUTPATIENT)
Dept: SURGERY | Age: 61
End: 2021-01-04
Payer: COMMERCIAL

## 2021-01-04 VITALS
SYSTOLIC BLOOD PRESSURE: 154 MMHG | DIASTOLIC BLOOD PRESSURE: 92 MMHG | OXYGEN SATURATION: 96 % | BODY MASS INDEX: 21.82 KG/M2 | WEIGHT: 170 LBS | HEIGHT: 74 IN | TEMPERATURE: 97.5 F | RESPIRATION RATE: 16 BRPM | HEART RATE: 92 BPM

## 2021-01-04 DIAGNOSIS — R22.42 SUBCUTANEOUS NODULE OF LEFT LOWER EXTREMITY: Primary | ICD-10-CM

## 2021-01-04 PROCEDURE — G8420 CALC BMI NORM PARAMETERS: HCPCS | Performed by: SURGERY

## 2021-01-04 PROCEDURE — 1036F TOBACCO NON-USER: CPT | Performed by: SURGERY

## 2021-01-04 PROCEDURE — G8482 FLU IMMUNIZE ORDER/ADMIN: HCPCS | Performed by: SURGERY

## 2021-01-04 PROCEDURE — 3017F COLORECTAL CA SCREEN DOC REV: CPT | Performed by: SURGERY

## 2021-01-04 PROCEDURE — G8427 DOCREV CUR MEDS BY ELIG CLIN: HCPCS | Performed by: SURGERY

## 2021-01-04 PROCEDURE — 99202 OFFICE O/P NEW SF 15 MIN: CPT | Performed by: SURGERY

## 2021-01-04 RX ORDER — GABAPENTIN 400 MG/1
CAPSULE ORAL
COMMUNITY
Start: 2020-12-16 | End: 2021-04-09 | Stop reason: SDUPTHER

## 2021-01-04 NOTE — PROGRESS NOTES
GENERAL SURGERY        HISTORY AND PHYSICAL    CHIEF COMPLAINT  Chief Complaint   Patient presents with    Cyst     Cyst on Left leg, ref by Dr. Jose Eduardo Pride       HPI  Patient is referred for evaluation of several subcutaneous nodules on his left leg. He has an extensive history of vascular surgery and fasciotomies of both lower extremities. This originally occurred in 2010. Recently, he noticed several subcutaneous nodules in both the left thigh and left lateral calf area. He denies any history of trauma to the region. He denies any active drainage from any of the areas in question. There is minimal discomfort associated with the nodules.     Past Medical History:   Diagnosis Date    AAA (abdominal aortic aneurysm) ([facility]     s/p repair    Asthma     Chronic anticoagulation 5/6/2014    DVT (deep venous thrombosis) ([facility] 2010    R leg for 1 time    Hypertension     Hypertension     Peripheral vascular disease ([facility] 2010    both legs, more on R leg    Transfusion history        Past Surgical History:   Procedure Laterality Date    AORTO-FEMORAL BYPASS GRAFT Bilateral 6/27/10    ARTERIAL ANEURYSM REPAIR Left 9/28/10    repair left femoral pseudoaneurysm    COLONOSCOPY      FASCIOTOMY Right 6/11/10    4 compartment    FEMORAL BYPASS Right 1/26/10    femoral-above knee popliteal    FEMORAL-FEMORAL BYPASS GRAFT  1/26/10    Dr. Stephani Cuevas right to left    HERNIA REPAIR  6/25/14    OPEN VENTRAL HERNIA REPAIR    THROMBOENDARTERECTOMY Left 1/26/10    left femoral    THROMBOENDARTERECTOMY Right 6/21/10    reexploration right fem-pop    VASCULAR SURGERY      22 times total       Current Outpatient Medications   Medication Sig Dispense Refill    gabapentin (NEURONTIN) 400 MG capsule take 1 capsule by mouth three times a day      doxycycline monohydrate (MONODOX) 100 MG capsule Take 1 capsule by mouth daily 60 capsule 3    lisinopril (PRINIVIL;ZESTRIL) 10 MG tablet Take 1 tablet by mouth daily 30 tablet  Smokeless tobacco: Never Used   Substance and Sexual Activity    Alcohol use: Yes     Alcohol/week: 2.0 standard drinks     Types: 2 Cans of beer per week     Comment: occ/states quit drinking    Drug use: Yes     Types: Marijuana     Comment: uses every other day    Sexual activity: Not on file   Lifestyle    Physical activity     Days per week: Not on file     Minutes per session: Not on file    Stress: Not on file   Relationships    Social connections     Talks on phone: Not on file     Gets together: Not on file     Attends Worship service: Not on file     Active member of club or organization: Not on file     Attends meetings of clubs or organizations: Not on file     Relationship status: Not on file    Intimate partner violence     Fear of current or ex partner: Not on file     Emotionally abused: Not on file     Physically abused: Not on file     Forced sexual activity: Not on file   Other Topics Concern    Not on file   Social History Narrative    Not on file         ROS  Review of Systems   Musculoskeletal: Positive for myalgias. Subcutaneous nodules in the left lateral thigh and left lateral calf. BP (!) 154/92   Pulse 92   Temp 97.5 °F (36.4 °C) (Infrared)   Resp 16   Ht 6' 2\" (1.88 m)   Wt 170 lb (77.1 kg)   SpO2 96%   BMI 21.83 kg/m²     PHYSICAL EXAM  Physical Exam  Constitutional:       Appearance: Normal appearance. Skin:            Comments: He admits to similar incisions in his right lower extremity. This area was not examined. Neurological:      Mental Status: He is alert and oriented to person, place, and time. Psychiatric:         Mood and Affect: Mood normal.           ASSESSMENT AND PLAN  1. Multiple small subcutaneous nodules in the left lateral thigh and left lateral calf area. These are consistent with subcutaneous cysts. These are not suspicious for malignancy. After discussion with the patient, the recommendation is for observation.   He is

## 2021-01-26 ENCOUNTER — NURSE ONLY (OUTPATIENT)
Dept: INTERNAL MEDICINE | Age: 61
End: 2021-01-26
Payer: COMMERCIAL

## 2021-01-26 DIAGNOSIS — I82.409 DEEP VEIN THROMBOSIS (DVT) OF LOWER EXTREMITY, UNSPECIFIED CHRONICITY, UNSPECIFIED LATERALITY, UNSPECIFIED VEIN (HCC): Primary | ICD-10-CM

## 2021-01-26 LAB
INTERNATIONAL NORMALIZATION RATIO, POC: 2.1
PROTHROMBIN TIME, POC: 25

## 2021-01-26 PROCEDURE — 85610 PROTHROMBIN TIME: CPT | Performed by: INTERNAL MEDICINE

## 2021-01-26 PROCEDURE — 93793 ANTICOAG MGMT PT WARFARIN: CPT | Performed by: INTERNAL MEDICINE

## 2021-01-27 NOTE — PROGRESS NOTES
Message left for patient to continue coumadin 4 mg daily and repeat INR in 1 week  Bleeding precautions reviewed   AVS mailed

## 2021-01-27 NOTE — PATIENT INSTRUCTIONS
Continue coumadin 4 mg daily  Repeat INR on 2/3/21 at 1:15pm  Bleeding precautions as reviewed   Please call if any questions or concerns  Patient Education        Taking Warfarin Safely: Care Instructions  Your Care Instructions     Warfarin is a medicine that you take to prevent blood clots. It is often called a blood thinner. Doctors give warfarin (such as Coumadin) to reduce the risk of blood clots. You may be at risk for blood clots if you have atrial fibrillation or deep vein thrombosis. Some other health problems may also put you at risk. Warfarin slows the amount of time it takes for your blood to clot. It can cause bleeding problems. Even if you've been taking warfarin for a while, it's important to know how to take it safely. Foods and other medicines can affect the way warfarin works. Some can make warfarin work too well. This can cause bleeding problems. And some can make it work poorly, so that it does not prevent blood clots very well. You will need regular blood tests to check how long it takes for your blood to form a clot. This test is called a PT or prothrombin time test. The result of the test is called an INR level. Depending on the test results, your doctor or anticoagulation clinic may adjust your dose of warfarin. Follow-up care is a key part of your treatment and safety. Be sure to make and go to all appointments, and call your doctor if you are having problems. It's also a good idea to know your test results and keep a list of the medicines you take. How can you care for yourself at home? Take warfarin safely  · Take your warfarin at the same time each day. · If you miss a dose of warfarin, don't take an extra dose to make up for it. Your doctor can tell you exactly what to do so you don't take too much or too little. · Wear medical alert jewelry that lets others know that you take warfarin. You can buy this at most drugstores. ? Vaginal bleeding that is different (heavier, more frequent, at a different time of the month) than what you are used to.  ? Bloody or black stools, or rectal bleeding. ? Bloody or pink urine. Watch closely for changes in your health, and be sure to contact your doctor if you have any problems. Where can you learn more? Go to [email or web address].[employer]Responsys. org and sign in to your [employer] account. Enter U155 in the [employer] box to learn more about \"Taking Warfarin Safely: Care Instructions. \"     If you do not have an account, please click on the \"Sign Up Now\" link. Current as of: December 16, 2019               Content Version: 12.6  © 5528-9197 [employer]. Care instructions adapted under license by [facility] ([facility]). If you have questions about a medical condition or this instruction, always ask your healthcare professional. [address] any warranty or liability for your use of this information.

## 2021-02-03 ENCOUNTER — NURSE ONLY (OUTPATIENT)
Dept: INTERNAL MEDICINE | Age: 61
End: 2021-02-03
Payer: COMMERCIAL

## 2021-02-03 VITALS — TEMPERATURE: 97.3 F

## 2021-02-03 DIAGNOSIS — I82.499 DEEP VEIN THROMBOSIS (DVT) OF OTHER VEIN OF LOWER EXTREMITY, UNSPECIFIED CHRONICITY, UNSPECIFIED LATERALITY (HCC): ICD-10-CM

## 2021-02-03 DIAGNOSIS — Z79.01 CHRONIC ANTICOAGULATION: Chronic | ICD-10-CM

## 2021-02-03 LAB — INTERNATIONAL NORMALIZATION RATIO, POC: 2.9

## 2021-02-03 PROCEDURE — 85610 PROTHROMBIN TIME: CPT | Performed by: INTERNAL MEDICINE

## 2021-02-03 PROCEDURE — 93793 ANTICOAG MGMT PT WARFARIN: CPT | Performed by: INTERNAL MEDICINE

## 2021-02-16 DIAGNOSIS — I10 ESSENTIAL HYPERTENSION: ICD-10-CM

## 2021-02-16 RX ORDER — LISINOPRIL 10 MG/1
TABLET ORAL
Qty: 30 TABLET | Refills: 1 | OUTPATIENT
Start: 2021-02-16

## 2021-02-17 ENCOUNTER — OFFICE VISIT (OUTPATIENT)
Dept: INTERNAL MEDICINE | Age: 61
End: 2021-02-17
Payer: COMMERCIAL

## 2021-02-17 VITALS
HEIGHT: 74 IN | RESPIRATION RATE: 20 BRPM | BODY MASS INDEX: 22.38 KG/M2 | DIASTOLIC BLOOD PRESSURE: 83 MMHG | HEART RATE: 84 BPM | WEIGHT: 174.4 LBS | OXYGEN SATURATION: 98 % | SYSTOLIC BLOOD PRESSURE: 156 MMHG | TEMPERATURE: 97.1 F

## 2021-02-17 DIAGNOSIS — E83.42 HYPOMAGNESEMIA: ICD-10-CM

## 2021-02-17 DIAGNOSIS — I73.9 PAD (PERIPHERAL ARTERY DISEASE) (HCC): ICD-10-CM

## 2021-02-17 DIAGNOSIS — Z98.890 HX OF ABDOMINAL SURGERY: ICD-10-CM

## 2021-02-17 DIAGNOSIS — J45.909 UNCOMPLICATED ASTHMA, UNSPECIFIED ASTHMA SEVERITY, UNSPECIFIED WHETHER PERSISTENT: ICD-10-CM

## 2021-02-17 DIAGNOSIS — Z13.31 POSITIVE DEPRESSION SCREENING: ICD-10-CM

## 2021-02-17 DIAGNOSIS — I82.4Y9 DEEP VEIN THROMBOSIS (DVT) OF PROXIMAL LOWER EXTREMITY, UNSPECIFIED CHRONICITY, UNSPECIFIED LATERALITY (HCC): ICD-10-CM

## 2021-02-17 DIAGNOSIS — I82.499 DEEP VEIN THROMBOSIS (DVT) OF OTHER VEIN OF LOWER EXTREMITY, UNSPECIFIED CHRONICITY, UNSPECIFIED LATERALITY (HCC): ICD-10-CM

## 2021-02-17 DIAGNOSIS — I10 ESSENTIAL HYPERTENSION: Primary | ICD-10-CM

## 2021-02-17 DIAGNOSIS — Z12.11 COLON CANCER SCREENING: ICD-10-CM

## 2021-02-17 PROCEDURE — G8427 DOCREV CUR MEDS BY ELIG CLIN: HCPCS | Performed by: INTERNAL MEDICINE

## 2021-02-17 PROCEDURE — 1036F TOBACCO NON-USER: CPT | Performed by: INTERNAL MEDICINE

## 2021-02-17 PROCEDURE — G8420 CALC BMI NORM PARAMETERS: HCPCS | Performed by: INTERNAL MEDICINE

## 2021-02-17 PROCEDURE — 99212 OFFICE O/P EST SF 10 MIN: CPT | Performed by: INTERNAL MEDICINE

## 2021-02-17 PROCEDURE — 99213 OFFICE O/P EST LOW 20 MIN: CPT | Performed by: INTERNAL MEDICINE

## 2021-02-17 PROCEDURE — G8482 FLU IMMUNIZE ORDER/ADMIN: HCPCS | Performed by: INTERNAL MEDICINE

## 2021-02-17 PROCEDURE — 3017F COLORECTAL CA SCREEN DOC REV: CPT | Performed by: INTERNAL MEDICINE

## 2021-02-17 RX ORDER — CLOPIDOGREL BISULFATE 75 MG/1
75 TABLET ORAL DAILY
Qty: 90 TABLET | Refills: 3 | Status: ON HOLD | OUTPATIENT
Start: 2021-02-17 | End: 2022-07-11 | Stop reason: HOSPADM

## 2021-02-17 RX ORDER — WARFARIN SODIUM 4 MG/1
4 TABLET ORAL DAILY
Qty: 30 TABLET | Refills: 3 | Status: SHIPPED
Start: 2021-02-17 | End: 2021-05-10 | Stop reason: SDUPTHER

## 2021-02-17 RX ORDER — LOSARTAN POTASSIUM 100 MG/1
100 TABLET ORAL DAILY
Qty: 90 TABLET | Refills: 2 | Status: SHIPPED
Start: 2021-02-17 | End: 2021-09-20 | Stop reason: SDUPTHER

## 2021-02-17 RX ORDER — MAGNESIUM OXIDE 400 MG/1
400 TABLET ORAL DAILY
Qty: 90 TABLET | Refills: 0 | Status: SHIPPED
Start: 2021-02-17 | End: 2021-07-06 | Stop reason: SDUPTHER

## 2021-02-17 RX ORDER — PAROXETINE 10 MG/1
10 TABLET, FILM COATED ORAL DAILY
Qty: 90 TABLET | Refills: 2 | Status: SHIPPED
Start: 2021-02-17 | End: 2022-04-29

## 2021-02-17 RX ORDER — ATORVASTATIN CALCIUM 80 MG/1
80 TABLET, FILM COATED ORAL DAILY
Qty: 90 TABLET | Refills: 3 | Status: SHIPPED
Start: 2021-02-17 | End: 2021-10-01 | Stop reason: SDUPTHER

## 2021-02-17 RX ORDER — LISINOPRIL 10 MG/1
10 TABLET ORAL DAILY
Qty: 90 TABLET | Refills: 0 | Status: CANCELLED | OUTPATIENT
Start: 2021-02-17

## 2021-02-17 RX ORDER — ALBUTEROL SULFATE 90 UG/1
2 AEROSOL, METERED RESPIRATORY (INHALATION) EVERY 6 HOURS PRN
Qty: 1 INHALER | Refills: 3 | Status: SHIPPED
Start: 2021-02-17 | End: 2021-09-27 | Stop reason: SDUPTHER

## 2021-02-17 RX ORDER — AMLODIPINE BESYLATE 10 MG/1
10 TABLET ORAL DAILY
Qty: 90 TABLET | Refills: 0 | Status: SHIPPED
Start: 2021-02-17 | End: 2021-06-29

## 2021-02-17 RX ORDER — DOXYCYCLINE 100 MG/1
100 CAPSULE ORAL DAILY
Qty: 60 CAPSULE | Refills: 3 | Status: SHIPPED
Start: 2021-02-17 | End: 2021-09-27 | Stop reason: SDUPTHER

## 2021-02-17 ASSESSMENT — PATIENT HEALTH QUESTIONNAIRE - PHQ9
SUM OF ALL RESPONSES TO PHQ9 QUESTIONS 1 & 2: 0
1. LITTLE INTEREST OR PLEASURE IN DOING THINGS: 0
SUM OF ALL RESPONSES TO PHQ QUESTIONS 1-9: 0
2. FEELING DOWN, DEPRESSED OR HOPELESS: 0

## 2021-02-17 NOTE — PROGRESS NOTES
[facility]  InternalMedicine Residency Program  ACC Note      SUBJECTIVE:  CC: had concerns including 3 Month Follow-Up and Eye Problem (patient has a lump on his bottom right lid). Liam Graves is a 59 yo M with PMH PVD, HTN, HLP, Hypercoagulable state with antiphospholipid syndrome, AAA repair complicated by graft infection. presented to the [facility] for a routine visit.     HTN  -On Losartan, Amlodipine, ? Lisinnopril was added last visit  -Has not been taking the third pill   -BP at home    Review of Systems   Constitutional: Negative for activity change, appetite change, chills, fever and unexpected weight change. HENT: Negative for congestion, ear discharge, ear pain, rhinorrhea, sinus pressure, sinus pain, sore throat and trouble swallowing. Eyes: Negative. Respiratory: Negative for cough, chest tightness, shortness of breath and wheezing. Cardiovascular: Negative for chest pain, palpitations and leg swelling. Gastrointestinal: Negative for abdominal pain, blood in stool, diarrhea, nausea and vomiting. Endocrine: Negative for cold intolerance and heat intolerance. Genitourinary: Negative for difficulty urinating, dysuria, frequency and urgency. Musculoskeletal: Negative for arthralgias, gait problem, joint swelling and myalgias. Skin: Negative. Allergic/Immunologic: Negative. Neurological: Negative for dizziness, tremors, syncope, speech difficulty, weakness, light-headedness and numbness. Hematological: Does not bruise/bleed easily. Psychiatric/Behavioral: Negative.       Outpatient Medications Marked as Taking for the 2/17/21 encounter (Office Visit) with Fernando Desai MD   Medication Sig Dispense Refill    doxycycline monohydrate (MONODOX) 100 MG capsule Take 1 capsule by mouth daily 60 capsule 3    lisinopril (PRINIVIL;ZESTRIL) 10 MG tablet Take 1 tablet by mouth daily 30 tablet 1    losartan (COZAAR) 50 MG tablet Take 1 tablet by mouth daily 30 tablet 3    PARoxetine (PAXIL) 10 MG tablet Take 1 tablet by mouth daily 90 tablet 2    clopidogrel (PLAVIX) 75 MG tablet Take 1 tablet by mouth daily 90 tablet 2    atorvastatin (LIPITOR) 80 MG tablet Take 1 tablet by mouth daily 90 tablet 3    magnesium oxide (MAG-OX) 400 MG tablet Take 1 tablet by mouth daily 30 tablet 3    albuterol sulfate  (90 Base) MCG/ACT inhaler Inhale 2 puffs into the lungs every 6 hours as needed for Wheezing 1 Inhaler 3    warfarin (COUMADIN) 3 MG tablet Take 1 tablet by mouth daily Take one tab daily except Friday and Wednesday 30 tablet 0    amLODIPine (NORVASC) 10 MG tablet Take 1 tablet by mouth daily 90 tablet 3    Multiple Vitamins-Minerals (THERAPEUTIC MULTIVITAMIN-MINERALS) tablet Take 1 tablet by mouth daily      Blood Pressure Monitoring (BLOOD PRESSURE CUFF) MISC Dx:  Hypertension with labile blood pressure 1 each 0       I have reviewed all pertinent PMHx, PSHx, FamHx, SocialHx, medications, and allergiesand updated history as appropriate. OBJECTIVE:    VS: BP (!) 156/83 (Site: Right Upper Arm, Position: Sitting, Cuff Size: Medium Adult)   Pulse 84   Temp 97.1 °F (36.2 °C) (Oral)   Resp 20   Ht 6' 2\" (1.88 m)   Wt 174 lb 6.4 oz (79.1 kg)   SpO2 98%   BMI 22.39 kg/m²      Physical Exam  Constitutional:       Appearance: Normal appearance. HENT:      Head: Normocephalic and atraumatic. Right Ear: Ear canal and external ear normal.      Left Ear: Ear canal and external ear normal.      Nose: Nose normal.      Mouth/Throat:      Mouth: Mucous membranes are moist.   Eyes:      Extraocular Movements: Extraocular movements intact. Conjunctiva/sclera: Conjunctivae normal.      Pupils: Pupils are equal, round, and reactive to light. Neck:      Musculoskeletal: Normal range of motion and neck supple. Cardiovascular:      Rate and Rhythm: Normal rate and regular rhythm. Pulses: Normal pulses.       Heart sounds: Normal heart sounds. No murmur. Pulmonary:      Effort: Pulmonary effort is normal.      Breath sounds: Normal breath sounds. No wheezing, rhonchi or rales. Chest:      Chest wall: No tenderness. Abdominal:      General: Abdomen is flat. Bowel sounds are normal. There is no distension. Tenderness: There is no abdominal tenderness. There is no guarding. Musculoskeletal: Normal range of motion. General: No swelling or tenderness. Skin:     General: Skin is warm. Capillary Refill: Capillary refill takes less than 2 seconds. Comments: Multiple incision scares b/l LE d/t surgery for PVD  Neurological:      General: No focal deficit present. Mental Status: He is alert and oriented to person, place, and time. Motor: No weakness. Psychiatric:         Mood and Affect: Mood normal.         Behavior: Behavior normal.     PLAN:  There are no diagnoses linked to this encounter. Hypercoagulable state d/t APLA in the setting of SLE with Hx DVT   -Patient currently on Coumadin 4 mg daily  -INR 2.9 2/3 - repeat next month  -Goal 2.5-3.5 per [facility] vascular surgery     PVD  -Follows with [facility], s/p thrombectomy on the left side 10/2018  -Currently on Plavix, Lipitor  -The 10-year ASCVD risk score (Christie Ochoa., et al., 2013) is: 12.7%    Values used to calculate the score:      Age: 63 years      Sex: Male      Is Non- : No      Diabetic: No      Tobacco smoker: No      Systolic Blood Pressure: 699 mmHg      Is BP treated: Yes      HDL Cholesterol: 40 mg/dL      Total Cholesterol: 139 mg/dL  .   HLD  -Lipid panel as 11/20  -Lipitor 40mg, CMP no transaminitis 11/20  -The 10-year ASCVD risk score (Christie Ochoa., et al., 2013) is: 12.7%    Values used to calculate the score:      Age: 63 years      Sex: Male      Is Non- : No      Diabetic: No      Tobacco smoker: No      Systolic Blood Pressure: 532 mmHg      Is BP treated: Yes      HDL Cholesterol: 40 mg/dL Total Cholesterol: 139 mg/dL    Hypertension:  -BP is 156/83, no orthostasis  -Currently on Amlodipine 10 mg, Losartan 50mg daily, Lisnopril was added last visit - Has not started it  -Previously developed cough on Lisinopril so was dcd    -Will increase Losartan to 100mg and dc Lisinopril    AAA, s/p repair with chronic abdominal graft infection  -On chronic suppression with Doxycycline    Depression  -Depressed once in a while, sleep and appetite ok   -No SI, HI  -Remains on Paxil 10mg daily, tolerating well with improved mood       Tobacco abuse  -1 cig/day   -On and off Chantix     HM  -C-scope last done 2016 >> recommended repeat in 5 yrs >> GS ref given        I have reviewed my findings and recommendations with Eric Ovalle and Dr Erich Aguilar MD PGY-3  2/17/2021 11:05 AM

## 2021-02-17 NOTE — PROGRESS NOTES
Patient given instruction by Dr Linda Gomez. 3 month follow up scheduled. Printed AVS given to patient.

## 2021-02-17 NOTE — PATIENT INSTRUCTIONS
Patient Education   -Please continue taking your meds as prescribed    RTC in 4 week for INR check     Learning About Benefits From Quitting Smoking  How does quitting smoking make you healthier? If you're thinking about quitting smoking, you may have a few reasons to be smoke-free. Your health may be one of them. · When you quit smoking, you lower your risks for cancer, lung disease, heart attack, stroke, blood vessel disease, and blindness from macular degeneration. · When you're smoke-free, you get sick less often, and you heal faster. You are less likely to get colds, flu, bronchitis, and pneumonia. · As a nonsmoker, you may find that your mood is better and you are less stressed. When and how will you feel healthier? Quitting has real health benefits that start from day 1 of being smoke-free. And the longer you stay smoke-free, the healthier you get and the better you feel. The first hours  · After just 20 minutes, your blood pressure and heart rate go down. That means there's less stress on your heart and blood vessels. · Within 12 hours, the level of carbon monoxide in your blood drops back to normal. That makes room for more oxygen. With more oxygen in your body, you may notice that you have more energy than when you smoked. After 2 weeks  · Your lungs start to work better. · Your risk of heart attack starts to drop. After 1 month  · When your lungs are clear, you cough less and breathe deeper, so it's easier to be active. · Your sense of taste and smell return. That means you can enjoy food more than you have since you started smoking. Over the years  · Over the years, your risks of heart disease, heart attack, and stroke are lower. · After 10 years, your risk of dying from lung cancer is cut by about half. And your risk for many other types of cancer is lower too. How would quitting help others in your life?   When you quit smoking, you improve the health of everyone who now breathes in

## 2021-02-17 NOTE — PROGRESS NOTES
[facility]  Internal Medicine Residency    Internal Medicine     Attending Physician Statement  I have discussed the case, including pertinent history and exam findings with the resident and the team. I agree with the assessment, plan and orders as documented by the resident. Past, family, and social history were reviewed.   Titrate BP meds  Anticoagulation  Citlali Ibarra MD

## 2021-02-25 ENCOUNTER — TELEPHONE (OUTPATIENT)
Dept: SURGERY | Age: 61
End: 2021-02-25

## 2021-02-25 NOTE — TELEPHONE ENCOUNTER
First attempt, Left message to schedule pt with first available at any location for a colonoscopy consult.   Electronically signed by Jaquelin Virk on 2/25/21 at 8:38 AM EST

## 2021-03-01 ENCOUNTER — TELEPHONE (OUTPATIENT)
Dept: SURGERY | Age: 61
End: 2021-03-01

## 2021-03-01 NOTE — TELEPHONE ENCOUNTER
Second attempt, Left message to schedule pt with first available surgeon at [place] office for a consult.   Electronically signed by Yaneli Chu on 3/1/21 at 3:01 PM EST

## 2021-03-04 ENCOUNTER — TELEPHONE (OUTPATIENT)
Dept: SURGERY | Age: 61
End: 2021-03-04

## 2021-03-04 NOTE — TELEPHONE ENCOUNTER
Third attempt, left messages three times. Unable to contact patient. We would be more than happy to schedule this patient with one of our surgeons when they call us back. At this time we are forwarding the referral back to referring provider to inform you that we were unable to schedule the patient. Thanks for the referral.  Any questions or concerns call [phone].       Electronically signed by Kathy Izquierdo on 3/4/21 at 9:30 AM EST

## 2021-03-12 ENCOUNTER — NURSE ONLY (OUTPATIENT)
Dept: INTERNAL MEDICINE | Age: 61
End: 2021-03-12
Payer: COMMERCIAL

## 2021-03-12 DIAGNOSIS — I82.499 DEEP VEIN THROMBOSIS (DVT) OF OTHER VEIN OF LOWER EXTREMITY, UNSPECIFIED CHRONICITY, UNSPECIFIED LATERALITY (HCC): Primary | ICD-10-CM

## 2021-03-12 LAB
INTERNATIONAL NORMALIZATION RATIO, POC: 2.6
PROTHROMBIN TIME, POC: ABNORMAL

## 2021-03-12 PROCEDURE — 93793 ANTICOAG MGMT PT WARFARIN: CPT | Performed by: INTERNAL MEDICINE

## 2021-03-12 PROCEDURE — 85610 PROTHROMBIN TIME: CPT | Performed by: INTERNAL MEDICINE

## 2021-03-12 NOTE — PROGRESS NOTES
Patient notified to continue coumadin 4 mg daily and repeat INR in 1 month  Bleeding precautions reviewed  AVS mailed

## 2021-03-12 NOTE — PATIENT INSTRUCTIONS
Continue coumadin 4 mg daily  Repeat INR in 1month  Bleeding precautions as reviewed  Please call if any questions or concerns  Patient Education        Taking Warfarin Safely: Care Instructions  Your Care Instructions     Warfarin is a medicine that you take to prevent blood clots. It is often called a blood thinner. Doctors give warfarin (such as Coumadin) to reduce the risk of blood clots. You may be at risk for blood clots if you have atrial fibrillation or deep vein thrombosis. Some other health problems may also put you at risk. Warfarin slows the amount of time it takes for your blood to clot. It can cause bleeding problems. Even if you've been taking warfarin for a while, it's important to know how to take it safely. Foods and other medicines can affect the way warfarin works. Some can make warfarin work too well. This can cause bleeding problems. And some can make it work poorly, so that it does not prevent blood clots very well. You will need regular blood tests to check how long it takes for your blood to form a clot. This test is called a PT or prothrombin time test. The result of the test is called an INR level. Depending on the test results, your doctor or anticoagulation clinic may adjust your dose of warfarin. Follow-up care is a key part of your treatment and safety. Be sure to make and go to all appointments, and call your doctor if you are having problems. It's also a good idea to know your test results and keep a list of the medicines you take. How can you care for yourself at home? Take warfarin safely  · Take your warfarin at the same time each day. · If you miss a dose of warfarin, don't take an extra dose to make up for it. Your doctor can tell you exactly what to do so you don't take too much or too little. · Wear medical alert jewelry that lets others know that you take warfarin. You can buy this at most drugstores.   · Don't take warfarin if you are pregnant or planning to get month) than what you are used to.  ? Bloody or black stools, or rectal bleeding. ? Bloody or pink urine. Watch closely for changes in your health, and be sure to contact your doctor if you have any problems. Where can you learn more? Go to https://chjorgeeb.Telisma. org and sign in to your Lema[employer] account. Enter T401 in the Independa box to learn more about \"Taking Warfarin Safely: Care Instructions. \"     If you do not have an account, please click on the \"Sign Up Now\" link. Current as of: August 31, 2020               Content Version: 12.8  © 2339-6080 Healthwise, [employer]!. Care instructions adapted under license by [facility] ([facility]). If you have questions about a medical condition or this instruction, always ask your healthcare professional. [address] any warranty or liability for your use of this information.

## 2021-03-16 ENCOUNTER — IMMUNIZATION (OUTPATIENT)
Dept: PRIMARY CARE CLINIC | Age: 61
End: 2021-03-16
Payer: COMMERCIAL

## 2021-03-16 PROCEDURE — 91301 COVID-19, MODERNA VACCINE 100MCG/0.5ML DOSE: CPT | Performed by: NURSE PRACTITIONER

## 2021-03-16 PROCEDURE — 0011A COVID-19, MODERNA VACCINE 100MCG/0.5ML DOSE: CPT | Performed by: NURSE PRACTITIONER

## 2021-04-06 ENCOUNTER — TELEPHONE (OUTPATIENT)
Dept: INTERNAL MEDICINE | Age: 61
End: 2021-04-06

## 2021-04-06 NOTE — TELEPHONE ENCOUNTER
Spoke with pharmacy regarding gabapentin. States med is ordered by [employer] doctor from [facility]. States they did sent request to that Dr's office but have not heard back. Spoke with Pablo Bates and notified of same. Suggested pt call office also to request refill.

## 2021-04-09 ENCOUNTER — NURSE ONLY (OUTPATIENT)
Dept: INTERNAL MEDICINE | Age: 61
End: 2021-04-09
Payer: COMMERCIAL

## 2021-04-09 DIAGNOSIS — I82.499 DEEP VEIN THROMBOSIS (DVT) OF OTHER VEIN OF LOWER EXTREMITY, UNSPECIFIED CHRONICITY, UNSPECIFIED LATERALITY (HCC): Primary | ICD-10-CM

## 2021-04-09 LAB
INTERNATIONAL NORMALIZATION RATIO, POC: 3.3
PROTHROMBIN TIME, POC: 39.2

## 2021-04-09 PROCEDURE — 85610 PROTHROMBIN TIME: CPT | Performed by: INTERNAL MEDICINE

## 2021-04-09 PROCEDURE — 93793 ANTICOAG MGMT PT WARFARIN: CPT | Performed by: INTERNAL MEDICINE

## 2021-04-09 RX ORDER — GABAPENTIN 400 MG/1
400 CAPSULE ORAL 3 TIMES DAILY
Qty: 21 CAPSULE | Refills: 0 | Status: SHIPPED
Start: 2021-04-09 | End: 2021-10-08 | Stop reason: ALTCHOICE

## 2021-04-09 RX ORDER — WARFARIN SODIUM 4 MG/1
4 TABLET ORAL DAILY
Qty: 30 TABLET | Refills: 0 | Status: SHIPPED
Start: 2021-04-09 | End: 2021-06-17

## 2021-04-09 NOTE — PATIENT INSTRUCTIONS
Hold todays dose of coumadin   Tomorrow 04/10/2021 restart taking 4 mgs daily and recheck inr in 1 week

## 2021-04-09 NOTE — PROGRESS NOTES
Veronica for refill of gabapentin 400 mg TID x 1 week (future refills c/o [facility]). Emma Sharma MD  Internal Medicine  4/9/2021 1:31 PM

## 2021-04-13 ENCOUNTER — IMMUNIZATION (OUTPATIENT)
Dept: PRIMARY CARE CLINIC | Age: 61
End: 2021-04-13
Payer: COMMERCIAL

## 2021-04-13 PROCEDURE — 91301 COVID-19, MODERNA VACCINE 100MCG/0.5ML DOSE: CPT | Performed by: NURSE PRACTITIONER

## 2021-04-13 PROCEDURE — 0012A COVID-19, MODERNA VACCINE 100MCG/0.5ML DOSE: CPT | Performed by: NURSE PRACTITIONER

## 2021-04-19 ENCOUNTER — NURSE ONLY (OUTPATIENT)
Dept: INTERNAL MEDICINE | Age: 61
End: 2021-04-19
Payer: COMMERCIAL

## 2021-04-19 DIAGNOSIS — I82.499 DEEP VEIN THROMBOSIS (DVT) OF OTHER VEIN OF LOWER EXTREMITY, UNSPECIFIED CHRONICITY, UNSPECIFIED LATERALITY (HCC): Primary | ICD-10-CM

## 2021-04-19 LAB
INTERNATIONAL NORMALIZATION RATIO, POC: 3
PROTHROMBIN TIME, POC: 35.4

## 2021-04-19 PROCEDURE — 93793 ANTICOAG MGMT PT WARFARIN: CPT | Performed by: INTERNAL MEDICINE

## 2021-04-19 PROCEDURE — 99212 OFFICE O/P EST SF 10 MIN: CPT | Performed by: INTERNAL MEDICINE

## 2021-04-19 PROCEDURE — 85610 PROTHROMBIN TIME: CPT | Performed by: INTERNAL MEDICINE

## 2021-04-19 NOTE — PROGRESS NOTES
Coumadin intructions giver per Dr. Pancho Bush. Called patient with instructions and next INR appt. AVS mailed to patient.

## 2021-04-19 NOTE — PATIENT INSTRUCTIONS
- Please continue same dosing of coumadin 4 mg daily.  - Please return in 2 weeks to recheck INR.     Call our office with any questions or concerns [phone]

## 2021-04-27 RX ORDER — GABAPENTIN 400 MG/1
CAPSULE ORAL
Qty: 21 CAPSULE | Refills: 0 | OUTPATIENT
Start: 2021-04-27

## 2021-05-10 ENCOUNTER — NURSE ONLY (OUTPATIENT)
Dept: INTERNAL MEDICINE | Age: 61
End: 2021-05-10
Payer: COMMERCIAL

## 2021-05-10 DIAGNOSIS — I82.499 DEEP VEIN THROMBOSIS (DVT) OF OTHER VEIN OF LOWER EXTREMITY, UNSPECIFIED CHRONICITY, UNSPECIFIED LATERALITY (HCC): Primary | ICD-10-CM

## 2021-05-10 LAB
INTERNATIONAL NORMALIZATION RATIO, POC: 2.5
PROTHROMBIN TIME, POC: 30.5

## 2021-05-10 PROCEDURE — 85610 PROTHROMBIN TIME: CPT | Performed by: INTERNAL MEDICINE

## 2021-05-10 PROCEDURE — 93793 ANTICOAG MGMT PT WARFARIN: CPT | Performed by: INTERNAL MEDICINE

## 2021-06-10 ENCOUNTER — NURSE ONLY (OUTPATIENT)
Dept: INTERNAL MEDICINE | Age: 61
End: 2021-06-10
Payer: COMMERCIAL

## 2021-06-10 DIAGNOSIS — I82.4Y9 DEEP VEIN THROMBOSIS (DVT) OF PROXIMAL LOWER EXTREMITY, UNSPECIFIED CHRONICITY, UNSPECIFIED LATERALITY (HCC): Primary | ICD-10-CM

## 2021-06-10 LAB
INTERNATIONAL NORMALIZATION RATIO, POC: 3.2
PROTHROMBIN TIME, POC: 39

## 2021-06-10 PROCEDURE — 93793 ANTICOAG MGMT PT WARFARIN: CPT | Performed by: INTERNAL MEDICINE

## 2021-06-10 PROCEDURE — 85610 PROTHROMBIN TIME: CPT | Performed by: INTERNAL MEDICINE

## 2021-06-17 ENCOUNTER — NURSE ONLY (OUTPATIENT)
Dept: INTERNAL MEDICINE | Age: 61
End: 2021-06-17
Payer: COMMERCIAL

## 2021-06-17 DIAGNOSIS — I82.499 DEEP VEIN THROMBOSIS (DVT) OF OTHER VEIN OF LOWER EXTREMITY, UNSPECIFIED CHRONICITY, UNSPECIFIED LATERALITY (HCC): Primary | ICD-10-CM

## 2021-06-17 LAB
INTERNATIONAL NORMALIZATION RATIO, POC: 2.7
PROTHROMBIN TIME, POC: 31.9

## 2021-06-17 PROCEDURE — 93793 ANTICOAG MGMT PT WARFARIN: CPT | Performed by: INTERNAL MEDICINE

## 2021-06-17 PROCEDURE — 85610 PROTHROMBIN TIME: CPT | Performed by: INTERNAL MEDICINE

## 2021-06-17 RX ORDER — WARFARIN SODIUM 4 MG/1
4 TABLET ORAL DAILY
Qty: 30 TABLET | Refills: 0 | Status: SHIPPED
Start: 2021-06-17 | End: 2021-07-15 | Stop reason: SDUPTHER

## 2021-06-29 DIAGNOSIS — I10 ESSENTIAL HYPERTENSION: ICD-10-CM

## 2021-06-29 RX ORDER — AMLODIPINE BESYLATE 10 MG/1
TABLET ORAL
Qty: 30 TABLET | Refills: 0 | Status: SHIPPED
Start: 2021-06-29 | End: 2021-08-02

## 2021-06-29 NOTE — TELEPHONE ENCOUNTER
Last Appointment:  2/17/2021  Future Appointments   Date Time Provider Roberto Patino   7/15/2021  8:00 AM SCHEDULE, SE ACC IM [facility]

## 2021-07-06 DIAGNOSIS — E83.42 HYPOMAGNESEMIA: ICD-10-CM

## 2021-07-06 RX ORDER — M-VIT,TX,IRON,MINS/CALC/FOLIC 27MG-0.4MG
1 TABLET ORAL DAILY
Qty: 90 TABLET | Refills: 0 | Status: SHIPPED
Start: 2021-07-06 | End: 2021-09-27 | Stop reason: SDUPTHER

## 2021-07-06 RX ORDER — MAGNESIUM OXIDE 400 MG/1
400 TABLET ORAL DAILY
Qty: 90 TABLET | Refills: 0 | Status: SHIPPED
Start: 2021-07-06 | End: 2021-09-27 | Stop reason: SDUPTHER

## 2021-07-15 ENCOUNTER — NURSE ONLY (OUTPATIENT)
Dept: INTERNAL MEDICINE | Age: 61
End: 2021-07-15
Payer: COMMERCIAL

## 2021-07-15 DIAGNOSIS — I82.499 DEEP VEIN THROMBOSIS (DVT) OF OTHER VEIN OF LOWER EXTREMITY, UNSPECIFIED CHRONICITY, UNSPECIFIED LATERALITY (HCC): ICD-10-CM

## 2021-07-15 LAB
INTERNATIONAL NORMALIZATION RATIO, POC: 2
PROTHROMBIN TIME, POC: 23.7

## 2021-07-15 PROCEDURE — 85610 PROTHROMBIN TIME: CPT | Performed by: INTERNAL MEDICINE

## 2021-07-15 PROCEDURE — 93793 ANTICOAG MGMT PT WARFARIN: CPT | Performed by: INTERNAL MEDICINE

## 2021-07-15 RX ORDER — WARFARIN SODIUM 4 MG/1
4 TABLET ORAL DAILY
Qty: 30 TABLET | Refills: 0 | Status: SHIPPED
Start: 2021-07-15 | End: 2021-09-27 | Stop reason: SDUPTHER

## 2021-07-15 NOTE — PATIENT INSTRUCTIONS
Continue Coumadin 4 mg daily as ordered  Call with any questions or concerns  Return to clinic in 2 weeks

## 2021-07-29 ENCOUNTER — NURSE ONLY (OUTPATIENT)
Dept: INTERNAL MEDICINE | Age: 61
End: 2021-07-29
Payer: COMMERCIAL

## 2021-07-29 DIAGNOSIS — I82.499 DEEP VEIN THROMBOSIS (DVT) OF OTHER VEIN OF LOWER EXTREMITY, UNSPECIFIED CHRONICITY, UNSPECIFIED LATERALITY (HCC): Primary | ICD-10-CM

## 2021-07-29 LAB
INTERNATIONAL NORMALIZATION RATIO, POC: 2.3
PROTHROMBIN TIME, POC: 27.4

## 2021-07-29 PROCEDURE — 85610 PROTHROMBIN TIME: CPT | Performed by: INTERNAL MEDICINE

## 2021-07-29 PROCEDURE — 93793 ANTICOAG MGMT PT WARFARIN: CPT | Performed by: INTERNAL MEDICINE

## 2021-07-29 NOTE — PROGRESS NOTES
Coumadin instructions given per Dr. Addi Marinelli. Pt left will call with instructions and next INR appointment. Printed AVS mailed to pt.

## 2021-07-31 DIAGNOSIS — I10 ESSENTIAL HYPERTENSION: ICD-10-CM

## 2021-08-02 RX ORDER — AMLODIPINE BESYLATE 10 MG/1
TABLET ORAL
Qty: 30 TABLET | Refills: 1 | Status: SHIPPED
Start: 2021-08-02 | End: 2021-09-20 | Stop reason: SDUPTHER

## 2021-08-02 NOTE — TELEPHONE ENCOUNTER
Last Appointment:  2/2021  Future Appointments   Date Time Provider Roberto Patino   8/19/2021  8:00 AM SCHEDULE, SE ACC IM [facility]   9/27/2021  8:00 AM Marlene Smith MD ACC IM [facility]

## 2021-08-18 ENCOUNTER — TELEPHONE (OUTPATIENT)
Dept: INTERNAL MEDICINE | Age: 61
End: 2021-08-18

## 2021-08-18 NOTE — TELEPHONE ENCOUNTER
Left message with pharmacy to fax another form on immunization record. The immunization record is not visible on the one they faxed us.

## 2021-08-23 ENCOUNTER — TELEPHONE (OUTPATIENT)
Dept: INTERNAL MEDICINE | Age: 61
End: 2021-08-23

## 2021-08-31 ENCOUNTER — NURSE ONLY (OUTPATIENT)
Dept: INTERNAL MEDICINE | Age: 61
End: 2021-08-31
Payer: COMMERCIAL

## 2021-08-31 DIAGNOSIS — I82.499 DEEP VEIN THROMBOSIS (DVT) OF OTHER VEIN OF LOWER EXTREMITY, UNSPECIFIED CHRONICITY, UNSPECIFIED LATERALITY (HCC): Primary | ICD-10-CM

## 2021-08-31 LAB
INTERNATIONAL NORMALIZATION RATIO, POC: 3.2
PROTHROMBIN TIME, POC: 37.8

## 2021-08-31 PROCEDURE — 85610 PROTHROMBIN TIME: CPT

## 2021-08-31 NOTE — PATIENT INSTRUCTIONS
- Please hold dose tonight and continue taking 4 mg of coumadin daily.  - Please return in 2 to recheck INR.     Call our office with any questions or concerns [phone]

## 2021-08-31 NOTE — PROGRESS NOTES
Coumadin intructions giver per Dr. Bubba Goddard. Called patient with instructions and next INR appt. AVS mailed to patient.

## 2021-09-11 NOTE — PROGRESS NOTES
Patient instructed to alternate coumadin 3 mg with 4 mg  Repeat inr in 1 week  Bleeding precautions as reviewed  AVS mailed
No

## 2021-09-20 DIAGNOSIS — I10 ESSENTIAL HYPERTENSION: ICD-10-CM

## 2021-09-20 RX ORDER — LOSARTAN POTASSIUM 100 MG/1
100 TABLET ORAL DAILY
Qty: 7 TABLET | Refills: 0 | Status: SHIPPED
Start: 2021-09-20 | End: 2021-09-27 | Stop reason: SDUPTHER

## 2021-09-20 RX ORDER — AMLODIPINE BESYLATE 10 MG/1
10 TABLET ORAL DAILY
Qty: 7 TABLET | Refills: 0 | Status: SHIPPED
Start: 2021-09-20 | End: 2021-09-27 | Stop reason: SDUPTHER

## 2021-09-20 NOTE — TELEPHONE ENCOUNTER
----- Message from Otto Kaur sent at 9/20/2021 10:58 AM EDT -----  Subject: Message to Provider    QUESTIONS  Information for Provider? patient called in states that he is out of Blood   Pressure medication he is scheduled to come in on the 27th please call   patient  ---------------------------------------------------------------------------  --------------  [address]  What is the best way for the office to contact you? OK to leave message on   voicemail,Do not leave any message, patient will call back for answer  Preferred Call Back Phone Number? [phone]  ---------------------------------------------------------------------------  --------------  SCRIPT ANSWERS  Relationship to Patient?  Self Patient presented to the office for annual labs only.

## 2021-09-22 ENCOUNTER — TELEPHONE (OUTPATIENT)
Dept: INTERNAL MEDICINE | Age: 61
End: 2021-09-22

## 2021-09-22 NOTE — TELEPHONE ENCOUNTER
Message left for patient regarding missed appointment for INR and the need to reschedule. Patient notified to call as soon as possible to rescheduled.

## 2021-09-26 ASSESSMENT — ENCOUNTER SYMPTOMS
NAUSEA: 0
ABDOMINAL PAIN: 0
COUGH: 0
BLOOD IN STOOL: 0
VOMITING: 0
CONSTIPATION: 0
BACK PAIN: 0
SHORTNESS OF BREATH: 0
DIARRHEA: 0

## 2021-09-27 ENCOUNTER — OFFICE VISIT (OUTPATIENT)
Dept: INTERNAL MEDICINE | Age: 61
End: 2021-09-27
Payer: COMMERCIAL

## 2021-09-27 VITALS
DIASTOLIC BLOOD PRESSURE: 86 MMHG | OXYGEN SATURATION: 99 % | HEART RATE: 80 BPM | WEIGHT: 169 LBS | TEMPERATURE: 98.4 F | SYSTOLIC BLOOD PRESSURE: 170 MMHG | HEIGHT: 74 IN | RESPIRATION RATE: 16 BRPM | BODY MASS INDEX: 21.69 KG/M2

## 2021-09-27 DIAGNOSIS — Z86.010 ENCOUNTER FOR COLONOSCOPY FOLLOWING COLON POLYP REMOVAL: ICD-10-CM

## 2021-09-27 DIAGNOSIS — J45.909 UNCOMPLICATED ASTHMA, UNSPECIFIED ASTHMA SEVERITY, UNSPECIFIED WHETHER PERSISTENT: ICD-10-CM

## 2021-09-27 DIAGNOSIS — Z12.5 PROSTATE CANCER SCREENING: Primary | ICD-10-CM

## 2021-09-27 DIAGNOSIS — I82.499 DEEP VEIN THROMBOSIS (DVT) OF OTHER VEIN OF LOWER EXTREMITY, UNSPECIFIED CHRONICITY, UNSPECIFIED LATERALITY (HCC): ICD-10-CM

## 2021-09-27 DIAGNOSIS — Z98.890 HX OF ABDOMINAL SURGERY: ICD-10-CM

## 2021-09-27 DIAGNOSIS — I10 ESSENTIAL HYPERTENSION: ICD-10-CM

## 2021-09-27 DIAGNOSIS — Z09 ENCOUNTER FOR COLONOSCOPY FOLLOWING COLON POLYP REMOVAL: ICD-10-CM

## 2021-09-27 DIAGNOSIS — E83.42 HYPOMAGNESEMIA: ICD-10-CM

## 2021-09-27 DIAGNOSIS — D68.61 ANTI-PHOSPHOLIPID ANTIBODY SYNDROME (HCC): ICD-10-CM

## 2021-09-27 LAB
INTERNATIONAL NORMALIZATION RATIO, POC: 1.1
PROTHROMBIN TIME, POC: 13.4

## 2021-09-27 PROCEDURE — 99214 OFFICE O/P EST MOD 30 MIN: CPT | Performed by: INTERNAL MEDICINE

## 2021-09-27 PROCEDURE — G8427 DOCREV CUR MEDS BY ELIG CLIN: HCPCS | Performed by: INTERNAL MEDICINE

## 2021-09-27 PROCEDURE — 99212 OFFICE O/P EST SF 10 MIN: CPT | Performed by: INTERNAL MEDICINE

## 2021-09-27 PROCEDURE — 1036F TOBACCO NON-USER: CPT | Performed by: INTERNAL MEDICINE

## 2021-09-27 PROCEDURE — G8420 CALC BMI NORM PARAMETERS: HCPCS | Performed by: INTERNAL MEDICINE

## 2021-09-27 PROCEDURE — 85610 PROTHROMBIN TIME: CPT | Performed by: INTERNAL MEDICINE

## 2021-09-27 PROCEDURE — 3017F COLORECTAL CA SCREEN DOC REV: CPT | Performed by: INTERNAL MEDICINE

## 2021-09-27 RX ORDER — WARFARIN SODIUM 4 MG/1
4 TABLET ORAL DAILY
Qty: 30 TABLET | Refills: 0 | Status: SHIPPED
Start: 2021-09-27 | End: 2021-10-08 | Stop reason: SDUPTHER

## 2021-09-27 RX ORDER — ALBUTEROL SULFATE 90 UG/1
2 AEROSOL, METERED RESPIRATORY (INHALATION) EVERY 6 HOURS PRN
Qty: 1 EACH | Refills: 2 | Status: SHIPPED
Start: 2021-09-27 | End: 2022-03-25 | Stop reason: SDUPTHER

## 2021-09-27 RX ORDER — LOSARTAN POTASSIUM 100 MG/1
100 TABLET ORAL DAILY
Qty: 30 TABLET | Refills: 1 | Status: SHIPPED
Start: 2021-09-27 | End: 2021-11-22

## 2021-09-27 RX ORDER — AMLODIPINE BESYLATE 10 MG/1
10 TABLET ORAL DAILY
Qty: 30 TABLET | Refills: 1 | Status: SHIPPED
Start: 2021-09-27 | End: 2022-03-25 | Stop reason: SDUPTHER

## 2021-09-27 RX ORDER — DOXYCYCLINE 100 MG/1
100 CAPSULE ORAL DAILY
Qty: 60 CAPSULE | Refills: 1 | Status: SHIPPED
Start: 2021-09-27 | End: 2022-02-03

## 2021-09-27 RX ORDER — M-VIT,TX,IRON,MINS/CALC/FOLIC 27MG-0.4MG
1 TABLET ORAL DAILY
Qty: 90 TABLET | Refills: 1 | Status: SHIPPED
Start: 2021-09-27 | End: 2022-03-25 | Stop reason: SDUPTHER

## 2021-09-27 RX ORDER — MAGNESIUM OXIDE 400 MG/1
400 TABLET ORAL DAILY
Qty: 90 TABLET | Refills: 1 | Status: SHIPPED
Start: 2021-09-27 | End: 2022-03-25 | Stop reason: SDUPTHER

## 2021-09-27 SDOH — ECONOMIC STABILITY: FOOD INSECURITY: WITHIN THE PAST 12 MONTHS, THE FOOD YOU BOUGHT JUST DIDN'T LAST AND YOU DIDN'T HAVE MONEY TO GET MORE.: NEVER TRUE

## 2021-09-27 SDOH — ECONOMIC STABILITY: FOOD INSECURITY: WITHIN THE PAST 12 MONTHS, YOU WORRIED THAT YOUR FOOD WOULD RUN OUT BEFORE YOU GOT MONEY TO BUY MORE.: NEVER TRUE

## 2021-09-27 ASSESSMENT — PATIENT HEALTH QUESTIONNAIRE - PHQ9
2. FEELING DOWN, DEPRESSED OR HOPELESS: 0
SUM OF ALL RESPONSES TO PHQ QUESTIONS 1-9: 0
SUM OF ALL RESPONSES TO PHQ9 QUESTIONS 1 & 2: 0
SUM OF ALL RESPONSES TO PHQ QUESTIONS 1-9: 0
SUM OF ALL RESPONSES TO PHQ QUESTIONS 1-9: 0
1. LITTLE INTEREST OR PLEASURE IN DOING THINGS: 0

## 2021-09-27 ASSESSMENT — SOCIAL DETERMINANTS OF HEALTH (SDOH): HOW HARD IS IT FOR YOU TO PAY FOR THE VERY BASICS LIKE FOOD, HOUSING, MEDICAL CARE, AND HEATING?: NOT HARD AT ALL

## 2021-09-27 NOTE — PROGRESS NOTES
Paola Lee (:  1960) is a 63 y.o. male,Established patient, here for evaluation of the following chief complaint(s): Established New Doctor, Office Visit for Anticoagulation Management, Hypertension, Medication Refill, and Hyperlipidemia         ASSESSMENT/PLAN:  Hypercoagulable state d/t APLA in the setting of SLE with Hx DVT  -currently on warfarin 4 mg daily  -goal of 2.50-3. 5 per [facility] vascular surgery  -INR 21:  3.2  -INR today is 1.1  -pt states that he might have missed a couple of doses  Plan  -bridge with enoxaparin for now  -advised to take warfarin 8 mg today, then 4 mg daily  -repeat INR on Oct 1, 2021    PAD  -Follows with [facility], last visit on 21  -stopped smoking   -s/p multiple vascular procedures  -RHINA R ankle-0.54; R toe 0.31          L ankle-0.83; L toe 0.54  -no complaints of progressive or new claudication  Plan  -continue clopidogrel and atorvastatin  -follow-up with 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 and infection  -on suppression antibiotic (doxycycline monohydrate)    AAA, s/p repair with chronic abdominal graft infection  -continue chronic suppression with doxycycline  -follow[facility]up with [facility]    HLD  -continue atorvastatin 80 mg daily    Hypertension  -BP at home: 160`s, checks occasionaly  -BP today at 150/80-->140/70  -was not able to take amlodipine and losartan this morning  -denies dizziness, lightheadedness, no symptoms of orthostasis  -on amlodipine 10 mg daily  -losartan was increased to 100 mg daily from last visit  -dcd lisinopril  because of cough  -atorvastatin 80 mg daily  -The 10-year ASCVD risk score (Ana Maria Mason., et al., 2013) is: 13%  Plan:  -continue amlodipine and losartan  -ordered CBC, CMP    Asthma  -not in exacerbation  -uses inhaler sparingly  Plan  -continue albuterol sulfate HFA prn for wheezing    DVT  -s/p thrombectomy on the left side 10/2018  -on warfarin 4 mg daily  -INR today 1.1  -patient says he`s been taking his meds, but he might have missed a couple of doses  Plan  -advised to take warfarin 8 mg today (2 tablets of warfarin 4 mg)  -continue with warfarin 4 mg daily starting tomorrow  -explained the need for bridging because of his extensive history  -started on enoxaparin 80 mg sc BID  -rpt INR Oct 1, 2021, Friday    Health maintenance  1. Colon cancer screening colonoscopy      -referred to General Surgery  2. Prostate cancer screening      -ordered PSA      No follow-ups on file. Subjective   SUBJECTIVE/OBJECTIVE:  John Locke is a 59 yo M with PMH PVD, HTN, HLP, Hypercoagulable state with antiphospholipid syndrome, AAA repair complicated by graft infection. presented to the [facility] [place] for a routine visit. Last INR 8/31/21 = 3.2  INR today 1.1  -states that he might have missed a couple of doses of his warfarin  -denies dyspnea, chest pain, signs of bleeding  -denies new or worsening claudication from his baseline, denies constitutional symptoms  -was last seen at [address] [place] [address] clinic last August. Follows-up with them every 3 months  -denies symptoms of orthostasis, headache, lightheadedness, syncope  -was not able to take BP meds this morning          Review of Systems   Constitutional: Negative for chills and fever. HENT: Negative for congestion. Respiratory: Negative for cough and shortness of breath. Cardiovascular: Negative for chest pain, palpitations and leg swelling. Gastrointestinal: Negative for abdominal pain, blood in stool, constipation, diarrhea, nausea and vomiting. Musculoskeletal: Negative for back pain and myalgias. Skin: Negative for rash. Neurological: Negative for dizziness and headaches. Psychiatric/Behavioral: The patient is not nervous/anxious. Objective   Physical Exam  Constitutional:       General: He is not in acute distress. Appearance: He is well-developed. HENT:      Head: Normocephalic and atraumatic. Eyes:      General: No scleral icterus. Conjunctiva/sclera: Conjunctivae normal.   Neck:      Trachea: No tracheal deviation. Cardiovascular:      Rate and Rhythm: Normal rate. Heart sounds: No murmur heard. Pulmonary:      Effort: Pulmonary effort is normal. No respiratory distress. Breath sounds: Normal breath sounds. Abdominal:      General: Bowel sounds are normal. There is no distension. Palpations: Abdomen is soft. Tenderness: There is no abdominal tenderness. Musculoskeletal:         General: Normal range of motion. Cervical back: Normal range of motion. Skin:     General: Skin is warm and dry. Neurological:      Mental Status: He is alert and oriented to person, place, and time. Psychiatric:         Behavior: Behavior normal.         Thought Content: Thought content normal.         Judgment: Judgment normal.            On this date 9/27/2021 I have spent 20 minutes reviewing previous notes, test results and face to face with the patient discussing the diagnosis and importance of compliance with the treatment plan as well as documenting on the day of the visit. An electronic signature was used to authenticate this note.     --Satish Caro MD

## 2021-09-27 NOTE — PATIENT INSTRUCTIONS
Patient Education        Preventing Falls: Care Instructions  Your Care Instructions     Getting around your home safely can be a challenge if you have injuries or health problems that make it easy for you to fall. Loose rugs and furniture in walkways are among the dangers for many older people who have problems walking or who have poor eyesight. People who have conditions such as arthritis, osteoporosis, or dementia also have to be careful not to fall. You can make your home safer with a few simple measures. Follow-up care is a key part of your treatment and safety. Be sure to make and go to all appointments, and call your doctor if you are having problems. It's also a good idea to know your test results and keep a list of the medicines you take. How can you care for yourself at home? Taking care of yourself  · You may get dizzy if you do not drink enough water. To prevent dehydration, drink plenty of fluids. Choose water and other clear liquids. If you have kidney, heart, or liver disease and have to limit fluids, talk with your doctor before you increase the amount of fluids you drink. · Exercise regularly to improve your strength, muscle tone, and balance. Walk if you can. Swimming may be a good choice if you cannot walk easily. · Have your vision and hearing checked each year or any time you notice a change. If you have trouble seeing and hearing, you might not be able to avoid objects and could lose your balance. · Know the side effects of the medicines you take. Ask your doctor or pharmacist whether the medicines you take can affect your balance. Sleeping pills or sedatives can affect your balance. · Limit the amount of alcohol you drink. Alcohol can impair your balance and other senses. · Ask your doctor whether calluses or corns on your feet need to be removed. If you wear loose-fitting shoes because of calluses or corns, you can lose your balance and fall.   · Talk to your doctor if you have numbness in your feet. Preventing falls at home  · Remove raised doorway thresholds, throw rugs, and clutter. Repair loose carpet or raised areas in the floor. · Move furniture and electrical cords to keep them out of walking paths. · Use nonskid floor wax, and wipe up spills right away, especially on ceramic tile floors. · If you use a walker or cane, put rubber tips on it. If you use crutches, clean the bottoms of them regularly with an abrasive pad, such as steel wool. · Keep your house well lit, especially [place], and outside walkways. Use night-lights in areas such as hallways and bathrooms. Add extra light switches or use remote switches (such as switches that go on or off when you clap your hands) to make it easier to turn lights on if you have to get up during the night. · Install sturdy handrails on stairways. · Move items in your cabinets so that the things you use a lot are on the lower shelves (about waist level). · Keep a cordless phone and a flashlight with new batteries by your bed. If possible, put a phone in each of the main rooms of your house, or carry a cell phone in case you fall and cannot reach a phone. Or, you can wear a device around your neck or wrist. You push a button that sends a signal for help. · Wear low-heeled shoes that fit well and give your feet good support. Use footwear with nonskid soles. Check the heels and soles of your shoes for wear. Repair or replace worn heels or soles. · Do not wear socks without shoes on wood floors. · Walk on the grass when the sidewalks are slippery. If you live in an area that gets snow and ice in the winter, sprinkle salt on slippery steps and sidewalks. Preventing falls in the bath  · Install grab bars and nonskid mats inside and outside your shower or tub and near the toilet and sinks. · Use shower chairs and bath benches. · Use a hand-held shower head that will allow you to sit while showering.   · Get into a tub or shower by putting the weaker leg in first. Get out of a tub or shower with your strong side first.  · Repair loose toilet seats and consider installing a raised toilet seat to make getting on and off the toilet easier. · Keep your bathroom door unlocked while you are in the shower. Where can you learn more? Go to https://chpepiceweb.Citymart - [employer] to transform cities. org and sign in to your Goojett account. Enter 0476 [id number] [phone] in the [facility] box to learn more about \"Preventing Falls: Care Instructions. \"     If you do not have an account, please click on the \"Sign Up Now\" link. Current as of: December 7, 2020               Content Version: 13.0  © 9044-4988 Healthwise, Incorporated. Care instructions adapted under license by [facility] ([facility]). If you have questions about a medical condition or this instruction, always ask your healthcare professional. [place] [address] any warranty or liability for your use of this information. 1. Please take warfarin 4 mg tablet, 2 tablets today. (Total of 8 mg tablet). 2. Take warfarin 4 mg tablet daily starting tomorrow. 3. Inject subcutaneously enoxaparin 80 mg twice a day for bridging. 4. Follow-up this Friday, October 1, 2021 for repeat INR. 5. Avoid green, leafy vegetables.

## 2021-09-27 NOTE — PROGRESS NOTES
poct inr 1.1  Patient discharged per [facility]   AVS given  Printed prescription for labwork given  Spoke with [facility] pharmacist [phone] ,who states patient is up to date with all medications and has picked up on time.

## 2021-10-01 ENCOUNTER — NURSE ONLY (OUTPATIENT)
Dept: INTERNAL MEDICINE | Age: 61
End: 2021-10-01
Payer: COMMERCIAL

## 2021-10-01 VITALS
SYSTOLIC BLOOD PRESSURE: 130 MMHG | RESPIRATION RATE: 16 BRPM | HEART RATE: 80 BPM | OXYGEN SATURATION: 99 % | DIASTOLIC BLOOD PRESSURE: 78 MMHG | TEMPERATURE: 98 F

## 2021-10-01 DIAGNOSIS — I73.9 PAD (PERIPHERAL ARTERY DISEASE) (HCC): ICD-10-CM

## 2021-10-01 DIAGNOSIS — I82.499 DEEP VEIN THROMBOSIS (DVT) OF OTHER VEIN OF LOWER EXTREMITY, UNSPECIFIED CHRONICITY, UNSPECIFIED LATERALITY (HCC): Primary | ICD-10-CM

## 2021-10-01 LAB
INTERNATIONAL NORMALIZATION RATIO, POC: 2
PROTHROMBIN TIME, POC: 23.6

## 2021-10-01 PROCEDURE — 85610 PROTHROMBIN TIME: CPT | Performed by: INTERNAL MEDICINE

## 2021-10-01 PROCEDURE — 93793 ANTICOAG MGMT PT WARFARIN: CPT | Performed by: INTERNAL MEDICINE

## 2021-10-01 RX ORDER — ATORVASTATIN CALCIUM 80 MG/1
80 TABLET, FILM COATED ORAL DAILY
Qty: 90 TABLET | Refills: 0 | Status: SHIPPED
Start: 2021-10-01 | End: 2022-01-21 | Stop reason: SDUPTHER

## 2021-10-01 NOTE — PATIENT INSTRUCTIONS
Stop lovenox  Continue 4 mg coumadin daily  Repeat INR in 1 week  Bleeding precautions as reviewed  Please call if any questions or concerns  Patient Education        Taking Warfarin Safely: Care Instructions  Your Care Instructions     Warfarin is a medicine that you take to prevent blood clots. It is often called a blood thinner. Doctors give warfarin (such as Coumadin) to reduce the risk of blood clots. You may be at risk for blood clots if you have atrial fibrillation or deep vein thrombosis. Some other health problems may also put you at risk. Warfarin slows the amount of time it takes for your blood to clot. It can cause bleeding problems. Even if you've been taking warfarin for a while, it's important to know how to take it safely. Foods and other medicines can affect the way warfarin works. Some can make warfarin work too well. This can cause bleeding problems. And some can make it work poorly, so that it does not prevent blood clots very well. You will need regular blood tests to check how long it takes for your blood to form a clot. This test is called a PT or prothrombin time test. The result of the test is called an INR level. Depending on the test results, your doctor or anticoagulation clinic may adjust your dose of warfarin. Follow-up care is a key part of your treatment and safety. Be sure to make and go to all appointments, and call your doctor if you are having problems. It's also a good idea to know your test results and keep a list of the medicines you take. How can you care for yourself at home? Take warfarin safely  · Take your warfarin at the same time each day. · If you miss a dose of warfarin, don't take an extra dose to make up for it. Your doctor can tell you exactly what to do so you don't take too much or too little. · Wear medical alert jewelry that lets others know that you take warfarin. You can buy this at most drugstores.   · Don't take warfarin if you are pregnant or planning to get pregnant. Talk to your doctor about how you can prevent getting pregnant while you are taking it. · Don't change your dose or stop taking warfarin unless your doctor tells you to. Effects of medicines and food on warfarin  · Don't start or stop taking any medicines, vitamins, or natural remedies unless you first talk to your doctor. Many medicines can affect how warfarin works. These include aspirin and other pain relievers, over-the-counter medicines, multivitamins, dietary supplements, and herbal products. · Tell all of your doctors and pharmacists that you take warfarin. Some prescription medicines can affect how warfarin works. · Keep the amount of vitamin K in your diet about the same from day to day. Do not suddenly eat a lot more or a lot less food that is rich in vitamin K than you usually do. Vitamin K affects how warfarin works and how your blood clots. Talk with your doctor before making big changes in your diet. Foods that have a lot of vitamin K include cooked green vegetables, such as:  ? Kale, spinach, turnip greens, josé greens, Swiss chard, and mustard greens. ? [place] sprouts, broccoli, and cabbage. · Limit your use of alcohol. Avoid bleeding by preventing falls and injuries  · Wear slippers or shoes with nonskid soles. · Remove throw rugs and clutter. · Rearrange furniture and electrical cords to keep them out of walking paths. · Keep stairways, porches, and outside walkways well lit. Use night-lights in hallways and bathrooms. · Be extra careful when you work with sharp tools or knives. When should you call for help? Call 911 anytime you think you may need emergency care. For example, call if:    · You have a sudden, severe headache that is different from past headaches. Call your doctor now or seek immediate medical care if:    · You have any abnormal bleeding, such as:  ? Nosebleeds.   ? Vaginal bleeding that is different (heavier, more frequent, at a different time of the month) than what you are used to.  ? Bloody or black stools, or rectal bleeding. ? Bloody or pink urine. Watch closely for changes in your health, and be sure to contact your doctor if you have any problems. Where can you learn more? Go to https://chpepiceweb.Catalyst Mobile. org and sign in to your Right Hemisphere account. Enter W141 in the TIMPIK box to learn more about \"Taking Warfarin Safely: Care Instructions. \"     If you do not have an account, please click on the \"Sign Up Now\" link. Current as of: April 29, 2021               Content Version: 13.0  © 6056-0365 Healthwise, Incorporated. Care instructions adapted under license by [facility] ([facility]). If you have questions about a medical condition or this instruction, always ask your healthcare professional. [address] any warranty or liability for your use of this information.

## 2021-10-01 NOTE — PROGRESS NOTES
Patient instructed to stop lovenox  Patient instructed to continue coumadin 4 mg daily  Patient instructed to take blood pressure medication as prescribe. Patient brought all medications with him, medications discussed and reviewed. Patient did not have paxil, states he will check at home.   Patient instructed to rtc in 1 week  Bleeding precautions reviewed

## 2021-10-08 ENCOUNTER — NURSE ONLY (OUTPATIENT)
Dept: INTERNAL MEDICINE | Age: 61
End: 2021-10-08
Payer: COMMERCIAL

## 2021-10-08 DIAGNOSIS — I82.499 DEEP VEIN THROMBOSIS (DVT) OF OTHER VEIN OF LOWER EXTREMITY, UNSPECIFIED CHRONICITY, UNSPECIFIED LATERALITY (HCC): Primary | ICD-10-CM

## 2021-10-08 LAB
INTERNATIONAL NORMALIZATION RATIO, POC: 2.4
PROTHROMBIN TIME, POC: 28.5

## 2021-10-08 PROCEDURE — 85610 PROTHROMBIN TIME: CPT | Performed by: INTERNAL MEDICINE

## 2021-10-08 PROCEDURE — 93793 ANTICOAG MGMT PT WARFARIN: CPT | Performed by: INTERNAL MEDICINE

## 2021-10-08 RX ORDER — WARFARIN SODIUM 4 MG/1
4 TABLET ORAL DAILY
Qty: 30 TABLET | Refills: 0 | Status: SHIPPED
Start: 2021-10-08 | End: 2022-01-21 | Stop reason: SDUPTHER

## 2021-10-08 NOTE — PROGRESS NOTES
Message left for patient to continue coumadin 4 mg daily  Repeat INR in 1 week [10/15/21]  Bleeding precautions reviewed  AVS mailed

## 2021-10-08 NOTE — PATIENT INSTRUCTIONS
Continue coumadin 4 mg daily  Repeat INR in 1 week [10/15/21]  Bleeding precautions as reviewed  Please call if any questions or concerns  Patient Education        Taking Warfarin Safely: Care Instructions  Your Care Instructions     Warfarin is a medicine that you take to prevent blood clots. It is often called a blood thinner. Doctors give warfarin (such as Coumadin) to reduce the risk of blood clots. You may be at risk for blood clots if you have atrial fibrillation or deep vein thrombosis. Some other health problems may also put you at risk. Warfarin slows the amount of time it takes for your blood to clot. It can cause bleeding problems. Even if you've been taking warfarin for a while, it's important to know how to take it safely. Foods and other medicines can affect the way warfarin works. Some can make warfarin work too well. This can cause bleeding problems. And some can make it work poorly, so that it does not prevent blood clots very well. You will need regular blood tests to check how long it takes for your blood to form a clot. This test is called a PT or prothrombin time test. The result of the test is called an INR level. Depending on the test results, your doctor or anticoagulation clinic may adjust your dose of warfarin. Follow-up care is a key part of your treatment and safety. Be sure to make and go to all appointments, and call your doctor if you are having problems. It's also a good idea to know your test results and keep a list of the medicines you take. How can you care for yourself at home? Take warfarin safely  · Take your warfarin at the same time each day. · If you miss a dose of warfarin, don't take an extra dose to make up for it. Your doctor can tell you exactly what to do so you don't take too much or too little. · Wear medical alert jewelry that lets others know that you take warfarin. You can buy this at most drugstores.   · Don't take warfarin if you are pregnant or planning to get pregnant. Talk to your doctor about how you can prevent getting pregnant while you are taking it. · Don't change your dose or stop taking warfarin unless your doctor tells you to. Effects of medicines and food on warfarin  · Don't start or stop taking any medicines, vitamins, or natural remedies unless you first talk to your doctor. Many medicines can affect how warfarin works. These include aspirin and other pain relievers, over-the-counter medicines, multivitamins, dietary supplements, and herbal products. · Tell all of your doctors and pharmacists that you take warfarin. Some prescription medicines can affect how warfarin works. · Keep the amount of vitamin K in your diet about the same from day to day. Do not suddenly eat a lot more or a lot less food that is rich in vitamin K than you usually do. Vitamin K affects how warfarin works and how your blood clots. Talk with your doctor before making big changes in your diet. Foods that have a lot of vitamin K include cooked green vegetables, such as:  ? Kale, spinach, turnip greens, josé greens, Swiss chard, and mustard greens. ? [place] sprouts, broccoli, and cabbage. · Limit your use of alcohol. Avoid bleeding by preventing falls and injuries  · Wear slippers or shoes with nonskid soles. · Remove throw rugs and clutter. · Rearrange furniture and electrical cords to keep them out of walking paths. · Keep stairways, porches, and outside walkways well lit. Use night-lights in hallways and bathrooms. · Be extra careful when you work with sharp tools or knives. When should you call for help? Call 911 anytime you think you may need emergency care. For example, call if:    · You have a sudden, severe headache that is different from past headaches. Call your doctor now or seek immediate medical care if:    · You have any abnormal bleeding, such as:  ? Nosebleeds.   ? Vaginal bleeding that is different (heavier, more frequent, at a different time

## 2021-10-15 ENCOUNTER — NURSE ONLY (OUTPATIENT)
Dept: INTERNAL MEDICINE | Age: 61
End: 2021-10-15
Payer: COMMERCIAL

## 2021-10-15 VITALS — TEMPERATURE: 97.9 F

## 2021-10-15 DIAGNOSIS — I82.499 DEEP VEIN THROMBOSIS (DVT) OF OTHER VEIN OF LOWER EXTREMITY, UNSPECIFIED CHRONICITY, UNSPECIFIED LATERALITY (HCC): Primary | ICD-10-CM

## 2021-10-15 LAB
INTERNATIONAL NORMALIZATION RATIO, POC: 3.2
PROTHROMBIN TIME, POC: 37.8

## 2021-10-15 PROCEDURE — 85610 PROTHROMBIN TIME: CPT | Performed by: INTERNAL MEDICINE

## 2021-10-15 PROCEDURE — 93793 ANTICOAG MGMT PT WARFARIN: CPT | Performed by: INTERNAL MEDICINE

## 2021-10-15 NOTE — PATIENT INSTRUCTIONS
Please continue on the same dose of Coumadin 4 mg daily. Recheck in 1 week. Call the office if you have any problems or concerns [phone].      Thank you

## 2021-10-15 NOTE — PROGRESS NOTES
INR results reviewed with Dr. Bert Hector and orders received. Patient is to continue on the same dose of Coumadin 4 mg daily. 1 week recheck. Patient verbalized understanding. Pt notified via phone of same. AVS mailed to patient.

## 2021-10-15 NOTE — PROGRESS NOTES
Hx of DVT, extensive vascular hx  INR today is 3.3. Continue 4 mg daily. Multiple periods of subtherapeutic INR on current dosage. Rpt INR in 1 week.    Electronically signed by Fabienne Guido DO on 10/15/2021 at 11:41 AM

## 2021-10-22 ENCOUNTER — NURSE ONLY (OUTPATIENT)
Dept: INTERNAL MEDICINE | Age: 61
End: 2021-10-22
Payer: COMMERCIAL

## 2021-10-22 DIAGNOSIS — I82.499 DEEP VEIN THROMBOSIS (DVT) OF OTHER VEIN OF LOWER EXTREMITY, UNSPECIFIED CHRONICITY, UNSPECIFIED LATERALITY (HCC): Primary | ICD-10-CM

## 2021-10-22 LAB
INTERNATIONAL NORMALIZATION RATIO, POC: 1.6
PROTHROMBIN TIME, POC: 19.7

## 2021-10-22 PROCEDURE — 93793 ANTICOAG MGMT PT WARFARIN: CPT | Performed by: INTERNAL MEDICINE

## 2021-10-22 PROCEDURE — 85610 PROTHROMBIN TIME: CPT | Performed by: INTERNAL MEDICINE

## 2021-10-22 NOTE — PROGRESS NOTES
INR results reviewed with Dr. Ana Ochoa and orders received. VM message left for patient for Coumadin instructions along with date and time of follow up appointment. AVS mailed to patient. Handicap parking letter also mailed to patient.

## 2021-10-22 NOTE — LETTER
[facility] Internal Medicine  [address] [place] [address]  Phone: [phone]  Fax: [phone]         October 22, 2021     Patient: Sandra Moreland   YOB: 1960   Date of Visit: 10/22/2021         To Whom It May Concern: It is my medical opinion that Sandra Moreland requires a disability parking placard for the following reasons: vascular condition  Duration of need: permanent    If you have any questions or concerns, please don't hesitate to call. Sincerely,        Blake Cohen MD  Internal Medicine  10/22/2021 8:20 AM

## 2021-10-29 ENCOUNTER — NURSE ONLY (OUTPATIENT)
Dept: INTERNAL MEDICINE | Age: 61
End: 2021-10-29
Payer: COMMERCIAL

## 2021-10-29 ENCOUNTER — ANTI-COAG VISIT (OUTPATIENT)
Dept: INTERNAL MEDICINE CLINIC | Age: 61
End: 2021-10-29

## 2021-10-29 DIAGNOSIS — I82.499 DEEP VEIN THROMBOSIS (DVT) OF OTHER VEIN OF LOWER EXTREMITY, UNSPECIFIED CHRONICITY, UNSPECIFIED LATERALITY (HCC): Primary | ICD-10-CM

## 2021-10-29 LAB
INTERNATIONAL NORMALIZATION RATIO, POC: 2.8
PROTHROMBIN TIME, POC: 34

## 2021-10-29 PROCEDURE — 93793 ANTICOAG MGMT PT WARFARIN: CPT | Performed by: INTERNAL MEDICINE

## 2021-10-29 PROCEDURE — 85610 PROTHROMBIN TIME: CPT | Performed by: INTERNAL MEDICINE

## 2021-11-05 ENCOUNTER — NURSE ONLY (OUTPATIENT)
Dept: INTERNAL MEDICINE | Age: 61
End: 2021-11-05
Payer: COMMERCIAL

## 2021-11-05 DIAGNOSIS — I82.499 DEEP VEIN THROMBOSIS (DVT) OF OTHER VEIN OF LOWER EXTREMITY, UNSPECIFIED CHRONICITY, UNSPECIFIED LATERALITY (HCC): Primary | ICD-10-CM

## 2021-11-05 LAB
INTERNATIONAL NORMALIZATION RATIO, POC: 2.6
PROTHROMBIN TIME, POC: 31.6

## 2021-11-05 PROCEDURE — 85610 PROTHROMBIN TIME: CPT | Performed by: INTERNAL MEDICINE

## 2021-11-05 PROCEDURE — 93793 ANTICOAG MGMT PT WARFARIN: CPT | Performed by: INTERNAL MEDICINE

## 2021-11-05 NOTE — PATIENT INSTRUCTIONS
Thank you for coming in today to have your INR checked. Please take your Coumadin as follows:  Coumadin 4 mg daily. We will recheck your INR at your next office visit in one month. Your appointment is 12/10/21 at 1:00 pm  Please call if you have any unusual bleeding or any concerns.

## 2021-11-05 NOTE — PROGRESS NOTES
Attempted to contact patient. No answer. Voicemail full. Will try again.   Haylee Bustillo LPN     77/5/92 [id number]:  Spoke with patient and related instruction of Coumadin 4 mg daily, checking INR next appointment on 12/10/21 at 1:00 pm  Haylee Bustillo LPN

## 2021-11-05 NOTE — PROGRESS NOTES
Patient denies forgetting any doses. States current dose is Coumadin 4 mg daily. Denies any new medications or drug therapies, no other non-prescription drugs not already know to doctor, no changes to eating habits or drinking habits, no unusual bleeding or bruising. A full discussion of the nature of anticoagulants has been carried out. A benefit risk analysis has been presented to the patient, so that they understand the justification for choosing anticoagulation at this time. The need for frequent and regular monitoring, precise dosage adjustment and compliance is stressed. Side effects of potential bleeding are discussed. The patient should avoid any OTC items containing aspirin or ibuprofen, and should avoid great swings in general diet. Avoid alcohol consumption. Call if any signs of abnormal bleeding.     Arturo Vergara LPN

## 2021-11-05 NOTE — PROGRESS NOTES
Hx of DVT, extensive vascular hx. INR 2.6. Take Coumadin 4 mg daily. Repeat INR in 2 week.   Electronically signed by [address], DO on 11/5/2021 at 9:00 AM

## 2021-11-21 DIAGNOSIS — I10 ESSENTIAL HYPERTENSION: ICD-10-CM

## 2021-11-22 NOTE — TELEPHONE ENCOUNTER
Last Appointment:  9/27/2021  Future Appointments   Date Time Provider Roberto Patino   12/10/2021  1:00 PM Governor MD Kd [facility]

## 2021-11-23 RX ORDER — LOSARTAN POTASSIUM 100 MG/1
TABLET ORAL
Qty: 30 TABLET | Refills: 0 | Status: SHIPPED
Start: 2021-11-23 | End: 2021-12-21

## 2021-12-10 ENCOUNTER — OFFICE VISIT (OUTPATIENT)
Dept: INTERNAL MEDICINE | Age: 61
End: 2021-12-10
Payer: COMMERCIAL

## 2021-12-10 VITALS
RESPIRATION RATE: 20 BRPM | HEIGHT: 74 IN | SYSTOLIC BLOOD PRESSURE: 147 MMHG | DIASTOLIC BLOOD PRESSURE: 85 MMHG | OXYGEN SATURATION: 96 % | HEART RATE: 95 BPM | WEIGHT: 164 LBS | BODY MASS INDEX: 21.05 KG/M2 | TEMPERATURE: 98.2 F

## 2021-12-10 DIAGNOSIS — Z79.01 CHRONIC ANTICOAGULATION: Chronic | ICD-10-CM

## 2021-12-10 DIAGNOSIS — I82.499 DEEP VEIN THROMBOSIS (DVT) OF OTHER VEIN OF LOWER EXTREMITY, UNSPECIFIED CHRONICITY, UNSPECIFIED LATERALITY (HCC): Primary | ICD-10-CM

## 2021-12-10 DIAGNOSIS — Z00.00 HEALTHCARE MAINTENANCE: ICD-10-CM

## 2021-12-10 DIAGNOSIS — I10 ESSENTIAL HYPERTENSION: ICD-10-CM

## 2021-12-10 LAB
INTERNATIONAL NORMALIZATION RATIO, POC: 3.8
PROTHROMBIN TIME, POC: 45.8

## 2021-12-10 PROCEDURE — 1036F TOBACCO NON-USER: CPT | Performed by: INTERNAL MEDICINE

## 2021-12-10 PROCEDURE — 85610 PROTHROMBIN TIME: CPT | Performed by: INTERNAL MEDICINE

## 2021-12-10 PROCEDURE — G8482 FLU IMMUNIZE ORDER/ADMIN: HCPCS | Performed by: INTERNAL MEDICINE

## 2021-12-10 PROCEDURE — 3017F COLORECTAL CA SCREEN DOC REV: CPT | Performed by: INTERNAL MEDICINE

## 2021-12-10 PROCEDURE — 99212 OFFICE O/P EST SF 10 MIN: CPT | Performed by: INTERNAL MEDICINE

## 2021-12-10 PROCEDURE — 99213 OFFICE O/P EST LOW 20 MIN: CPT | Performed by: INTERNAL MEDICINE

## 2021-12-10 PROCEDURE — G8427 DOCREV CUR MEDS BY ELIG CLIN: HCPCS | Performed by: INTERNAL MEDICINE

## 2021-12-10 PROCEDURE — G8420 CALC BMI NORM PARAMETERS: HCPCS | Performed by: INTERNAL MEDICINE

## 2021-12-10 RX ORDER — GABAPENTIN 400 MG/1
CAPSULE ORAL
COMMUNITY
Start: 2021-11-26

## 2021-12-10 NOTE — PROGRESS NOTES
Patient denies forgetting any doses. States current dose is Coumadin 4 mg daily. Denies any new medications or drug therapies, no other non-prescription drugs not already know to doctor, no changes to eating habits or drinking habits, no unusual bleeding or bruising. A full discussion of the nature of anticoagulants has been carried out. A benefit risk analysis has been presented to the patient, so that they understand the justification for choosing anticoagulation at this time. The need for frequent and regular monitoring, precise dosage adjustment and compliance is stressed. Side effects of potential bleeding are discussed. The patient should avoid any OTC items containing aspirin or ibuprofen, and should avoid great swings in general diet. Avoid alcohol consumption. Call if any signs of abnormal bleeding.      +++++++++++++++++++++++++++++++++++++++++++++++  Patient has not knowingly had unprotected exposire to anyone positive for COVID-10 within the last 14 days DENIES    AND does not have the following signs or symptoms:    A. One of the followin. Fever greater than 100.0 F NEGATIVE       2. Cough NEGATIVE       3. New onset shortness of breath NEGATIVE       4. New onset difficulty breathing NEGATIVE    AND/OR    B. Two or more of the following criteria:        1. Muscle aches NEGATIVE       2. Headache NEGATIVE       3. Sore Throat NEGATIVE       4. New onset loss of smell or taste NEGATIVE       5. New onset diarrhea NEGATIVE       6. Chills NEGATIVE       7. Runny nose NEGATIVE       8. Sneezing NEGATIVE  +++++++++++++++++++++++++++++++++++++++++++++++++  States he wants an excuse from serving on a jury.  States he \"can't sit that way for hours on end.\"  +++++++++++++++++++++++++++++++++++++++++++++++++  AVS and lab/xray orders printed and patient discharged by Dr. Sabina Rich

## 2021-12-10 NOTE — PATIENT INSTRUCTIONS
COVID-19 vaccine appointments are not available through our practice. As you're eligible to receive the COVID-19 vaccine, as determined by your state's department of health, you will be able to schedule an appointment through [address] or by calling [phone]. Appointments are required to receive the COVID-19 vaccine. As vaccine supply continues to be limited, we anticipate open appointments to fill up quickly and appreciate your patience as we work through the process of providing vaccines to those in our communities. 1. Continue to take 4mg Coumadin  2.  Return in 2 weeks for INR check

## 2021-12-10 NOTE — PROGRESS NOTES
[facility] [address]  Internal Medicine Residency Program  [facility] Note      SUBJECTIVE:  CC: had concerns including Anticoagulation, Hypertension, and Other (excuse for jury duty). 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 presenting to [facility] for f/u    Last seen 02/17/21--> 09/27/21    The following were discussed:  Hypercoagulable state d/t APLA in the setting of SLE with Hx DVT   -Goal 2.5-3.5 per [facility] vascular surgery  -last INR: 3.8  Coumadin 4mg daily  -will have him f/u at the clinic in 2 weeks for INR check, no changes today     PAD  -Follows with [facility], s/p thrombectomy on the left side 10/2018  -Currently on Plavix, Lipitor    HLD  -Lipid panel UTD as of 2020  -Lipitor 40mg, CMP no transaminitis 11/20  -needs a new lipid panel    The 10-year ASCVD risk score (Christopher Chavez., et al., 2013) is: 12.8%    Values used to calculate the score:      Age: 57 years      Sex: Male      Is Non- : No      Diabetic: No      Tobacco smoker: No      Systolic Blood Pressure: 480 mmHg      Is BP treated: Yes      HDL Cholesterol: 40 mg/dL      Total Cholesterol: 139 mg/dL       Hypertension:  -BP is 147/85 , no orthostasis  -Currently on Amlodipine 10 mg, Losartan 100mg daily, have advised lifestyle modifications instead of uptitration,as the patient does seem to be overwhelmed with the medications that he is taking.  Is not able to fully remember and recollect which medications he is taking.       AAA, s/p repair with chronic abdominal graft infection  -On chronic suppression with Doxycycline     Depression  -Depressed once in a while, sleep and appetite ok   -No SI, HI   -Remains on Paxil 10mg daily, tolerating well with improved mood       Tobacco abuse  -1 cig/day -- stopped: 2 years back  -On and off Chantix     HM  -C-scope last done 2016 >> recommended repeat in 5 yrs >> GS ref given  -COVID booster: yet to take, needs info for the same     Review of Systems   Constitutional: Negative for appetite change, chills, diaphoresis, fatigue and fever. HENT: Positive for postnasal drip and sinus pressure. Negative for rhinorrhea, sinus pain, sneezing and sore throat. Eyes: Negative for itching. Respiratory: Negative for apnea, cough, choking, chest tightness, shortness of breath and wheezing. Cardiovascular: Negative for chest pain, palpitations and leg swelling. Gastrointestinal: Negative for constipation, diarrhea, nausea and vomiting. Genitourinary: Negative for dysuria. Musculoskeletal: Negative for arthralgias and back pain. Neurological: Negative for light-headedness and numbness.        Outpatient Medications Marked as Taking for the 12/10/21 encounter (Office Visit) with Ehsan Huerta MD   Medication Sig Dispense Refill    losartan (COZAAR) 100 MG tablet take 1 tablet by mouth once daily 30 tablet 0    warfarin (COUMADIN) 4 MG tablet Take 1 tablet by mouth daily 30 tablet 0    atorvastatin (LIPITOR) 80 MG tablet Take 1 tablet by mouth daily 90 tablet 0    albuterol sulfate  (90 Base) MCG/ACT inhaler Inhale 2 puffs into the lungs every 6 hours as needed for Wheezing 1 each 2    amLODIPine (NORVASC) 10 MG tablet Take 1 tablet by mouth daily 30 tablet 1    Multiple Vitamins-Minerals (THERAPEUTIC MULTIVITAMIN-MINERALS) tablet Take 1 tablet by mouth daily 90 tablet 1    doxycycline monohydrate (MONODOX) 100 MG capsule Take 1 capsule by mouth daily 60 capsule 1    magnesium oxide (MAG-OX) 400 MG tablet Take 1 tablet by mouth daily 90 tablet 1    PARoxetine (PAXIL) 10 MG tablet Take 1 tablet by mouth daily 90 tablet 2    clopidogrel (PLAVIX) 75 MG tablet Take 1 tablet by mouth daily 90 tablet 3    Blood Pressure Monitoring (BLOOD PRESSURE CUFF) MISC Dx:  Hypertension with labile blood pressure 1 each 0       OBJECTIVE:    VS:   BP (!) 147/85 (Site: Left Upper Arm, Position: Sitting, Cuff Size: Medium Adult)   Pulse 95   Temp 98.2 °F (36.8 °C) (Temporal)   Resp 20 Comment: room air  Ht 6' 2\" (1.88 m)   Wt 164 lb (74.4 kg)   SpO2 96%   BMI 21.06 kg/m²     EXAM:  Physical Exam  HENT:      Nose: Nose normal.      Mouth/Throat:      Mouth: Mucous membranes are moist.      Pharynx: No posterior oropharyngeal erythema. Comments: Tonsils : right side appeared slightly inflammed with a single pus point. Abdominal:      Palpations: Abdomen is soft. Musculoskeletal:         General: Normal range of motion. Skin:     General: Skin is warm. Neurological:      Mental Status: He is alert. ASSESSMENT/PLAN:  I have reviewed all pertinent PMH, PSH, FH, SH, medications and allergies and updated history as appropriate. Colton Johnson was seen today for anticoagulation, hypertension and other. Diagnoses and all orders for this visit:    Deep vein thrombosis (DVT) of other vein of lower extremity, unspecified chronicity, unspecified laterality ([facility]  -     POCT INR  -     CBC WITH AUTO DIFFERENTIAL; Future    Healthcare maintenance  -     LIPID PANEL; Future  -     COMPREHENSIVE METABOLIC PANEL; Future      PS. Needs a letter of medical exemption to be exempted from jury duty, as the past time he sat in one place for too long, he had a DVT.     RTC: in 2 weeks for INR check    I have reviewed my findings and recommendations with Sangeeta Thakur and Dr Teodoro Swanson MD PGY-2   12/12/2021 10:27 PM

## 2021-12-10 NOTE — PROGRESS NOTES
[facility]  Internal Medicine Residency Clinic    Attending Physician Statement  I have discussed the case, including pertinent history and exam findings with the resident physician. I agree with the assessment, plan and orders as documented by the resident. I have reviewed all pertinent PMHx, PSHx, FamHx, SocialHx, medications, and allergies and updated history as appropriate. Patient presents for routine follow up of medical problems. Hx of DVT with APLA with PAD--- INR 3.8, continue 4 mg daily repeat in 2 weeks    HTN -- above goal today, if next visit again elevated, may need additional med. Advised low Na diet. Remainder of medical problems as per resident note.     Kenn Sheets,   12/13/2021 4:40 PM

## 2021-12-11 ASSESSMENT — ENCOUNTER SYMPTOMS
DIARRHEA: 0
APNEA: 0
BACK PAIN: 0
NAUSEA: 0
SHORTNESS OF BREATH: 0
EYE ITCHING: 0
COUGH: 0
SINUS PAIN: 0
RHINORRHEA: 0
CHEST TIGHTNESS: 0
SORE THROAT: 0
WHEEZING: 0
VOMITING: 0
SINUS PRESSURE: 1
CONSTIPATION: 0
CHOKING: 0

## 2021-12-15 ENCOUNTER — HOSPITAL ENCOUNTER (OUTPATIENT)
Age: 61
Discharge: HOME | End: 2021-12-15
Payer: COMMERCIAL

## 2021-12-15 DIAGNOSIS — I82.499 DEEP VEIN THROMBOSIS (DVT) OF OTHER VEIN OF LOWER EXTREMITY, UNSPECIFIED CHRONICITY, UNSPECIFIED LATERALITY (HCC): ICD-10-CM

## 2021-12-15 DIAGNOSIS — Z00.00 HEALTHCARE MAINTENANCE: ICD-10-CM

## 2021-12-15 LAB
BASOPHILS ABSOLUTE: 0.05 E9/L (ref 0–0.2)
BASOPHILS RELATIVE PERCENT: 0.7 % (ref 0–2)
CHOLESTEROL, TOTAL: 149 MG/DL (ref 0–199)
EOSINOPHILS ABSOLUTE: 0.11 E9/L (ref 0.05–0.5)
EOSINOPHILS RELATIVE PERCENT: 1.6 % (ref 0–6)
HCT VFR BLD CALC: 45.6 % (ref 37–54)
HDLC SERPL-MCNC: 37 MG/DL
HEMOGLOBIN: 16 G/DL (ref 12.5–16.5)
IMMATURE GRANULOCYTES #: 0.03 E9/L
IMMATURE GRANULOCYTES %: 0.4 % (ref 0–5)
LDL CHOLESTEROL CALCULATED: 88 MG/DL (ref 0–99)
LYMPHOCYTES ABSOLUTE: 2.38 E9/L (ref 1.5–4)
LYMPHOCYTES RELATIVE PERCENT: 33.8 % (ref 20–42)
MCH RBC QN AUTO: 32.1 PG (ref 26–35)
MCHC RBC AUTO-ENTMCNC: 35.1 % (ref 32–34.5)
MCV RBC AUTO: 91.6 FL (ref 80–99.9)
MONOCYTES ABSOLUTE: 0.55 E9/L (ref 0.1–0.95)
MONOCYTES RELATIVE PERCENT: 7.8 % (ref 2–12)
NEUTROPHILS ABSOLUTE: 3.93 E9/L (ref 1.8–7.3)
NEUTROPHILS RELATIVE PERCENT: 55.7 % (ref 43–80)
PDW BLD-RTO: 12.3 FL (ref 11.5–15)
PLATELET # BLD: 155 E9/L (ref 130–450)
PMV BLD AUTO: 10.7 FL (ref 7–12)
RBC # BLD: 4.98 E12/L (ref 3.8–5.8)
REASON FOR REJECTION: NORMAL
REJECTED TEST: NORMAL
TRIGL SERPL-MCNC: 120 MG/DL (ref 0–149)
VLDLC SERPL CALC-MCNC: 24 MG/DL
WBC # BLD: 7.1 E9/L (ref 4.5–11.5)

## 2021-12-15 PROCEDURE — 80061 LIPID PANEL: CPT

## 2021-12-15 PROCEDURE — 85025 COMPLETE CBC W/AUTO DIFF WBC: CPT

## 2021-12-15 PROCEDURE — 36415 COLL VENOUS BLD VENIPUNCTURE: CPT

## 2021-12-16 ENCOUNTER — HOSPITAL ENCOUNTER (OUTPATIENT)
Age: 61
Discharge: HOME | End: 2021-12-16
Payer: COMMERCIAL

## 2021-12-16 LAB
ALBUMIN SERPL-MCNC: 4.6 G/DL (ref 3.5–5.2)
ALP BLD-CCNC: 111 U/L (ref 40–129)
ALT SERPL-CCNC: 17 U/L (ref 0–40)
ANION GAP SERPL CALCULATED.3IONS-SCNC: 16 MMOL/L (ref 7–16)
AST SERPL-CCNC: 18 U/L (ref 0–39)
BILIRUB SERPL-MCNC: 0.7 MG/DL (ref 0–1.2)
BUN BLDV-MCNC: 11 MG/DL (ref 6–23)
CALCIUM SERPL-MCNC: 9.4 MG/DL (ref 8.6–10.2)
CHLORIDE BLD-SCNC: 103 MMOL/L (ref 98–107)
CO2: 20 MMOL/L (ref 22–29)
CREAT SERPL-MCNC: 0.9 MG/DL (ref 0.7–1.2)
GFR AFRICAN AMERICAN: >60
GFR NON-AFRICAN AMERICAN: >60 ML/MIN/1.73
GLUCOSE BLD-MCNC: 89 MG/DL (ref 74–99)
POTASSIUM SERPL-SCNC: 3.8 MMOL/L (ref 3.5–5)
SODIUM BLD-SCNC: 139 MMOL/L (ref 132–146)
TOTAL PROTEIN: 7.6 G/DL (ref 6.4–8.3)

## 2021-12-16 PROCEDURE — 36415 COLL VENOUS BLD VENIPUNCTURE: CPT

## 2021-12-16 PROCEDURE — 80053 COMPREHEN METABOLIC PANEL: CPT

## 2021-12-21 DIAGNOSIS — I10 ESSENTIAL HYPERTENSION: ICD-10-CM

## 2021-12-21 RX ORDER — LOSARTAN POTASSIUM 100 MG/1
TABLET ORAL
Qty: 90 TABLET | Refills: 0 | Status: SHIPPED
Start: 2021-12-21 | End: 2022-03-25 | Stop reason: SDUPTHER

## 2021-12-21 NOTE — TELEPHONE ENCOUNTER
Last Appointment:  12/10/21  Future Appointments   Date Time Provider Roberto Patino   12/23/2021  1:00 PM SCHEDULE, SE ACC IM [facility]   3/25/2022  1:00 PM Tobie Castleman, MD ACC IM [facility]

## 2021-12-23 ENCOUNTER — NURSE ONLY (OUTPATIENT)
Dept: INTERNAL MEDICINE | Age: 61
End: 2021-12-23
Payer: COMMERCIAL

## 2021-12-23 DIAGNOSIS — I82.499 DEEP VEIN THROMBOSIS (DVT) OF OTHER VEIN OF LOWER EXTREMITY, UNSPECIFIED CHRONICITY, UNSPECIFIED LATERALITY (HCC): ICD-10-CM

## 2021-12-23 DIAGNOSIS — I82.499 DEEP VEIN THROMBOSIS (DVT) OF OTHER VEIN OF LOWER EXTREMITY, UNSPECIFIED CHRONICITY, UNSPECIFIED LATERALITY (HCC): Primary | ICD-10-CM

## 2021-12-23 LAB
INR BLD: 5.8
INTERNATIONAL NORMALIZATION RATIO, POC: 5.8
PROTHROMBIN TIME, POC: 69.8
PROTHROMBIN TIME: 63.4 SEC (ref 9.3–12.4)

## 2021-12-23 PROCEDURE — 85610 PROTHROMBIN TIME: CPT | Performed by: INTERNAL MEDICINE

## 2021-12-23 PROCEDURE — 93793 ANTICOAG MGMT PT WARFARIN: CPT | Performed by: INTERNAL MEDICINE

## 2021-12-23 NOTE — PROGRESS NOTES
INR lab redrawn and sent to lab. Patient instructed to hold coumadin until Monday 12/27/2021. Next appt scheduled, patient notified and verbalizes understanding.

## 2021-12-28 ENCOUNTER — NURSE ONLY (OUTPATIENT)
Dept: INTERNAL MEDICINE | Age: 61
End: 2021-12-28
Payer: COMMERCIAL

## 2021-12-28 DIAGNOSIS — I82.499 DEEP VEIN THROMBOSIS (DVT) OF OTHER VEIN OF LOWER EXTREMITY, UNSPECIFIED CHRONICITY, UNSPECIFIED LATERALITY (HCC): Primary | ICD-10-CM

## 2021-12-28 LAB
INTERNATIONAL NORMALIZATION RATIO, POC: 3.1
PROTHROMBIN TIME, POC: 37.7

## 2021-12-28 PROCEDURE — 93793 ANTICOAG MGMT PT WARFARIN: CPT | Performed by: INTERNAL MEDICINE

## 2021-12-28 PROCEDURE — 85610 PROTHROMBIN TIME: CPT | Performed by: INTERNAL MEDICINE

## 2021-12-28 NOTE — PROGRESS NOTES
Called pt and reviewed new dosing of 3mgs of coumadin daily and recheck inr in one week  Fu appt scheduled and printed avs mailed to patient   Order for 3 mgs of coumadin sent per dr Thalia Walker

## 2021-12-28 NOTE — PROGRESS NOTES
Hx of DVT, extensive vascular hx.  INR 3.1; goal 2.5-3.5; patient took 4 mg last night; will start patient on 3 mg daily and have him come back for INR check in 1 week     Electronically signed by Bhavna Salter DO on 12/28/2021 at 2:59 PM

## 2022-01-04 ENCOUNTER — NURSE ONLY (OUTPATIENT)
Dept: INTERNAL MEDICINE | Age: 62
End: 2022-01-04
Payer: COMMERCIAL

## 2022-01-04 DIAGNOSIS — I82.499 DEEP VEIN THROMBOSIS (DVT) OF OTHER VEIN OF LOWER EXTREMITY, UNSPECIFIED CHRONICITY, UNSPECIFIED LATERALITY (HCC): Primary | ICD-10-CM

## 2022-01-04 LAB
INTERNATIONAL NORMALIZATION RATIO, POC: 2
PROTHROMBIN TIME, POC: 23.9

## 2022-01-04 PROCEDURE — 85610 PROTHROMBIN TIME: CPT | Performed by: INTERNAL MEDICINE

## 2022-01-04 PROCEDURE — 93793 ANTICOAG MGMT PT WARFARIN: CPT | Performed by: INTERNAL MEDICINE

## 2022-01-04 RX ORDER — WARFARIN SODIUM 3 MG/1
3 TABLET ORAL DAILY
Qty: 30 TABLET | Refills: 0 | Status: SHIPPED
Start: 2022-01-04 | End: 2022-01-31

## 2022-01-04 RX ORDER — WARFARIN SODIUM 3 MG/1
3 TABLET ORAL DAILY
COMMUNITY
End: 2022-01-31

## 2022-01-18 ENCOUNTER — TELEPHONE (OUTPATIENT)
Dept: INTERNAL MEDICINE | Age: 62
End: 2022-01-18

## 2022-01-18 NOTE — TELEPHONE ENCOUNTER
Called patient to reschedule INR, no answer, instructed a call back. No show letter mailed to patient.

## 2022-01-21 ENCOUNTER — NURSE ONLY (OUTPATIENT)
Dept: INTERNAL MEDICINE | Age: 62
End: 2022-01-21
Payer: COMMERCIAL

## 2022-01-21 VITALS — TEMPERATURE: 98.6 F

## 2022-01-21 DIAGNOSIS — I82.499 DEEP VEIN THROMBOSIS (DVT) OF OTHER VEIN OF LOWER EXTREMITY, UNSPECIFIED CHRONICITY, UNSPECIFIED LATERALITY (HCC): ICD-10-CM

## 2022-01-21 DIAGNOSIS — I73.9 PAD (PERIPHERAL ARTERY DISEASE) (HCC): ICD-10-CM

## 2022-01-21 PROCEDURE — 93793 ANTICOAG MGMT PT WARFARIN: CPT | Performed by: INTERNAL MEDICINE

## 2022-01-21 RX ORDER — WARFARIN SODIUM 4 MG/1
4 TABLET ORAL DAILY
Qty: 15 TABLET | Refills: 0 | Status: SHIPPED
Start: 2022-01-21 | End: 2022-03-03 | Stop reason: SDUPTHER

## 2022-01-21 RX ORDER — ATORVASTATIN CALCIUM 80 MG/1
80 TABLET, FILM COATED ORAL DAILY
Qty: 90 TABLET | Refills: 1 | Status: SHIPPED
Start: 2022-01-21 | End: 2022-04-29 | Stop reason: SDUPTHER

## 2022-01-21 NOTE — PATIENT INSTRUCTIONS
New Coumadin Instructions: Take 4.5mg on 1-21-22, 4mg on Monday's & Wednesday's, 3mg all other days. Repeat INR in 1 week.   Any questions please call Internal Medicine Clinic at [facility]  Internal Medicine

## 2022-01-28 ENCOUNTER — NURSE ONLY (OUTPATIENT)
Dept: INTERNAL MEDICINE | Age: 62
End: 2022-01-28
Payer: COMMERCIAL

## 2022-01-28 DIAGNOSIS — I82.499 DEEP VEIN THROMBOSIS (DVT) OF OTHER VEIN OF LOWER EXTREMITY, UNSPECIFIED CHRONICITY, UNSPECIFIED LATERALITY (HCC): Primary | ICD-10-CM

## 2022-01-28 LAB
INTERNATIONAL NORMALIZATION RATIO, POC: 2.2
PROTHROMBIN TIME, POC: 26.4

## 2022-01-28 PROCEDURE — 99211 OFF/OP EST MAY X REQ PHY/QHP: CPT

## 2022-01-28 PROCEDURE — 85610 PROTHROMBIN TIME: CPT

## 2022-01-28 NOTE — PROGRESS NOTES
Spoke with patient personally. Coumadin 4 mg on Monday & Friday, then Coumadin 3 mg on Sunday, Tuesday, Thursday & Daturday. Patient states understanding and compliance with Coumadin orders and follow up. AVS printed and mailed, along with appointment card.   Milli Bhatia LPN

## 2022-01-28 NOTE — PATIENT INSTRUCTIONS
Thank you for coming in today to have your INR checked. Please take your Coumadin as follows:  Coumadin 4 mg on Monday & Friday  Coumadin 3 mg on Sunday, Tuesday, Thursday & Saturday  We will recheck your INR in 3 weeks  Your appointment is Friday 2/18/22 at 1:00 pm  Please call if you have any unusual bleeding or any concerns.

## 2022-01-28 NOTE — PROGRESS NOTES
inr 2.2  Continue same    Repeat inr 2/5-3weeks      Patient denies forgetting any doses. States current dose is on 1/21/22 was 4.5 mg, then 4 mg on M&F, 3 mg on S/T/T/S. Denies any new medications or drug therapies, no other non-prescription drugs not already know to doctor, no changes to eating habits or drinking habits, no unusual bleeding or bruising. A full discussion of the nature of anticoagulants has been carried out. A benefit risk analysis has been presented to the patient, so that they understand the justification for choosing anticoagulation at this time. The need for frequent and regular monitoring, precise dosage adjustment and compliance is stressed. Side effects of potential bleeding are discussed. The patient should avoid any OTC items containing aspirin or ibuprofen, and should avoid great swings in general diet. Avoid alcohol consumption. Call if any signs of abnormal bleeding. Burt Luna LPN    Spoke with patient personally. Patient states understanding and compliance with Coumadin orders and follow up. AVS printed and mailed, along with appointment card. Detailed message left on patient's voicemail. Instructed patient to call if there are any questions on instructions. AVS printed and mailed, along with appointment card.   Burt Luna LPN      Patient [phone]

## 2022-01-30 DIAGNOSIS — I82.499 DEEP VEIN THROMBOSIS (DVT) OF OTHER VEIN OF LOWER EXTREMITY, UNSPECIFIED CHRONICITY, UNSPECIFIED LATERALITY (HCC): ICD-10-CM

## 2022-01-31 RX ORDER — WARFARIN SODIUM 3 MG/1
TABLET ORAL
Qty: 30 TABLET | Refills: 0 | Status: SHIPPED
Start: 2022-01-31 | End: 2022-03-03 | Stop reason: SDUPTHER

## 2022-01-31 NOTE — TELEPHONE ENCOUNTER
Last Appointment:  12/10/21  Future Appointments   Date Time Provider Roberto Carey   2/18/2022  1:00 PM SCHEDULE, SE ACC IM [facility]   3/25/2022  1:00 PM Yoshi Hui MD ACC [facility]

## 2022-02-07 ENCOUNTER — TELEPHONE (OUTPATIENT)
Dept: INTERNAL MEDICINE | Age: 62
End: 2022-02-07

## 2022-02-07 NOTE — TELEPHONE ENCOUNTER
Patient called regarding needing a refill on doxycyline.   Patient notified that refill called into pharmacy on 2/3/22

## 2022-02-17 ENCOUNTER — NURSE ONLY (OUTPATIENT)
Dept: INTERNAL MEDICINE | Age: 62
End: 2022-02-17
Payer: COMMERCIAL

## 2022-02-17 VITALS — TEMPERATURE: 98.6 F

## 2022-02-17 DIAGNOSIS — I82.499 DEEP VEIN THROMBOSIS (DVT) OF OTHER VEIN OF LOWER EXTREMITY, UNSPECIFIED CHRONICITY, UNSPECIFIED LATERALITY (HCC): Primary | ICD-10-CM

## 2022-02-17 LAB
INTERNATIONAL NORMALIZATION RATIO, POC: 2.2
PROTHROMBIN TIME, POC: 25.9

## 2022-02-17 PROCEDURE — 85610 PROTHROMBIN TIME: CPT

## 2022-02-17 NOTE — PATIENT INSTRUCTIONS
Please continue taking 4mg Monday & Wednesday, 3mg all other days. Repeat INR in 2 weeks. Any questions please call Internal Medicine Clinic at [phone]    Thank You  Internal Medicine  Patient Education        Deep Vein Thrombosis: Care Instructions  Overview     A deep vein thrombosis (DVT) is a blood clot in certain veins, usually in the legs, pelvis, or arms. Blood clots in these veins need to be treated because they can get bigger, break loose, and travel through the bloodstream to the lungs. A blood clot in a lung can be life-threatening. The doctor may have given you a blood thinner (anticoagulant). A blood thinner can stop the blood clot from growing larger and prevent new clots from forming. You will need to take a blood thinner for at least 3 months. The doctor has checked you carefully, but problems can develop later. If you notice any problems or new symptoms, get medical treatment right away. Follow-up care is a key part of your treatment and safety. Be sure to make and go to all appointments, and call your doctor if you are having problems. It's also a good idea to know your test results and keep a list of the medicines you take. How can you care for yourself at home? · Take your medicines exactly as prescribed. Call your doctor if you think you are having a problem with your medicine. · If you are taking a blood thinner, be sure you get instructions about how to take your medicine safely. Blood thinners can cause serious bleeding problems. · Try to walk several times a day. · Wear compression stockings if your doctor recommends them. These stockings are tighter at the feet than on the legs. They may reduce pain and swelling in your legs. But there are different types of stockings, and they need to fit right. So your doctor will recommend what you need. · When you sit, use a pillow to raise the arm or leg that has the blood clot. Try to keep it above the level of your heart.   When should warfarin, a medicine to prevent blood clots, is working. How do you prepare for the test?  In general, there's nothing you have to do before this test, unless your doctor tells you to. How is the test done? A health professional uses a needle to take a blood sample, usually from the arm. In some cases, the health professional will take a sample of blood from your fingertip instead of your vein. For a finger stick blood test, the health professional will clean your hand, use a lancet to puncture the skin, and place a small tube on the puncture site to collect your blood. Some people use a monitor at home to test a small blood sample. How long does the test take? The test will take a few minutes. What happens after the test?  · You will probably be able to go home right away. It depends on the reason for the test.  · You can go back to your usual activities right away. Follow-up care is a key part of your treatment and safety. Be sure to make and go to all appointments, and call your doctor if you are having problems. It's also a good idea to keep a list of the medicines you take. Ask your doctor when you can expect to have your test results. Where can you learn more? Go to [email or web address] and sign in to your [employer] account. Enter J248 in the [facility] box to learn more about \"Prothrombin Time: About This Test.\"     If you do not have an account, please click on the \"Sign Up Now\" link. Current as of: April 29, 2021               Content Version: 13.1  © 2006-2021 [employer]. Care instructions adapted under license by [facility] [facility] If you have questions about a medical condition or this instruction, always ask your healthcare professional. [place] [address] any warranty or liability for your use of this information.

## 2022-02-17 NOTE — PROGRESS NOTES
Patient in office for anti coag management, poct inr obtained, anti coag encounter created and routed to physician. Repeat INR in 2 weeks.

## 2022-03-03 ENCOUNTER — NURSE ONLY (OUTPATIENT)
Dept: INTERNAL MEDICINE | Age: 62
End: 2022-03-03
Payer: COMMERCIAL

## 2022-03-03 DIAGNOSIS — Z79.01 CHRONIC ANTICOAGULATION: Primary | ICD-10-CM

## 2022-03-03 DIAGNOSIS — I82.499 DEEP VEIN THROMBOSIS (DVT) OF OTHER VEIN OF LOWER EXTREMITY, UNSPECIFIED CHRONICITY, UNSPECIFIED LATERALITY (HCC): ICD-10-CM

## 2022-03-03 LAB
INTERNATIONAL NORMALIZATION RATIO, POC: 2.1
PROTHROMBIN TIME, POC: 24.9

## 2022-03-03 PROCEDURE — 93793 ANTICOAG MGMT PT WARFARIN: CPT | Performed by: INTERNAL MEDICINE

## 2022-03-03 PROCEDURE — 85610 PROTHROMBIN TIME: CPT | Performed by: INTERNAL MEDICINE

## 2022-03-03 RX ORDER — WARFARIN SODIUM 3 MG/1
TABLET ORAL
Qty: 30 TABLET | Refills: 0 | Status: SHIPPED
Start: 2022-03-03 | End: 2022-03-25 | Stop reason: SDUPTHER

## 2022-03-03 RX ORDER — WARFARIN SODIUM 4 MG/1
TABLET ORAL
Qty: 15 TABLET | Refills: 0 | Status: SHIPPED
Start: 2022-03-03 | End: 2022-03-25 | Stop reason: SDUPTHER

## 2022-03-03 NOTE — PATIENT INSTRUCTIONS
Continue Coumadin 4 mg on Mon and Wed. Coumadin 3 mg all other days. Repeat INR at your next appt on 3/25/22. You can also discuss the use of Chantix at your next appt. Please call with any questions or concerns.   Mandi Villalba RN

## 2022-03-03 NOTE — PROGRESS NOTES
INR results reviewed with Dr. Leighann Driver and orders received. Spoke with patient via phone and notified of Coumadin instructions along with date and time of follow up appointment. AVS mailed to patient. Pt asking for refill on Chantix. Reviewed with Dr. Leighann Driver. Pt to discuss with  at upcoming appt 3/25/22.

## 2022-03-24 DIAGNOSIS — I10 ESSENTIAL HYPERTENSION: ICD-10-CM

## 2022-03-24 RX ORDER — LOSARTAN POTASSIUM 100 MG/1
TABLET ORAL
Qty: 90 TABLET | Refills: 0 | OUTPATIENT
Start: 2022-03-24

## 2022-03-25 ENCOUNTER — OFFICE VISIT (OUTPATIENT)
Dept: INTERNAL MEDICINE | Age: 62
End: 2022-03-25
Payer: COMMERCIAL

## 2022-03-25 VITALS
BODY MASS INDEX: 21.94 KG/M2 | TEMPERATURE: 97.3 F | SYSTOLIC BLOOD PRESSURE: 160 MMHG | WEIGHT: 171 LBS | RESPIRATION RATE: 18 BRPM | OXYGEN SATURATION: 99 % | HEIGHT: 74 IN | HEART RATE: 97 BPM | DIASTOLIC BLOOD PRESSURE: 101 MMHG

## 2022-03-25 DIAGNOSIS — K63.5 POLYP OF SIGMOID COLON, UNSPECIFIED TYPE: ICD-10-CM

## 2022-03-25 DIAGNOSIS — J45.909 UNCOMPLICATED ASTHMA, UNSPECIFIED ASTHMA SEVERITY, UNSPECIFIED WHETHER PERSISTENT: ICD-10-CM

## 2022-03-25 DIAGNOSIS — E83.42 HYPOMAGNESEMIA: ICD-10-CM

## 2022-03-25 DIAGNOSIS — I82.499 DEEP VEIN THROMBOSIS (DVT) OF OTHER VEIN OF LOWER EXTREMITY, UNSPECIFIED CHRONICITY, UNSPECIFIED LATERALITY (HCC): Primary | ICD-10-CM

## 2022-03-25 DIAGNOSIS — I10 ESSENTIAL HYPERTENSION: ICD-10-CM

## 2022-03-25 DIAGNOSIS — D68.61 ANTI-PHOSPHOLIPID ANTIBODY SYNDROME (HCC): ICD-10-CM

## 2022-03-25 LAB
INTERNATIONAL NORMALIZATION RATIO, POC: 2.1
PROTHROMBIN TIME, POC: 25.6

## 2022-03-25 PROCEDURE — G8427 DOCREV CUR MEDS BY ELIG CLIN: HCPCS | Performed by: INTERNAL MEDICINE

## 2022-03-25 PROCEDURE — 99213 OFFICE O/P EST LOW 20 MIN: CPT | Performed by: INTERNAL MEDICINE

## 2022-03-25 PROCEDURE — G8482 FLU IMMUNIZE ORDER/ADMIN: HCPCS | Performed by: INTERNAL MEDICINE

## 2022-03-25 PROCEDURE — 85610 PROTHROMBIN TIME: CPT | Performed by: INTERNAL MEDICINE

## 2022-03-25 PROCEDURE — 1036F TOBACCO NON-USER: CPT | Performed by: INTERNAL MEDICINE

## 2022-03-25 PROCEDURE — 99212 OFFICE O/P EST SF 10 MIN: CPT | Performed by: INTERNAL MEDICINE

## 2022-03-25 PROCEDURE — G8420 CALC BMI NORM PARAMETERS: HCPCS | Performed by: INTERNAL MEDICINE

## 2022-03-25 PROCEDURE — 3017F COLORECTAL CA SCREEN DOC REV: CPT | Performed by: INTERNAL MEDICINE

## 2022-03-25 RX ORDER — AMLODIPINE BESYLATE 10 MG/1
10 TABLET ORAL DAILY
Qty: 30 TABLET | Refills: 1 | Status: SHIPPED
Start: 2022-03-25 | End: 2022-04-18 | Stop reason: SDUPTHER

## 2022-03-25 RX ORDER — LOSARTAN POTASSIUM 100 MG/1
100 TABLET ORAL DAILY
Qty: 90 TABLET | Refills: 1 | Status: SHIPPED
Start: 2022-03-25 | End: 2022-08-26 | Stop reason: SDUPTHER

## 2022-03-25 RX ORDER — WARFARIN SODIUM 4 MG/1
TABLET ORAL
Qty: 15 TABLET | Refills: 0 | Status: SHIPPED
Start: 2022-03-25 | End: 2022-04-18 | Stop reason: SDUPTHER

## 2022-03-25 RX ORDER — M-VIT,TX,IRON,MINS/CALC/FOLIC 27MG-0.4MG
1 TABLET ORAL DAILY
Qty: 90 TABLET | Refills: 1 | Status: SHIPPED
Start: 2022-03-25 | End: 2022-06-28 | Stop reason: SDUPTHER

## 2022-03-25 RX ORDER — WARFARIN SODIUM 3 MG/1
TABLET ORAL
Qty: 30 TABLET | Refills: 0 | Status: SHIPPED
Start: 2022-03-25 | End: 2022-04-18 | Stop reason: SDUPTHER

## 2022-03-25 RX ORDER — MAGNESIUM OXIDE 400 MG/1
400 TABLET ORAL DAILY
Qty: 90 TABLET | Refills: 1 | Status: SHIPPED
Start: 2022-03-25 | End: 2022-06-28 | Stop reason: SDUPTHER

## 2022-03-25 RX ORDER — VARENICLINE TARTRATE 1 MG/1
1 TABLET, FILM COATED ORAL 2 TIMES DAILY
Qty: 60 TABLET | Refills: 3 | Status: CANCELLED | OUTPATIENT
Start: 2022-03-25

## 2022-03-25 RX ORDER — ALBUTEROL SULFATE 90 UG/1
2 AEROSOL, METERED RESPIRATORY (INHALATION) EVERY 6 HOURS PRN
Qty: 1 EACH | Refills: 2 | Status: SHIPPED
Start: 2022-03-25 | End: 2022-06-02

## 2022-03-25 ASSESSMENT — ENCOUNTER SYMPTOMS
DIARRHEA: 0
COUGH: 0
CONSTIPATION: 0
NAUSEA: 0
VOMITING: 0
SHORTNESS OF BREATH: 0
BACK PAIN: 0
BLOOD IN STOOL: 0
ABDOMINAL PAIN: 0

## 2022-03-25 NOTE — PATIENT INSTRUCTIONS
1. Take your medications regularly. 2. If ever you ran out of refills, please call the clinic Mon to Fri 8AM to 4 PM.  3. We increased your coumadin to 4 mg MWF, then 3 mg the rest of the week.

## 2022-03-25 NOTE — PROGRESS NOTES
[facility]    Attending Physician Statement  I have discussed the case, including pertinent history and exam findings with the resident physician. I agree with the assessment, plan and orders as documented by the resident. I have reviewed all pertinent PMHx, PSHx, FamHx, SocialHx, medications, and allergies and updated history as appropriate. Patient presents for routine follow up of medical problems. Presents for follow-up appointment   INR today is not at goal- GOAL INR 2.5 to 3.5 per [facility]   Missed Vascular Surgery follow-up appointment at [facility] and need to reschedule     Will adjust warfarin today    Medication refills needed   No acute complaints     Hypercoagulable state with underlying PVD   INR goal 2.5 to 3.5   No complaints of bleeding   INR 2.3 today    Increase warfarin 4 mg to MWF and 3 mg all other days   Repeat INR in 2 weeks     HTN- continue current regimen     HPL- continue current regimen     Tobacco Use   Smoking cessation counseling     Hx of Colonic Polyp    Need surveillance [facility]   Will need LMWH bridging prior to procedure   Refer back to surgery and then will need coordination       Remainder of medical problems as per resident note.     Emmanuel Beck MD, Jordan Shepard   3/25/2022 2:05 PM

## 2022-03-25 NOTE — PROGRESS NOTES
Neelima Izaguirre (:  1960) is a 57 y.o. male,Established patient, here for evaluation of the following chief complaint(s):  Hypertension       ASSESSMENT/PLAN:    Hypertension:  -not able to check BP at home  -BP in the clinic: 150/101 mmHg  -denies s/sx of orthostasis  -currently on amlodipine 10 mg, losartan 100mg daily  -has not had any refills for months, but states that he is taking it  -last CMP 21: wnl  Plan  -continue amlodipine and losartan  -advised on diet and exercise    HLD  -last lipid panel 12/15/21: LDL 88, HDL 37  -The 10-year ASCVD risk score (Alejandra Elmore, et al., 2013) is: 14.2%  -on atorvastatin 80 mg daily  Plan  -continue atorvastatin    AAA, s/p repair with chronic abdominal graft infection  -on chronic suppression with doxycycline 100 mg daily    Hypercoagulable state d/t APLA in the setting of SLE with Hx DVT   -Goal 2.5-3.5 per [facility] vascular surgery  -last INR 3/3/22: 2.1-->2.3  -on warfarin 4mg daily MT; 3 mg rest of the days  -will have him f/u at the clinic in 2 weeks for INR check, no changes today  Plan  -increase warfarin to 4 mg MWF, then 3 mg TueThSatSun  -INR check in 2 weeks (22)    PAD  -follows with [facility], s/p thrombectomy on the left side 10/2018  -missed appointment with Dr. Roverto Delgado (Vascular at [facility]) last 22  -currently on clopidogrel and high intensity atorvastatin  Plan  -continue clopidogrel and atorvastatin  -with appointment for vascular follow-up next month    Hx of sigmoid polyp  -s/p colonoscopy , sigmoid polyp at 25 cm, tubular adenoma  -advised for coloscopy after 5 years  Plan  -refer back to [address] [place] [address] for colonoscopy    Metatarsalgia B feet   -had follow-up visit with Dr. aPtricia Reddy (Podiatry at [facility]) on 22: mycotic toe nails debrided, recommended [facility] orthotics as a first-line strategy for his mechanical foot pain, if ineffective t/c custom foot orthotics later, folow-up in 3 months  -on gabapentin 400 mg BID  Plan  -continue gabapentin  -continue to follow with podiatry in 3 months    Depression  -mood:   -no suicidal ideation  -on paroxetine 10 mg daily, tolerating well  Plan  -continue paroxetine      Tobacco abuse  -4 cigarettes/day, started around 25 y/o x 53 years 1/2 pack/day->  -On and off Chantix, but has depression and on paroxetine   Plan  -smoking cessation program     Health Maintenance  -Colonoscopy last done 2016 >> recommended repeat in 5 yrs >> referred back to -Dr. Gokul [address] booster: yet to take, needs info for the same     Return in about 2 weeks (around 4/8/2022) for INR check. Subjective   SUBJECTIVE/OBJECTIVE:  This is C. C. A 57year old man, with a PMHx of HTN, HLD, PAD, AAA, s/p repair with chronic abdominal graft infection, hypercoagulable state d/t APLA in the setting of SLE with Hx DVT, metatarsalgia B feet, depression, tobacco abuse who comes into the clinic for follow-up. States that his blood pressure is running high. He states that he is taking his BP meds regularly, however, has ran out of prescription/refiull for the past month. Complains of occasional headache. Denies lightheadedness, dizziness, chest pain, dyspnea, orthopnea, PND, B leg edema, abdominal pain, nausea, vomiting, loss of appetite, dysuria, bowel changes, overt bleeding, numbness or tingling. Review of Systems   Constitutional: Negative for chills and fever. HENT: Negative for congestion. Respiratory: Negative for cough and shortness of breath. Cardiovascular: Negative for chest pain, palpitations and leg swelling. Gastrointestinal: Negative for abdominal pain, blood in stool, constipation, diarrhea, nausea and vomiting. Musculoskeletal: Negative for back pain and myalgias. Skin: Negative for rash. Neurological: Negative for dizziness and headaches. Psychiatric/Behavioral: The patient is not nervous/anxious.            Objective   Physical Exam  Constitutional:       General: He

## 2022-04-01 ENCOUNTER — TELEPHONE (OUTPATIENT)
Dept: INTERNAL MEDICINE | Age: 62
End: 2022-04-01

## 2022-04-01 NOTE — TELEPHONE ENCOUNTER
----- Message from Yulissa Wetzel sent at 4/1/2022 10:13 AM EDT -----  Subject: Message to Provider    QUESTIONS  Information for Provider? Patient returned a call from practice, could   find no notes in chart as to why; patient asked that he be called again if   there was a reason for call.   ---------------------------------------------------------------------------  --------------  [address]  What is the best way for the office to contact you? OK to leave message on   voicemail  Preferred Call Back Phone Number?  [phone]  ---------------------------------------------------------------------------  --------------  SCRIPT ANSWERS  undefined

## 2022-04-08 ENCOUNTER — NURSE ONLY (OUTPATIENT)
Dept: INTERNAL MEDICINE | Age: 62
End: 2022-04-08
Payer: COMMERCIAL

## 2022-04-08 VITALS — TEMPERATURE: 97.9 F

## 2022-04-08 DIAGNOSIS — I82.499 DEEP VEIN THROMBOSIS (DVT) OF OTHER VEIN OF LOWER EXTREMITY, UNSPECIFIED CHRONICITY, UNSPECIFIED LATERALITY (HCC): Primary | ICD-10-CM

## 2022-04-08 LAB
INTERNATIONAL NORMALIZATION RATIO, POC: 1.9
PROTHROMBIN TIME, POC: 22.9

## 2022-04-08 PROCEDURE — 85610 PROTHROMBIN TIME: CPT | Performed by: INTERNAL MEDICINE

## 2022-04-08 PROCEDURE — 93793 ANTICOAG MGMT PT WARFARIN: CPT | Performed by: INTERNAL MEDICINE

## 2022-04-08 NOTE — PATIENT INSTRUCTIONS
PLEASE take Coumadin 6 mg tonight. Take Coumadin 3 mg on Wednesday and Saturday also Coumadin 4 mg all other days. Your next PT/INR will be done on 4/15 at 8 am. Please call the office with any problems or concerns at [phone].     Thank you

## 2022-04-08 NOTE — PROGRESS NOTES
CHECK ONBASE FOR SCAN     INR results reviewed with Dr. Joan Lopez and orders received. Patient instructed to take Coumadin 6 mg TONIGHT. Also to take Coumadin 3 mg on Wednesday and Saturday along with Coumadin 4 mg on all other days. Patient verbalized understanding. Recheck PT/INR in 1 week on 4/15. Pt notified via phone of same. AVS mailed to patient.

## 2022-04-17 ASSESSMENT — ENCOUNTER SYMPTOMS
SORE THROAT: 0
ABDOMINAL PAIN: 0
NAUSEA: 0
RECTAL PAIN: 0
DIARRHEA: 0
CONSTIPATION: 0
VOMITING: 0
ABDOMINAL DISTENTION: 0
ANAL BLEEDING: 0
BLOOD IN STOOL: 0
SHORTNESS OF BREATH: 0
TROUBLE SWALLOWING: 0
COLOR CHANGE: 0
COUGH: 0
BACK PAIN: 0
CHOKING: 0

## 2022-04-17 NOTE — PROGRESS NOTES
GENERAL SURGERY        HISTORY AND PHYSICAL    CHIEF COMPLAINT  Chief Complaint   Patient presents with    Consultation     Colonoscopy consult - Ref by Dr Dorothea Ortiz Colonoscopy     last colonoscopy about 5-6 years ago - 06/2016 - Hx of colon polyps    Other     patient has no complaints today        HPI  He is referred by by Dr. Shukri Morley for evaluation regarding follow-up colonoscopy. In 2016, he had a colonoscopy that showed an 8 mm pedunculated polyp that was removed from the sigmoid colon. Pathology reports showed tubular adenoma. At present, he has no lower GI or lower abdominal complaints. He denies any rectal bleeding. He has a negative personal and family history of colon cancer. He is on chronic warfarin therapy for a history of DVT.     Past Medical History:   Diagnosis Date    AAA (abdominal aortic aneurysm) ([facility]     s/p repair    Asthma     Chronic anticoagulation 5/6/2014    DVT (deep venous thrombosis) ([facility] 2010    R leg for 1 time    Hypertension     Hypertension     Peripheral vascular disease ([facility] 2010    both legs, more on R leg    Transfusion history        Past Surgical History:   Procedure Laterality Date    AORTO-FEMORAL BYPASS GRAFT Bilateral 6/27/10    ARTERIAL ANEURYSM REPAIR Left 9/28/10    repair left femoral pseudoaneurysm    COLONOSCOPY      FASCIOTOMY Right 6/11/10    4 compartment    FEMORAL BYPASS Right 1/26/10    femoral-above knee popliteal    FEMORAL-FEMORAL BYPASS GRAFT  1/26/10    Dr. Alissa Doty right to left    HERNIA REPAIR  6/25/14    OPEN VENTRAL HERNIA REPAIR    THROMBOENDARTERECTOMY Left 1/26/10    left femoral    THROMBOENDARTERECTOMY Right 6/21/10    reexploration right fem-pop    VASCULAR SURGERY      22 times total       Current Outpatient Medications   Medication Sig Dispense Refill    losartan (COZAAR) 100 MG tablet Take 1 tablet by mouth daily 90 tablet 1    albuterol sulfate  (90 Base) MCG/ACT inhaler Inhale 2 puffs into the lungs every 6 hours as needed for Wheezing 1 each 2    Multiple Vitamins-Minerals (THERAPEUTIC MULTIVITAMIN-MINERALS) tablet Take 1 tablet by mouth daily 90 tablet 1    magnesium oxide (MAG-OX) 400 MG tablet Take 1 tablet by mouth daily 90 tablet 1    doxycycline monohydrate (MONODOX) 100 MG capsule take 1 capsule by mouth once daily 90 capsule 1    atorvastatin (LIPITOR) 80 MG tablet Take 1 tablet by mouth daily 90 tablet 1    gabapentin (NEURONTIN) 400 MG capsule take 1 capsule by mouth three times a day      PARoxetine (PAXIL) 10 MG tablet Take 1 tablet by mouth daily 90 tablet 2    clopidogrel (PLAVIX) 75 MG tablet Take 1 tablet by mouth daily 90 tablet 3    Blood Pressure Monitoring (BLOOD PRESSURE CUFF) MISC Dx:  Hypertension with labile blood pressure 1 each 0    amLODIPine (NORVASC) 10 MG tablet Take 1 tablet by mouth daily 30 tablet 1    warfarin (COUMADIN) 3 MG tablet take 1 tablet by mouth once 5 times weekly 30 tablet 0    warfarin (COUMADIN) 4 MG tablet Take 1 tab by mouth 2 times weekly. 14 tablet 0     No current facility-administered medications for this visit.        Allergies   Allergen Reactions    Ultram [Tramadol]        Family History   Problem Relation Age of Onset    Diabetes Mother     High Blood Pressure Mother     Cancer Father     Diabetes Brother        Social History     Socioeconomic History    Marital status: Single     Spouse name: Not on file    Number of children: Not on file    Years of education: Not on file    Highest education level: Not on file   Occupational History    Not on file   Tobacco Use    Smoking status: Current Every Day Smoker     Packs/day: 0.33     Years: 31.00     Pack years: 10.23     Types: Cigarettes     Last attempt to quit: 4/9/2019     Years since quitting: 3.0    Smokeless tobacco: Never Used    Tobacco comment: states smoking a couple cigareetes a day   Vaping Use    Vaping Use: Never used   Substance and Sexual Activity    Alcohol use: Yes     Alcohol/week: 2.0 standard drinks     Types: 2 Cans of beer per week     Comment: occ/states quit drinking    Drug use: Yes     Types: Marijuana Bennie Divine)     Comment: uses every other day    Sexual activity: Not on file   Other Topics Concern    Not on file   Social History Narrative    Not on file     Social Determinants of Health     Financial Resource Strain: Low Risk     Difficulty of Paying Living Expenses: Not hard at all   Food Insecurity: No Food Insecurity    Worried About [facility] in the Last Year: Never true    [facility] in the Last Year: Never true   Transportation Needs:     Lack of Transportation (Medical): Not on file    Lack of Transportation (Non-Medical): Not on file   Physical Activity:     Days of Exercise per Week: Not on file    Minutes of Exercise per Session: Not on file   Stress:     Feeling of Stress : Not on file   Social Connections:     Frequency of Communication with Friends and Family: Not on file    Frequency of Social Gatherings with Friends and Family: Not on file    Attends [employer] Services: Not on file    Active Member of [address] [place] [address] or Organizations: Not on file    Attends Club or Organization Meetings: Not on file    Marital Status: Not on file   Intimate Partner Violence:     Fear of Current or Ex-Partner: Not on file    Emotionally Abused: Not on file    Physically Abused: Not on file    Sexually Abused: Not on file   Housing Stability:     Unable to Pay for Housing in the Last Year: Not on file    Number of [place] in the Last Year: Not on file    Unstable Housing in the Last Year: Not on file         ROS  Review of Systems   Constitutional: Negative for activity change, appetite change, chills, diaphoresis, fatigue, fever and unexpected weight change. HENT: Negative for sore throat and trouble swallowing. Respiratory: Negative for cough, choking and shortness of breath.     Cardiovascular: Negative for chest pain.   Gastrointestinal: Negative for abdominal distention, abdominal pain, anal bleeding, blood in stool, constipation, diarrhea, nausea, rectal pain and vomiting. Genitourinary: Negative for difficulty urinating. Musculoskeletal: Positive for arthralgias. Negative for back pain. Skin: Negative for color change. Neurological: Negative for seizures, syncope, speech difficulty and numbness. Hematological: Negative for adenopathy. Psychiatric/Behavioral: Negative. BP (!) 140/79 (Site: Left Upper Arm, Position: Sitting, Cuff Size: Large Adult)   Pulse 88   Temp 98.7 °F (37.1 °C) (Infrared)   Ht 6' 2\" (1.88 m)   Wt 171 lb (77.6 kg)   SpO2 96%   BMI 21.96 kg/m²     PHYSICAL EXAM  Physical Exam  Constitutional:       General: He is not in acute distress. Appearance: Normal appearance. He is not ill-appearing. HENT:      Head: Normocephalic and atraumatic. Eyes:      General: No scleral icterus. Cardiovascular:      Rate and Rhythm: Normal rate and regular rhythm. Pulmonary:      Effort: No respiratory distress. Breath sounds: Normal breath sounds. Abdominal:      General: There is no distension. Palpations: Abdomen is soft. Tenderness: There is no abdominal tenderness. Musculoskeletal:         General: No swelling or tenderness. Cervical back: Neck supple. Skin:     General: Skin is warm and dry. Coloration: Skin is not jaundiced. Neurological:      General: No focal deficit present. Mental Status: He is alert and oriented to person, place, and time. Psychiatric:         Mood and Affect: Mood normal.         Behavior: Behavior normal.         Thought Content: Thought content normal.         Judgment: Judgment normal.           ASSESSMENT AND PLAN  1. Personal history of tubular adenoma of the sigmoid colon removed in 2016. My recommendation is for him to have colonoscopy as an outpatient.  The patient was explained the risks/benefits/alternatives/expected outcomes of the procedure. The patient was explained the risks of the procedure, including, but not limited to, the risk of reaction to the anesthesia medicine and the risk of perforation requiring further surgery. The patient was informed that they may require biopsy or polypectomy. These procedures may increase the risk of complication. All questions were answered. The patient verbalized understanding and agreed to proceed. Prep-mag citrate and Dulcolax tablets. 2.   DVT-on chronic warfarin therapy. As per Dr. Zak Crow note on 3/25/2022, he will require low molecular weight heparin bridging for his colonoscopy. I will reach out to his primary care team to coordinate      Twin Art MD    NOTE: This report was transcribed using voice recognition software. Every effort was made to ensure accuracy; however, inadvertent computerized transcription errors may be present.

## 2022-04-18 ENCOUNTER — OFFICE VISIT (OUTPATIENT)
Dept: SURGERY | Age: 62
End: 2022-04-18
Payer: COMMERCIAL

## 2022-04-18 ENCOUNTER — NURSE ONLY (OUTPATIENT)
Dept: INTERNAL MEDICINE | Age: 62
End: 2022-04-18
Payer: COMMERCIAL

## 2022-04-18 VITALS
WEIGHT: 171 LBS | HEIGHT: 74 IN | HEART RATE: 88 BPM | TEMPERATURE: 98.7 F | OXYGEN SATURATION: 96 % | BODY MASS INDEX: 21.94 KG/M2 | DIASTOLIC BLOOD PRESSURE: 79 MMHG | SYSTOLIC BLOOD PRESSURE: 140 MMHG

## 2022-04-18 DIAGNOSIS — I10 ESSENTIAL HYPERTENSION: ICD-10-CM

## 2022-04-18 DIAGNOSIS — I82.499 DEEP VEIN THROMBOSIS (DVT) OF OTHER VEIN OF LOWER EXTREMITY, UNSPECIFIED CHRONICITY, UNSPECIFIED LATERALITY (HCC): Primary | ICD-10-CM

## 2022-04-18 DIAGNOSIS — Z86.010 HISTORY OF COLON POLYPS: Primary | ICD-10-CM

## 2022-04-18 DIAGNOSIS — Z79.01 WARFARIN ANTICOAGULATION: ICD-10-CM

## 2022-04-18 LAB
INTERNATIONAL NORMALIZATION RATIO, POC: 1.8
PROTHROMBIN TIME, POC: 21.4

## 2022-04-18 PROCEDURE — 99024 POSTOP FOLLOW-UP VISIT: CPT | Performed by: SURGERY

## 2022-04-18 PROCEDURE — 93793 ANTICOAG MGMT PT WARFARIN: CPT | Performed by: INTERNAL MEDICINE

## 2022-04-18 PROCEDURE — 85610 PROTHROMBIN TIME: CPT | Performed by: INTERNAL MEDICINE

## 2022-04-18 PROCEDURE — 99202 OFFICE O/P NEW SF 15 MIN: CPT | Performed by: SURGERY

## 2022-04-18 RX ORDER — WARFARIN SODIUM 3 MG/1
TABLET ORAL
Qty: 30 TABLET | Refills: 0 | Status: SHIPPED
Start: 2022-04-18 | End: 2022-04-29 | Stop reason: SDUPTHER

## 2022-04-18 RX ORDER — AMLODIPINE BESYLATE 10 MG/1
10 TABLET ORAL DAILY
Qty: 30 TABLET | Refills: 1 | Status: SHIPPED
Start: 2022-04-18 | End: 2022-04-29 | Stop reason: SDUPTHER

## 2022-04-18 RX ORDER — WARFARIN SODIUM 4 MG/1
TABLET ORAL
Qty: 14 TABLET | Refills: 0 | Status: SHIPPED
Start: 2022-04-18 | End: 2022-04-29 | Stop reason: SDUPTHER

## 2022-04-18 NOTE — PATIENT INSTRUCTIONS
Patient to take additional 2 mg of warfarin today  and then to take 4 mg daily   Recheck INR in 1 week

## 2022-04-18 NOTE — PROGRESS NOTES
Hx of DVT, extensive vascular hx. INR 1.8.  Goal is 2.5  - 3.5. patient has been subtherapeutic for multiple blood draws; patient to take additional 2 mg today (total of 6 mg today); then to take 4 mg daily (increase of 8%); recheck in 1 week  Electronically signed by Mariela Ricci DO on 4/18/2022 at 2:17 PM

## 2022-04-18 NOTE — Clinical Note
Todd Bass and Alondra Park--- as per Dr. David Becerra note on 3/25/2022, I need your input regarding his low molecular weight heparin bridging for his colonoscopy. Please let me know the details of your recommendations.   Thank you for your help  RAR

## 2022-04-18 NOTE — PROGRESS NOTES
Coumadin instructions given per Dr. Prateek Garcia. Pt left will call with instructions and next INR appointment. Printed AVS mailed to pt.

## 2022-04-18 NOTE — PATIENT INSTRUCTIONS
Patient Information and Instructions for Colonoscopy         Definition of Colonoscopy   A colonoscopy is the visual exam of the rectum and colon (large intestine). The exam is done with a tool called a colonoscope. The colonoscope is a flexible tube with a tiny camera on the end. This instrument allows the doctor to view the inside of your rectum and colon. Sigmoidoscopy is a shorter scope that views only the last one third of the colon. Reasons for Colonoscopy   It is used to examine, diagnose, and treat problems in your large intestine. The procedure is most often done for the following reasons: To determine the cause of abdominal pain, rectal bleeding, or a change in bowel habits   To detect and treat colon cancer or colon polyps   To obtain tissue samples for testing   To stop intestinal bleeding   Monitor response to treatment if you have inflammatory bowel disease     Possible Complications   Complications are rare, but no procedure is completely free of risk. If you are planning to have a colonoscopy, your doctor will review a list of possible complications, which may include:   Bleeding   Reaction to the sedation causing drop in your blood pressure or problems breathing  Perforation or puncture of the bowel     Factors that may increase the risk of complications include:   Pre-existing heart or kidney condition   Treatment with certain medicines, including aspirin and other drugs with anticoagulant or blood-thinning properties   Prior abdominal surgery or radiation treatments   Active colitis , diverticulitis , or other acute bowel disease   Previous treatment with radiation therapy     Be sure to discuss these risks with your doctor before the procedure.      What to Expect   Prior to Procedure   Your doctor will likely do the following:   Physical exam   Health history   Review of medicines   Test your stool for hidden blood (called \"occult blood\")     Your colon must be completely clean before the procedure. Any stool left in the intestine will block the view. This preparation may start several days before the procedure. Follow your doctor's instructions. Leading up to your procedure:   Talk to your doctor about your medicines. You may be asked to stop taking some medicines up to one week before the procedure, like:   Anti-inflammatory drugs (e.g., aspirin )   Blood thinners like clopidogrel (Plavix) or warfarin (Coumadin)   Iron supplements or vitamins containing iron   The day or days before your procedure, go on a clear liquid diet (clear broth, clear juice, clear jello) with no red coloring  Do not eat or drink anything after midnight. Wear comfortable clothing. If you have diabetes, ask your doctor if you need to adjust your diabetes medicine on the day prior to your procedure and the day of your procedure. Arrange for a ride home after the procedure. Anesthesia   You will receive intravenous sedation medicine for the procedure so you will not feel anything during the procedure. Description of the Procedure   You will lie on your left side with knees bent and drawn up toward your chest. The colonoscope will be slowly inserted through the rectum and into the bowel. The colonoscope will inject air into the colon. A small attached video camera will allow the doctor to view the colon's lining on a screen. The doctor will continue guiding the tool through the bowel and assess the lining. A tissue sample or polyps may be removed during the procedure. How Long Will It Take? Usually it takes about 30 to 45 minutes     Will It Hurt? Most people do not feel anything during the procedure and will not remember the procedure. After the procedure, gas pains and cramping are common. These pains should go away with the passing of gas. Post-procedure Care   If any tissue was removed: It will be sent to a lab to be examined. It may take 1-2 weeks for results.  The doctor will usually give an initial report after the scope is removed. Other tests may be recommended. A small amount of bleeding may occur during the first few days after the procedure. When you return home after the procedure, be sure to follow your doctor's instructions, which may include:   Resume medicines as instructed by your doctor. Resume normal diet, unless directed otherwise by your doctor. The sedative will make you drowsy. Avoid driving, operating machinery, or making important decisions for the rest of the day. Rest for the remainder of the day. After arriving home, contact your doctor if any of the following occurs:   Bleeding from your rectum, notify your doctor if you pass a teaspoonful of blood or more. Black, tarry stools   Severe abdominal pain   Hard, swollen abdomen   Signs of infection, including fever or chills   Inability to pass gas or stool   Coughing, shortness of breath, chest pain, severe nausea or vomiting     In case of an emergency, CALL 911 . Instructions for Clear liquid diet  Definition  A clear liquid diet consists of clear liquids, such as water, broth and plain gelatin, that are easily digested and leave no undigested residue in your intestinal tract. Your doctor may prescribe a clear liquid diet before certain medical procedures or if you have certain digestive problems. Because a clear liquid diet can't provide you with adequate calories and nutrients, it shouldn't be continued for more than a few days. Purpose  A clear liquid diet is often used before tests, procedures or surgeries that require no food in your stomach or intestines, such as before colonoscopy. It may also be recommended as a short-term diet if you have certain digestive problems, such as nausea, vomiting or diarrhea, or after certain types of surgery.      Diet details  A clear liquid diet helps maintain adequate hydration, provides some important electrolytes, such as sodium and potassium, and gives some energy at a time when a full diet isn't possible or recommended. The following foods are allowed in a clear liquid diet:    Plain water         Fruit juices without pulp, such as apple juice   Strained lemonade    Clear, fat-free broth (bouillon or consomme)   Clear sodas (NO COLA)   Plain gelatin (NO RED OR PURPLE)  Honey   Ice pops without bits of fruit or fruit pulp   Tea or coffee WITHOUT milk or cream    **NOTHING RED OR PURPLE**   **If you CAN see through it you can have it**    Any foods not on the above list should be avoided. Also, for certain tests, such as colon exams, your doctor may ask you to avoid liquids or gelatin with red coloring. A typical menu on the clear liquid diet may look like this:     Breakfast:  1 glass fruit juice  1 cup coffee or tea (without dairy products)  1 cup broth  1 bowl gelatin     Snack:  1 glass fruit juice  1 bowl gelatin     Lunch:  1 glass fruit juice  1 glass water  1 cup broth  1 bowl gelatin     Snack:  1 ice pop (without fruit pulp)  1 cup coffee or tea (without dairy products) or a soft drink     Dinner:  1 cup juice or water  1 cup broth  1 bowl gelatin  1 cup coffee or tea     Results  Although the clear liquid diet may not be very exciting, it does fulfill its purpose. It's designed to keep your stomach and intestines clear, limit strain to your digestive system, but keep your body hydrated as you prepare for or recover from a medical procedure. Risks  Because a clear liquid diet can't provide you with adequate calories and nutrients, it shouldn't be used for more than a few days. Only use the clear liquid diet as directed by your doctor. If your doctor prescribes a clear liquid diet before a medical test, be sure to follow the diet instructions exactly. If you don't follow the diet exactly, you risk an inaccurate test and may have to reschedule the procedure for another time.      [address] [place] [address]  MAGNESIUM CITRATE/DULCOLAX TABLETS PREP  COLON PREP FOR COLONOSCOPY OR COLON SURGERY    It is very important that you follow all of the instructions listed on this sheet carefully (they may be slightly different than the directions on the product that you purchase at the pharmacy) to ensure that you colon is adequately cleaned out or your risk of complications could be increased. 2 Days or More Before Endoscopy:   Obtain 2 10-ounce bottle of Magnesium Citrate and 1 bottle of Dulcolax tablets from the pharmacy.  Do not eat corn, tomatoes, peas or watermelon 3 to 5 days before procedure.  If you are on INSULIN or OTHER DIABETIC MEDICATIONS, then check with your primary care physician as to how to adjust your medication while on clear liquid diet and when nothing by mouth. 1 Day Before the Endoscopy:   No solid food - only clear liquids (soup, jello, or juice that you can see through with no solid food) for breakfast, lunch and supper. DO NOT drink or eat anything that is red as it will turn the inside of the colon red and look like blood. Nothing to eat or drink after midnight.  Have at least 8 oz or more of clear liquids for breakfast (7 am to 8 am) and lunch (11:30 am to 12:30 pm).  12 Noon Drink a 10 oz bottle of Magnesium Citrate and take 4 Dulcolax tablets followed immediately by at least 8 oz of clear liquids.  1:00 pm Drink at least 8 oz of clear liquids.  2:00 pm Drink at least 8 oz of clear liquids.  3:00 pm Drink at least 8 oz of clear liquids.  4:00 pm Take 4 Dulcolax tablets and drink at least 8 oz of clear liquids.  5:00 pm Drink at least 8 oz of clear liquids.  6:00 pm Dinner - all clear liquids and at end of dinner drink a 10 oz bottle of Magnesium Citrate followed immediately by at least 8 oz of clear liquids.  7:00 pm Drink at least 8 oz of clear liquids.  8:00 pm Drink at least 8 oz of clear liquids.    Can continue to take liquids until 12 midnight then nothing to eat or drink    Day of Endoscopy:  · Nothing to eat or drink after midnight the night before the procedure. · If any blood pressure medications or heart medications are due in the morning, you should take them with a sip of water. Please contact Govind Carvalho MA at [phone] with any questions or concerns.

## 2022-04-19 ENCOUNTER — TELEPHONE (OUTPATIENT)
Dept: SURGERY | Age: 62
End: 2022-04-19

## 2022-04-19 NOTE — TELEPHONE ENCOUNTER
Prior Authorization Form:      DEMOGRAPHICS:                     Patient Name:  Yaritza Valerio  Patient :  1960            Insurance:  Payor: Luo Beck / Plan: Corine Schooling / Product Type: *No Product type* /   Insurance ID Number:    Payor/Plan Subscr  Sex Relation Sub.  Ins. ID Effective Group Num   1. [employer] - * KSENIA ESPINO* 1960 Male Self [id number] 1/1/15 [facility] [address]         DIAGNOSIS & PROCEDURE:                       Procedure/Operation: colonoscopy           CPT Code: 97419    Diagnosis:  Hx of colon polyps    ICD10 Code: Z86.010    Location:  [facility]    Surgeon:  Dr Nathalie Harrison INFORMATION:                          Date: 2022    Time: 930              [facility]                                                       Status:  Outpatient        Special Comments:  NA       Electronically signed by Gregory Arshad on 2022 at 10:55 AM

## 2022-04-19 NOTE — TELEPHONE ENCOUNTER
Gulshan Moreland is scheduled for a colonoscopy with Dr Angelina Herrera  on 6/7/2022 @ 0930, arrival time 0830. Patient has been notified of the appointment and a letter has been given to the patient in clinic. Patient has been told to make sure they have a ride and to park in the [facility] and report through the outpatient entrance of the hospital facing [place] for same day surgery.     Electronically signed by Damian Skelton on 4/19/22 at 10:54 AM EDT

## 2022-04-25 ENCOUNTER — TELEPHONE (OUTPATIENT)
Dept: INTERNAL MEDICINE | Age: 62
End: 2022-04-25

## 2022-04-25 NOTE — TELEPHONE ENCOUNTER
Patient was a no show for his INR this morning. Called but did not answer. Unable to leave voicemail as mailbox is full. Letter mailed. INR updated.   Valentin Quarles LPN

## 2022-04-25 NOTE — PROGRESS NOTES
See telephone call from today as patient was a no show. Anticoag tracking updated for Wednesday.    Lee Le LPN

## 2022-04-29 ENCOUNTER — OFFICE VISIT (OUTPATIENT)
Dept: INTERNAL MEDICINE | Age: 62
End: 2022-04-29
Payer: COMMERCIAL

## 2022-04-29 VITALS
DIASTOLIC BLOOD PRESSURE: 87 MMHG | BODY MASS INDEX: 21.88 KG/M2 | TEMPERATURE: 97 F | RESPIRATION RATE: 18 BRPM | HEART RATE: 85 BPM | WEIGHT: 170.5 LBS | HEIGHT: 74 IN | OXYGEN SATURATION: 96 % | SYSTOLIC BLOOD PRESSURE: 137 MMHG

## 2022-04-29 DIAGNOSIS — M79.2 NEUROPATHIC PAIN: ICD-10-CM

## 2022-04-29 DIAGNOSIS — I10 ESSENTIAL HYPERTENSION: ICD-10-CM

## 2022-04-29 DIAGNOSIS — I82.499 DEEP VEIN THROMBOSIS (DVT) OF OTHER VEIN OF LOWER EXTREMITY, UNSPECIFIED CHRONICITY, UNSPECIFIED LATERALITY (HCC): ICD-10-CM

## 2022-04-29 DIAGNOSIS — M72.2 PLANTAR FASCIITIS OF LEFT FOOT: Primary | ICD-10-CM

## 2022-04-29 DIAGNOSIS — I73.9 PAD (PERIPHERAL ARTERY DISEASE) (HCC): ICD-10-CM

## 2022-04-29 PROCEDURE — G8427 DOCREV CUR MEDS BY ELIG CLIN: HCPCS | Performed by: INTERNAL MEDICINE

## 2022-04-29 PROCEDURE — 85610 PROTHROMBIN TIME: CPT | Performed by: INTERNAL MEDICINE

## 2022-04-29 PROCEDURE — G8420 CALC BMI NORM PARAMETERS: HCPCS | Performed by: INTERNAL MEDICINE

## 2022-04-29 PROCEDURE — 99213 OFFICE O/P EST LOW 20 MIN: CPT | Performed by: INTERNAL MEDICINE

## 2022-04-29 PROCEDURE — 3017F COLORECTAL CA SCREEN DOC REV: CPT | Performed by: INTERNAL MEDICINE

## 2022-04-29 PROCEDURE — 4004F PT TOBACCO SCREEN RCVD TLK: CPT | Performed by: INTERNAL MEDICINE

## 2022-04-29 RX ORDER — ATORVASTATIN CALCIUM 80 MG/1
80 TABLET, FILM COATED ORAL DAILY
Qty: 90 TABLET | Refills: 0 | Status: SHIPPED
Start: 2022-04-29 | End: 2022-08-26 | Stop reason: SDUPTHER

## 2022-04-29 RX ORDER — WARFARIN SODIUM 4 MG/1
TABLET ORAL
Qty: 30 TABLET | Refills: 1 | Status: SHIPPED
Start: 2022-04-29 | End: 2022-06-13 | Stop reason: SDUPTHER

## 2022-04-29 RX ORDER — AMLODIPINE BESYLATE 10 MG/1
10 TABLET ORAL DAILY
Qty: 90 TABLET | Refills: 0 | Status: SHIPPED
Start: 2022-04-29 | End: 2022-08-26 | Stop reason: SDUPTHER

## 2022-04-29 RX ORDER — WARFARIN SODIUM 3 MG/1
TABLET ORAL
Qty: 30 TABLET | Refills: 0 | Status: SHIPPED
Start: 2022-04-29 | End: 2022-05-06 | Stop reason: SDUPTHER

## 2022-04-29 ASSESSMENT — ENCOUNTER SYMPTOMS
RESPIRATORY NEGATIVE: 1
GASTROINTESTINAL NEGATIVE: 1

## 2022-04-29 NOTE — PROGRESS NOTES
[facility]  Internal Medicine Residency Program  ACC Note      SUBJECTIVE:  CC: had concerns including Foot Swelling (onset x 1 week ago but swelling went away, deniesn injury) and Anticoagulation (needs INR done). HPI:  Angel Agarwal is a 57 y.o.male presenting to [facility] for foot pain and medication refill. Foot pain began approximately 1 week ago and is localized to the plantar aspect of the left foot. Denies any pain in the right foot. Patient states the pain is worse when getting out of bed in the morning. No other aggravating factors. Describes the pain as tightness and occasionally has burning. He states recently his increased physical activity at the request of his PCP. Patient does currently use over-the-counter shoe inserts. Patient has a follow-up appointment with his podiatrist next month. On exam, he does elicit tenderness on palpation over the plantar aponeurosis, which is worsened with extension of the toes. DP and PT pulses are intact on the left side. He has no open wounds and no evidence of athlete's foot. Chronic issues include HTN, is requesting refill for amlodipine. BP well controlled today 137/87. He also needs refills on his Lipitor, he is on high intensity statin due to PVD. Anticoagulation instructions from last visit were reviewed, patient was post to be on 4 mg warfarin, however the patient has been taking 3 mg on Mondays and Tuesdays with 4 mg all other days. POCT INR in office today was 2.9. Patient denies any significant bruising or bleeding and denies any hematuria or melanotic stools. No other acute complaints at this time. Review of Systems   Constitutional: Negative. Respiratory: Negative. Cardiovascular: Negative. Gastrointestinal: Negative. Genitourinary: Negative. Musculoskeletal: Negative. Neurological: Negative. Hematological: Negative. Psychiatric/Behavioral: Negative.         Outpatient Medications Marked as Taking for the 4/29/22 encounter (Office Visit) with Silvia Rodriguez MD   Medication Sig Dispense Refill    amLODIPine (NORVASC) 10 MG tablet Take 1 tablet by mouth daily 90 tablet 0    warfarin (COUMADIN) 4 MG tablet Take 1 tab by mouth daily on Wednesday, Thursday, Friday, Saturday, Sunday 30 tablet 1    warfarin (COUMADIN) 3 MG tablet take 1 tablet by mouth on Mondays and Tuesdays 30 tablet 0    atorvastatin (LIPITOR) 80 MG tablet Take 1 tablet by mouth daily 90 tablet 0    losartan (COZAAR) 100 MG tablet Take 1 tablet by mouth daily 90 tablet 1    albuterol sulfate  (90 Base) MCG/ACT inhaler Inhale 2 puffs into the lungs every 6 hours as needed for Wheezing 1 each 2    Multiple Vitamins-Minerals (THERAPEUTIC MULTIVITAMIN-MINERALS) tablet Take 1 tablet by mouth daily 90 tablet 1    magnesium oxide (MAG-OX) 400 MG tablet Take 1 tablet by mouth daily 90 tablet 1    doxycycline monohydrate (MONODOX) 100 MG capsule take 1 capsule by mouth once daily 90 capsule 1    gabapentin (NEURONTIN) 400 MG capsule take 1 capsule by mouth three times a day      clopidogrel (PLAVIX) 75 MG tablet Take 1 tablet by mouth daily 90 tablet 3       OBJECTIVE:    VS:   /87 (Site: Left Upper Arm, Position: Sitting, Cuff Size: Large Adult)   Pulse 85   Temp 97 °F (36.1 °C) (Oral)   Resp 18   Ht 6' 2\" (1.88 m)   Wt 170 lb 8 oz (77.3 kg)   SpO2 96%   BMI 21.89 kg/m²     EXAM:  Physical Exam  Constitutional:       General: He is not in acute distress. Appearance: Normal appearance. He is normal weight. He is not ill-appearing, toxic-appearing or diaphoretic. HENT:      Head: Normocephalic and atraumatic. Right Ear: External ear normal.      Left Ear: External ear normal.      Nose: Nose normal.      Mouth/Throat:      Mouth: Mucous membranes are moist.      Pharynx: Oropharynx is clear. Eyes:      General: No scleral icterus. Right eye: No discharge.          Left eye: No discharge. Extraocular Movements: Extraocular movements intact. Conjunctiva/sclera: Conjunctivae normal.   Cardiovascular:      Rate and Rhythm: Normal rate and regular rhythm. Pulses:           Dorsalis pedis pulses are 1+ on the left side. Posterior tibial pulses are 1+ on the left side. Heart sounds: Normal heart sounds. No murmur heard. No gallop. Pulmonary:      Effort: Pulmonary effort is normal. No respiratory distress. Breath sounds: Normal breath sounds. No stridor. No wheezing, rhonchi or rales. Abdominal:      General: Abdomen is flat. Bowel sounds are normal. There is no distension. Palpations: Abdomen is soft. There is no mass. Tenderness: There is no abdominal tenderness. There is no guarding or rebound. Hernia: No hernia is present. Musculoskeletal:      Right lower leg: No edema. Left lower leg: No edema. Feet:    Feet:      Left foot:      Skin integrity: Skin integrity normal.   Skin:     General: Skin is warm and dry. Capillary Refill: Capillary refill takes less than 2 seconds. Coloration: Skin is not jaundiced or pale. Findings: No bruising, erythema, lesion or rash. Neurological:      General: No focal deficit present. Mental Status: He is alert and oriented to person, place, and time. Mental status is at baseline. Gait: Gait normal.   Psychiatric:         Mood and Affect: Mood normal.         Behavior: Behavior normal.         Thought Content: Thought content normal.         Judgment: Judgment normal.         ASSESSMENT/PLAN:  I have reviewed all pertinent PMH, PSH, FH, SH, medications and allergies and updated history as appropriate. 1. Plantar Fasciitis  2. HTN  3. PAD/HLD  4.  DVT/Anti-Phospholipid Syndrome - INR 2.9 today (goal 2.5-3.5)    · Follow up with podiatry for custom orthotics  · Stretches reviewed in office with patient  · Supportive shoes, no sandals/flip flops  · Refill amlodipine, Lipitor  · Check A1c, B12 and B6 to r/o any underlying causes of neuropathy  · Continue warfarin 3mg M/T and 4mg all other days   · Plan for C scope on 6/7, will need bridge given his APS and h/o DVT   - hold Coumadin 5 days prior to planned procedure   - start Lovenox 1mg/kg BID, hold 1 day prior to procedure   - resume Lovenox and Coumadin 24hrs post-op   - continue Lovenox and Coumadin both until INR at goal       RTC: 5/6 with Dr. Prudencio Padron for bridging instructions      I have reviewed my findings and recommendations with Gulshan Moreland and Dr Chi Chaudhari.      Zeyad Rudd MD PGY-3  4/29/2022 3:02 PM
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
## 2022-04-29 NOTE — PROGRESS NOTES
[facility] [address]  [facility]    Attending Physician Statement  I have discussed the case, including pertinent history and exam findings with the resident physician. I agree with the assessment, plan and orders as documented by the resident. I have reviewed all pertinent PMHx, PSHx, FamHx, SocialHx, medications, and allergies and updated history as appropriate. Patient here for foot swelling. 1. L foot pain mainly on the plantar aspect after he has increased his activity. Noted have metatarsalgia B feet - no custom inserts yet. Likely with plantar fasciitis as well. Appointment with podiatry scheduled for next month. Pain with plantar extension. Strong pulses. Stretching exercises. Add neuropathy work up. 2. DVT, APS with goal INR of 2.5 - 3.5 per [facility]. INR 2.9 today on 3mg MT and 4mg other days. Continue same regimen. Rpt INR during OV next week (5/6/22)  3. Colonoscopy scheduled for 6/7/22. Given hypercoagulable state, need to bridge prior to procedure -- will give instructions on next OV 5/6/22. Remainder of medical problems as per resident note. Maria Isabel Camara MD  4/29/2022 2:23 PM

## 2022-04-29 NOTE — PATIENT INSTRUCTIONS
Patient Instructions: Your medications have been refilled. Please continue taking as directed. Please follow up with your podiatrist and discuss custom orthotics. Routine blood work has been ordered for you. Please have your blood drawn prior to your next office visit. Mitzy Olmstead MD  4/29/2022  2:40 PM    Patient Education        Plantar Fasciitis: Care Instructions  Overview     Plantar fasciitis is pain and inflammation of the plantar fascia, the tissue at the bottom of your foot that connects the heel bone to the toes. The plantar fascia also supports the arch. If you strain the plantar fascia, it can developsmall tears and cause heel pain when you stand or walk. Plantar fasciitis can be caused by running or other sports. It also may occur in people who are overweight or who have high arches or flat feet. You may get plantar fasciitis if you walk or stand for long periods, or have a tightAchilles tendon or calf muscles. You can improve your foot pain with rest and other care at home. It might takea few weeks to a few months for your foot to heal completely. Follow-up care is a key part of your treatment and safety. Be sure to make and go to all appointments, and call your doctor if you are having problems. It's also a good idea to know your test results and keep alist of the medicines you take. How can you care for yourself at home?  Rest your feet often. Reduce your activity to a level that lets you avoid pain. If possible, do not run or walk on hard surfaces.  Take pain medicines exactly as directed. ? If the doctor gave you a prescription medicine for pain, take it as prescribed. ? If you are not taking a prescription pain medicine, take an over-the-counter anti-inflammatory medicine for pain and swelling, such as ibuprofen (Advil, Motrin) or naproxen (Aleve). Read and follow all instructions on the label.  Use ice massage to help with pain and swelling.  You can use an ice cube or an ice cup several times a day. To make an ice cup, fill a paper cup with water and freeze it. Cut off the top of the cup until a half-inch of ice shows. Hold onto the remaining paper to use the cup. Rub the ice in small circles over the area for 5 to 7 minutes.  Contrast baths, which alternate hot and cold water, can also help reduce swelling. But because heat alone may make pain and swelling worse, end a contrast bath with a soak in cold water.  Wear a night splint if your doctor suggests it. A night splint holds your foot with the toes pointed up and the foot and ankle at a 90-degree angle. This position gives the bottom of your foot a constant, gentle stretch.  Do simple exercises such as calf stretches and towel stretches 2 to 3 times each day, especially when you first get up in the morning. These can help the plantar fascia become more flexible. They also make the muscles that support your arch stronger. Hold these stretches for 15 to 30 seconds per stretch. Repeat 2 to 4 times. ? Stand about 1 foot from a wall. Place the palms of both hands against the wall at chest level. Lean forward against the wall, keeping one leg with the knee straight and heel on the ground while bending the knee of the other leg.  ? Sit down on the floor or a mat with your feet stretched in front of you. Roll up a towel lengthwise, and loop it over the ball of your foot. Holding the towel at both ends, gently pull the towel toward you to stretch your foot.  Wear shoes with good arch support. Athletic shoes or shoes with a well-cushioned sole are good choices.  Replace athletic shoes regularly.  Try heel cups or shoe inserts (orthotics) to help cushion your heel. You can buy these at many shoe stores.  Put on your shoes as soon as you get out of bed. Going barefoot or wearing slippers may make your pain worse.  Reach and stay at a good weight for your height. This puts less strain on your feet.   When should you call for help? Call your doctor now or seek immediate medical care if:     You have heel pain with fever, redness, or warmth in your heel.      You cannot put weight on the sore foot. Watch closely for changes in your health, and be sure to contact your doctor if:     You have numbness or tingling in your heel.      Your heel pain lasts more than 2 weeks. Where can you learn more? Go to https://chpepiceweb.[email or web address]. org and sign in to your [employer] account. Enter F668 in the [employer] box to learn more about \"Plantar Fasciitis: Care Instructions. \"     If you do not have an account, please click on the \"Sign Up Now\" link. Current as of: July 1, 2021               Content Version: 13.2  © 2006-2022 [employer]. Care instructions adapted under license by [facility] ([facility]). If you have questions about a medical condition or this instruction, always ask your healthcare professional. [place] [address] any warranty or liability for your use of this information. Patient Education        Plantar Fasciitis: Exercises  Introduction  Here are some examples of exercises for you to try. The exercises may be suggested for a condition or for rehabilitation. Start each exercise slowly. Ease off the exercises if you start to have pain. You will be told when to start these exercises and which ones will work bestfor you. How to do the exercises  Towel stretch    1. Sit with your legs extended and knees straight. 2. Place a towel around your foot just under the toes. 3. Hold each end of the towel in each hand, with your hands above your knees. 4. Pull back with the towel so that your foot stretches toward you. 5. Hold the position for at least 15 to 30 seconds. 6. Repeat 2 to 4 times a session, up to 5 sessions a day. Calf stretch    This exercise stretches the muscles at the back of the lower leg (the calf) andthe Achilles tendon.  Do this exercise 3 or 4 times a day, 5 days a week. 1. Stand facing a wall with your hands on the wall at about eye level. Put the leg you want to stretch about a step behind your other leg. 2. Keeping your back heel on the floor, bend your front knee until you feel a stretch in the back leg. 3. Hold the stretch for 15 to 30 seconds. Repeat 2 to 4 times. Plantar fascia and calf stretch    Stretching the plantar fascia and calf muscles can increase flexibility and decrease heel pain. You can do this exercise several times each day and beforeand after activity. 1. Stand on a step as shown above. Be sure to hold on to the banister. 2. Slowly let your heels down over the edge of the step as you relax your calf muscles. You should feel a gentle stretch across the bottom of your foot and up the back of your leg to your knee. 3. Hold the stretch about 15 to 30 seconds, and then tighten your calf muscle a little to bring your heel back up to the level of the step. Repeat 2 to 4 times. Towel curls    Make this exercise more challenging by placing a weighted object, such as asoup can, on the other end of the towel. 1. While sitting, place your foot on a towel on the floor and scrunch the towel toward you with your toes. 2. Then, also using your toes, push the towel away from you. [employer] pickups    1. Put marbles on the floor next to a cup.  2. Using your toes, try to lift the marbles up from the floor and put them in the cup. Follow-up care is a key part of your treatment and safety. Be sure to make and go to all appointments, and call your doctor if you are having problems. It's also a good idea to know your test results and keep alist of the medicines you take. Where can you learn more? Go to https://BelieversFundshwetaZwipeeb.[employer]. org and sign in to your [employer] account. Enter T128 in the [employer] box to learn more about \"Plantar Fasciitis: Exercises. \"     If you do not have an account, please click on the \"Sign Up Now\" link.  Current as of: July 1, 2021               Content Version: 13.2  © 1202-2012 [employer]. Care instructions adapted under license by [facility] ([facility]). If you have questions about a medical condition or this instruction, always ask your healthcare professional. [address] any warranty or liability for your use of this information.

## 2022-05-03 ENCOUNTER — HOSPITAL ENCOUNTER (OUTPATIENT)
Age: 62
Discharge: HOME | End: 2022-05-03
Payer: COMMERCIAL

## 2022-05-03 DIAGNOSIS — M79.2 NEUROPATHIC PAIN: ICD-10-CM

## 2022-05-03 LAB
FOLATE: 16.8 NG/ML (ref 4.8–24.2)
HBA1C MFR BLD: 4.9 % (ref 4–5.6)
VITAMIN B-12: 796 PG/ML (ref 211–946)

## 2022-05-03 PROCEDURE — 84207 ASSAY OF VITAMIN B-6: CPT

## 2022-05-03 PROCEDURE — 82746 ASSAY OF FOLIC ACID SERUM: CPT

## 2022-05-03 PROCEDURE — 82607 VITAMIN B-12: CPT

## 2022-05-03 PROCEDURE — 83036 HEMOGLOBIN GLYCOSYLATED A1C: CPT

## 2022-05-03 PROCEDURE — 36415 COLL VENOUS BLD VENIPUNCTURE: CPT

## 2022-05-06 ENCOUNTER — OFFICE VISIT (OUTPATIENT)
Dept: INTERNAL MEDICINE | Age: 62
End: 2022-05-06
Payer: COMMERCIAL

## 2022-05-06 VITALS
WEIGHT: 170.3 LBS | RESPIRATION RATE: 18 BRPM | TEMPERATURE: 96.8 F | OXYGEN SATURATION: 96 % | SYSTOLIC BLOOD PRESSURE: 129 MMHG | BODY MASS INDEX: 21.86 KG/M2 | HEART RATE: 85 BPM | DIASTOLIC BLOOD PRESSURE: 85 MMHG | HEIGHT: 74 IN

## 2022-05-06 DIAGNOSIS — I82.499 DEEP VEIN THROMBOSIS (DVT) OF OTHER VEIN OF LOWER EXTREMITY, UNSPECIFIED CHRONICITY, UNSPECIFIED LATERALITY (HCC): Primary | ICD-10-CM

## 2022-05-06 LAB
INTERNATIONAL NORMALIZATION RATIO, POC: 3.1
PROTHROMBIN TIME, POC: 37.7

## 2022-05-06 PROCEDURE — 85610 PROTHROMBIN TIME: CPT | Performed by: INTERNAL MEDICINE

## 2022-05-06 PROCEDURE — G8420 CALC BMI NORM PARAMETERS: HCPCS | Performed by: INTERNAL MEDICINE

## 2022-05-06 PROCEDURE — 99214 OFFICE O/P EST MOD 30 MIN: CPT | Performed by: INTERNAL MEDICINE

## 2022-05-06 PROCEDURE — 3017F COLORECTAL CA SCREEN DOC REV: CPT | Performed by: INTERNAL MEDICINE

## 2022-05-06 PROCEDURE — G8427 DOCREV CUR MEDS BY ELIG CLIN: HCPCS | Performed by: INTERNAL MEDICINE

## 2022-05-06 PROCEDURE — 93793 ANTICOAG MGMT PT WARFARIN: CPT | Performed by: INTERNAL MEDICINE

## 2022-05-06 PROCEDURE — 4004F PT TOBACCO SCREEN RCVD TLK: CPT | Performed by: INTERNAL MEDICINE

## 2022-05-06 RX ORDER — WARFARIN SODIUM 3 MG/1
TABLET ORAL
Qty: 30 TABLET | Refills: 0 | Status: SHIPPED
Start: 2022-05-06 | End: 2022-06-10 | Stop reason: SDUPTHER

## 2022-05-06 ASSESSMENT — ENCOUNTER SYMPTOMS
CONSTIPATION: 0
COUGH: 0
SHORTNESS OF BREATH: 0
ABDOMINAL PAIN: 0
DIARRHEA: 0
NAUSEA: 0
BACK PAIN: 0
VOMITING: 0
BLOOD IN STOOL: 0

## 2022-05-06 NOTE — PATIENT INSTRUCTIONS
On preparation for colonoscopy on June 7, 2022:   · June 1, 2022 - Hold warfarin   · June 2 and 3 -repeat INR   · Plan to start Lovenox 1 mg/kg BID once INR is less than or equal to 2.5  · June 6 - Hold night dose of lovenox  · June 7-colonoscopy  · June 8 -resume Lovenox if okay with GS, then begin bridging to warfarin;   · June 8 -resume warfarin 3 mg daily  · Chuyita 10 - repeat INR   · Take other medications regularly  · Continue to follow-up with vascular surgery and podiatry at [facility]  ·

## 2022-05-06 NOTE — PROGRESS NOTES
Cathy Gonzalez (:  1960) is a 57 y.o. male,Established patient, here for evaluation of the following chief complaint(s):  Results (lab), Other (needs an INR done), and Ankle Problem (left swelling present )    Seen last 22 for R foot pain and INR check       ASSESSMENT/PLAN:  Hypertension:  -BP at home: 110`s mmHg  -BP in the clinic: 129/85 mmHg  -denies s/sx of orthostasis  -currently on amlodipine 10 mg, losartan 100mg daily  -last CMP 21: wnl  Plan  -continue amlodipine and losartan  -advised on diet and exercise     HLD  -last lipid panel 12/15/21: LDL 88, HDL 37  -The 10-year ASCVD risk score (Gwen Sylvester, et al., 2013) is: 14.2%  -on atorvastatin 80 mg daily  Plan  -continue atorvastatin     AAA, s/p repair with chronic abdominal graft infection  -on chronic suppression with doxycycline 100 mg daily     Hypercoagulable state d/t APLA in the setting of SLE with Hx DVT   -Goal 2.5-3.5 per [facility] vascular surgery  -seen by Dr. Jewels Miranda (Vascular at [facility]) on 4/15  -goal BP < 130/80, HBA1C <7%, LDL < 70  -last INR 22: 1.8->2.9->3.1 today  -on warfarin 4mg daily   -will have him f/u at the clinic in 2 weeks for INR check, no changes today  Plan  -continue warfarin to 4 mg daily  -INR check in 2 weeks      PAD  -follows with [facility], s/p thrombectomy on the left side 10/2018  -seen by Dr. Jewels Miranda (Vascular at [facility]) on 4/15  -currently on clopidogrel 75 mg daily and atorvastatin 80 mg daily  Plan  -continue clopidogrel and atorvastatin     Hx of sigmoid polyp  -s/p colonoscopy , sigmoid polyp at 25 cm, tubular adenoma  -advised for coloscopy after 5 years  -for colonoscopy on 22 with Dr. Trista Truong  -keep schedule for colonoscopy on 22  -Plan anticoagulation prior to procedure:   · 2022 - Hold warfarin   ·  and 3 -repeat INR   · Plan to start Lovenox 1 mg/kg BID once INR is less than or equal to 2.5  ·  - Hold night dose of lovenox  ·  7-colonoscopy  · June 8 -resume Lovenox if okay with GS, then begin bridging to warfarin;   · June 8 -resume warfarin 3 mg daily  · Chuyita 10 - repeat INR     Metatarsalgia B feet 2/2 Plantar fasciitis   -follows up with Dr. Damaso Levin (Podiatry at [facility])   -has an appointment this month  -got his custom-fit orthotic shoes  -symptoms are better  -on gabapentin 400 mg BID  -Vit B12 and folate 5/3/22: wnl  -HBA1C 5/3/22: 4.9%  Plan  -continue gabapentin  -continue to follow with podiatry at [facility]    Depression  -mood: okay, no SI  -stopped taking paroxetine, states\" he doesn`t need it\"  Plan  -will monitor      Tobacco abuse  -still smokes 3 cigarettes/day, started around 25 y/o x 53 years 1/2 pack/day  -nicotine replacement therapy  Plan  -smoking cessation program     Health Maintenance  -Colonoscopy last done 2016 : colonoscopy scheduled 6/7/22 with Dr. Kristopher Cantor booster     Return in about 27 days (around 6/2/2022) for INR check on June 2,3, and 10. Regular follow-up with me in 3 months. Subjective   SUBJECTIVE/OBJECTIVE:  This is C. C. A 57year old man, with a PMHx of HTN, HLD, PAD, AAA, s/p repair with chronic abdominal graft infection, hypercoagulable state d/t APLA in the setting of SLE with Hx DVT, metatarsalgia B feet, depression, tobacco abuse who comes into the clinic for follow-up. He was last seen on 4/29/22 for R foot pain. He is get his orthotic shoes with relief. Vit B12 and folate levels were wnl. INR last visit was 2.9. Today, he has no active complaints. He denies fever, cough, colds, chills, weight loss, headache, chest pain, dyspnea, abdominal pain, overt signs of bleeding, urinary or bowel changes, numbness, tingling, foot pain, claudication, leg swelling. He was seen by Dr. Daryl Regalado (vascular in [facility]) last 4/15/22. He has a follow-up appointment with Dr. Rc Henderson (podiatry in [facility]) this month. He has scheduled colonoscopy with Dr. Tonya Marsh on June 7, 2022.          Review of Systems Constitutional: Negative for chills and fever. HENT: Negative for congestion. Respiratory: Negative for cough and shortness of breath. Cardiovascular: Negative for chest pain, palpitations and leg swelling. Gastrointestinal: Negative for abdominal pain, blood in stool, constipation, diarrhea, nausea and vomiting. Musculoskeletal: Negative for back pain and myalgias. Skin: Negative for rash. Neurological: Negative for dizziness and headaches. Psychiatric/Behavioral: The patient is not nervous/anxious. Objective   Physical Exam  Constitutional:       General: He is not in acute distress. Appearance: He is well-developed. HENT:      Head: Normocephalic and atraumatic. Eyes:      General: No scleral icterus. Conjunctiva/sclera: Conjunctivae normal.   Neck:      Trachea: No tracheal deviation. Cardiovascular:      Rate and Rhythm: Normal rate. Heart sounds: No murmur heard. Pulmonary:      Effort: Pulmonary effort is normal. No respiratory distress. Breath sounds: Normal breath sounds. Abdominal:      General: Bowel sounds are normal. There is no distension. Palpations: Abdomen is soft. Tenderness: There is no abdominal tenderness. Musculoskeletal:         General: Normal range of motion. Cervical back: Normal range of motion. Skin:     General: Skin is warm and dry. Neurological:      Mental Status: He is alert and oriented to person, place, and time. Psychiatric:         Behavior: Behavior normal.         Thought Content: Thought content normal.         Judgment: Judgment normal.            On this date 5/6/2022 I have spent 15 minutes reviewing previous notes, test results and face to face with the patient discussing the diagnosis and importance of compliance with the treatment plan as well as documenting on the day of the visit. An electronic signature was used to authenticate this note.     --Leon Conway MD

## 2022-05-06 NOTE — PROGRESS NOTES
[facility]  [facility]    Attending Physician Statement  I have discussed the case, including pertinent history and exam findings with the resident physician. I agree with the assessment, plan and orders as documented by the resident. I have reviewed all pertinent PMHx, PSHx, FamHx, SocialHx, medications, and allergies and updated history as appropriate. Patient here for routine follow up of medical problems. 1. Has APS, DVT (INR goal of 2.5 - 3.5). Has colonoscopy scheduled 6/7/22. Will need to bridge as he is high risk for recurrent clot. Instructions for bridging  · June 1 - stop warfarin  · Chuyita 2, 3 - rpt INR. Once less than or equal to 2.5, start lovenox BID. Prescribe lovenox at that time. · June 6 - hold night dose of lovenox  · June 7 - colonoscopy  · June 8 - resume lovenox + bridge back to warfarin if okay with surgery (start at 3mg daily)   · Chuyita 10 - rpt INR  · Discontinue lovenox once INR back at goal  2. HTN, controlled. Renal function  3. HLD, controlled  4. Plantar fasciitis improved with orthotic shoes  5. On doxycycline for chronic suppression after abdominal graft infection      Remainder of medical problems as per resident note. Ally Ricks MD  5/6/2022 2:38 PM

## 2022-05-07 LAB — VITAMIN B6: 17.9 NMOL/L (ref 20–125)

## 2022-05-27 ENCOUNTER — PREP FOR PROCEDURE (OUTPATIENT)
Dept: SURGERY | Age: 62
End: 2022-05-27

## 2022-05-27 RX ORDER — SODIUM CHLORIDE 9 MG/ML
INJECTION, SOLUTION INTRAVENOUS CONTINUOUS
Status: CANCELLED | OUTPATIENT
Start: 2022-05-27

## 2022-05-27 NOTE — H&P (VIEW-ONLY)
GENERAL SURGERY                                                               HISTORY AND PHYSICAL     CHIEF COMPLAINT       Chief Complaint   Patient presents with    Consultation       Colonoscopy consult - Ref by Dr Luna Simms Colonoscopy       last colonoscopy about 5-6 years ago - 06/2016 - Hx of colon polyps    Other       patient has no complaints today          HPI  He is referred by by Dr. Matt Goodwin for evaluation regarding follow-up colonoscopy. In 2016, he had a colonoscopy that showed an 8 mm pedunculated polyp that was removed from the sigmoid colon. Pathology reports showed tubular adenoma. At present, he has no lower GI or lower abdominal complaints. He denies any rectal bleeding. He has a negative personal and family history of colon cancer.   He is on chronic warfarin therapy for a history of DVT.     Past Medical History        Past Medical History:   Diagnosis Date    AAA (abdominal aortic aneurysm) ([address]       s/p repair    Asthma      Chronic anticoagulation 5/6/2014    DVT (deep venous thrombosis) ([address] 2010     R leg for 1 time    Hypertension      Hypertension      Peripheral vascular disease ([address] 2010     both legs, more on R leg    Transfusion history              Past Surgical History         Past Surgical History:   Procedure Laterality Date    AORTO-FEMORAL BYPASS GRAFT Bilateral 6/27/10    ARTERIAL ANEURYSM REPAIR Left 9/28/10     repair left femoral pseudoaneurysm    COLONOSCOPY        FASCIOTOMY Right 6/11/10     4 compartment    FEMORAL BYPASS Right 1/26/10     femoral-above knee popliteal    FEMORAL-FEMORAL BYPASS GRAFT   1/26/10     Dr. Cardenas Many right to left    HERNIA REPAIR   6/25/14     OPEN VENTRAL HERNIA REPAIR    THROMBOENDARTERECTOMY Left 1/26/10     left femoral    THROMBOENDARTERECTOMY Right 6/21/10     reexploration right fem-pop    VASCULAR SURGERY         22 times total            Current Facility-Administered Medications          Current Outpatient Medications   Medication Sig Dispense Refill    losartan (COZAAR) 100 MG tablet Take 1 tablet by mouth daily 90 tablet 1    albuterol sulfate  (90 Base) MCG/ACT inhaler Inhale 2 puffs into the lungs every 6 hours as needed for Wheezing 1 each 2    Multiple Vitamins-Minerals (THERAPEUTIC MULTIVITAMIN-MINERALS) tablet Take 1 tablet by mouth daily 90 tablet 1    magnesium oxide (MAG-OX) 400 MG tablet Take 1 tablet by mouth daily 90 tablet 1    doxycycline monohydrate (MONODOX) 100 MG capsule take 1 capsule by mouth once daily 90 capsule 1    atorvastatin (LIPITOR) 80 MG tablet Take 1 tablet by mouth daily 90 tablet 1    gabapentin (NEURONTIN) 400 MG capsule take 1 capsule by mouth three times a day        PARoxetine (PAXIL) 10 MG tablet Take 1 tablet by mouth daily 90 tablet 2    clopidogrel (PLAVIX) 75 MG tablet Take 1 tablet by mouth daily 90 tablet 3    Blood Pressure Monitoring (BLOOD PRESSURE CUFF) MISC Dx:  Hypertension with labile blood pressure 1 each 0    amLODIPine (NORVASC) 10 MG tablet Take 1 tablet by mouth daily 30 tablet 1    warfarin (COUMADIN) 3 MG tablet take 1 tablet by mouth once 5 times weekly 30 tablet 0    warfarin (COUMADIN) 4 MG tablet Take 1 tab by mouth 2 times weekly.  14 tablet 0      No current facility-administered medications for this visit.                 Allergies   Allergen Reactions    Ultram [Tramadol]           Family History         Family History   Problem Relation Age of Onset    Diabetes Mother      High Blood Pressure Mother      Cancer Father      Diabetes Brother              Social History         Socioeconomic History    Marital status: Single       Spouse name: Not on file    Number of children: Not on file    Years of education: Not on file    Highest education level: Not on file   Occupational History    Not on file   Tobacco Use    Smoking status: Current Every Day Smoker       Packs/day: 0.33       Years: 31.00       Pack years: 10.23       Types: Cigarettes       Last attempt to quit: 4/9/2019       Years since quitting: 3.0    Smokeless tobacco: Never Used    Tobacco comment: states smoking a couple cigareetes a day   Vaping Use    Vaping Use: Never used   Substance and Sexual Activity    Alcohol use: Yes       Alcohol/week: 2.0 standard drinks       Types: 2 Cans of beer per week       Comment: occ/states quit drinking    Drug use: Yes       Types: Marijuana Rodena Capes)       Comment: uses every other day    Sexual activity: Not on file   Other Topics Concern    Not on file   Social History Narrative    Not on file      Social Determinants of Health          Financial Resource Strain: Low Risk     Difficulty of Paying Living Expenses: Not hard at all   Food Insecurity: No Food Insecurity    Worried About [address] in the Last Year: Never true    [facility] in the Last Year: Never true   Transportation Needs:     Lack of Transportation (Medical): Not on file    Lack of Transportation (Non-Medical):  Not on file   Physical Activity:     Days of Exercise per Week: Not on file    Minutes of Exercise per Session: Not on file   Stress:     Feeling of Stress : Not on file   Social Connections:     Frequency of Communication with Friends and Family: Not on file    Frequency of Social Gatherings with Friends and Family: Not on file    Attends Sikh Services: Not on file    Active Member of [address] [place] [address] or Organizations: Not on file    Attends Club or Organization Meetings: Not on file    Marital Status: Not on file   Intimate Partner Violence:     Fear of Current or Ex-Partner: Not on file    Emotionally Abused: Not on file    Physically Abused: Not on file    Sexually Abused: Not on file   Housing Stability:     Unable to Pay for Housing in the Last Year: Not on file    Number of [place] in the Last Year: Not on file    Unstable Housing in the Last Year: Not on file               ROS  Review of Systems   Constitutional: Negative for activity change, appetite change, chills, diaphoresis, fatigue, fever and unexpected weight change. HENT: Negative for sore throat and trouble swallowing. Respiratory: Negative for cough, choking and shortness of breath. Cardiovascular: Negative for chest pain. Gastrointestinal: Negative for abdominal distention, abdominal pain, anal bleeding, blood in stool, constipation, diarrhea, nausea, rectal pain and vomiting. Genitourinary: Negative for difficulty urinating. Musculoskeletal: Positive for arthralgias. Negative for back pain. Skin: Negative for color change. Neurological: Negative for seizures, syncope, speech difficulty and numbness. Hematological: Negative for adenopathy. Psychiatric/Behavioral: Negative.          BP (!) 140/79 (Site: Left Upper Arm, Position: Sitting, Cuff Size: Large Adult)   Pulse 88   Temp 98.7 °F (37.1 °C) (Infrared)   Ht 6' 2\" (1.88 m)   Wt 171 lb (77.6 kg)   SpO2 96%   BMI 21.96 kg/m²      PHYSICAL EXAM  Physical Exam  Constitutional:       General: He is not in acute distress. Appearance: Normal appearance. He is not ill-appearing. HENT:      Head: Normocephalic and atraumatic. Eyes:      General: No scleral icterus. Cardiovascular:      Rate and Rhythm: Normal rate and regular rhythm. Pulmonary:      Effort: No respiratory distress. Breath sounds: Normal breath sounds. Abdominal:      General: There is no distension. Palpations: Abdomen is soft. Tenderness: There is no abdominal tenderness. Musculoskeletal:         General: No swelling or tenderness. Cervical back: Neck supple. Skin:     General: Skin is warm and dry. Coloration: Skin is not jaundiced. Neurological:      General: No focal deficit present. Mental Status: He is alert and oriented to person, place, and time.    Psychiatric:         Mood and Affect: Mood normal.         Behavior: Behavior normal. Thought Content: Thought content normal.         Judgment: Judgment normal.               ASSESSMENT AND PLAN  1. Personal history of tubular adenoma of the sigmoid colon removed in 2016. My recommendation is for him to have colonoscopy as an outpatient. The patient was explained the risks/benefits/alternatives/expected outcomes of the procedure. The patient was explained the risks of the procedure, including, but not limited to, the risk of reaction to the anesthesia medicine and the risk of perforation requiring further surgery. The patient was informed that they may require biopsy or polypectomy. These procedures may increase the risk of complication. All questions were answered. The patient verbalized understanding and agreed to proceed. Prep-mag citrate and Dulcolax tablets. 2.   DVT-on chronic warfarin therapy. As per Dr. Tito Klein note on 3/25/2022, he will require low molecular weight heparin bridging for his colonoscopy. I will reach out to his primary care team to coordinate        Swapna Zamora MD     NOTE: This report was transcribed using voice recognition software. Every effort was made to ensure accuracy; however, inadvertent computerized transcription errors may be present.

## 2022-05-27 NOTE — H&P
GENERAL SURGERY                                                               HISTORY AND PHYSICAL     CHIEF COMPLAINT       Chief Complaint   Patient presents with    Consultation       Colonoscopy consult - Ref by Dr Parmjit Vallejo Colonoscopy       last colonoscopy about 5-6 years ago - 06/2016 - Hx of colon polyps    Other       patient has no complaints today          HPI  He is referred by by Dr. Mason Simmons for evaluation regarding follow-up colonoscopy. In 2016, he had a colonoscopy that showed an 8 mm pedunculated polyp that was removed from the sigmoid colon. Pathology reports showed tubular adenoma. At present, he has no lower GI or lower abdominal complaints. He denies any rectal bleeding. He has a negative personal and family history of colon cancer.   He is on chronic warfarin therapy for a history of DVT.     Past Medical History        Past Medical History:   Diagnosis Date    AAA (abdominal aortic aneurysm) ([address]       s/p repair    Asthma      Chronic anticoagulation 5/6/2014    DVT (deep venous thrombosis) ([address] 2010     R leg for 1 time    Hypertension      Hypertension      Peripheral vascular disease ([address] 2010     both legs, more on R leg    Transfusion history              Past Surgical History         Past Surgical History:   Procedure Laterality Date    AORTO-FEMORAL BYPASS GRAFT Bilateral 6/27/10    ARTERIAL ANEURYSM REPAIR Left 9/28/10     repair left femoral pseudoaneurysm    COLONOSCOPY        FASCIOTOMY Right 6/11/10     4 compartment    FEMORAL BYPASS Right 1/26/10     femoral-above knee popliteal    FEMORAL-FEMORAL BYPASS GRAFT   1/26/10     Dr. Josue Chino right to left    HERNIA REPAIR   6/25/14     OPEN VENTRAL HERNIA REPAIR    THROMBOENDARTERECTOMY Left 1/26/10     left femoral    THROMBOENDARTERECTOMY Right 6/21/10     reexploration right fem-pop    VASCULAR SURGERY         22 times total            Current Facility-Administered Medications          Current Outpatient Medications   Medication Sig Dispense Refill    losartan (COZAAR) 100 MG tablet Take 1 tablet by mouth daily 90 tablet 1    albuterol sulfate  (90 Base) MCG/ACT inhaler Inhale 2 puffs into the lungs every 6 hours as needed for Wheezing 1 each 2    Multiple Vitamins-Minerals (THERAPEUTIC MULTIVITAMIN-MINERALS) tablet Take 1 tablet by mouth daily 90 tablet 1    magnesium oxide (MAG-OX) 400 MG tablet Take 1 tablet by mouth daily 90 tablet 1    doxycycline monohydrate (MONODOX) 100 MG capsule take 1 capsule by mouth once daily 90 capsule 1    atorvastatin (LIPITOR) 80 MG tablet Take 1 tablet by mouth daily 90 tablet 1    gabapentin (NEURONTIN) 400 MG capsule take 1 capsule by mouth three times a day        PARoxetine (PAXIL) 10 MG tablet Take 1 tablet by mouth daily 90 tablet 2    clopidogrel (PLAVIX) 75 MG tablet Take 1 tablet by mouth daily 90 tablet 3    Blood Pressure Monitoring (BLOOD PRESSURE CUFF) MISC Dx:  Hypertension with labile blood pressure 1 each 0    amLODIPine (NORVASC) 10 MG tablet Take 1 tablet by mouth daily 30 tablet 1    warfarin (COUMADIN) 3 MG tablet take 1 tablet by mouth once 5 times weekly 30 tablet 0    warfarin (COUMADIN) 4 MG tablet Take 1 tab by mouth 2 times weekly.  14 tablet 0      No current facility-administered medications for this visit.                 Allergies   Allergen Reactions    Ultram [Tramadol]           Family History         Family History   Problem Relation Age of Onset    Diabetes Mother      High Blood Pressure Mother      Cancer Father      Diabetes Brother              Social History         Socioeconomic History    Marital status: Single       Spouse name: Not on file    Number of children: Not on file    Years of education: Not on file    Highest education level: Not on file   Occupational History    Not on file   Tobacco Use    Smoking status: Current Every Day Smoker       Packs/day: 0.33       Years: 31.00       Pack years: 10.23       Types: Cigarettes       Last attempt to quit: 4/9/2019       Years since quitting: 3.0    Smokeless tobacco: Never Used    Tobacco comment: states smoking a couple cigareetes a day   Vaping Use    Vaping Use: Never used   Substance and Sexual Activity    Alcohol use: Yes       Alcohol/week: 2.0 standard drinks       Types: 2 Cans of beer per week       Comment: occ/states quit drinking    Drug use: Yes       Types: Marijuana [address] Other)       Comment: uses every other day    Sexual activity: Not on file   Other Topics Concern    Not on file   Social History Narrative    Not on file      Social Determinants of Health          Financial Resource Strain: Low Risk     Difficulty of Paying Living Expenses: Not hard at all   Food Insecurity: No Food Insecurity    Worried About [address] in the Last Year: Never true    [address] [place] in the Last Year: Never true   Transportation Needs:     Lack of Transportation (Medical): Not on file    Lack of Transportation (Non-Medical):  Not on file   Physical Activity:     Days of Exercise per Week: Not on file    Minutes of Exercise per Session: Not on file   Stress:     Feeling of Stress : Not on file   Social Connections:     Frequency of Communication with Friends and Family: Not on file    Frequency of Social Gatherings with Friends and Family: Not on file    Attends Islam Services: Not on file    Active Member of [employer] or Organizations: Not on file    Attends Club or Organization Meetings: Not on file    Marital Status: Not on file   Intimate Partner Violence:     Fear of Current or Ex-Partner: Not on file    Emotionally Abused: Not on file    Physically Abused: Not on file    Sexually Abused: Not on file   Housing Stability:     Unable to Pay for Housing in the Last Year: Not on file    Number of [place] in the Last Year: Not on file    Unstable Housing in the Last Year: Not on file               ROS  Review of Systems   Constitutional: Negative for activity change, appetite change, chills, diaphoresis, fatigue, fever and unexpected weight change. HENT: Negative for sore throat and trouble swallowing. Respiratory: Negative for cough, choking and shortness of breath. Cardiovascular: Negative for chest pain. Gastrointestinal: Negative for abdominal distention, abdominal pain, anal bleeding, blood in stool, constipation, diarrhea, nausea, rectal pain and vomiting. Genitourinary: Negative for difficulty urinating. Musculoskeletal: Positive for arthralgias. Negative for back pain. Skin: Negative for color change. Neurological: Negative for seizures, syncope, speech difficulty and numbness. Hematological: Negative for adenopathy. Psychiatric/Behavioral: Negative.          BP (!) 140/79 (Site: Left Upper Arm, Position: Sitting, Cuff Size: Large Adult)   Pulse 88   Temp 98.7 °F (37.1 °C) (Infrared)   Ht 6' 2\" (1.88 m)   Wt 171 lb (77.6 kg)   SpO2 96%   BMI 21.96 kg/m²      PHYSICAL EXAM  Physical Exam  Constitutional:       General: He is not in acute distress. Appearance: Normal appearance. He is not ill-appearing. HENT:      Head: Normocephalic and atraumatic. Eyes:      General: No scleral icterus. Cardiovascular:      Rate and Rhythm: Normal rate and regular rhythm. Pulmonary:      Effort: No respiratory distress. Breath sounds: Normal breath sounds. Abdominal:      General: There is no distension. Palpations: Abdomen is soft. Tenderness: There is no abdominal tenderness. Musculoskeletal:         General: No swelling or tenderness. Cervical back: Neck supple. Skin:     General: Skin is warm and dry. Coloration: Skin is not jaundiced. Neurological:      General: No focal deficit present. Mental Status: He is alert and oriented to person, place, and time.    Psychiatric:         Mood and Affect: Mood normal.         Behavior: Behavior normal. Thought Content: Thought content normal.         Judgment: Judgment normal.               ASSESSMENT AND PLAN  1. Personal history of tubular adenoma of the sigmoid colon removed in 2016. My recommendation is for him to have colonoscopy as an outpatient. The patient was explained the risks/benefits/alternatives/expected outcomes of the procedure. The patient was explained the risks of the procedure, including, but not limited to, the risk of reaction to the anesthesia medicine and the risk of perforation requiring further surgery. The patient was informed that they may require biopsy or polypectomy. These procedures may increase the risk of complication. All questions were answered. The patient verbalized understanding and agreed to proceed. Prep-mag citrate and Dulcolax tablets. 2.   DVT-on chronic warfarin therapy. As per Dr. Ryan Madden note on 3/25/2022, he will require low molecular weight heparin bridging for his colonoscopy. I will reach out to his primary care team to coordinate        Lydia Feng MD     NOTE: This report was transcribed using voice recognition software. Every effort was made to ensure accuracy; however, inadvertent computerized transcription errors may be present.

## 2022-05-31 ENCOUNTER — TELEPHONE (OUTPATIENT)
Dept: INTERNAL MEDICINE | Age: 62
End: 2022-05-31

## 2022-05-31 NOTE — PROGRESS NOTES
[address] PRE-ADMISSION TESTING   ENDOSCOPY/ COLONSCOPY INSTRUCTIONS  PAT- Phone Number: [phone]    ENDOSCOPY/ COLONSCOPY INSTRUCTIONS:     [x] Bowel Prep instructions reviewed. [x] Colonoscopy- The day prior: No solid foods. Clear liquids only. [x] Nothing by mouth after midnight. Including no gum, candy, mints, or water. [x] You may brush your teeth, gargle, but do NOT swallow water. [x] No lotions, powders, deodorant. [x] Arrange transportation with a responsible adult  to and from the hospital. If you do not have a responsible adult  to transport you, you will need to make arrangements with a medical transportation company. Arrange for someone to be with you for the remainder of the day and for 24 hours after your procedure due to having had anesthesia when discharged.   -Who will be your  for transportation? Wife   -Who will be staying with you for 24 hrs after your procedure? Wife     PARKING INSTRUCTIONS:     [x] ARRIVAL TIME:  6/7 at 8:30 am  · [x] Enter into the [employer]. Two people may accompany you. Masks are required. · [x] Parking Lot \"I\" is where you will park. It is located on the corner of [facility] and [address] [facility]. The entrance is on [address]. · To enter, press the button and the gate will lift. A free token will be provided to exit the lot. EDUCATION INSTRUCTIONS:    [x] Bring a complete list of your medications, please write the last time you took the medicine, give this list to the nurse. [x] Take the following medications the morning of surgery with 1-2 ounces of water: Gabapentin & Amlodipine  [x] Stop herbal supplements and vitamins 5 days before your surgery. [] DO NOT take any diabetic medicine the morning of surgery. Follow instructions for insulin the day before surgery.   [] If you are diabetic and your blood sugar is low or you feel symptomatic, you may drink 1-2 ounces of apple juice or take a glucose tablet. The morning of your procedure, you may call the pre-op area if you have concerns about your blood sugar [phone]. [] Use your inhalers the morning of surgery. Bring your emergency inhaler with you day of surgery. [x] Follow physician instructions regarding any blood thinners you may be taking. LD Coumadin on 6/1, LD Lovenox 6/6 AM (Holding pm dose). WHAT TO EXPECT:    [x] The day of your procedure you will be greeted and checked in by the [employer].  In addition, you will be registered in the [place] by a Patient Access Representative. Please bring your photo ID and insurance card. A nurse will greet you in accordance to the time you are needed in the pre-op area to prepare you for surgery. Please do not be discouraged if you are not greeted in the order you arrive as there are many variables that are involved in patient preparation. Your patience is greatly appreciated as you wait for your nurse. Please bring in items such as: books, magazines, newspapers, electronics, or any other items  to occupy your time in the waiting area. [x]  Delays may occur. Staff will make a sincere effort to keep you informed of delays. If any delays occur with your procedure, we apologize ahead of time for your inconvenience as we recognize the value of your time.

## 2022-05-31 NOTE — TELEPHONE ENCOUNTER
2nd attempt to reach pt no answer message was left to call us ASAP and to hold Coumadin June 1st and Check INR June 2nd for further instructions

## 2022-05-31 NOTE — PROGRESS NOTES
Called patient today but he didn`t . Left a voice message that he is not supposed to take his warfarin dose tomorrow and that he needs to come to the clinic on June 2, 2022 for INR check. This is in preparation for his colonoscopy on June 7, 2022.

## 2022-05-31 NOTE — PROGRESS NOTES
Spoke with patient for PAT call. Patient stated that he did not receive instructions from Dr. Matt Myers office regarding bowel preparation needed for colonoscopy scheduled on 6/7. Patient instructed to call Dr. Matt Myers office. Office phone number given to patient.

## 2022-06-01 NOTE — TELEPHONE ENCOUNTER
Did get hold of pt this a.m. and informed pt not to take Coumadin today and come in tomorrow for INR check needs further instructions for test scheduled in regards to his Coumadin see Dr. Becca Blanca notes

## 2022-06-02 ENCOUNTER — NURSE ONLY (OUTPATIENT)
Dept: INTERNAL MEDICINE | Age: 62
End: 2022-06-02
Payer: COMMERCIAL

## 2022-06-02 VITALS — TEMPERATURE: 98 F

## 2022-06-02 DIAGNOSIS — I82.499 DEEP VEIN THROMBOSIS (DVT) OF OTHER VEIN OF LOWER EXTREMITY, UNSPECIFIED CHRONICITY, UNSPECIFIED LATERALITY (HCC): Primary | ICD-10-CM

## 2022-06-02 LAB
INTERNATIONAL NORMALIZATION RATIO, POC: 2.1
PROTHROMBIN TIME, POC: 25

## 2022-06-02 PROCEDURE — 85610 PROTHROMBIN TIME: CPT

## 2022-06-02 PROCEDURE — 93793 ANTICOAG MGMT PT WARFARIN: CPT | Performed by: INTERNAL MEDICINE

## 2022-06-02 RX ORDER — ENOXAPARIN SODIUM 100 MG/ML
1 INJECTION SUBCUTANEOUS 2 TIMES DAILY
Qty: 20 EACH | Refills: 0 | Status: SHIPPED
Start: 2022-06-02 | End: 2022-06-13 | Stop reason: SDUPTHER

## 2022-06-02 NOTE — PROGRESS NOTES
INR results reviewed with Dr. Rodrigo Garcia and orders received. Patient given instructions by Dr India Anderson. Patient will start Lovenox today. Hold Lovenox 6/6 and Coumadin night before procedure. Recheck PT/INR on 6/10. Pt notified via in the office of same. AVS given to patient.

## 2022-06-02 NOTE — PATIENT INSTRUCTIONS
· June 2, - start taking lovenox 80 mg twice daily injected under the skin; take at 10 am and 10 pm  · June 6 - hold night dose of lovenox  · June 7 - colonoscopy  · June 8 - resume lovenox + bridge back to warfarin if okay with surgery (start at 3mg daily)   · [place] 10 - rpt INR  · Discontinue lovenox once INR back at goal

## 2022-06-02 NOTE — PROGRESS NOTES
Hx of DVT, extensive vascular hx.  INR 2.1.   · Chuyita 2, 3 - Start lovenox 80 mg BID sc  · June 6 - hold night dose of lovenox  · June 7 - colonoscopy  · June 8 - resume lovenox + bridge back to warfarin if okay with surgery (start at 3mg daily)   · [address] 10 - rpt INR  · Discontinue lovenox once INR back at goal    For colonoscopy on June 7: Repeat INR [address] 10    Electronically signed by Nathalie Gilman MD on 6/2/2022 at 8:45 AM

## 2022-06-03 NOTE — PROGRESS NOTES
Due to patient's high risk of thrombosis and arterial clots, plan to hold plavix for 24 hours prior to procedure on June 6, 2022 and restart it 24 hours after procedure on June 8, 2022.       Electronically signed by Tawana Patrick DO on 6/3/2022 at 1:26 PM

## 2022-06-03 NOTE — PROGRESS NOTES
Patient notified Plavix needed to be stopped 24 hours prior to procedure. Patient verbalized understanding.

## 2022-06-07 ENCOUNTER — HOSPITAL ENCOUNTER (OUTPATIENT)
Age: 62
Setting detail: OUTPATIENT SURGERY
Discharge: HOME | End: 2022-06-07
Attending: SURGERY | Admitting: SURGERY
Payer: COMMERCIAL

## 2022-06-07 ENCOUNTER — TELEPHONE (OUTPATIENT)
Dept: INTERNAL MEDICINE | Age: 62
End: 2022-06-07

## 2022-06-07 ENCOUNTER — ANESTHESIA (OUTPATIENT)
Dept: ENDOSCOPY | Age: 62
End: 2022-06-07
Payer: COMMERCIAL

## 2022-06-07 ENCOUNTER — ANESTHESIA EVENT (OUTPATIENT)
Dept: ENDOSCOPY | Age: 62
End: 2022-06-07
Payer: COMMERCIAL

## 2022-06-07 VITALS
SYSTOLIC BLOOD PRESSURE: 190 MMHG | HEART RATE: 76 BPM | TEMPERATURE: 97.9 F | RESPIRATION RATE: 16 BRPM | BODY MASS INDEX: 22.33 KG/M2 | OXYGEN SATURATION: 98 % | HEIGHT: 74 IN | WEIGHT: 174 LBS | DIASTOLIC BLOOD PRESSURE: 92 MMHG

## 2022-06-07 PROCEDURE — 45330 DIAGNOSTIC SIGMOIDOSCOPY: CPT | Performed by: SURGERY

## 2022-06-07 PROCEDURE — 2580000003 HC RX 258: Performed by: SURGERY

## 2022-06-07 PROCEDURE — 2709999900 HC NON-CHARGEABLE SUPPLY: Performed by: SURGERY

## 2022-06-07 PROCEDURE — 3700000000 HC ANESTHESIA ATTENDED CARE: Performed by: SURGERY

## 2022-06-07 PROCEDURE — 3609027000 HC COLONOSCOPY: Performed by: SURGERY

## 2022-06-07 PROCEDURE — 3700000001 HC ADD 15 MINUTES (ANESTHESIA): Performed by: SURGERY

## 2022-06-07 PROCEDURE — 6370000000 HC RX 637 (ALT 250 FOR IP): Performed by: ANESTHESIOLOGIST ASSISTANT

## 2022-06-07 PROCEDURE — 7100000011 HC PHASE II RECOVERY - ADDTL 15 MIN: Performed by: SURGERY

## 2022-06-07 PROCEDURE — 7100000010 HC PHASE II RECOVERY - FIRST 15 MIN: Performed by: SURGERY

## 2022-06-07 PROCEDURE — 2500000003 HC RX 250 WO HCPCS: Performed by: ANESTHESIOLOGIST ASSISTANT

## 2022-06-07 PROCEDURE — 6360000002 HC RX W HCPCS: Performed by: ANESTHESIOLOGIST ASSISTANT

## 2022-06-07 RX ORDER — PROPOFOL 10 MG/ML
INJECTION, EMULSION INTRAVENOUS PRN
Status: DISCONTINUED | OUTPATIENT
Start: 2022-06-07 | End: 2022-06-07 | Stop reason: SDUPTHER

## 2022-06-07 RX ORDER — SODIUM CHLORIDE 9 MG/ML
INJECTION, SOLUTION INTRAVENOUS CONTINUOUS
Status: DISCONTINUED | OUTPATIENT
Start: 2022-06-07 | End: 2022-06-07 | Stop reason: HOSPADM

## 2022-06-07 RX ORDER — GLYCOPYRROLATE 1 MG/5 ML
SYRINGE (ML) INTRAVENOUS PRN
Status: DISCONTINUED | OUTPATIENT
Start: 2022-06-07 | End: 2022-06-07 | Stop reason: SDUPTHER

## 2022-06-07 RX ORDER — ALBUTEROL SULFATE 90 UG/1
AEROSOL, METERED RESPIRATORY (INHALATION) PRN
Status: DISCONTINUED | OUTPATIENT
Start: 2022-06-07 | End: 2022-06-07 | Stop reason: SDUPTHER

## 2022-06-07 RX ADMIN — Medication 0.2 MG: at 08:35

## 2022-06-07 RX ADMIN — SODIUM CHLORIDE: 9 INJECTION, SOLUTION INTRAVENOUS at 08:21

## 2022-06-07 RX ADMIN — ALBUTEROL SULFATE 2 PUFF: 108 AEROSOL, METERED RESPIRATORY (INHALATION) at 08:25

## 2022-06-07 RX ADMIN — PROPOFOL 220 MG: 10 INJECTION, EMULSION INTRAVENOUS at 08:35

## 2022-06-07 ASSESSMENT — PAIN - FUNCTIONAL ASSESSMENT: PAIN_FUNCTIONAL_ASSESSMENT: NONE - DENIES PAIN

## 2022-06-07 ASSESSMENT — PAIN SCALES - GENERAL
PAINLEVEL_OUTOF10: 0

## 2022-06-07 ASSESSMENT — LIFESTYLE VARIABLES: SMOKING_STATUS: 1

## 2022-06-07 NOTE — TELEPHONE ENCOUNTER
Called patient and informed to restart Lovenox and plavix on 6/08/2022. Patient verbalized understand when asked to repeat back instructions.

## 2022-06-07 NOTE — OP NOTE
COLONOSCOPY OP NOTE     DATE OF PROCEDURE: 6/7/2022     SURGEON: Angelina Herrera     ASSISTANT: none     PREOPERATIVE DIAGNOSIS: High risk colorectal cancer screening for colon polyp removed 6 years ago     POSTOPERATIVE DIAGNOSIS:   1. 1 cm polyp at the 45 cm gauri from the anal verge  2. For distinct polyps between the 35 and 40 cm gauri from the anal verge-size was 0.5 cm's to 1.25 cm  3. Polyp at the 30 cm gauri-1 cm  4. Polyp at 20 cm gauri-1 cm in size  5. Extensive diverticulosis in the sigmoid colon  6. Inability to pass sigmoid colon due to poor prep     OPERATION:  Flexible sigmoidoscopy     ANESTHESIA: Local monitored anesthesia.      ESTIMATED BLOOD LOSS (ml): less than 50      COMPLICATIONS:  Inability to complete the exam due to poor prep     SPECIMENS:  Was Not Obtained     HISTORY: The patient is a 57y.o. year old male with history of a colonoscopy in 2016 with removal of an 8 mm pedunculated polyp from the sigmoid colon. The pathology report showed tubular adenoma. At present, he has no lower abdominal or lower GI complaints. He has a negative personal and family history of colon cancer. He is currently on chronic warfarin therapy for DVT. His primary care team gave him appropriate bridging instructions for this procedure. I recommend colonoscopy with possible biopsy or polypectomy and I explained the risk, benefits, expected outcome, and alternatives to the procedure. Risks included but are not limited to bleeding, infection, respiratory distress, hypotension, and perforation of the colon. The patient understands and is in agreement.         PROCEDURE: The patient was given IV conscious sedation per anesthesia. The patient was given supplemental oxygen by nasal cannula. The colonoscope was inserted per rectum and advanced under direct vision to the 50 cm gauri which corresponded to the sigmoid colon. There was extensive diverticulosis seen in the sigmoid colon.  The prep was unsatisfactory so exam was aborted at the 50 cm gauri. .     FINDINGS:     Descending/Sigmoid colon: Extensive diverticulosis seen in the descending and sigmoid colon. The procedure was stopped at the 50 cm gauri from the anal verge due to poor prep. Multiple polyps were seen in the sigmoid colon region. --At the 45 cm gauri, there was a 1 cm polyp  --Between the 35 and 40 cm gauri from the anal verge, there were 4 distinct polyps ranging in size from 0.5cm to 1.25 cm. --At the 30 cm gauri from the anal verge, there was a 1 cm polyp  --At the 20 cm gauri from the anal verge there was a flat, 8 mm polyp. There was extensive diverticulosis seen in the lower descending and sigmoid colon. There was a large amount of solid stool preventing passage of the scope. The procedure was stopped at the 50 cm gauri.     Rectum/Anus: examined in normal and retroflexed positions and was normal     The colon was decompressed and the scope was removed. The withdraw time was approximately 5 minutes. The patient tolerated the procedure well.      ASSESSMENT/PLAN:   1. Previous history of tubular adenoma of the sigmoid colon. 2. On today's examination, numerous polyps were identified between the 20 and 45 cm gauri from the anal verge. The procedure was incomplete due to poor prep and the procedure was aborted. No polypectomies were performed. 3. Patient is on chronic warfarin therapy for DVT. He was placed on perioperative Lovenox by his primary care team.  His Plavix was held 48 hours previously. 4. Recommend follow up colonoscopy in the near future after discussion with his primary care team regarding management of his anticoagulants around the time of his procedure. He will require numerous polypectomies.     Electronically signed by Hannah Fang MD on 5/27/22 at 8:13 AM EDT     Brianna Keenan M.D.        NOTE: This report was transcribed using voice recognition software.  Every effort was made to ensure accuracy; however, inadvertent computerized transcription errors may be present

## 2022-06-07 NOTE — ANESTHESIA PRE PROCEDURE
Department of Anesthesiology  Preprocedure Note       Name:  Kristyn Altamirano   Age:  57 y.o.  :  1960                                          MRN:  [id number]         Date:  2022      Surgeon: Mona Obando):  Hannah Fang MD    Procedure: Procedure(s):  COLORECTAL CANCER SCREENING, HIGH RISK    Medications prior to admission:   Prior to Admission medications    Medication Sig Start Date End Date Taking?  Authorizing Provider   enoxaparin (LOVENOX) 80 MG/0.8ML Inject 0.8 mLs into the skin 2 times daily 22   Leon Conway MD   warfarin (COUMADIN) 3 MG tablet take 1 tablet by mouth on  and    Loen Conway MD   amLODIPine (NORVASC) 10 MG tablet Take 1 tablet by mouth daily 22   Karrie Leonardo MD   warfarin (COUMADIN) 4 MG tablet Take 1 tab by mouth daily on Wednesday, Thursday, Friday, Saturday, 22   Karrie Leonardo MD   atorvastatin (LIPITOR) 80 MG tablet Take 1 tablet by mouth daily 22   Karrie Leonardo MD   losartan (COZAAR) 100 MG tablet Take 1 tablet by mouth daily 3/25/22   Leon Conway MD   Multiple Vitamins-Minerals (THERAPEUTIC MULTIVITAMIN-MINERALS) tablet Take 1 tablet by mouth daily 3/25/22   Leon Conway MD   magnesium oxide (MAG-OX) 400 MG tablet Take 1 tablet by mouth daily 3/25/22   Leon Conway MD   doxycycline monohydrate (MONODOX) 100 MG capsule take 1 capsule by mouth once daily 2/3/22   Thelma Marroquin MD   gabapentin (NEURONTIN) 400 MG capsule take 1 capsule by mouth three times a day 21   Historical Provider, MD   clopidogrel (PLAVIX) 75 MG tablet Take 1 tablet by mouth daily 21   Jenna Akbar MD   Blood Pressure Monitoring (BLOOD PRESSURE CUFF) MISC Dx:  Hypertension with labile blood pressure 19   Jenna Akbar MD       Current medications:    Current Facility-Administered Medications   Medication Dose Route Frequency Provider Last Rate Last Admin    0.9 % sodium chloride infusion   IntraVENous Continuous Karmen Oreilly MD           Allergies: Allergies   Allergen Reactions    Ultram [Tramadol]        Problem List:    Patient Active Problem List   Diagnosis Code    Tobacco abuse Z72.0    Gout M10.9    Incisional hernia K43.2    Chronic anticoagulation Z79.01    DVT, lower extremity ([facility] I82.409    Rectal bleeding K62.5    PVD (peripheral vascular disease) with claudication (HCC) I73.9    Abdominal aortic aneurysm without rupture (HCC) I71.4    Colon polyp K63.5    Essential hypertension I10    Left leg pain M79.605    Hematoma T14. 8XXA    Anti-phospholipid antibody syndrome ([facility] D68.61       Past Medical History:        Diagnosis Date    AAA (abdominal aortic aneurysm) ([facility]     s/p repair    Asthma     Chronic anticoagulation 05/06/2014    DVT (deep venous thrombosis) ([facility] 2010    R leg for 1 time    Hypertension     Peripheral vascular disease ([facility] 2010    both legs, more on R leg    Transfusion history        Past Surgical History:        Procedure Laterality Date    AORTO-FEMORAL BYPASS GRAFT Bilateral 6/27/10    ARTERIAL ANEURYSM REPAIR Left 9/28/10    repair left femoral pseudoaneurysm    COLONOSCOPY      FASCIOTOMY Right 6/11/10    4 compartment    FEMORAL BYPASS Right 1/26/10    femoral-above knee popliteal    FEMORAL-FEMORAL BYPASS GRAFT  1/26/10    Dr. Ludin Culp right to left    HERNIA REPAIR  6/25/14    OPEN VENTRAL HERNIA REPAIR    THROMBOENDARTERECTOMY Left 1/26/10    left femoral    THROMBOENDARTERECTOMY Right 6/21/10    reexploration right fem-pop    VASCULAR SURGERY      22 times total       Social History:    Social History     Tobacco Use    Smoking status: Current Every Day Smoker     Packs/day: 0.25     Years: 43.00     Pack years: 10.75     Types: Cigarettes    Smokeless tobacco: Never Used    Tobacco comment: smokes a couple  cigarettes per day   Substance Use Topics    Alcohol use:  Yes Alcohol/week: 1.0 standard drink     Types: 1 Cans of beer per week     Comment: occ/states quit drinking                                Ready to quit: Not Answered  Counseling given: Yes  Comment: smokes a couple  cigarettes per day      Vital Signs (Current):   Vitals:    05/31/22 1151   Weight: 174 lb (78.9 kg)   Height: 6' 2\" (1.88 m)                                              BP Readings from Last 3 Encounters:   05/06/22 129/85   04/29/22 137/87   04/18/22 (!) 140/79       NPO Status:                                                                                 BMI:   Wt Readings from Last 3 Encounters:   05/31/22 174 lb (78.9 kg)   05/06/22 170 lb 4.8 oz (77.2 kg)   04/29/22 170 lb 8 oz (77.3 kg)     Body mass index is 22.34 kg/m². CBC:   Lab Results   Component Value Date    WBC 7.1 12/15/2021    RBC 4.98 12/15/2021    HGB 16.0 12/15/2021    HCT 45.6 12/15/2021    MCV 91.6 12/15/2021    RDW 12.3 12/15/2021     12/15/2021       CMP:   Lab Results   Component Value Date     12/16/2021    K 3.8 12/16/2021    K 3.7 07/11/2018     12/16/2021    CO2 20 12/16/2021    BUN 11 12/16/2021    CREATININE 0.9 12/16/2021    GFRAA >60 12/16/2021    LABGLOM >60 12/16/2021    GLUCOSE 89 12/16/2021    GLUCOSE 89 04/27/2012    PROT 7.6 12/16/2021    CALCIUM 9.4 12/16/2021    BILITOT 0.7 12/16/2021    ALKPHOS 111 12/16/2021    AST 18 12/16/2021    ALT 17 12/16/2021       POC Tests: No results for input(s): POCGLU, POCNA, POCK, POCCL, POCBUN, POCHEMO, POCHCT in the last 72 hours. Coags:   Lab Results   Component Value Date    PROTIME 25.0 06/02/2022    INR 2.1 06/02/2022    INR 5.8 12/23/2021    APTT 32.4 10/19/2018       HCG (If Applicable): No results found for: PREGTESTUR, PREGSERUM, HCG, HCGQUANT     ABGs: No results found for: PHART, PO2ART, MMZ2GKX, JSL1TXG, BEART, Y8ILIVUT     Type & Screen (If Applicable):  No results found for: LABABO, LABRH    Drug/Infectious Status (If Applicable):   No results found for: HIV, HEPCAB    COVID-19 Screening (If Applicable): No results found for: COVID19        Anesthesia Evaluation  Patient summary reviewed  Airway: Mallampati: Unable to assess / NA          Dental:          Pulmonary: breath sounds clear to auscultation  (+) asthma: current smoker (0.25 PPD. )                           Cardiovascular:    (+) hypertension:,         Rhythm: regular  Rate: normal           Beta Blocker:  Not on Beta Blocker         Neuro/Psych:   Negative Neuro/Psych ROS              GI/Hepatic/Renal: Neg GI/Hepatic/Renal ROS            Endo/Other: Negative Endo/Other ROS                    Abdominal:             Vascular:   + PVD, aortic or cerebral, DVT, .        ROS comment: Stent Abdominal Aortic Aneurysm. Ch Anticoagulation. . Other Findings:           Anesthesia Plan      MAC     ASA 3       Induction: intravenous. continuous noninvasive hemodynamic monitor    Anesthetic plan and risks discussed with patient. Plan discussed with CRNA.     Attending anesthesiologist reviewed and agrees with Ramírez Colorado MD   6/7/2022

## 2022-06-07 NOTE — INTERVAL H&P NOTE
Update History & Physical    The patient's History and Physical of May 27, 2022 was reviewed with the patient and I examined the patient. There was no change. The surgical site was confirmed by the patient and me. Plan: The risks, benefits, expected outcome, and alternative to the recommended procedure have been discussed with the patient. Patient understands and wants to proceed with the procedure.      Electronically signed by Twin Art MD on 6/7/2022 at 8:34 AM

## 2022-06-07 NOTE — PROGRESS NOTES
Reviewed discharge instructions with patient  including follow up, coagulation instructions per his primary care. Verbalized understanding. No questions at this time.

## 2022-06-07 NOTE — ANESTHESIA POSTPROCEDURE EVALUATION
Department of Anesthesiology  Postprocedure Note    Patient: Kristin Lima  MRN: [id number]  YOB: 1960  Date of evaluation: 6/7/2022  Time:  9:24 AM     Procedure Summary     Date: 06/07/22 Room / Location: [address] [place] [address] / [facility]    Anesthesia Start: 0825 Anesthesia Stop: 9401    Procedure: COLORECTAL CANCER SCREENING, HIGH RISK (N/A ) Diagnosis: (HISTORY OF COLON POLYPS)    Surgeons: Ham Braga MD Responsible Provider: Trupti Márquez MD    Anesthesia Type: MAC ASA Status: 3          Anesthesia Type: No value filed. Con Phase I:      Con Phase II: Con Score: 10    Last vitals: Reviewed and per EMR flowsheets.        Anesthesia Post Evaluation    Patient location during evaluation: PACU  Patient participation: complete - patient participated  Level of consciousness: awake  Pain score: 0  Airway patency: patent  Nausea & Vomiting: no nausea  Complications: no  Cardiovascular status: blood pressure returned to baseline  Respiratory status: acceptable  Hydration status: euvolemic

## 2022-06-10 ENCOUNTER — NURSE ONLY (OUTPATIENT)
Dept: INTERNAL MEDICINE | Age: 62
End: 2022-06-10
Payer: COMMERCIAL

## 2022-06-10 VITALS — TEMPERATURE: 97.1 F

## 2022-06-10 DIAGNOSIS — I82.499 DEEP VEIN THROMBOSIS (DVT) OF OTHER VEIN OF LOWER EXTREMITY, UNSPECIFIED CHRONICITY, UNSPECIFIED LATERALITY (HCC): Primary | ICD-10-CM

## 2022-06-10 LAB
INTERNATIONAL NORMALIZATION RATIO, POC: 1.2
PROTHROMBIN TIME, POC: 14.9

## 2022-06-10 PROCEDURE — 93793 ANTICOAG MGMT PT WARFARIN: CPT | Performed by: INTERNAL MEDICINE

## 2022-06-10 PROCEDURE — 85610 PROTHROMBIN TIME: CPT

## 2022-06-10 RX ORDER — WARFARIN SODIUM 3 MG/1
TABLET ORAL
Qty: 30 TABLET | Refills: 0 | Status: SHIPPED
Start: 2022-06-10 | End: 2022-07-07 | Stop reason: ALTCHOICE

## 2022-06-10 RX ORDER — ENOXAPARIN SODIUM 100 MG/ML
INJECTION SUBCUTANEOUS
Qty: 16 ML | OUTPATIENT
Start: 2022-06-10

## 2022-06-10 NOTE — PROGRESS NOTES
Called and spoke with patient after reviewing INR with Dr. Tutu Camara. Patient instructed to continue Lovenox injections twice a day with Coumadin 3 mg daily and repeat INR on Monday. Printed AVS with follow up appointment mailed to the patient. Instructed  To call with all issues.

## 2022-06-10 NOTE — PATIENT INSTRUCTIONS
INR today 1.2 and repeat INR due Monday June 13 th at 8 am  Continue your lovenox injections twice a day with Coumadin 3 mg daily.   Please call with any issues and thank you for coming in

## 2022-06-13 ENCOUNTER — NURSE ONLY (OUTPATIENT)
Dept: INTERNAL MEDICINE | Age: 62
End: 2022-06-13
Payer: COMMERCIAL

## 2022-06-13 DIAGNOSIS — I82.499 DEEP VEIN THROMBOSIS (DVT) OF OTHER VEIN OF LOWER EXTREMITY, UNSPECIFIED CHRONICITY, UNSPECIFIED LATERALITY (HCC): Primary | ICD-10-CM

## 2022-06-13 LAB
INTERNATIONAL NORMALIZATION RATIO, POC: 1.6
PROTHROMBIN TIME, POC: 19.7

## 2022-06-13 PROCEDURE — 93793 ANTICOAG MGMT PT WARFARIN: CPT | Performed by: INTERNAL MEDICINE

## 2022-06-13 PROCEDURE — 85610 PROTHROMBIN TIME: CPT

## 2022-06-13 RX ORDER — WARFARIN SODIUM 4 MG/1
TABLET ORAL
Qty: 30 TABLET | Refills: 1 | Status: ON HOLD
Start: 2022-06-13 | End: 2022-07-11 | Stop reason: HOSPADM

## 2022-06-13 RX ORDER — ENOXAPARIN SODIUM 100 MG/ML
1 INJECTION SUBCUTANEOUS 2 TIMES DAILY
Qty: 10 EACH | Refills: 0 | Status: SHIPPED
Start: 2022-06-13 | End: 2022-07-07 | Stop reason: ALTCHOICE

## 2022-06-13 NOTE — PROGRESS NOTES
Coumadin instructions given per Dr. Chi Chaudhari. Pt left will call with instructions and next INR appointment. Called pt informed Medications was ordered at [facility] to  today and instructions were given along with next INR appt. Also instructed to bring medications to next INR appt.  Per Dr. Chi Chaudhari

## 2022-06-13 NOTE — PATIENT INSTRUCTIONS
Take 5mg of Coumadin tonight then starting on Tuesday take 4mg of Coumadin daily in addition to taking his Lovenox as ordered, BID Sub-Q   Recheck INR on 6/16/22

## 2022-06-16 ENCOUNTER — NURSE ONLY (OUTPATIENT)
Dept: INTERNAL MEDICINE | Age: 62
End: 2022-06-16
Payer: COMMERCIAL

## 2022-06-16 ENCOUNTER — TELEPHONE (OUTPATIENT)
Dept: SURGERY | Age: 62
End: 2022-06-16

## 2022-06-16 DIAGNOSIS — I82.499 DEEP VEIN THROMBOSIS (DVT) OF OTHER VEIN OF LOWER EXTREMITY, UNSPECIFIED CHRONICITY, UNSPECIFIED LATERALITY (HCC): Primary | ICD-10-CM

## 2022-06-16 LAB
INTERNATIONAL NORMALIZATION RATIO, POC: 1.9
PROTHROMBIN TIME, POC: 22.4

## 2022-06-16 PROCEDURE — 93793 ANTICOAG MGMT PT WARFARIN: CPT | Performed by: INTERNAL MEDICINE

## 2022-06-16 PROCEDURE — 85610 PROTHROMBIN TIME: CPT

## 2022-06-16 RX ORDER — VARENICLINE TARTRATE 1 MG/1
1 TABLET, FILM COATED ORAL 2 TIMES DAILY
COMMUNITY
End: 2022-08-26

## 2022-06-16 NOTE — PROGRESS NOTES
Confirmed with Eleanor Vega MA that schedule for repeat colonoscopy with multiple polypectomy is pending. Will continue bridging at this time and continue to coordinate with Surgery service regarding his procedure schedule and periop anticoagulation planning.     Electronically signed by Vianca Mclaughlin MD on 6/16/2022 at 8:24 AM

## 2022-06-16 NOTE — TELEPHONE ENCOUNTER
MA contacted patient to schedule colonoscopy. Patient scheduled for colonoscopy on 7/11/2022 @ 0800, arrival time 0700. Patient informed of date, time, location, and prep instructions. Patient informed new surgery letter and prep instructions will be mailed to the home. Patient informed to expect contact from Dr Marisela Bustillo office in regards to anticoagulation therapy recommendation. Patient verbalized understanding. MA routing message to Dr Darlyn Colvin informatively.     Electronically signed by Sara Denver on 6/16/22 at 4:09 PM EDT

## 2022-06-16 NOTE — PATIENT INSTRUCTIONS
Take 5mgs today only  One brown (3mg)and half of the blue tablet (4mg) to equal 5mgs then tomorrow start taking 4mgs daily  Continue taking the lovenox twice daily as ordered  Your inr will be rechecked next monday

## 2022-06-18 DIAGNOSIS — I82.499 DEEP VEIN THROMBOSIS (DVT) OF OTHER VEIN OF LOWER EXTREMITY, UNSPECIFIED CHRONICITY, UNSPECIFIED LATERALITY (HCC): ICD-10-CM

## 2022-06-20 ENCOUNTER — TELEPHONE (OUTPATIENT)
Dept: SURGERY | Age: 62
End: 2022-06-20

## 2022-06-20 ENCOUNTER — NURSE ONLY (OUTPATIENT)
Dept: INTERNAL MEDICINE | Age: 62
End: 2022-06-20
Payer: COMMERCIAL

## 2022-06-20 DIAGNOSIS — I82.499 DEEP VEIN THROMBOSIS (DVT) OF OTHER VEIN OF LOWER EXTREMITY, UNSPECIFIED CHRONICITY, UNSPECIFIED LATERALITY (HCC): Primary | ICD-10-CM

## 2022-06-20 LAB
INTERNATIONAL NORMALIZATION RATIO, POC: 2
PROTHROMBIN TIME, POC: 23.5

## 2022-06-20 PROCEDURE — 85610 PROTHROMBIN TIME: CPT

## 2022-06-20 PROCEDURE — 85610 PROTHROMBIN TIME: CPT | Performed by: INTERNAL MEDICINE

## 2022-06-20 PROCEDURE — 93793 ANTICOAG MGMT PT WARFARIN: CPT | Performed by: INTERNAL MEDICINE

## 2022-06-20 RX ORDER — ENOXAPARIN SODIUM 100 MG/ML
INJECTION SUBCUTANEOUS
Qty: 8 ML | OUTPATIENT
Start: 2022-06-20

## 2022-06-20 NOTE — PROGRESS NOTES
Preop anticoagulation planning. Discussed plan with inpatient clinical pharmacist.    July 4 - Stop plavix and stop warfarin. July 6 - Do INR. Bridge with lovenox once INR < 2.5. Will need to prescribe lovenox  July 10 - Hold morning and evening dose of lovenox BID. July 11 - Colonoscopy. Coordinate with Dr Eliud Grant to restart lovenox/warfarin 24 - 48 hour post procedure depending on procedure done. Ally Ricks MD  Internal Medicine  6/20/2022 8:49 AM

## 2022-06-20 NOTE — PATIENT INSTRUCTIONS
Stop taking lovenox for now   Continue taking 4mgs of coumadin daily   Return to office this Friday at 0800 for a repeat inr

## 2022-06-24 ENCOUNTER — NURSE ONLY (OUTPATIENT)
Dept: INTERNAL MEDICINE | Age: 62
End: 2022-06-24
Payer: COMMERCIAL

## 2022-06-24 VITALS — TEMPERATURE: 97.6 F

## 2022-06-24 DIAGNOSIS — I82.499 DEEP VEIN THROMBOSIS (DVT) OF OTHER VEIN OF LOWER EXTREMITY, UNSPECIFIED CHRONICITY, UNSPECIFIED LATERALITY (HCC): Primary | ICD-10-CM

## 2022-06-24 LAB
INTERNATIONAL NORMALIZATION RATIO, POC: 2.3
PROTHROMBIN TIME, POC: 27.6

## 2022-06-24 PROCEDURE — 93793 ANTICOAG MGMT PT WARFARIN: CPT | Performed by: INTERNAL MEDICINE

## 2022-06-24 PROCEDURE — 85610 PROTHROMBIN TIME: CPT | Performed by: INTERNAL MEDICINE

## 2022-06-24 RX ORDER — WARFARIN SODIUM 1 MG/1
1 TABLET ORAL DAILY
Qty: 30 TABLET | Refills: 3 | Status: ON HOLD
Start: 2022-06-24 | End: 2022-07-11 | Stop reason: HOSPADM

## 2022-06-24 NOTE — PATIENT INSTRUCTIONS
INR today 2.3  Please take Coumadin 5 mg Monday, Wednesday and Friday and 4 mg of Coumadin all other days. Repeat INR on 6/29/2022 at 8:15 am  Please call with all issues or concerns.

## 2022-06-24 NOTE — PROGRESS NOTES
Hx of DVT, extensive vascular hx and APS. INR Goal 2.5-3.5. Change regimen to 5mg MWF and 4 mg all other days. Rpt INR on 06/29/22. Rpt colonoscopy scheduled for 7/11/22, will need to plan preop anticoagulation. Will need another INR visit for 07/07/08. See INR protocol in note for next colonoscopy     Electronically signed by Agnieszka Salvador,  on 6/24/2022 at 8:44 AM        Has APS, DVT (INR goal of 2.5 - 3.5). Has colonoscopy scheduled 07/11/22. Will need to bridge as he is high risk for recurrent clot. Instructions for bridging  · July 6 - stop warfarin  · July 7,8 - rpt INR. Once less than or equal to 2.5, start lovenox BID. Prescribe lovenox at that time.    · July 10 - hold night dose of lovenox  · July 11- colonoscopy  · July 13- resume lovenox + bridge back to warfarin if okay with surgery (start at 3mg daily)   · July 15 - rpt INR  · Discontinue lovenox once INR back at goal    Electronically signed by Agnieszka aSlvador,  on 6/24/2022 at 8:49 AM

## 2022-06-24 NOTE — PROGRESS NOTES
Called and spoke with patient. Instructed per verbal of Dr. Brenna Moise he is to take Coumadin 5 mg Monday, Wednesday and Friday and Coumadin 4 mg all other days. Patient able to recall back. Instructed to call with any issues. Printed AVS with follow up appointment mailed to the patient.

## 2022-06-24 NOTE — RESULT ENCOUNTER NOTE
Accidentally signed the visit instead of sending it back to you.       Electronically signed by Kaia James DO on 6/24/2022 at 8:50 AM

## 2022-06-28 ENCOUNTER — NURSE ONLY (OUTPATIENT)
Dept: INTERNAL MEDICINE | Age: 62
End: 2022-06-28
Payer: COMMERCIAL

## 2022-06-28 DIAGNOSIS — E83.42 HYPOMAGNESEMIA: ICD-10-CM

## 2022-06-28 DIAGNOSIS — I82.499 DEEP VEIN THROMBOSIS (DVT) OF OTHER VEIN OF LOWER EXTREMITY, UNSPECIFIED CHRONICITY, UNSPECIFIED LATERALITY (HCC): Primary | ICD-10-CM

## 2022-06-28 LAB
INTERNATIONAL NORMALIZATION RATIO, POC: 1.7
PROTHROMBIN TIME, POC: 20.6

## 2022-06-28 PROCEDURE — 93793 ANTICOAG MGMT PT WARFARIN: CPT | Performed by: INTERNAL MEDICINE

## 2022-06-28 PROCEDURE — 99211 OFF/OP EST MAY X REQ PHY/QHP: CPT | Performed by: INTERNAL MEDICINE

## 2022-06-28 PROCEDURE — 85610 PROTHROMBIN TIME: CPT | Performed by: INTERNAL MEDICINE

## 2022-06-28 RX ORDER — MULTIVITAMIN WITH FOLIC ACID 400 MCG
TABLET ORAL
COMMUNITY
Start: 2022-05-20 | End: 2022-07-07 | Stop reason: ALTCHOICE

## 2022-06-28 RX ORDER — M-VIT,TX,IRON,MINS/CALC/FOLIC 27MG-0.4MG
1 TABLET ORAL DAILY
Qty: 90 TABLET | Refills: 1 | Status: SHIPPED | OUTPATIENT
Start: 2022-06-28

## 2022-06-28 RX ORDER — MAGNESIUM OXIDE 400 MG/1
400 TABLET ORAL DAILY
Qty: 90 TABLET | Refills: 1 | Status: SHIPPED | OUTPATIENT
Start: 2022-06-28

## 2022-06-28 ASSESSMENT — PATIENT HEALTH QUESTIONNAIRE - PHQ9
2. FEELING DOWN, DEPRESSED OR HOPELESS: 0
SUM OF ALL RESPONSES TO PHQ QUESTIONS 1-9: 0
SUM OF ALL RESPONSES TO PHQ QUESTIONS 1-9: 0
SUM OF ALL RESPONSES TO PHQ9 QUESTIONS 1 & 2: 0
SUM OF ALL RESPONSES TO PHQ QUESTIONS 1-9: 0
1. LITTLE INTEREST OR PLEASURE IN DOING THINGS: 0
SUM OF ALL RESPONSES TO PHQ QUESTIONS 1-9: 0

## 2022-06-28 NOTE — PROGRESS NOTES
Preop anticoagulation planning.        July 4 - Stop plavix and stop warfarin. July 6 - Do INR. Bridge with lovenox once INR < 2.5. Will need to prescribe lovenox  July 10 - Hold morning and evening dose of lovenox BID. July 11 - Colonoscopy. Coordinate with Dr Coco Anand to restart lovenox/warfarin*  24 - 48 hour post procedure depending on procedure done. This plan has been communicated to Dr Viktoria Larios clinic. *Multiple episodes of subtx INR. May benefit switch from warfarin to apixaban post procedure.     Electronically signed by Joyce Dalton MD on 6/28/2022 at 9:45 AM

## 2022-06-28 NOTE — PATIENT INSTRUCTIONS
Thank you for coming in today to have your INR checked. Please take your Coumadin as follows:  Coumadin 5 mg daily  We will recheck your INR in one week  Your appointment is 7/6/22 at 8:00 am  Please call if you have any unusual bleeding or any concerns. Thanks!      Gillian Larsen LPN :)

## 2022-07-03 ENCOUNTER — TELEPHONE (OUTPATIENT)
Dept: INTERNAL MEDICINE CLINIC | Age: 62
End: 2022-07-03

## 2022-07-05 ENCOUNTER — NURSE ONLY (OUTPATIENT)
Dept: INTERNAL MEDICINE | Age: 62
End: 2022-07-05
Payer: COMMERCIAL

## 2022-07-05 ENCOUNTER — PREP FOR PROCEDURE (OUTPATIENT)
Dept: SURGERY | Age: 62
End: 2022-07-05

## 2022-07-05 DIAGNOSIS — I82.499 DEEP VEIN THROMBOSIS (DVT) OF OTHER VEIN OF LOWER EXTREMITY, UNSPECIFIED CHRONICITY, UNSPECIFIED LATERALITY (HCC): Primary | ICD-10-CM

## 2022-07-05 LAB
INTERNATIONAL NORMALIZATION RATIO, POC: 4.3
PROTHROMBIN TIME, POC: 51.9

## 2022-07-05 PROCEDURE — 85610 PROTHROMBIN TIME: CPT | Performed by: INTERNAL MEDICINE

## 2022-07-05 PROCEDURE — 93793 ANTICOAG MGMT PT WARFARIN: CPT | Performed by: INTERNAL MEDICINE

## 2022-07-05 RX ORDER — SODIUM CHLORIDE 9 MG/ML
INJECTION, SOLUTION INTRAVENOUS CONTINUOUS
Status: CANCELLED | OUTPATIENT
Start: 2022-07-05

## 2022-07-05 NOTE — PROGRESS NOTES
Coumadin instructions given per Dr. Jt Zimmerman. Pt left will call with instructions and next INR appointment. Printed AVS mailed to pt.

## 2022-07-05 NOTE — H&P (VIEW-ONLY)
GENERAL SURGERY                                                               HISTORY AND PHYSICAL     CHIEF COMPLAINT          Chief Complaint   Patient presents with    Consultation       Colonoscopy consult - Ref by Dr Evangelista Weathers Colonoscopy       last colonoscopy about 5-6 years ago - 06/2016 - Hx of colon polyps    Other       patient has no complaints today          HPI  He is referred by by Dr. Charlotte Fajardo evaluation regarding follow-up colonoscopy.  In 2016, he had a colonoscopy that showed an 8 mm pedunculated polyp that was removed from the sigmoid colon.  Pathology reports showed tubular adenoma. At present, he has no lower GI or lower abdominal complaints.  He denies any rectal bleeding. He has a negative personal and family history of colon cancer. He is on chronic warfarin therapy for a history of DVT. On June 7, 2022, he had attempted colonoscopy that was stopped due to poor prep. However visualization of the lower 50 cm showed multiple polyps.   He is being rescheduled for a follow-up colonoscopy with management of his anticoagulants by his primary care team.     Past Medical History           Past Medical History:   Diagnosis Date    AAA (abdominal aortic aneurysm) ([address]       s/p repair    Asthma      Chronic anticoagulation 5/6/2014    DVT (deep venous thrombosis) ([address] 2010     R leg for 1 time    Hypertension      Hypertension      Peripheral vascular disease ([address] 2010     both legs, more on R leg    Transfusion history              Past Surgical History             Past Surgical History:   Procedure Laterality Date    AORTO-FEMORAL BYPASS GRAFT Bilateral 6/27/10    ARTERIAL ANEURYSM REPAIR Left 9/28/10     repair left femoral pseudoaneurysm    COLONOSCOPY        FASCIOTOMY Right 6/11/10     4 compartment    FEMORAL BYPASS Right 1/26/10     femoral-above knee popliteal    FEMORAL-FEMORAL BYPASS GRAFT   1/26/10     Dr. Karyle Gist right to left    HERNIA REPAIR   6/25/14   OPEN VENTRAL HERNIA REPAIR    THROMBOENDARTERECTOMY Left 1/26/10     left femoral    THROMBOENDARTERECTOMY Right 6/21/10     reexploration right fem-pop    VASCULAR SURGERY         22 times total            Current Facility-Administered Medications               Current Outpatient Medications   Medication Sig Dispense Refill    losartan (COZAAR) 100 MG tablet Take 1 tablet by mouth daily 90 tablet 1    albuterol sulfate  (90 Base) MCG/ACT inhaler Inhale 2 puffs into the lungs every 6 hours as needed for Wheezing 1 each 2    Multiple Vitamins-Minerals (THERAPEUTIC MULTIVITAMIN-MINERALS) tablet Take 1 tablet by mouth daily 90 tablet 1    magnesium oxide (MAG-OX) 400 MG tablet Take 1 tablet by mouth daily 90 tablet 1    doxycycline monohydrate (MONODOX) 100 MG capsule take 1 capsule by mouth once daily 90 capsule 1    atorvastatin (LIPITOR) 80 MG tablet Take 1 tablet by mouth daily 90 tablet 1    gabapentin (NEURONTIN) 400 MG capsule take 1 capsule by mouth three times a day        PARoxetine (PAXIL) 10 MG tablet Take 1 tablet by mouth daily 90 tablet 2    clopidogrel (PLAVIX) 75 MG tablet Take 1 tablet by mouth daily 90 tablet 3    Blood Pressure Monitoring (BLOOD PRESSURE CUFF) MISC Dx:  Hypertension with labile blood pressure 1 each 0    amLODIPine (NORVASC) 10 MG tablet Take 1 tablet by mouth daily 30 tablet 1    warfarin (COUMADIN) 3 MG tablet take 1 tablet by mouth once 5 times weekly 30 tablet 0    warfarin (COUMADIN) 4 MG tablet Take 1 tab by mouth 2 times weekly.  14 tablet 0      No current facility-administered medications for this visit.                    Allergies   Allergen Reactions    Ultram [Tramadol]           Family History             Family History   Problem Relation Age of Onset    Diabetes Mother      High Blood Pressure Mother      Cancer Father      Diabetes Brother              Social History               Socioeconomic History    Marital status: Single       Spouse name: Not on file    Number of children: Not on file    Years of education: Not on file    Highest education level: Not on file   Occupational History    Not on file   Tobacco Use    Smoking status: Current Every Day Smoker       Packs/day: 0.33       Years: 31.00       Pack years: 10.23       Types: Cigarettes       Last attempt to quit: 4/9/2019       Years since quitting: 3.0    Smokeless tobacco: Never Used    Tobacco comment: states smoking a couple cigareetes a day   Vaping Use    Vaping Use: Never used   Substance and Sexual Activity    Alcohol use: Yes       Alcohol/week: 2.0 standard drinks       Types: 2 Cans of beer per week       Comment: occ/states quit drinking    Drug use: Yes       Types: Marijuana Melani Gouty)       Comment: uses every other day    Sexual activity: Not on file   Other Topics Concern    Not on file   Social History Narrative    Not on file      Social Determinants of Health            Financial Resource Strain: Low Risk     Difficulty of Paying Living Expenses: Not hard at all   Food Insecurity: No Food Insecurity    Worried About [address] [employer] in the Last Year: Never true    [address]  in the Last Year: Never true   Transportation Needs:     Lack of Transportation (Medical): Not on file    Lack of Transportation (Non-Medical):  Not on file   Physical Activity:     Days of Exercise per Week: Not on file    Minutes of Exercise per Session: Not on file   Stress:     Feeling of Stress : Not on file   Social Connections:     Frequency of Communication with Friends and Family: Not on file    Frequency of Social Gatherings with Friends and Family: Not on file    Attends Congregation Services: Not on file    Active Member of Clubs or Organizations: Not on file    Attends Club or Organization Meetings: Not on file    Marital Status: Not on file   Intimate Partner Violence:     Fear of Current or Ex-Partner: Not on file    Emotionally Abused: Not on file  Physically Abused: Not on file    Sexually Abused: Not on file   Housing Stability:     Unable to Pay for Housing in the Last Year: Not on file    Number of Places Lived in the Last Year: Not on file    Unstable Housing in the Last Year: Not on file               ROS  Review of Systems   Constitutional: Negative for activity change, appetite change, chills, diaphoresis, fatigue, fever and unexpected weight change. HENT: Negative for sore throat and trouble swallowing.    Respiratory: Negative for cough, choking and shortness of breath.    Cardiovascular: Negative for chest pain. Gastrointestinal: Negative for abdominal distention, abdominal pain, anal bleeding, blood in stool, constipation, diarrhea, nausea, rectal pain and vomiting. Genitourinary: Negative for difficulty urinating. Musculoskeletal: Positive for arthralgias. Negative for back pain. Skin: Negative for color change. Neurological: Negative for seizures, syncope, speech difficulty and numbness. Hematological: Negative for adenopathy. Psychiatric/Behavioral: Negative.          BP (!) 140/79 (Site: Left Upper Arm, Position: Sitting, Cuff Size: Large Adult)   Pulse 88   Temp 98.7 °F (37.1 °C) (Infrared)   Ht 6' 2\" (1.88 m)   Wt 171 lb (77.6 kg)   SpO2 96%   BMI 21.96 kg/m²      PHYSICAL EXAM  Physical Exam  Constitutional:       General: He is not in acute distress.     Appearance: Normal appearance. He is not ill-appearing. HENT:      Head: Normocephalic and atraumatic. Eyes:      General: No scleral icterus. Cardiovascular:      Rate and Rhythm: Normal rate and regular rhythm. Pulmonary:      Effort: No respiratory distress.      Breath sounds: Normal breath sounds. Abdominal:      General: There is no distension.      Palpations: Abdomen is soft.      Tenderness: There is no abdominal tenderness. Musculoskeletal:         General: No swelling or tenderness.      Cervical back: Neck supple.    Skin:     General:

## 2022-07-05 NOTE — PROGRESS NOTES
Preop anticoagulation planning.        July 4 - Plavix and warfarin on hold. July 5 - INR 4.5. Continue holding plavix and warfarin. July 7 - Rpt INR. Possible bridging to lovenox. July 10 - if patient bridged, patient to hold morning and evening dose of lovenox BID. July 11 - Colonoscopy. Coordinate with Dr Evonne Allen to restart lovenox/warfarin*  24 - 48 hour post procedure depending on procedure done.       *Multiple episodes of subtx INR. May benefit switch from warfarin to apixaban post procedure.       Electronically signed by Cici Villalpando MD on 7/5/2022 at 8:49 AM

## 2022-07-05 NOTE — H&P
GENERAL SURGERY                                                               HISTORY AND PHYSICAL     CHIEF COMPLAINT          Chief Complaint   Patient presents with    Consultation       Colonoscopy consult - Ref by Dr Rivas Chao Colonoscopy       last colonoscopy about 5-6 years ago - 06/2016 - Hx of colon polyps    Other       patient has no complaints today          HPI  He is referred by by Dr. oJse Liu evaluation regarding follow-up colonoscopy.  In 2016, he had a colonoscopy that showed an 8 mm pedunculated polyp that was removed from the sigmoid colon.  Pathology reports showed tubular adenoma. At present, he has no lower GI or lower abdominal complaints.  He denies any rectal bleeding. He has a negative personal and family history of colon cancer. He is on chronic warfarin therapy for a history of DVT. On June 7, 2022, he had attempted colonoscopy that was stopped due to poor prep. However visualization of the lower 50 cm showed multiple polyps.   He is being rescheduled for a follow-up colonoscopy with management of his anticoagulants by his primary care team.     Past Medical History           Past Medical History:   Diagnosis Date    AAA (abdominal aortic aneurysm) ([address]       s/p repair    Asthma      Chronic anticoagulation 5/6/2014    DVT (deep venous thrombosis) ([address] 2010     R leg for 1 time    Hypertension      Hypertension      Peripheral vascular disease ([address] 2010     both legs, more on R leg    Transfusion history              Past Surgical History             Past Surgical History:   Procedure Laterality Date    AORTO-FEMORAL BYPASS GRAFT Bilateral 6/27/10    ARTERIAL ANEURYSM REPAIR Left 9/28/10     repair left femoral pseudoaneurysm    COLONOSCOPY        FASCIOTOMY Right 6/11/10     4 compartment    FEMORAL BYPASS Right 1/26/10     femoral-above knee popliteal    FEMORAL-FEMORAL BYPASS GRAFT   1/26/10     Dr. Monico Guevara right to left    HERNIA REPAIR   6/25/14   OPEN VENTRAL HERNIA REPAIR    THROMBOENDARTERECTOMY Left 1/26/10     left femoral    THROMBOENDARTERECTOMY Right 6/21/10     reexploration right fem-pop    VASCULAR SURGERY         22 times total            Current Facility-Administered Medications               Current Outpatient Medications   Medication Sig Dispense Refill    losartan (COZAAR) 100 MG tablet Take 1 tablet by mouth daily 90 tablet 1    albuterol sulfate  (90 Base) MCG/ACT inhaler Inhale 2 puffs into the lungs every 6 hours as needed for Wheezing 1 each 2    Multiple Vitamins-Minerals (THERAPEUTIC MULTIVITAMIN-MINERALS) tablet Take 1 tablet by mouth daily 90 tablet 1    magnesium oxide (MAG-OX) 400 MG tablet Take 1 tablet by mouth daily 90 tablet 1    doxycycline monohydrate (MONODOX) 100 MG capsule take 1 capsule by mouth once daily 90 capsule 1    atorvastatin (LIPITOR) 80 MG tablet Take 1 tablet by mouth daily 90 tablet 1    gabapentin (NEURONTIN) 400 MG capsule take 1 capsule by mouth three times a day        PARoxetine (PAXIL) 10 MG tablet Take 1 tablet by mouth daily 90 tablet 2    clopidogrel (PLAVIX) 75 MG tablet Take 1 tablet by mouth daily 90 tablet 3    Blood Pressure Monitoring (BLOOD PRESSURE CUFF) MISC Dx:  Hypertension with labile blood pressure 1 each 0    amLODIPine (NORVASC) 10 MG tablet Take 1 tablet by mouth daily 30 tablet 1    warfarin (COUMADIN) 3 MG tablet take 1 tablet by mouth once 5 times weekly 30 tablet 0    warfarin (COUMADIN) 4 MG tablet Take 1 tab by mouth 2 times weekly.  14 tablet 0      No current facility-administered medications for this visit.                    Allergies   Allergen Reactions    Ultram [Tramadol]           Family History             Family History   Problem Relation Age of Onset    Diabetes Mother      High Blood Pressure Mother      Cancer Father      Diabetes Brother              Social History               Socioeconomic History    Marital status: Single       Spouse name: Not on file    Number of children: Not on file    Years of education: Not on file    Highest education level: Not on file   Occupational History    Not on file   Tobacco Use    Smoking status: Current Every Day Smoker       Packs/day: 0.33       Years: 31.00       Pack years: 10.23       Types: Cigarettes       Last attempt to quit: 4/9/2019       Years since quitting: 3.0    Smokeless tobacco: Never Used    Tobacco comment: states smoking a couple cigareetes a day   Vaping Use    Vaping Use: Never used   Substance and Sexual Activity    Alcohol use: Yes       Alcohol/week: 2.0 standard drinks       Types: 2 Cans of beer per week       Comment: occ/states quit drinking    Drug use: Yes       Types: Marijuana Darryle Pimple)       Comment: uses every other day    Sexual activity: Not on file   Other Topics Concern    Not on file   Social History Narrative    Not on file      Social Determinants of Health            Financial Resource Strain: Low Risk     Difficulty of Paying Living Expenses: Not hard at all   Food Insecurity: No Food Insecurity    Worried About [address] [employer] in the Last Year: Never true    [address]  in the Last Year: Never true   Transportation Needs:     Lack of Transportation (Medical): Not on file    Lack of Transportation (Non-Medical):  Not on file   Physical Activity:     Days of Exercise per Week: Not on file    Minutes of Exercise per Session: Not on file   Stress:     Feeling of Stress : Not on file   Social Connections:     Frequency of Communication with Friends and Family: Not on file    Frequency of Social Gatherings with Friends and Family: Not on file    Attends Yazidism Services: Not on file    Active Member of Clubs or Organizations: Not on file    Attends Club or Organization Meetings: Not on file    Marital Status: Not on file   Intimate Partner Violence:     Fear of Current or Ex-Partner: Not on file    Emotionally Abused: Not on file  Physically Abused: Not on file    Sexually Abused: Not on file   Housing Stability:     Unable to Pay for Housing in the Last Year: Not on file    Number of Places Lived in the Last Year: Not on file    Unstable Housing in the Last Year: Not on file               ROS  Review of Systems   Constitutional: Negative for activity change, appetite change, chills, diaphoresis, fatigue, fever and unexpected weight change. HENT: Negative for sore throat and trouble swallowing.    Respiratory: Negative for cough, choking and shortness of breath.    Cardiovascular: Negative for chest pain. Gastrointestinal: Negative for abdominal distention, abdominal pain, anal bleeding, blood in stool, constipation, diarrhea, nausea, rectal pain and vomiting. Genitourinary: Negative for difficulty urinating. Musculoskeletal: Positive for arthralgias. Negative for back pain. Skin: Negative for color change. Neurological: Negative for seizures, syncope, speech difficulty and numbness. Hematological: Negative for adenopathy. Psychiatric/Behavioral: Negative.          BP (!) 140/79 (Site: Left Upper Arm, Position: Sitting, Cuff Size: Large Adult)   Pulse 88   Temp 98.7 °F (37.1 °C) (Infrared)   Ht 6' 2\" (1.88 m)   Wt 171 lb (77.6 kg)   SpO2 96%   BMI 21.96 kg/m²      PHYSICAL EXAM  Physical Exam  Constitutional:       General: He is not in acute distress.     Appearance: Normal appearance. He is not ill-appearing. HENT:      Head: Normocephalic and atraumatic. Eyes:      General: No scleral icterus. Cardiovascular:      Rate and Rhythm: Normal rate and regular rhythm. Pulmonary:      Effort: No respiratory distress.      Breath sounds: Normal breath sounds. Abdominal:      General: There is no distension.      Palpations: Abdomen is soft.      Tenderness: There is no abdominal tenderness. Musculoskeletal:         General: No swelling or tenderness.      Cervical back: Neck supple.    Skin:     General: Skin is warm and dry.      Coloration: Skin is not jaundiced. Neurological:      General: No focal deficit present.      Mental Status: He is alert and oriented to person, place, and time. Psychiatric:         Mood and Affect: Mood normal.         BehaviorDarron Leventhal         Thought Content: Thought content normal.         Judgment: Judgment normal.               ASSESSMENT AND PLAN  1.  Personal history of tubular adenoma of the sigmoid colon removed in 2016.  My recommendation is for him to have colonoscopy as an outpatient. The patient was explained the risks/benefits/alternatives/expected outcomes of the procedure.  The patient was explained the risks of the procedure, including, but not limited to, the risk of reaction to the anesthesia medicine and the risk of perforation requiring further surgery.  The patient was informed that they may require biopsy or polypectomy. These procedures may increase the risk of complication. All questions were answered.  The patient verbalized understanding and agreed to proceed.  Prep-mag citrate and Dulcolax tablets. 2.  Multiple polyps on colonoscopy in June 2022. This examination was limited by a poor prep and no polypectomies were performed. 3.   DVT-on chronic warfarin therapy.  Management of anticoagulants around the time of his procedure as per Dr. Milton Silva recent note.  Sajan Stewart MD     NOTE: This report was transcribed using voice recognition software. Every effort was made to ensure accuracy; however, inadvertent computerized transcription errors may be present.

## 2022-07-07 ENCOUNTER — TELEPHONE (OUTPATIENT)
Dept: INTERNAL MEDICINE | Age: 62
End: 2022-07-07

## 2022-07-07 ENCOUNTER — NURSE ONLY (OUTPATIENT)
Dept: INTERNAL MEDICINE | Age: 62
End: 2022-07-07
Payer: COMMERCIAL

## 2022-07-07 DIAGNOSIS — I82.499 DEEP VEIN THROMBOSIS (DVT) OF OTHER VEIN OF LOWER EXTREMITY, UNSPECIFIED CHRONICITY, UNSPECIFIED LATERALITY (HCC): Primary | ICD-10-CM

## 2022-07-07 LAB
INTERNATIONAL NORMALIZATION RATIO, POC: 2
PROTHROMBIN TIME, POC: 23.7

## 2022-07-07 PROCEDURE — 93793 ANTICOAG MGMT PT WARFARIN: CPT | Performed by: INTERNAL MEDICINE

## 2022-07-07 PROCEDURE — 85610 PROTHROMBIN TIME: CPT | Performed by: INTERNAL MEDICINE

## 2022-07-07 RX ORDER — ENOXAPARIN SODIUM 100 MG/ML
1 INJECTION SUBCUTANEOUS 2 TIMES DAILY
Qty: 5 EACH | Refills: 0 | Status: ON HOLD
Start: 2022-07-07 | End: 2022-07-11 | Stop reason: HOSPADM

## 2022-07-07 NOTE — PROGRESS NOTES
INR today  2. Colonoscopy scheduled 7/11/22. Start lovenox 1mg/kg twice a day (first dose can be done tonight) until July 9 only. No lovenox on July 10. Keep holding lovenox after colonoscopy. Will coordinate with office of Dr Sid Lubin regarding's findings on colonoscopy. INR check 7/13/22. Evaluate if patient can be switched to Eliquis given multiple subtx episodes on warfarin.        Electronically signed by Namrata Shrestha MD on 7/7/2022 at 11:03 AM

## 2022-07-07 NOTE — PROGRESS NOTES
[address] PRE-ADMISSION TESTING GENERAL INSTRUCTIONS- [facility]-phone number:[phone]    GENERAL INSTRUCTIONS  [x] Antibacterial Soap shower Night before and/or AM of Surgery  [] Apolinar wipe instruction sheet and wipes given. [x] Nothing by mouth after midnight, including gum, candy, mints, or water. [x] You may brush your teeth, gargle, but do NOT swallow water. []Hibiclens shower  the night before and the morning of surgery. Do not use             Hibiclens on your face or head. [x]No smoking, chewing tobacco, illegal drugs, or alcohol within 24 hours of your surgery. [x] Jewelry, valuables or body piercing's should not be brought to the hospital. All body and/or tongue piercing's must be removed prior to arriving to hospital.  ALL hair pins must be removed. [x] Do not wear makeup, lotions, powders, deodorant. Nail polish as directed by the nurse. [x] Arrange transportation with a responsible adult  to and from the hospital. If you do not have a responsible adult  to transport you, you will need to make arrangements with a medical transportation company (i.e. [employer]. A Uber/taxi/bus is not appropriate unless you are accompanied by a responsible adult ). Arrange for someone to be with you for the remainder of the day and for 24 hours after your procedure due to having had anesthesia. Who will be your  for transportation?______TO BE ARRANGED ____________   Who will be staying with you for 24 hrs after your procedure?________FAMILY__________  [x] Bring insurance card and photo ID.  [] Transfusion Bracelet: Please bring with you to hospital, day of surgery  [] Bring urine specimen day of surgery. Any small container is acceptable. [] Use inhalers the morning of surgery and bring with you to hospital.  [] Bring copy of living will or healthcare power of  papers to be placed in your electronic record.   [] CPAP/BI-PAP: Please bring your machine if you are to spend the night in the hospital.     PARKING INSTRUCTIONS:   [x] Arrival Time:__0700__________  · [x] Parking lot '\"I\"  is located on [facility] (the corner of [facility] and [facility]). To enter, press the button and the gate will lift. A free token will be provided to exit the lot. One car per patient is allowed to park in this lot. All other cars are to park on [address] [place] [address] either in the parking garage or the handicap lot. [] To reach the [facility] lobby from [address] [place] [address], upon entering the hospital, take elevator B to the 3rd floor. EDUCATION INSTRUCTIONS:      [] Knee or hip replacement booklet & exercise pamphlets given. [x] [address] placed in chart. [] Pre-admission Testing educational folder given  [] Incentive Spirometry,coughing & deep breathing exercises reviewed. []Medication information sheet(s)   []Fluoroscopy-Xray used in surgery reviewed with patient. Educational pamphlet placed in chart. [x]Pain: Post-op pain is normal and to be expected. You will be asked to rate your pain from 0-10(a zero is not acceptable-education is needed). Your post-op pain goal is:2[] Ask your nurse for your pain medication. [] Joint camp offered. [] Joint replacement booklets given. [] Other:___________________________    MEDICATION INSTRUCTIONS:   [x]Bring a complete list of your medications, please write the last time you took the medicine, give this list to the nurse. [x] Take the following medications the morning of surgery with 1-2 ounces of water: SEE MED LIST[] Stop herbal supplements and vitamins 5 days before your surgery. LD 7/6/22  [] DO NOT take any diabetic medicine the morning of surgery. Follow instructions for insulin the day before surgery. [] If you are diabetic and your blood sugar is low or you feel symptomatic, you may drink 1-2 ounces of apple juice or take a glucose tablet.   The morning of your procedure, you may call the pre-op area if you have concerns about your blood sugar [phone]. [] Use your inhalers the morning of surgery. Bring your emergency inhaler with you day of surgery. [x] Follow physician instructions regarding any blood thinners you may be taking. LD 7/5/22  WHAT TO EXPECT:  [x] The day of surgery you will be greeted and checked in by the [employer].  In addition, you will be registered in the [place] by a Patient Access Representative. Please bring your photo ID and insurance card. A nurse will greet you in accordance to the time you are needed in the pre-op area to prepare you for surgery. Please do not be discouraged if you are not greeted in the order you arrive as there are many variables that are involved in patient preparation. Your patience is greatly appreciated as you wait for your nurse. Please bring in items such as: books, magazines, newspapers, electronics, or any other items  to occupy your time in the waiting area. [x]  Delays may occur with surgery and staff will make a sincere effort to keep you informed of delays. If any delays occur with your procedure, we apologize ahead of time for your inconvenience as we recognize the value of your time.

## 2022-07-07 NOTE — TELEPHONE ENCOUNTER
----- Message from Mahad Perez MD sent at 7/7/2022 11:06 AM EDT -----  INR today  2. Colonoscopy scheduled 7/11/22.      Start lovenox 1mg/kg twice a day (first dose can be done tonight) until July 9 only. Prescription sent to [facility].     NO lovenox on July 10.      Keep holding lovenox after colonoscopy.      INR check 7/13/22.

## 2022-07-07 NOTE — TELEPHONE ENCOUNTER
Attempted to call patient to give instructions, no answer and unable to leave vm . Letter mailed with instructions and next appointment date.

## 2022-07-07 NOTE — PROGRESS NOTES
MARGAUX AT DR. Emy Whiteside OFFICE THAT MANJU CALLED ME , HE STATES THAT HE RECEIVED A MESSAGE FROM HIS PHARMACY THAT HE IS TO  LOVENOX RX TODAY. THERE IS A NOTE IN Epic FROM HIS PCP THAT SHE WAS TRYING TO REACH HIM ABOUT STARTING THE LOVENOX TODAY PM AND 2X DAILY . LD 7/9/22. NONE ON 7/10/22. PROCEDURE ON 7/11/22, MANJU TO  TODAY AND FOLLOW THE INSTRUCTIONS ON THE RX.

## 2022-07-07 NOTE — TELEPHONE ENCOUNTER
Attempted to call patient and a secondary number for a [facility] , both numbers no answer. Hector's number vm full., and Karina's left VM. hair removal not indicated

## 2022-07-08 ENCOUNTER — TELEPHONE (OUTPATIENT)
Dept: INTERNAL MEDICINE | Age: 62
End: 2022-07-08

## 2022-07-08 ENCOUNTER — TELEPHONE (OUTPATIENT)
Dept: SURGERY | Age: 62
End: 2022-07-08

## 2022-07-08 NOTE — TELEPHONE ENCOUNTER
MA received call from patient and reviewed colonoscopy prep instructions. Instructions sent via Slingjot per patient request. Informed of medications to find at pharmacy. Patient verbalized understanding.      Electronically signed by Em Dudley on 7/8/22 at 10:05 AM EDT

## 2022-07-08 NOTE — TELEPHONE ENCOUNTER
Attempted to call patient and signifcant other [place] Pavy again this morning, unable to leave message on  Ede's phone because mailbox is full, lm on Karina's phone to have patient return call.

## 2022-07-08 NOTE — PROGRESS NOTES
Clinic unable to contact patient yesterday re: preop anticoagulation instructions. Patient did  lovenox and had first dose yesterday. Advised to have last dose on July 9, 7pm. No lovenox on [place], July 10. Coordinated with ISATU Adkins) to remind patient re; instructions for colonoscopy prep.      Electronically signed by Kelly Strickland MD on 7/8/2022 at 9:57 AM

## 2022-07-11 ENCOUNTER — ANESTHESIA (OUTPATIENT)
Dept: ENDOSCOPY | Age: 62
End: 2022-07-11
Payer: COMMERCIAL

## 2022-07-11 ENCOUNTER — ANESTHESIA EVENT (OUTPATIENT)
Dept: ENDOSCOPY | Age: 62
End: 2022-07-11
Payer: COMMERCIAL

## 2022-07-11 ENCOUNTER — HOSPITAL ENCOUNTER (OUTPATIENT)
Age: 62
Setting detail: OUTPATIENT SURGERY
Discharge: HOME | End: 2022-07-11
Attending: SURGERY | Admitting: SURGERY
Payer: COMMERCIAL

## 2022-07-11 VITALS
RESPIRATION RATE: 16 BRPM | BODY MASS INDEX: 22.84 KG/M2 | HEIGHT: 74 IN | WEIGHT: 178 LBS | OXYGEN SATURATION: 99 % | TEMPERATURE: 96.8 F | SYSTOLIC BLOOD PRESSURE: 205 MMHG | DIASTOLIC BLOOD PRESSURE: 106 MMHG | HEART RATE: 70 BPM

## 2022-07-11 PROCEDURE — 6370000000 HC RX 637 (ALT 250 FOR IP): Performed by: ANESTHESIOLOGY

## 2022-07-11 PROCEDURE — 2709999900 HC NON-CHARGEABLE SUPPLY: Performed by: SURGERY

## 2022-07-11 PROCEDURE — 3609010600 HC COLONOSCOPY POLYPECTOMY SNARE/COLD BIOPSY: Performed by: SURGERY

## 2022-07-11 PROCEDURE — 6360000002 HC RX W HCPCS

## 2022-07-11 PROCEDURE — 3700000000 HC ANESTHESIA ATTENDED CARE: Performed by: SURGERY

## 2022-07-11 PROCEDURE — 2580000003 HC RX 258: Performed by: SURGERY

## 2022-07-11 PROCEDURE — 7100000010 HC PHASE II RECOVERY - FIRST 15 MIN: Performed by: SURGERY

## 2022-07-11 PROCEDURE — 7100000011 HC PHASE II RECOVERY - ADDTL 15 MIN: Performed by: SURGERY

## 2022-07-11 PROCEDURE — 6360000002 HC RX W HCPCS: Performed by: ANESTHESIOLOGIST ASSISTANT

## 2022-07-11 PROCEDURE — 3700000001 HC ADD 15 MINUTES (ANESTHESIA): Performed by: SURGERY

## 2022-07-11 PROCEDURE — 45380 COLONOSCOPY AND BIOPSY: CPT | Performed by: SURGERY

## 2022-07-11 RX ORDER — HYDRALAZINE HYDROCHLORIDE 20 MG/ML
5 INJECTION INTRAMUSCULAR; INTRAVENOUS ONCE
Status: COMPLETED | OUTPATIENT
Start: 2022-07-11 | End: 2022-07-11

## 2022-07-11 RX ORDER — PROPOFOL 10 MG/ML
INJECTION, EMULSION INTRAVENOUS PRN
Status: DISCONTINUED | OUTPATIENT
Start: 2022-07-11 | End: 2022-07-11 | Stop reason: SDUPTHER

## 2022-07-11 RX ORDER — HYDRALAZINE HYDROCHLORIDE 20 MG/ML
INJECTION INTRAMUSCULAR; INTRAVENOUS
Status: COMPLETED
Start: 2022-07-11 | End: 2022-07-11

## 2022-07-11 RX ORDER — LIDOCAINE HYDROCHLORIDE 20 MG/ML
INJECTION, SOLUTION INTRAVENOUS PRN
Status: DISCONTINUED | OUTPATIENT
Start: 2022-07-11 | End: 2022-07-11 | Stop reason: SDUPTHER

## 2022-07-11 RX ORDER — LOSARTAN POTASSIUM 50 MG/1
100 TABLET ORAL ONCE
Status: COMPLETED | OUTPATIENT
Start: 2022-07-11 | End: 2022-07-11

## 2022-07-11 RX ORDER — SODIUM CHLORIDE 9 MG/ML
INJECTION, SOLUTION INTRAVENOUS CONTINUOUS
Status: DISCONTINUED | OUTPATIENT
Start: 2022-07-11 | End: 2022-07-11 | Stop reason: HOSPADM

## 2022-07-11 RX ADMIN — LOSARTAN POTASSIUM 100 MG: 50 TABLET, FILM COATED ORAL at 10:14

## 2022-07-11 RX ADMIN — HYDRALAZINE HYDROCHLORIDE 5 MG: 20 INJECTION INTRAMUSCULAR; INTRAVENOUS at 09:30

## 2022-07-11 RX ADMIN — SODIUM CHLORIDE: 9 INJECTION, SOLUTION INTRAVENOUS at 07:03

## 2022-07-11 RX ADMIN — LIDOCAINE HYDROCHLORIDE 80 MG: 20 INJECTION, SOLUTION INTRAVENOUS at 08:03

## 2022-07-11 RX ADMIN — PROPOFOL 500 MG: 10 INJECTION, EMULSION INTRAVENOUS at 08:03

## 2022-07-11 ASSESSMENT — PAIN - FUNCTIONAL ASSESSMENT: PAIN_FUNCTIONAL_ASSESSMENT: 0-10

## 2022-07-11 ASSESSMENT — PAIN SCALES - GENERAL: PAINLEVEL_OUTOF10: 0

## 2022-07-11 ASSESSMENT — LIFESTYLE VARIABLES: SMOKING_STATUS: 1

## 2022-07-11 NOTE — ANESTHESIA PRE PROCEDURE
Department of Anesthesiology  Preprocedure Note       Name:  Elvia    Age:  57 y.o.  :  1960                                          MRN:  [id number]         Date:  2022      Surgeon: Luda Esquivel):  Lyndon Hernandez MD    Procedure: Procedure(s):  COLONOSCOPY DIAGNOSTIC    Medications prior to admission:   Prior to Admission medications    Medication Sig Start Date End Date Taking?  Authorizing Provider   enoxaparin (LOVENOX) 80 MG/0.8ML Inject 0.8 mLs into the skin 2 times daily 22   Cari Flores MD   Multiple Vitamins-Minerals (THERAPEUTIC MULTIVITAMIN-MINERALS) tablet Take 1 tablet by mouth daily 22   Cari Flores MD   magnesium oxide (MAG-OX) 400 MG tablet Take 1 tablet by mouth daily 22   Cari Flores MD   warfarin (COUMADIN) 1 MG tablet Take 1 tablet by mouth daily 22   Frieda Plata.,    varenicline (CHANTIX) 1 MG tablet Take 1 mg by mouth 2 times daily  Patient not taking: Reported on 2022    Historical Provider, MD   warfarin (COUMADIN) 4 MG tablet Take 1 tab by mouth daily on Wednesday, Thursday, Friday, Saturday,   Patient taking differently: Take by mouth daily  22   Cari Flores MD   amLODIPine (NORVASC) 10 MG tablet Take 1 tablet by mouth daily  Patient taking differently: Take 10 mg by mouth nightly  22   Karrie Dolan MD   atorvastatin (LIPITOR) 80 MG tablet Take 1 tablet by mouth daily  Patient taking differently: Take 80 mg by mouth nightly  22   Karrie Dolan MD   losartan (COZAAR) 100 MG tablet Take 1 tablet by mouth daily  Patient taking differently: Take 100 mg by mouth nightly  3/25/22   Marisabel Mccann MD   doxycycline monohydrate (MONODOX) 100 MG capsule take 1 capsule by mouth once daily 2/3/22   Cari Flores MD   gabapentin (NEURONTIN) 400 MG capsule take 1 capsule by mouth three times a day 21   Historical Provider, MD   clopidogrel (PLAVIX) 75 MG tablet Take 1 tablet by mouth daily 2/17/21   La Nena Novak MD   Blood Pressure Monitoring (BLOOD PRESSURE CUFF) MISC Dx:  Hypertension with labile blood pressure 1/16/19   La Nena Novak MD       Current medications:    Current Facility-Administered Medications   Medication Dose Route Frequency Provider Last Rate Last Admin    0.9 % sodium chloride infusion   IntraVENous Continuous Dk Go MD           Allergies: Allergies   Allergen Reactions    Ultram [Tramadol]        Problem List:    Patient Active Problem List   Diagnosis Code    Tobacco abuse Z72.0    Gout M10.9    Incisional hernia K43.2    Chronic anticoagulation Z79.01    DVT, lower extremity ([address] I82.409    Rectal bleeding K62.5    PVD (peripheral vascular disease) with claudication (HCC) I73.9    Abdominal aortic aneurysm without rupture (HCC) I71.4    Colon polyp K63.5    Essential hypertension I10    Left leg pain M79.605    Hematoma T14. 8XXA    Anti-phospholipid antibody syndrome ([address] D68.61       Past Medical History:        Diagnosis Date    AAA (abdominal aortic aneurysm) ([address]     s/p repair    Asthma     Chronic anticoagulation 05/06/2014    DVT (deep venous thrombosis) ([address] 2010    R leg for 1 time    Hypertension     Peripheral vascular disease ([address] 2010    both legs, more on R leg    Transfusion history        Past Surgical History:        Procedure Laterality Date    AORTO-FEMORAL BYPASS GRAFT Bilateral 6/27/10    ARTERIAL ANEURYSM REPAIR Left 9/28/10    repair left femoral pseudoaneurysm    COLONOSCOPY      COLONOSCOPY N/A 6/7/2022    COLORECTAL CANCER SCREENING, HIGH RISK performed by Dk Go MD at [address] FASCIOTOMY Right 6/11/10    4 compartment    FEMORAL BYPASS Right 1/26/10    femoral-above knee popliteal    FEMORAL-FEMORAL BYPASS GRAFT  1/26/10    Dr. Alejandra Pulliam right to left    HERNIA REPAIR  6/25/14    OPEN VENTRAL HERNIA REPAIR    THROMBOENDARTERECTOMY Left 1/26/10    left femoral    THROMBOENDARTERECTOMY Right 6/21/10    reexploration right fem-pop    VASCULAR SURGERY      22 times total       Social History:    Social History     Tobacco Use    Smoking status: Current Every Day Smoker     Packs/day: 0.25     Years: 43.00     Pack years: 10.75     Types: Cigarettes    Smokeless tobacco: Never Used    Tobacco comment: smokes a couple  cigarettes per day   Substance Use Topics    Alcohol use: Yes     Alcohol/week: 1.0 standard drink     Types: 1 Cans of beer per week     Comment: occ/states quit drinking                                Ready to quit: Not Answered  Counseling given: Not Answered  Comment: smokes a couple  cigarettes per day      Vital Signs (Current):   Vitals:    07/07/22 0851 07/07/22 0900 07/11/22 0652   BP:   (!) 178/93   Pulse:   80   Resp:   20   Temp:   97.8 °F (36.6 °C)   TempSrc:   Temporal   SpO2:   97%   Weight:  178 lb (80.7 kg) 178 lb (80.7 kg)   Height: 6' 2\" (1.88 m)  6' 2\" (1.88 m)                                              BP Readings from Last 3 Encounters:   07/11/22 (!) 178/93   06/07/22 (!) 190/92   05/06/22 129/85       NPO Status: Time of last liquid consumption: 2300                        Time of last solid consumption: 0900                        Date of last liquid consumption: 07/10/22                        Date of last solid food consumption: 07/10/22    BMI:   Wt Readings from Last 3 Encounters:   07/11/22 178 lb (80.7 kg)   06/07/22 174 lb (78.9 kg)   05/06/22 170 lb 4.8 oz (77.2 kg)     Body mass index is 22.85 kg/m².     CBC:   Lab Results   Component Value Date/Time    WBC 7.1 12/15/2021 04:15 PM    RBC 4.98 12/15/2021 04:15 PM    HGB 16.0 12/15/2021 04:15 PM    HCT 45.6 12/15/2021 04:15 PM    MCV 91.6 12/15/2021 04:15 PM    RDW 12.3 12/15/2021 04:15 PM     12/15/2021 04:15 PM       CMP:   Lab Results   Component Value Date/Time     12/16/2021 03:12 PM    K 3.8 12/16/2021 03:12 PM    K 3.7 07/11/2018 08:43 AM     12/16/2021 03:12 PM    CO2 20 12/16/2021 03:12 PM    BUN 11 12/16/2021 03:12 PM    CREATININE 0.9 12/16/2021 03:12 PM    GFRAA >60 12/16/2021 03:12 PM    LABGLOM >60 12/16/2021 03:12 PM    GLUCOSE 89 12/16/2021 03:12 PM    GLUCOSE 89 04/27/2012 02:40 PM    PROT 7.6 12/16/2021 03:12 PM    CALCIUM 9.4 12/16/2021 03:12 PM    BILITOT 0.7 12/16/2021 03:12 PM    ALKPHOS 111 12/16/2021 03:12 PM    AST 18 12/16/2021 03:12 PM    ALT 17 12/16/2021 03:12 PM       POC Tests: No results for input(s): POCGLU, POCNA, POCK, POCCL, POCBUN, POCHEMO, POCHCT in the last 72 hours. Coags:   Lab Results   Component Value Date/Time    PROTIME 23.7 07/07/2022 08:11 AM    INR 2.0 07/07/2022 08:11 AM    INR 5.8 12/23/2021 09:30 AM    APTT 32.4 10/19/2018 07:50 AM       HCG (If Applicable): No results found for: PREGTESTUR, PREGSERUM, HCG, HCGQUANT     ABGs: No results found for: PHART, PO2ART, VNT5KTS, LWW9GBK, BEART, Q0REURZG     Type & Screen (If Applicable):  No results found for: LABABO, LABRH    Drug/Infectious Status (If Applicable):  No results found for: HIV, HEPCAB    COVID-19 Screening (If Applicable): No results found for: COVID19        Anesthesia Evaluation  Patient summary reviewed  Airway: Mallampati: II  TM distance: >3 FB   Neck ROM: full  Mouth opening: > = 3 FB   Dental:      Comment: Missing front upper teeth    Pulmonary: breath sounds clear to auscultation  (+) asthma: current smoker (0.25 PPD.)          Patient did not smoke on day of surgery. Cardiovascular:    (+) hypertension:,         Rhythm: regular  Rate: normal           Beta Blocker:  Not on Beta Blocker         Neuro/Psych:   Negative Neuro/Psych ROS              GI/Hepatic/Renal:   (+) bowel prep,          ROS comment: Colon polyps.    Endo/Other: Negative Endo/Other ROS                    Abdominal:             Vascular:   + PVD, aortic or cerebral, DVT, .        ROS comment: Stent Abdominal Aortic Aneurysm. Ch Anticoagulation. . Other Findings:             Anesthesia Plan      MAC     ASA 3       Induction: intravenous. continuous noninvasive hemodynamic monitor  MIPS: Prophylactic antiemetics administered. Anesthetic plan and risks discussed with patient. Plan discussed with CRNA.               [address],    7/11/2022

## 2022-07-11 NOTE — PROGRESS NOTES
Patient given his PO dose of Cozaar , discharge instructions gone over with patient informed to hold his blood thinners until he hears form his family doctor and to follow up with family doctor about his blood pressure  all questions answered at this time patients ride called and she can be here at 10:45.

## 2022-07-11 NOTE — OP NOTE
COLONOSCOPY OP NOTE     DATE OF PROCEDURE: 7/11/2022     SURGEON: Charles Ragland     ASSISTANT: none     PREOPERATIVE DIAGNOSIS: High risk colorectal cancer screening for colon polyp removed 6 years ago     POSTOPERATIVE DIAGNOSIS:   1. Colonoscope was able to reach the ileocecal valve  2. Poor prep of the proximal colon that eventually interfered with polypectomies in the left colon  3. Multiple polyps seen in the colon as delineated below in the history  4. Several additional polyps seen proximal to this-none that were more than 1 cm or ulcerated.     OPERATION: Total colonoscopy with cauterization of polyp at the 70 and 40 cm gauri. Unintentional polypectomy at the 40 cm gauri.     ANESTHESIA: Local monitored anesthesia.      ESTIMATED BLOOD LOSS (ml): less than 50      COMPLICATIONS: None     SPECIMENS:  Was Not Obtained     HISTORY: The patient is a 57y.o. year old male with a personal history of colon polyps. Last polypectomy was in 2016. This showed a tubular adenoma in sigmoid colon. In June 2022, he underwent attempted colonoscopy that was limited by poor prep. He had multiple polyps identified during that procedure that were left in place. This includes the following  1. 1 cm polyp at the 45 cm gauri from the anal verge  2. 4 distinct polyps between the 35 and 40 cm gauri from the anal verge-size was 0.5 cm's to 1.25 cm  3. Polyp at the 30 cm gauri-1 cm  4. Polyp at 20 cm gauri-1 cm in size     The patient is also anticoagulated for history of DVT. The management of his anticoagulants around the time of this procedure was managed by his primary care team.   I recommend colonoscopy with possible biopsy or polypectomy and I explained the risk, benefits, expected outcome, and alternatives to the procedure. Risks included but are not limited to bleeding, infection, respiratory distress, hypotension, and perforation of the colon.   The patient understands and is in agreement.         PROCEDURE: The patient was given IV conscious sedation per anesthesia. The patient was given supplemental oxygen by nasal cannula. The colonoscope was inserted per rectum and advanced under direct vision to the cecum without difficulty. The prep was poor--- the scope was able to be passed to the ileocecal valve. There was debris in the cecum that traveled distally in the colon interfering with the remaining portion examination.     FINDINGS:  Cecum/Ascending colon: Solid debris in the base of cecum. Passage of additional debris was visualized as it came through the ileocecal valve. A few, benign, less than 1 cm mucosal lesions seen in left in place.     Transverse colon: Less than 1 cm benign-appearing polyp at the 70 cm gauri. This was cauterized. No bleeding noted. The remaining area was partially visualized due to poor prep     Descending/Sigmoid colon: There was a cluster of polyps at the 40 cm gauri. This appears to be the same area that was visualized on the colonoscopy last month. One of them was cauterized. In attempting to perform a snare polypectomy, the snare was looped around the polyp and left loose. Due to debris coming into the field, the snare was attempted to be removed from the polyp but resulted in pulling the polyp off the mucosa. The area was reinspected and irrigated several times without active bleeding noted. The polypectomy site was unable to be seen. Due to the debris, decision made to not attempt any more polypectomies. Visualization the remaining portion of the descending and sigmoid was limited due to debris.     Rectum/Anus: examined in normal and retroflexed positions and was normal     The colon was decompressed and the scope was removed. The withdraw time was approximately 15 minutes. The patient tolerated the procedure well.      ASSESSMENT/PLAN:   1. Known history of multiple colon polyps. Examination performed in June 2022 was incomplete due to poor prep.   During this examination, the scope was able to be passed all the way to the ileocecal valve but the exam was then limited by poor prep and debris traveling distally in the colon during the examination. 2. Polypectomy-polyp was removed with out cautery from approximately 40 cm gauri. Specimen was not able to be retrieved for pathology. No active bleeding seen in site as best possible. 3. History of DVT requiring chronic anticoagulation. His anticoagulation was held prior to the examination. His primary care team guided the management of the anticoagulants around the procedure. Anticoagulants will continue on hold for the next 5 days. I will reach out to his primary care team to set guidelines for the future. 4. Recommend follow up colonoscopy with in a 6 to 12-month timeframe. I will review the importance of the prep with the patient prior to him being rescheduled. I will work with his primary care team to manage his anticoagulants around the time of the follow-up exam in the future.     Electronically signed by Dk Go MD on 7/6/22 at 8:33 AM RABIAT     Miriam Georges M.D.        NOTE: This report was transcribed using voice recognition software.  Every effort was made to ensure accuracy; however, inadvertent computerized transcription errors may be present

## 2022-07-11 NOTE — INTERVAL H&P NOTE
Update History & Physical    The patient's History and Physical of July 5, 2022 was reviewed with the patient and I examined the patient. There was no change. The surgical site was confirmed by the patient and me. Plan: The risks, benefits, expected outcome, and alternative to the recommended procedure have been discussed with the patient. Patient understands and wants to proceed with the procedure.      Electronically signed by Jenni Escobar MD on 7/11/2022 at 7:57 AM

## 2022-07-11 NOTE — PROGRESS NOTES
Dr. Concepcion Mccartney updated on BP, 221/109 after medication. IV lost during bathroom transfer. Order received to give home losartan dose that patient has held for the past 4 days, and can then discharge. Patient is to follow up with PCP re: hypertension, Dr. Charles Ragland also aware of elevated BP during procedure.

## 2022-07-11 NOTE — ANESTHESIA POSTPROCEDURE EVALUATION
Department of Anesthesiology  Postprocedure Note    Patient: Sonu Small  MRN: [id number]  YOB: 1960  Date of evaluation: 7/11/2022      Procedure Summary     Date: 07/11/22 Room / Location: [address] [place] [address] / [facility]    Anesthesia Start: 7943 Anesthesia Stop: 6386    Procedure: COLONOSCOPY POLYPECTOMY SNARE/COLD BIOPSY (N/A ) Diagnosis:       Polyp of colon, unspecified part of colon, unspecified type      (COLON POLYPS)    Surgeons: Stephanie Pires MD Responsible Provider: Frederic Fuentes DO    Anesthesia Type: MAC ASA Status: 3          Anesthesia Type: No value filed.     Con Phase I: Con Score: 10    Con Phase II:        Anesthesia Post Evaluation    Patient location during evaluation: PACU  Patient participation: complete - patient participated  Level of consciousness: awake and alert  Airway patency: patent  Nausea & Vomiting: no nausea and no vomiting  Complications: no  Cardiovascular status: hemodynamically stable  Respiratory status: acceptable  Hydration status: euvolemic

## 2022-07-12 ENCOUNTER — NURSE ONLY (OUTPATIENT)
Dept: INTERNAL MEDICINE | Age: 62
End: 2022-07-12
Payer: COMMERCIAL

## 2022-07-12 DIAGNOSIS — Z79.01 CHRONIC ANTICOAGULATION: ICD-10-CM

## 2022-07-12 DIAGNOSIS — I82.499 DEEP VEIN THROMBOSIS (DVT) OF OTHER VEIN OF LOWER EXTREMITY, UNSPECIFIED CHRONICITY, UNSPECIFIED LATERALITY (HCC): Primary | ICD-10-CM

## 2022-07-12 LAB
INTERNATIONAL NORMALIZATION RATIO, POC: 1.1
PROTHROMBIN TIME, POC: 13.5

## 2022-07-12 PROCEDURE — 85610 PROTHROMBIN TIME: CPT

## 2022-07-12 PROCEDURE — 99211 OFF/OP EST MAY X REQ PHY/QHP: CPT

## 2022-07-13 RX ORDER — ENOXAPARIN SODIUM 100 MG/ML
1 INJECTION SUBCUTANEOUS 2 TIMES DAILY
Qty: 5 EACH | Refills: 0 | Status: SHIPPED
Start: 2022-07-13 | End: 2022-07-20 | Stop reason: ALTCHOICE

## 2022-07-20 ENCOUNTER — NURSE ONLY (OUTPATIENT)
Dept: INTERNAL MEDICINE | Age: 62
End: 2022-07-20
Payer: COMMERCIAL

## 2022-07-20 DIAGNOSIS — I82.499 DEEP VEIN THROMBOSIS (DVT) OF OTHER VEIN OF LOWER EXTREMITY, UNSPECIFIED CHRONICITY, UNSPECIFIED LATERALITY (HCC): Primary | ICD-10-CM

## 2022-07-20 LAB
INTERNATIONAL NORMALIZATION RATIO, POC: 2.2
PROTHROMBIN TIME, POC: 26.4

## 2022-07-20 PROCEDURE — 93793 ANTICOAG MGMT PT WARFARIN: CPT | Performed by: INTERNAL MEDICINE

## 2022-07-20 PROCEDURE — 85610 PROTHROMBIN TIME: CPT | Performed by: INTERNAL MEDICINE

## 2022-07-20 RX ORDER — WARFARIN SODIUM 3 MG/1
5 TABLET ORAL DAILY
COMMUNITY
End: 2022-08-26 | Stop reason: ALTCHOICE

## 2022-07-20 RX ORDER — WARFARIN SODIUM 3 MG/1
TABLET ORAL
Qty: 30 TABLET | Refills: 0 | Status: CANCELLED | OUTPATIENT
Start: 2022-07-20

## 2022-07-20 NOTE — PATIENT INSTRUCTIONS
Please start Coumadin M/W/F 6 mg, Coumadin 5 mg the other days. Recheck in one week 7/26/2022. STOP Lovenox .

## 2022-07-20 NOTE — PROGRESS NOTES
INR results reviewed with Dr. Kanika Valladares and orders received. AVS mailed to patient. Called patient to inform him of Coumadin instructions. No answer and VM full. AVS printed and mailed and next appointment scheduled.

## 2022-07-20 NOTE — PROGRESS NOTES
Attempted to reach patient again with no results. AVS mailed, unable to leave message because VM is full. I spoke to patient's alternate Abbie Terry and gave instructions , tabby stated she will give patient the instructions. And I let her know if patient had any questions to please call the office.

## 2022-07-27 ENCOUNTER — TELEPHONE (OUTPATIENT)
Dept: INTERNAL MEDICINE | Age: 62
End: 2022-07-27

## 2022-07-29 ENCOUNTER — NURSE ONLY (OUTPATIENT)
Dept: INTERNAL MEDICINE | Age: 62
End: 2022-07-29
Payer: COMMERCIAL

## 2022-07-29 VITALS — TEMPERATURE: 97.1 F

## 2022-07-29 DIAGNOSIS — I82.499 DEEP VEIN THROMBOSIS (DVT) OF OTHER VEIN OF LOWER EXTREMITY, UNSPECIFIED CHRONICITY, UNSPECIFIED LATERALITY (HCC): Primary | ICD-10-CM

## 2022-07-29 DIAGNOSIS — I82.499 DEEP VEIN THROMBOSIS (DVT) OF OTHER VEIN OF LOWER EXTREMITY, UNSPECIFIED CHRONICITY, UNSPECIFIED LATERALITY (HCC): ICD-10-CM

## 2022-07-29 DIAGNOSIS — K76.89 HEPATIC CYST: ICD-10-CM

## 2022-07-29 LAB
INR BLD: 7.4
INTERNATIONAL NORMALIZATION RATIO, POC: 6.9
PROTHROMBIN TIME, POC: 82.2
PROTHROMBIN TIME: 83.2 SEC (ref 9.3–12.4)

## 2022-07-29 PROCEDURE — 93793 ANTICOAG MGMT PT WARFARIN: CPT | Performed by: INTERNAL MEDICINE

## 2022-07-29 PROCEDURE — 85610 PROTHROMBIN TIME: CPT | Performed by: INTERNAL MEDICINE

## 2022-07-29 NOTE — PROGRESS NOTES
Interval hx:   Recent INR have been subtherapeutic, he even was on Lovenox bridge with coumadin 5 mg and dose increased last visit due to him being subtherapeutic. Most recent dose 6mg M/W/F and 4 mg all others. INR lab confirmed 7.4 today. No signs of bleeding. Advised about risk and hold 3 days coumadin, recheck in 3 days. Previous imaging shows incidental findings large hepatic cysts (>1 cm) on CT 2019 -- recommend repeat US liver.     Electronically signed by Heladio Shell DO on 7/29/2022 at 9:02 AM

## 2022-07-29 NOTE — PROGRESS NOTES
INR in office 6.9, stat lab per [facility] in [facility] ([facility] lab was INR 7.4. Reviewed with Dr. Warren Cherry and verbal order given to patient to hold Coumadin until repeat INR to be drawn on Monday. Patient instructed to go to the ER if bleeding or abnormal bruising occurs and to be cautious of falls or cutting himself. Patient verbalized understanding. Printed AVS with instructions and follow up appointment given.

## 2022-07-29 NOTE — PATIENT INSTRUCTIONS
INR per lab was 7.4  Please hold Coumadin and repeat INR Monday  Please go to ER if bleeding or abnormal brusing occurs

## 2022-08-01 ENCOUNTER — NURSE ONLY (OUTPATIENT)
Dept: INTERNAL MEDICINE | Age: 62
End: 2022-08-01
Payer: COMMERCIAL

## 2022-08-01 DIAGNOSIS — I82.499 DEEP VEIN THROMBOSIS (DVT) OF OTHER VEIN OF LOWER EXTREMITY, UNSPECIFIED CHRONICITY, UNSPECIFIED LATERALITY (HCC): Primary | ICD-10-CM

## 2022-08-01 LAB
INTERNATIONAL NORMALIZATION RATIO, POC: 2.8
PROTHROMBIN TIME, POC: 33

## 2022-08-01 PROCEDURE — 85610 PROTHROMBIN TIME: CPT | Performed by: INTERNAL MEDICINE

## 2022-08-01 PROCEDURE — 93793 ANTICOAG MGMT PT WARFARIN: CPT | Performed by: INTERNAL MEDICINE

## 2022-08-01 RX ORDER — WARFARIN SODIUM 5 MG/1
5 TABLET ORAL DAILY
Qty: 30 TABLET | Refills: 3 | Status: SHIPPED
Start: 2022-08-01 | End: 2022-08-26 | Stop reason: ALTCHOICE

## 2022-08-03 ENCOUNTER — TELEPHONE (OUTPATIENT)
Dept: INTERNAL MEDICINE | Age: 62
End: 2022-08-03

## 2022-08-03 DIAGNOSIS — K76.89 HEPATIC CYST: Primary | ICD-10-CM

## 2022-08-05 ENCOUNTER — NURSE ONLY (OUTPATIENT)
Dept: INTERNAL MEDICINE | Age: 62
End: 2022-08-05
Payer: COMMERCIAL

## 2022-08-05 DIAGNOSIS — I82.499 DEEP VEIN THROMBOSIS (DVT) OF OTHER VEIN OF LOWER EXTREMITY, UNSPECIFIED CHRONICITY, UNSPECIFIED LATERALITY (HCC): ICD-10-CM

## 2022-08-05 DIAGNOSIS — I82.499 DEEP VEIN THROMBOSIS (DVT) OF OTHER VEIN OF LOWER EXTREMITY, UNSPECIFIED CHRONICITY, UNSPECIFIED LATERALITY (HCC): Primary | ICD-10-CM

## 2022-08-05 LAB
INR BLD: 6.1
INTERNATIONAL NORMALIZATION RATIO, POC: 6
PROTHROMBIN TIME, POC: 71.8
PROTHROMBIN TIME: 68.6 SEC (ref 9.3–12.4)

## 2022-08-05 PROCEDURE — 93793 ANTICOAG MGMT PT WARFARIN: CPT | Performed by: INTERNAL MEDICINE

## 2022-08-05 PROCEDURE — 85610 PROTHROMBIN TIME: CPT | Performed by: INTERNAL MEDICINE

## 2022-08-05 NOTE — PROGRESS NOTES
Called patient and reviewed that he is to hold taking coumadin today and tomorrow and on sunday he is to start taking 5mgs daily and will be rechecked next Wednesday at 800am  patient repeated all instructions back to me and verbalized understanding of all instructions  Printed avs with fu appt mailed to patient  Patient checked his medicine and has enough coumadin to take until next appointment

## 2022-08-05 NOTE — PROGRESS NOTES
Hx of DVT, APS. Goal INR 2.5 - 3.5. INR 6. 13. Patient has actually been taking 6 mg daily instead of his ordered 5 mg Plan to hold warfarin today and tomorrow. Resume 5 mg  dose for a 16% reduction. Return for INR on Wednesday.      Electronically signed by Tiffany Gooden DO on 8/5/2022 at 10:42 AM

## 2022-08-10 ENCOUNTER — NURSE ONLY (OUTPATIENT)
Dept: INTERNAL MEDICINE | Age: 62
End: 2022-08-10
Payer: COMMERCIAL

## 2022-08-10 DIAGNOSIS — Z98.890 HX OF ABDOMINAL SURGERY: ICD-10-CM

## 2022-08-10 DIAGNOSIS — I82.499 DEEP VEIN THROMBOSIS (DVT) OF OTHER VEIN OF LOWER EXTREMITY, UNSPECIFIED CHRONICITY, UNSPECIFIED LATERALITY (HCC): Primary | ICD-10-CM

## 2022-08-10 LAB
INTERNATIONAL NORMALIZATION RATIO, POC: 5.1
PROTHROMBIN TIME, POC: 61.8

## 2022-08-10 PROCEDURE — 93793 ANTICOAG MGMT PT WARFARIN: CPT | Performed by: INTERNAL MEDICINE

## 2022-08-10 PROCEDURE — 85610 PROTHROMBIN TIME: CPT | Performed by: INTERNAL MEDICINE

## 2022-08-10 RX ORDER — DOXYCYCLINE 100 MG/1
CAPSULE ORAL
Qty: 90 CAPSULE | Refills: 1 | OUTPATIENT
Start: 2022-08-10

## 2022-08-10 NOTE — PATIENT INSTRUCTIONS
Hold coumadin today and tomorrow and then start your new dosing of 4mgs on tues,thursdays and saturdays and 5 mgs all other days of the week  recheck your inr on 08/17/2022

## 2022-08-11 ENCOUNTER — TELEPHONE (OUTPATIENT)
Dept: INTERNAL MEDICINE | Age: 62
End: 2022-08-11

## 2022-08-11 RX ORDER — DOXYCYCLINE 100 MG/1
CAPSULE ORAL
Qty: 90 CAPSULE | Refills: 0 | Status: SHIPPED
Start: 2022-08-11 | End: 2022-10-19 | Stop reason: SDUPTHER

## 2022-08-11 NOTE — TELEPHONE ENCOUNTER
Pt states he spoke with [facility] and they state they did not prescribe the antibiotic. Pt states he hasn't had it in 2 days.  Please advise and call

## 2022-08-16 ENCOUNTER — HOSPITAL ENCOUNTER (OUTPATIENT)
Dept: ULTRASOUND IMAGING | Age: 62
Discharge: HOME | End: 2022-08-18
Payer: COMMERCIAL

## 2022-08-16 ENCOUNTER — NURSE ONLY (OUTPATIENT)
Dept: INTERNAL MEDICINE | Age: 62
End: 2022-08-16
Payer: COMMERCIAL

## 2022-08-16 VITALS — TEMPERATURE: 97.6 F

## 2022-08-16 DIAGNOSIS — I82.499 DEEP VEIN THROMBOSIS (DVT) OF OTHER VEIN OF LOWER EXTREMITY, UNSPECIFIED CHRONICITY, UNSPECIFIED LATERALITY (HCC): Primary | ICD-10-CM

## 2022-08-16 DIAGNOSIS — K76.89 HEPATIC CYST: ICD-10-CM

## 2022-08-16 LAB
INTERNATIONAL NORMALIZATION RATIO, POC: 1.4
PROTHROMBIN TIME, POC: 16.4

## 2022-08-16 PROCEDURE — 85610 PROTHROMBIN TIME: CPT | Performed by: INTERNAL MEDICINE

## 2022-08-16 PROCEDURE — 93793 ANTICOAG MGMT PT WARFARIN: CPT | Performed by: INTERNAL MEDICINE

## 2022-08-16 PROCEDURE — 76705 ECHO EXAM OF ABDOMEN: CPT

## 2022-08-16 NOTE — PROGRESS NOTES
INR today 1.4 Reviewed with Dr. Marcella Briones and patient notified the following, Please take Coumadin 6 mg tonight. Starting tomorrow take 4 mg of Coumadin. You are to take Coumadin 5 mg Tuesday, Thursday and Saturday and Coumadin 4 mg all other days. Repeat INR will be 8/24/2022 at 8:30 am with your PCP. Printed AVS with follow up appointment mailed to the patient. Patient states understanding.

## 2022-08-23 ENCOUNTER — TELEPHONE (OUTPATIENT)
Dept: INTERNAL MEDICINE | Age: 62
End: 2022-08-23

## 2022-08-24 ENCOUNTER — TELEPHONE (OUTPATIENT)
Dept: INTERNAL MEDICINE | Age: 62
End: 2022-08-24

## 2022-08-26 ENCOUNTER — OFFICE VISIT (OUTPATIENT)
Dept: INTERNAL MEDICINE | Age: 62
End: 2022-08-26
Payer: COMMERCIAL

## 2022-08-26 VITALS
TEMPERATURE: 97.2 F | WEIGHT: 162.7 LBS | DIASTOLIC BLOOD PRESSURE: 74 MMHG | RESPIRATION RATE: 18 BRPM | HEART RATE: 96 BPM | BODY MASS INDEX: 20.88 KG/M2 | HEIGHT: 74 IN | OXYGEN SATURATION: 95 % | SYSTOLIC BLOOD PRESSURE: 133 MMHG

## 2022-08-26 DIAGNOSIS — I73.9 PAD (PERIPHERAL ARTERY DISEASE) (HCC): ICD-10-CM

## 2022-08-26 DIAGNOSIS — I10 ESSENTIAL HYPERTENSION: ICD-10-CM

## 2022-08-26 DIAGNOSIS — I82.499 DEEP VEIN THROMBOSIS (DVT) OF OTHER VEIN OF LOWER EXTREMITY, UNSPECIFIED CHRONICITY, UNSPECIFIED LATERALITY (HCC): Primary | ICD-10-CM

## 2022-08-26 LAB
INTERNATIONAL NORMALIZATION RATIO, POC: 1.4
PROTHROMBIN TIME, POC: 17.3

## 2022-08-26 PROCEDURE — 3017F COLORECTAL CA SCREEN DOC REV: CPT | Performed by: INTERNAL MEDICINE

## 2022-08-26 PROCEDURE — 85610 PROTHROMBIN TIME: CPT | Performed by: INTERNAL MEDICINE

## 2022-08-26 PROCEDURE — G8420 CALC BMI NORM PARAMETERS: HCPCS | Performed by: INTERNAL MEDICINE

## 2022-08-26 PROCEDURE — 99214 OFFICE O/P EST MOD 30 MIN: CPT | Performed by: INTERNAL MEDICINE

## 2022-08-26 PROCEDURE — G8427 DOCREV CUR MEDS BY ELIG CLIN: HCPCS | Performed by: INTERNAL MEDICINE

## 2022-08-26 PROCEDURE — 4004F PT TOBACCO SCREEN RCVD TLK: CPT | Performed by: INTERNAL MEDICINE

## 2022-08-26 PROCEDURE — 99212 OFFICE O/P EST SF 10 MIN: CPT | Performed by: INTERNAL MEDICINE

## 2022-08-26 RX ORDER — LOSARTAN POTASSIUM 100 MG/1
100 TABLET ORAL DAILY
Qty: 90 TABLET | Refills: 1 | Status: SHIPPED | OUTPATIENT
Start: 2022-08-26

## 2022-08-26 RX ORDER — AMLODIPINE BESYLATE 10 MG/1
10 TABLET ORAL DAILY
Qty: 90 TABLET | Refills: 1 | Status: SHIPPED | OUTPATIENT
Start: 2022-08-26

## 2022-08-26 RX ORDER — ROSUVASTATIN CALCIUM 40 MG/1
40 TABLET, COATED ORAL NIGHTLY
COMMUNITY
Start: 2022-07-22 | End: 2022-08-26 | Stop reason: ALTCHOICE

## 2022-08-26 RX ORDER — ATORVASTATIN CALCIUM 80 MG/1
80 TABLET, FILM COATED ORAL NIGHTLY
Qty: 90 TABLET | Refills: 1 | Status: SHIPPED | OUTPATIENT
Start: 2022-08-26

## 2022-08-26 RX ORDER — BUPROPION HYDROCHLORIDE 150 MG/1
150 TABLET ORAL DAILY
COMMUNITY
Start: 2022-07-22

## 2022-08-26 ASSESSMENT — ENCOUNTER SYMPTOMS
SORE THROAT: 0
VOMITING: 0
EYE DISCHARGE: 0
ABDOMINAL PAIN: 0
CONSTIPATION: 0
SHORTNESS OF BREATH: 0
DIARRHEA: 0
NAUSEA: 0
COUGH: 0
BLOOD IN STOOL: 0

## 2022-08-26 NOTE — PROGRESS NOTES
[facility]  InternalMedicine Residency Program  ACC Note      SUBJECTIVE:  CC: had concerns including Follow-up (INR done today ). Aleena Romero presented to the [facility] for a routine visit. INR remains subtherapeutic. He is very upset over recent issue with [facility] and his family, unsure if he took his scheduled medications over the last few days. He denies chest pain, shortness of breath, palpitations, abdominal pain, leg pain, leg swelling. No other acute complaints. Review of Systems   Constitutional:  Negative for chills and fever. HENT:  Negative for congestion and sore throat. Eyes:  Negative for discharge and visual disturbance. Respiratory:  Negative for cough and shortness of breath. Cardiovascular:  Negative for chest pain, palpitations and leg swelling. Gastrointestinal:  Negative for abdominal pain, blood in stool, constipation, diarrhea, nausea and vomiting. Endocrine: Negative for cold intolerance and heat intolerance. Genitourinary:  Negative for dysuria, frequency and urgency. Musculoskeletal:  Negative for myalgias. Skin:  Negative for pallor and rash. Neurological:  Negative for syncope, weakness and headaches. Current Outpatient Medications on File Prior to Visit   Medication Sig Dispense Refill    VENTOLIN  (90 Base) MCG/ACT inhaler       buPROPion (WELLBUTRIN XL) 150 MG extended release tablet Take 150 mg by mouth daily      rosuvastatin (CRESTOR) 40 MG tablet Take 40 mg by mouth nightly      doxycycline monohydrate (MONODOX) 100 MG capsule take 1 capsule by mouth once daily 90 capsule 0    warfarin (COUMADIN) 5 MG tablet Take 1 tablet by mouth in the morning. 30 tablet 3    warfarin (COUMADIN) 3 MG tablet Take 5 mg by mouth in the morning. Take 5 mg daily po,use with 1 mg to make 5mg.       Multiple Vitamins-Minerals (THERAPEUTIC MULTIVITAMIN-MINERALS) tablet Take 1 tablet by mouth daily 90 tablet 1    magnesium oxide (MAG-OX) 400 MG tablet Take 1 tablet by mouth daily 90 tablet 1    amLODIPine (NORVASC) 10 MG tablet Take 1 tablet by mouth daily (Patient taking differently: Take 10 mg by mouth nightly) 90 tablet 0    atorvastatin (LIPITOR) 80 MG tablet Take 1 tablet by mouth daily (Patient taking differently: Take 80 mg by mouth nightly) 90 tablet 0    losartan (COZAAR) 100 MG tablet Take 1 tablet by mouth daily (Patient taking differently: Take 100 mg by mouth nightly) 90 tablet 1    gabapentin (NEURONTIN) 400 MG capsule take 1 capsule by mouth three times a day      Blood Pressure Monitoring (BLOOD PRESSURE CUFF) MISC Dx:  Hypertension with labile blood pressure 1 each 0    varenicline (CHANTIX) 1 MG tablet Take 1 mg by mouth 2 times daily  (Patient not taking: No sig reported)       No current facility-administered medications on file prior to visit. OBJECTIVE:    VS: /74 (Site: Left Upper Arm, Position: Sitting, Cuff Size: Medium Adult)   Pulse 96   Temp 97.2 °F (36.2 °C) (Temporal)   Resp 18   Ht 6' 2\" (1.88 m)   Wt 162 lb 11.2 oz (73.8 kg)   SpO2 95% Comment: room air  BMI 20.89 kg/m²   Physical Exam  HENT:      Head: Normocephalic and atraumatic. Mouth/Throat:      Pharynx: No oropharyngeal exudate. Eyes:      General:         Right eye: No discharge. Left eye: No discharge. Conjunctiva/sclera: Conjunctivae normal.   Cardiovascular:      Rate and Rhythm: Normal rate and regular rhythm. Heart sounds: No murmur heard. Pulmonary:      Effort: Pulmonary effort is normal. No respiratory distress. Breath sounds: Normal breath sounds. Abdominal:      General: Bowel sounds are normal. There is no distension. Palpations: Abdomen is soft. Tenderness: There is no abdominal tenderness. Musculoskeletal:         General: No tenderness or deformity. Cervical back: Neck supple. Lymphadenopathy:      Cervical: No cervical adenopathy. Skin:     General: Skin is warm. Coloration: Skin is not pale. Neurological:      Mental Status: He is alert and oriented to person, place, and time. ASSESSMENT/PLAN:    I have reviewed all pertient PMHx, PSHx, FamHx, Social Hx, medications, and allergies andupdated history as appropriate. Has APS, DVT (INR goal of 2.5 - 3.5). In the last few months, patient has not beentherapeutic on warfarin. Long discussion regarding importance of adequate anticoagulation and switching to eliquis. Patient states he has always refused eliquis in the past but willing to try at this time. Last dose of warfarin was this morning, INR today was 1.4. Dscussed to STOP taking warfarin. He may start first dose eliquis tonight. Warfarin taken off medlist and discontinuation of medication confirmed with pharmacy. Coupon for first 30 days of eliquis given to patient today. Patient repeated instructions correctly multiple times. HTN, controlled on amlodipine and losartan. Renal function normal. 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

## 2022-08-26 NOTE — PROGRESS NOTES
Spoke with Cindy Horton @ [facility] and informed to discontinue Warfarin and patient will be starting Eliquis. Pharmacy stated understanding. Mailed AVS to patient.

## 2022-08-26 NOTE — PATIENT INSTRUCTIONS
Dear Noy Carmen,        Thank you for coming to your appointment today. I hope we have addressed all of your needs. Please make sure to do the following:  - STOP TAKING WARFARIN  - Continue your medications as listed. - Get labs drawn before our next follow up. Have a great day! Sincerely,  Myriam Ochoa M.D  8/26/2022  3:53 PM

## 2022-09-01 ENCOUNTER — OFFICE VISIT (OUTPATIENT)
Dept: INTERNAL MEDICINE | Age: 62
End: 2022-09-01
Payer: COMMERCIAL

## 2022-09-01 VITALS
WEIGHT: 163 LBS | OXYGEN SATURATION: 97 % | TEMPERATURE: 97.2 F | DIASTOLIC BLOOD PRESSURE: 80 MMHG | BODY MASS INDEX: 20.92 KG/M2 | HEART RATE: 90 BPM | RESPIRATION RATE: 18 BRPM | HEIGHT: 74 IN | SYSTOLIC BLOOD PRESSURE: 137 MMHG

## 2022-09-01 DIAGNOSIS — I82.499 DEEP VEIN THROMBOSIS (DVT) OF OTHER VEIN OF LOWER EXTREMITY, UNSPECIFIED CHRONICITY, UNSPECIFIED LATERALITY (HCC): Primary | ICD-10-CM

## 2022-09-01 DIAGNOSIS — D68.61 ANTI-PHOSPHOLIPID ANTIBODY SYNDROME (HCC): ICD-10-CM

## 2022-09-01 PROCEDURE — G8427 DOCREV CUR MEDS BY ELIG CLIN: HCPCS | Performed by: STUDENT IN AN ORGANIZED HEALTH CARE EDUCATION/TRAINING PROGRAM

## 2022-09-01 PROCEDURE — 3017F COLORECTAL CA SCREEN DOC REV: CPT | Performed by: STUDENT IN AN ORGANIZED HEALTH CARE EDUCATION/TRAINING PROGRAM

## 2022-09-01 PROCEDURE — 4004F PT TOBACCO SCREEN RCVD TLK: CPT | Performed by: STUDENT IN AN ORGANIZED HEALTH CARE EDUCATION/TRAINING PROGRAM

## 2022-09-01 PROCEDURE — G8420 CALC BMI NORM PARAMETERS: HCPCS | Performed by: STUDENT IN AN ORGANIZED HEALTH CARE EDUCATION/TRAINING PROGRAM

## 2022-09-01 PROCEDURE — 99213 OFFICE O/P EST LOW 20 MIN: CPT | Performed by: STUDENT IN AN ORGANIZED HEALTH CARE EDUCATION/TRAINING PROGRAM

## 2022-09-01 PROCEDURE — 99212 OFFICE O/P EST SF 10 MIN: CPT | Performed by: STUDENT IN AN ORGANIZED HEALTH CARE EDUCATION/TRAINING PROGRAM

## 2022-09-01 RX ORDER — WARFARIN SODIUM 4 MG/1
4 TABLET ORAL DAILY
Qty: 30 TABLET | Refills: 1 | Status: SHIPPED | OUTPATIENT
Start: 2022-09-01

## 2022-09-01 ASSESSMENT — ENCOUNTER SYMPTOMS
NAUSEA: 0
ABDOMINAL PAIN: 0
SHORTNESS OF BREATH: 0
COUGH: 0
VOMITING: 0
EYE DISCHARGE: 0
CONSTIPATION: 0
TROUBLE SWALLOWING: 0
DIARRHEA: 0
BACK PAIN: 0
WHEEZING: 0

## 2022-09-01 NOTE — PROGRESS NOTES
[facility]  Internal Medicine Residency Clinic    Attending Physician Statement  I have discussed the case, including pertinent history and exam findings with the resident physician. I agree with the assessment, plan and orders as documented by the resident. I have reviewed all pertinent PMHx, PSHx, FamHx, SocialHx, medications, and allergies and updated history as appropriate. Patient presents for routine follow up of medical problems. HO DVT, has been on ELiquis with side effects including NV, recommended to change to previous dose of coumadin - 4 mg daily and check INR in 4-5 days. Hypertension has been controlled well with current meds. Needs to repeat BMP  Lipid disorder has been treated with lipitor. Total time spent in this visit 25 mintues. Remainder of medical problems as per resident note.     Chris Mcgee MD  9/1/2022 8:34 AM

## 2022-09-01 NOTE — PROGRESS NOTES
[facility]  Internal Medicine Residency Program  [facility] Note      SUBJECTIVE:  CC: had no chief complaint listed for this encounter. 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 for discussing anticoagulation. Patient was recently seen in clinic on 8/26/2022. His INR was found to be subtherapeutic. He was transitioned to Eliquis. Patient reports that he has not been taking his Eliquis or warfarin for last 1 week. He took Eliquis for 2 days which resulted in severe side effects of nausea, vomiting and palpitation. Patient is now extremely reluctant to restart Eliquis and wants to be started back on warfarin. Discussion about continued need for INR checks, dietary restrictions 2/2 being on warfarin was done. Patient was taking warfarin 4 mg Monday Wednesday Friday and 6 mg Tuesday on Thursday. His goal INR is 2.5-3.5. Patient denies any headache, lightheadedness, blurry vision, chest pain, shortness of breath, palpitations, cough, heat/cold intolerance, polyuria, polydipsia, polyphagia, abdominal pain, bowel or bladder complaints. Patient reports compliance with medications except anticoagulation and denies any associated side effects with medications. Review of Systems   Constitutional:  Negative for activity change, appetite change, fatigue and fever. HENT:  Negative for congestion, nosebleeds and trouble swallowing. Eyes:  Negative for discharge and visual disturbance. Respiratory:  Negative for cough, shortness of breath and wheezing. Cardiovascular:  Negative for chest pain, palpitations and leg swelling. Gastrointestinal:  Negative for abdominal pain, constipation, diarrhea, nausea and vomiting. Endocrine: Negative for cold intolerance, polydipsia and polyuria.    Genitourinary:  Negative for difficulty urinating, dysuria, frequency and urgency. Musculoskeletal:  Negative for back pain and joint swelling. Skin:  Negative for pallor and rash. Allergic/Immunologic: Negative for environmental allergies. Neurological:  Negative for dizziness, weakness, light-headedness, numbness and headaches. Hematological: Negative. Psychiatric/Behavioral:  Negative for confusion, decreased concentration and sleep disturbance. No outpatient medications have been marked as taking for the 9/1/22 encounter (Appointment) with Sharlene Quarles MD.       OBJECTIVE:    VS:   There were no vitals taken for this visit. EXAM:  Physical Exam  Constitutional:       Appearance: Normal appearance. HENT:      Head: Normocephalic and atraumatic. Right Ear: Ear canal and external ear normal.      Left Ear: Ear canal and external ear normal.      Nose: No congestion. Mouth/Throat:      Mouth: Mucous membranes are moist.   Eyes:      Extraocular Movements: Extraocular movements intact. Conjunctiva/sclera: Conjunctivae normal.      Pupils: Pupils are equal, round, and reactive to light. Cardiovascular:      Rate and Rhythm: Normal rate and regular rhythm. Pulses: Normal pulses. Heart sounds: Normal heart sounds. No murmur heard. No gallop. Pulmonary:      Effort: Pulmonary effort is normal. No respiratory distress. Breath sounds: Normal breath sounds. Abdominal:      General: Bowel sounds are normal. There is no distension. Palpations: Abdomen is soft. There is no mass. Tenderness: There is no abdominal tenderness. Musculoskeletal:         General: No swelling or deformity. Cervical back: Normal range of motion. Right lower leg: No edema. Left lower leg: No edema. Skin:     Coloration: Skin is not jaundiced or pale. Findings: No bruising or rash. Neurological:      General: No focal deficit present.       Mental Status: He is alert and oriented to person, place, cm)  Follows with general surgery  Plan  Follow-up colonoscopy in 6 to 12 months      RTC:  1 week for INR check and 2 months with PCP    I have reviewed my findings and recommendations with Jodie Aparicio and Flora Zamora MD PGY-2  9/1/2022 4:15 AM

## 2022-09-01 NOTE — PATIENT INSTRUCTIONS
Dear Mary Kong,        Thank you for coming to your appointment today. I hope we have addressed all of your needs. Please make sure to do the following:  - Continue your medications as listed in your visit summary.  -Based on our discussion during the visit, you are having severe side effects with Eliquis. We are discontinuing Eliquis.  -Start taking tablet warfarin 4 mg daily. - Avoid alcohol, green leafy vegetables, cranberry, herbs, green tea as they can interfere with warfarin function and reduce the levels. -Please follow-up in IM clinic with a nurse for INR check on 9/6/2022  - Get labs drawn before our next follow up. You were provided with a prescription for labs during the visit.    -Should you have further questions in regards to this visit, you can review your clinical note and after visit summary document on your [facility] account. Other questions can be directed to our nurse line at [phone]   -You will see your PCP for follow-up in 2 months. Call for a sooner appointment if you develop any acute concerns    Have a great day!         Sincerely,  Robina Mart M.D PGY-2  9/1/2022  8:43 AM

## 2022-09-06 ENCOUNTER — NURSE ONLY (OUTPATIENT)
Dept: INTERNAL MEDICINE | Age: 62
End: 2022-09-06
Payer: COMMERCIAL

## 2022-09-06 VITALS — TEMPERATURE: 98.1 F

## 2022-09-06 DIAGNOSIS — I82.499 DEEP VEIN THROMBOSIS (DVT) OF OTHER VEIN OF LOWER EXTREMITY, UNSPECIFIED CHRONICITY, UNSPECIFIED LATERALITY (HCC): Primary | ICD-10-CM

## 2022-09-06 LAB
INTERNATIONAL NORMALIZATION RATIO, POC: 1.4
PROTHROMBIN TIME, POC: 16.5

## 2022-09-06 PROCEDURE — 93793 ANTICOAG MGMT PT WARFARIN: CPT | Performed by: INTERNAL MEDICINE

## 2022-09-06 PROCEDURE — 85610 PROTHROMBIN TIME: CPT | Performed by: INTERNAL MEDICINE

## 2022-09-06 NOTE — PROGRESS NOTES
INR results reviewed with Dr. Lorenza David. Orders received. Patient will start taking Coumadin 6 mg on Tuesday and Thursday and Coumadin 4 mg all other days. Recheck PT/INR in 1 week. Patient notified via phone of these orders. Patient voices understanding of the information and follow-up. AVS printed and mailed to patient with appointment card for next INR check.

## 2022-09-06 NOTE — PATIENT INSTRUCTIONS
Your new dose of Coumadin will be Coumadin 6 mg on Tuesday and Thursday and Coumadin 4 mg all other days. Your PT/INR will be rechecked on 9/13 at 1 pm. Please call the office with any problems or concerns.     Thank you    Zach TAN

## 2022-09-13 ENCOUNTER — TELEPHONE (OUTPATIENT)
Dept: INTERNAL MEDICINE | Age: 62
End: 2022-09-13

## 2022-09-13 NOTE — TELEPHONE ENCOUNTER
----- Message from Andre Bryant sent at 9/13/2022  9:19 AM EDT -----  Subject: Message to Provider    QUESTIONS  Information for Provider? patient would like a call back to schedule INR  ---------------------------------------------------------------------------  --------------  Harsh MAYBERRY  [phone]; OK to leave message on voicemail  ---------------------------------------------------------------------------  --------------  SCRIPT ANSWERS  undefined

## 2022-09-16 ENCOUNTER — HOSPITAL ENCOUNTER (OUTPATIENT)
Age: 62
Discharge: HOME | End: 2022-09-16
Payer: COMMERCIAL

## 2022-09-16 ENCOUNTER — NURSE ONLY (OUTPATIENT)
Dept: INTERNAL MEDICINE | Age: 62
End: 2022-09-16
Payer: COMMERCIAL

## 2022-09-16 VITALS — TEMPERATURE: 97.1 F

## 2022-09-16 DIAGNOSIS — I10 ESSENTIAL HYPERTENSION: ICD-10-CM

## 2022-09-16 DIAGNOSIS — I73.9 PAD (PERIPHERAL ARTERY DISEASE) (HCC): ICD-10-CM

## 2022-09-16 DIAGNOSIS — I82.499 DEEP VEIN THROMBOSIS (DVT) OF OTHER VEIN OF LOWER EXTREMITY, UNSPECIFIED CHRONICITY, UNSPECIFIED LATERALITY (HCC): Primary | ICD-10-CM

## 2022-09-16 LAB
ANION GAP SERPL CALCULATED.3IONS-SCNC: 13 MMOL/L (ref 7–16)
BUN BLDV-MCNC: 11 MG/DL (ref 6–23)
CALCIUM SERPL-MCNC: 9.1 MG/DL (ref 8.6–10.2)
CHLORIDE BLD-SCNC: 107 MMOL/L (ref 98–107)
CHOLESTEROL, TOTAL: 111 MG/DL (ref 0–199)
CO2: 17 MMOL/L (ref 22–29)
CREAT SERPL-MCNC: 0.9 MG/DL (ref 0.7–1.2)
GFR AFRICAN AMERICAN: >60
GFR NON-AFRICAN AMERICAN: >60 ML/MIN/1.73
GLUCOSE BLD-MCNC: 111 MG/DL (ref 74–99)
HDLC SERPL-MCNC: 36 MG/DL
INTERNATIONAL NORMALIZATION RATIO, POC: 1.4
LDL CHOLESTEROL CALCULATED: 57 MG/DL (ref 0–99)
POTASSIUM SERPL-SCNC: 4 MMOL/L (ref 3.5–5)
PROTHROMBIN TIME, POC: 16.3
SODIUM BLD-SCNC: 137 MMOL/L (ref 132–146)
TRIGL SERPL-MCNC: 91 MG/DL (ref 0–149)
VLDLC SERPL CALC-MCNC: 18 MG/DL

## 2022-09-16 PROCEDURE — 80061 LIPID PANEL: CPT

## 2022-09-16 PROCEDURE — 85610 PROTHROMBIN TIME: CPT | Performed by: INTERNAL MEDICINE

## 2022-09-16 PROCEDURE — 80048 BASIC METABOLIC PNL TOTAL CA: CPT

## 2022-09-16 PROCEDURE — 36415 COLL VENOUS BLD VENIPUNCTURE: CPT

## 2022-09-16 PROCEDURE — 93793 ANTICOAG MGMT PT WARFARIN: CPT | Performed by: INTERNAL MEDICINE

## 2022-09-16 NOTE — PATIENT INSTRUCTIONS
INR today 1.4  Please take Coumadin 6 mg on Tuesday, Thursday and Saturday and Coumadin 4 mg all other days. Repeat INR 1 week. Thank you for coming in today and please call with any issues or concerns.

## 2022-09-16 NOTE — PROGRESS NOTES
INR reviewed  with Dr. Jt Zimmerman. Called patient and was instructed per verbal of Dr. Marleny Artis to start Coumadin 6 mg Tues/Thurs/Sat and Coumadin 4 mg all other days. Patient able to recall back. Instructed INR repeat 1 week. Patient  verbalized understanding. AVS printed with follow up appointment and mailed to the patient.

## 2022-09-16 NOTE — PROGRESS NOTES
[facility]  Internal Medicine Residency Clinic    Attending Physician Statement  I have discussed the case, including pertinent history and exam findings with the resident physician. {Blank single:52885::\" \",\"I have seen and examined the patient and the key elements of the encounter have been performed by me. \"} I agree with the assessment, plan and orders as documented by the resident. I have reviewed all pertinent PMHx, PSHx, FamHx, SocialHx, medications, and allergies and updated history as appropriate. {Blank single:96606::\"Patient here for routine follow up of medical problems. \",\"Patient here for new patient appointment to establish care. \",\"Patient here for emergency room follow up of medical problems. \"}     Remainder of medical problems as per resident note.     [address] Ruiz DO  9/16/2022 8:31 AM    Encounter time including independent chart review, discussion with patient, interpreting test results and/or external communications: {Levels for Time:54892}'

## 2022-09-19 ENCOUNTER — TELEPHONE (OUTPATIENT)
Dept: INTERNAL MEDICINE | Age: 62
End: 2022-09-19

## 2022-09-19 DIAGNOSIS — M79.671 PAIN IN BOTH FEET: Primary | ICD-10-CM

## 2022-09-19 DIAGNOSIS — M79.672 PAIN IN BOTH FEET: Primary | ICD-10-CM

## 2022-09-23 ENCOUNTER — NURSE ONLY (OUTPATIENT)
Dept: INTERNAL MEDICINE | Age: 62
End: 2022-09-23
Payer: COMMERCIAL

## 2022-09-23 VITALS — TEMPERATURE: 98.1 F

## 2022-09-23 DIAGNOSIS — I82.499 DEEP VEIN THROMBOSIS (DVT) OF OTHER VEIN OF LOWER EXTREMITY, UNSPECIFIED CHRONICITY, UNSPECIFIED LATERALITY (HCC): Primary | ICD-10-CM

## 2022-09-23 LAB
INTERNATIONAL NORMALIZATION RATIO, POC: 1.2
PROTHROMBIN TIME, POC: 14.5

## 2022-09-23 PROCEDURE — 93793 ANTICOAG MGMT PT WARFARIN: CPT | Performed by: INTERNAL MEDICINE

## 2022-09-23 NOTE — PROGRESS NOTES
INR results reviewed with Dr. Nena Jiménez. Patient new dose of Coumadin 6 mg on Tuesday, Thursday and Saturday also Coumadin 5 mg all other day. Recheck PT/INR in 1 week. Orders received. Patient notified via phone of these orders. Patient voices understanding of the information and follow-up. AVS printed and mailed to patient with appointment card for next INR check.

## 2022-09-23 NOTE — PATIENT INSTRUCTIONS
Your new dose of Coumadin is  Coumadin 6 mg on Tuesday, Thursday and Saturday and Coumadin 5 mg all other days. Your PT / INR  recheck will be done in 1 week on 9/30 at 130 pm.  Please call the office with any problems or complains at [phone].     Thank you    Angelica Adler

## 2022-09-23 NOTE — PROGRESS NOTES
Significant concern with lower INR despite Warfarin dosing increase. Concern with consistency of med and diet. Labile readings with significant elevated INR values previously and now with low INR on multiple encounters. Need to improve adherence at this time. Will increase to 5 mg on M/W/F/Sun and maintain 6 mg on T/Th/Sat (11.8% increase per protocol- 10-20%) Will be conservative due to labile readings. Repeat INR needed 1 week.

## 2022-09-30 ENCOUNTER — NURSE ONLY (OUTPATIENT)
Dept: INTERNAL MEDICINE | Age: 62
End: 2022-09-30
Payer: COMMERCIAL

## 2022-09-30 VITALS — TEMPERATURE: 97.4 F

## 2022-09-30 DIAGNOSIS — I82.499 DEEP VEIN THROMBOSIS (DVT) OF OTHER VEIN OF LOWER EXTREMITY, UNSPECIFIED CHRONICITY, UNSPECIFIED LATERALITY (HCC): Primary | ICD-10-CM

## 2022-09-30 DIAGNOSIS — M10.9 GOUT INVOLVING TOE, UNSPECIFIED CAUSE, UNSPECIFIED CHRONICITY, UNSPECIFIED LATERALITY: ICD-10-CM

## 2022-09-30 LAB
ANION GAP SERPL CALCULATED.3IONS-SCNC: 13 MMOL/L (ref 7–16)
BUN BLDV-MCNC: 7 MG/DL (ref 6–23)
CALCIUM SERPL-MCNC: 9.7 MG/DL (ref 8.6–10.2)
CHLORIDE BLD-SCNC: 109 MMOL/L (ref 98–107)
CO2: 24 MMOL/L (ref 22–29)
CREAT SERPL-MCNC: 0.9 MG/DL (ref 0.7–1.2)
GFR AFRICAN AMERICAN: >60
GFR NON-AFRICAN AMERICAN: >60 ML/MIN/1.73
GLUCOSE BLD-MCNC: 88 MG/DL (ref 74–99)
INR BLD: 5.4
INTERNATIONAL NORMALIZATION RATIO, POC: 4.7
POTASSIUM SERPL-SCNC: 3.4 MMOL/L (ref 3.5–5)
PROTHROMBIN TIME, POC: 56
PROTHROMBIN TIME: 58.3 SEC (ref 9.3–12.4)
SODIUM BLD-SCNC: 146 MMOL/L (ref 132–146)
URIC ACID, SERUM: 5.3 MG/DL (ref 3.4–7)

## 2022-09-30 PROCEDURE — 85610 PROTHROMBIN TIME: CPT | Performed by: INTERNAL MEDICINE

## 2022-09-30 PROCEDURE — 93793 ANTICOAG MGMT PT WARFARIN: CPT | Performed by: INTERNAL MEDICINE

## 2022-09-30 NOTE — PROGRESS NOTES
INR results reviewed with Dr. Franck Palacios. Orders received. Patient will HOLD Coumadin for two days. He will resume his Coumadin 5 mg on Sunday. Patient will continue taking his Coumadin 6 mg on Tuesday, Thursday and Saturday along with taking  Coumadin 5 mg all other days. Recheck PT/INR in 1 week on  10/7 at  1 pm.  Patient notified via phone of these orders. Patient voices understanding of the information and follow-up. AVS printed and mailed to patient with appointment card for next INR check.

## 2022-09-30 NOTE — PATIENT INSTRUCTIONS
HOLD today's and tomorrow dose of Coumadin. On Sunday you will take Coumadin 5 mg. Your will continue your same dose of Coumadin 6 mg  on Tuesday ,Thursday and Saturday along with Coumadin 5 mg all other days. Your next PT/INR will be in 1 week on 10/7 at 1 pm. Please call the office with any problems or concerns at [phone].      Thank you    Roverto Altamirano LPN

## 2022-10-03 DIAGNOSIS — E87.6 HYPOKALEMIA: Primary | ICD-10-CM

## 2022-10-07 ENCOUNTER — NURSE ONLY (OUTPATIENT)
Dept: INTERNAL MEDICINE | Age: 62
End: 2022-10-07
Payer: COMMERCIAL

## 2022-10-07 VITALS — TEMPERATURE: 97 F

## 2022-10-07 DIAGNOSIS — I82.499 DEEP VEIN THROMBOSIS (DVT) OF OTHER VEIN OF LOWER EXTREMITY, UNSPECIFIED CHRONICITY, UNSPECIFIED LATERALITY (HCC): Primary | ICD-10-CM

## 2022-10-07 LAB
INTERNATIONAL NORMALIZATION RATIO, POC: 4.5
PROTHROMBIN TIME, POC: 53.7

## 2022-10-07 PROCEDURE — 85610 PROTHROMBIN TIME: CPT | Performed by: INTERNAL MEDICINE

## 2022-10-07 PROCEDURE — 93793 ANTICOAG MGMT PT WARFARIN: CPT | Performed by: INTERNAL MEDICINE

## 2022-10-07 NOTE — PROGRESS NOTES
INR results reviewed with Dr. Jeferson Del Castillo and orders received. Pt notified via in person of same. AVS given to patient. Marielena Spear

## 2022-10-07 NOTE — PROGRESS NOTES
Reviewed dose adjustments with patient. Denies GI upset, nausea / emesis or med changes. Is planning to have vascular intervention next month at [facility].      Electronically signed by Zeynep Gong DO on 10/7/2022 at 1:25 PM

## 2022-10-19 DIAGNOSIS — Z98.890 HX OF ABDOMINAL SURGERY: ICD-10-CM

## 2022-10-19 RX ORDER — DOXYCYCLINE 100 MG/1
CAPSULE ORAL
Qty: 90 CAPSULE | Refills: 0 | Status: SHIPPED | OUTPATIENT
Start: 2022-10-19

## 2022-10-19 NOTE — TELEPHONE ENCOUNTER
Patient called to reschedule PT/INR appt for 10/24 at 1 pm. Patient is requesting medication refill also on ATB. Please advise.

## 2022-10-24 ENCOUNTER — NURSE ONLY (OUTPATIENT)
Dept: INTERNAL MEDICINE | Age: 62
End: 2022-10-24
Payer: COMMERCIAL

## 2022-10-24 DIAGNOSIS — I82.499 DEEP VEIN THROMBOSIS (DVT) OF OTHER VEIN OF LOWER EXTREMITY, UNSPECIFIED CHRONICITY, UNSPECIFIED LATERALITY (HCC): Primary | ICD-10-CM

## 2022-10-24 LAB
INTERNATIONAL NORMALIZATION RATIO, POC: 3.4
PROTHROMBIN TIME, POC: 40.3

## 2022-10-24 PROCEDURE — 85610 PROTHROMBIN TIME: CPT | Performed by: INTERNAL MEDICINE

## 2022-10-24 PROCEDURE — 93793 ANTICOAG MGMT PT WARFARIN: CPT | Performed by: INTERNAL MEDICINE

## 2022-10-24 NOTE — PROGRESS NOTES
INR results reviewed with Dr. Anna Marie Marroquin and orders received. Pt notified via voice mail of same.  AVS mailed to patient with follow-up apppointment

## 2022-10-24 NOTE — PATIENT INSTRUCTIONS
Hold todays dose of coumadin and tomorrow start taking alternating days of 4 and 5 mgs  your next inr appointment is 10/31/2022 at 100pm

## 2022-10-28 DIAGNOSIS — I82.499 DEEP VEIN THROMBOSIS (DVT) OF OTHER VEIN OF LOWER EXTREMITY, UNSPECIFIED CHRONICITY, UNSPECIFIED LATERALITY (HCC): ICD-10-CM

## 2022-10-28 DIAGNOSIS — D68.61 ANTI-PHOSPHOLIPID ANTIBODY SYNDROME (HCC): ICD-10-CM

## 2022-10-28 RX ORDER — WARFARIN SODIUM 4 MG/1
TABLET ORAL
Qty: 30 TABLET | Refills: 1 | OUTPATIENT
Start: 2022-10-28

## 2022-11-04 ENCOUNTER — NURSE ONLY (OUTPATIENT)
Dept: INTERNAL MEDICINE | Age: 62
End: 2022-11-04
Payer: COMMERCIAL

## 2022-11-04 DIAGNOSIS — I82.499 DEEP VEIN THROMBOSIS (DVT) OF OTHER VEIN OF LOWER EXTREMITY, UNSPECIFIED CHRONICITY, UNSPECIFIED LATERALITY (HCC): Primary | ICD-10-CM

## 2022-11-04 LAB
INTERNATIONAL NORMALIZATION RATIO, POC: 1.6
PROTHROMBIN TIME, POC: 19.7

## 2022-11-04 PROCEDURE — 85610 PROTHROMBIN TIME: CPT | Performed by: INTERNAL MEDICINE

## 2022-11-04 PROCEDURE — 93793 ANTICOAG MGMT PT WARFARIN: CPT | Performed by: INTERNAL MEDICINE

## 2022-11-04 NOTE — PATIENT INSTRUCTIONS
Take your coumadin dosing as instructed  take alternating days of 4 and 5 mgs  your inr will be rechecked in one week

## 2022-11-04 NOTE — PROGRESS NOTES
Hx of DVT, APS. Goal INR 2.5 - 3.5. INR 1.6. Patient has had multiple adjustments over the last few weeks. Patient has not been taking his alternating 4 mg and 5 mg dosages; rather he has only taken 4 mg daily and has missed doses. Plan to have patient restart taking his alternate daily dosing and then return to clinic in 1 week for repeat INR.       Electronically signed by Sheila Sanchez DO on 11/4/2022 at 9:36 AM

## 2022-11-04 NOTE — PROGRESS NOTES
INR results reviewed with Dr. Jesus Hawthorne. Orders received. Patient notified via voicemail of these orders. Detailed message left with new orders. Patient was instructed to call with any questions. AVS printed and mailed to patient with appointment card for next INR check.

## 2022-11-04 NOTE — PROGRESS NOTES
Patient notified of new orders, patient statd he has appt at [address] [place] [address] on the 11 th and will not be able to come here. Pt rescheduled for the 10th. Patrick wanted to remind dr he has a procedure on the 11/14 at [address] [place] [address].

## 2022-11-10 ENCOUNTER — TELEPHONE (OUTPATIENT)
Dept: INTERNAL MEDICINE | Age: 62
End: 2022-11-10

## 2022-11-21 ENCOUNTER — TELEPHONE (OUTPATIENT)
Dept: INTERNAL MEDICINE | Age: 62
End: 2022-11-21

## 2022-11-21 NOTE — TELEPHONE ENCOUNTER
Pt had a surgical procedure and cant walk right now states he will call back to schedule inr when he can walk

## 2022-12-02 ENCOUNTER — NURSE ONLY (OUTPATIENT)
Dept: INTERNAL MEDICINE | Age: 62
End: 2022-12-02
Payer: COMMERCIAL

## 2022-12-02 DIAGNOSIS — I82.499 DEEP VEIN THROMBOSIS (DVT) OF OTHER VEIN OF LOWER EXTREMITY, UNSPECIFIED CHRONICITY, UNSPECIFIED LATERALITY (HCC): Primary | ICD-10-CM

## 2022-12-02 LAB
INTERNATIONAL NORMALIZATION RATIO, POC: 3
PROTHROMBIN TIME, POC: 36.3

## 2022-12-02 PROCEDURE — 85610 PROTHROMBIN TIME: CPT | Performed by: INTERNAL MEDICINE

## 2022-12-02 RX ORDER — WARFARIN SODIUM 4 MG/1
TABLET ORAL
Qty: 30 TABLET | Refills: 0 | Status: SHIPPED | OUTPATIENT
Start: 2022-12-02

## 2022-12-02 RX ORDER — WARFARIN SODIUM 5 MG/1
TABLET ORAL
Qty: 30 TABLET | Refills: 0 | Status: SHIPPED | OUTPATIENT
Start: 2022-12-02

## 2022-12-02 NOTE — PATIENT INSTRUCTIONS
INR today was 3.0  Please continue alternating your Coumadin dose 5 mg and 4 mg daily. Will repeat your INR on your next PCP visit on 12/7/2022 at 2:30 pm  Please call with all issues.

## 2022-12-02 NOTE — PROGRESS NOTES
INR reviewed with Dr. Abner Mcrae. Left voicemail per patient request and instructed to continue alternating Coumadin 4 mg and 5 mg daily. Instructed  repeat INR will be done at PCP appointment 12/7/2022 and to call with all issues. AVS printed with follow up appointment and discharge instructions and mailed to the patient.

## 2022-12-08 NOTE — PROGRESS NOTES
[facility] Internal Medicine      SUBJECTIVE:  Abel Harrison (:  1960) is a 60 y.o. 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 Goal INR 2.5-3.5. Not tolerate Eliquis. 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 Last seen 2022    Underwent direct repair of right common femoral artery pseudoaneurysm with patch graft at [facility] on 2022. No signs of infection or bleeding at incision site. Patient states he has soreness of right leg. Able to ambulate with a cane. Has a follow up appointment at [facility] on . Advised patient to keep the appointment. 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  Advise patient to make an appointment with general surgery to repeat colonoscopy. Tobacco abuse  0.25 ppd for >40 years. Quit in 2021. Marijuana abuse  Take marijuana for pain per patient    HM  A1c 4.9 (5/2022)  Flu vaccine done 9/2022  Need to follow with [facility] for colonoscopy        Review of Systems   Constitutional:  Negative for chills, fatigue and fever. HENT:  Negative for congestion and sinus pain. Eyes:  Negative for visual disturbance. Respiratory:  Negative for cough and shortness of breath. Cardiovascular:  Negative for chest pain, palpitations and leg swelling. Gastrointestinal:  Negative for abdominal pain, blood in stool, constipation, diarrhea, nausea and vomiting. Endocrine: Negative for polyuria. Genitourinary:  Negative for dysuria and hematuria. Musculoskeletal:  Negative for arthralgias, back pain and myalgias. Skin:  Negative for rash. Mild-moderate pain on right leg post-op   Neurological:  Negative for weakness, light-headedness, numbness and headaches. Hematological:  Does not bruise/bleed easily. Psychiatric/Behavioral:  Negative for dysphoric mood. Current Outpatient Medications on File Prior to Visit   Medication Sig Dispense Refill    warfarin (COUMADIN) 4 MG tablet Take 1 tablet every other day (alternating with 5 mg). 30 tablet 0    warfarin (COUMADIN) 5 MG tablet Take 1 tablet every other day (alternating with 4 mg). 30 tablet 0    VENTOLIN  (90 Base) MCG/ACT inhaler       buPROPion (WELLBUTRIN XL) 150 MG extended release tablet Take 150 mg by mouth daily      Blood Pressure Monitoring (BLOOD PRESSURE CUFF) MISC Dx:  Hypertension with labile blood pressure 1 each 0     No current facility-administered medications on file prior to visit.        OBJECTIVE:    VS:   Vitals:    12/09/22 1338   BP: 112/69   Site: Left Upper Arm   Position: Sitting   Cuff Size: Medium Adult   Pulse: 92   Resp: 18   Temp: 97.9 °F (36.6 °C)   TempSrc: Temporal   SpO2: 95%   Weight: 167 lb (75.8 kg)   Height: 6' 2\" (1.88 m)     Physical Exam  Vitals reviewed. Constitutional:       General: He is not in acute distress. Appearance: Normal appearance. HENT:      Head: Normocephalic and atraumatic. Nose: No rhinorrhea. Mouth/Throat:      Mouth: Mucous membranes are moist.      Pharynx: Oropharynx is clear. Neck:      Vascular: No carotid bruit. Cardiovascular:      Rate and Rhythm: Normal rate and regular rhythm. Pulses:           Dorsalis pedis pulses are 1+ on the right side and 1+ on the left side. Posterior tibial pulses are 2+ on the right side and 2+ on the left side. Heart sounds: Normal heart sounds. No murmur heard. No friction rub. No gallop. Pulmonary:      Effort: Pulmonary effort is normal.      Breath sounds: Normal breath sounds. No wheezing, rhonchi or rales. Abdominal:      General: Bowel sounds are normal.      Palpations: Abdomen is soft. Tenderness: There is no abdominal tenderness. Musculoskeletal:         General: No swelling, tenderness or deformity. Cervical back: Neck supple. Right lower leg: No edema. Left lower leg: No edema. Skin:     General: Skin is warm and dry. Comments: Healing wound at right groin incision site clean with granulation tissue, no erythema or drainage   Neurological:      General: No focal deficit present. Mental Status: He is alert. Cranial Nerves: No cranial nerve deficit. Psychiatric:         Mood and Affect: Mood normal.          ASSESSMENT/PLAN:  1. Deep vein thrombosis (DVT) of other vein of lower extremity, unspecified chronicity, unspecified laterality (HCC)  Comments:  Continue warfarin 4 mg daily  Orders:  -     POCT INR  2. Essential hypertension  -     amLODIPine (NORVASC) 10 MG tablet; Take 1 tablet by mouth daily, Disp-90 tablet, R-1Normal  -     losartan (COZAAR) 100 MG tablet; Take 1 tablet by mouth daily, Disp-90 tablet, R-1Normal  3.  Hx of abdominal surgery  -     doxycycline monohydrate (MONODOX) 100 MG capsule; take 1 capsule by mouth once daily, Disp-90 capsule, R-0Normal  4. Hypomagnesemia  -     magnesium oxide (MAG-OX) 400 MG tablet; Take 1 tablet by mouth daily, Disp-90 tablet, R-1Normal  5. PAD (peripheral artery disease) (HCC)  -     atorvastatin (LIPITOR) 80 MG tablet; Take 1 tablet by mouth nightly, Disp-90 tablet, R-1Normal  6. Neuropathic pain  -     Multiple Vitamins-Minerals (THERAPEUTIC MULTIVITAMIN-MINERALS) tablet; Take 1 tablet by mouth daily, Disp-90 tablet, R-1Normal  -     gabapentin (NEURONTIN) 400 MG capsule; take 1 capsule by mouth three times a day, Disp-90 capsule, R-5Normal     RTC:  Return in about 2 weeks (around 12/23/2022) for For INR check. I have reviewed my findings and recommendations with Lizzy Ragland and Dr. Jesus Hawthorne.     Nancy Herndon MD   12/9/2022 11:03 PM

## 2022-12-09 ENCOUNTER — OFFICE VISIT (OUTPATIENT)
Dept: INTERNAL MEDICINE | Age: 62
End: 2022-12-09
Payer: COMMERCIAL

## 2022-12-09 VITALS
DIASTOLIC BLOOD PRESSURE: 69 MMHG | WEIGHT: 167 LBS | OXYGEN SATURATION: 95 % | HEART RATE: 92 BPM | BODY MASS INDEX: 21.43 KG/M2 | SYSTOLIC BLOOD PRESSURE: 112 MMHG | TEMPERATURE: 97.9 F | HEIGHT: 74 IN | RESPIRATION RATE: 18 BRPM

## 2022-12-09 DIAGNOSIS — Z98.890 HX OF ABDOMINAL SURGERY: ICD-10-CM

## 2022-12-09 DIAGNOSIS — I82.499 DEEP VEIN THROMBOSIS (DVT) OF OTHER VEIN OF LOWER EXTREMITY, UNSPECIFIED CHRONICITY, UNSPECIFIED LATERALITY (HCC): Primary | ICD-10-CM

## 2022-12-09 DIAGNOSIS — E83.42 HYPOMAGNESEMIA: ICD-10-CM

## 2022-12-09 DIAGNOSIS — I10 ESSENTIAL HYPERTENSION: ICD-10-CM

## 2022-12-09 DIAGNOSIS — I73.9 PAD (PERIPHERAL ARTERY DISEASE) (HCC): ICD-10-CM

## 2022-12-09 DIAGNOSIS — M79.2 NEUROPATHIC PAIN: ICD-10-CM

## 2022-12-09 LAB
INTERNATIONAL NORMALIZATION RATIO, POC: 3.4
PROTHROMBIN TIME, POC: 41

## 2022-12-09 PROCEDURE — 3017F COLORECTAL CA SCREEN DOC REV: CPT | Performed by: INTERNAL MEDICINE

## 2022-12-09 PROCEDURE — 4004F PT TOBACCO SCREEN RCVD TLK: CPT | Performed by: INTERNAL MEDICINE

## 2022-12-09 PROCEDURE — 3078F DIAST BP <80 MM HG: CPT | Performed by: INTERNAL MEDICINE

## 2022-12-09 PROCEDURE — 85610 PROTHROMBIN TIME: CPT | Performed by: INTERNAL MEDICINE

## 2022-12-09 PROCEDURE — G8420 CALC BMI NORM PARAMETERS: HCPCS | Performed by: INTERNAL MEDICINE

## 2022-12-09 PROCEDURE — 99212 OFFICE O/P EST SF 10 MIN: CPT | Performed by: INTERNAL MEDICINE

## 2022-12-09 PROCEDURE — 3074F SYST BP LT 130 MM HG: CPT | Performed by: INTERNAL MEDICINE

## 2022-12-09 PROCEDURE — G8427 DOCREV CUR MEDS BY ELIG CLIN: HCPCS | Performed by: INTERNAL MEDICINE

## 2022-12-09 PROCEDURE — G8482 FLU IMMUNIZE ORDER/ADMIN: HCPCS | Performed by: INTERNAL MEDICINE

## 2022-12-09 PROCEDURE — 99213 OFFICE O/P EST LOW 20 MIN: CPT | Performed by: INTERNAL MEDICINE

## 2022-12-09 RX ORDER — GABAPENTIN 400 MG/1
CAPSULE ORAL
Qty: 90 CAPSULE | Refills: 5 | Status: SHIPPED | OUTPATIENT
Start: 2022-12-09 | End: 2023-12-08

## 2022-12-09 RX ORDER — AMLODIPINE BESYLATE 10 MG/1
10 TABLET ORAL DAILY
Qty: 90 TABLET | Refills: 1 | Status: SHIPPED | OUTPATIENT
Start: 2022-12-09

## 2022-12-09 RX ORDER — LOSARTAN POTASSIUM 100 MG/1
100 TABLET ORAL DAILY
Qty: 90 TABLET | Refills: 1 | Status: SHIPPED | OUTPATIENT
Start: 2022-12-09

## 2022-12-09 RX ORDER — MAGNESIUM OXIDE 400 MG/1
400 TABLET ORAL DAILY
Qty: 90 TABLET | Refills: 1 | Status: SHIPPED | OUTPATIENT
Start: 2022-12-09

## 2022-12-09 RX ORDER — ATORVASTATIN CALCIUM 80 MG/1
80 TABLET, FILM COATED ORAL NIGHTLY
Qty: 90 TABLET | Refills: 1 | Status: SHIPPED | OUTPATIENT
Start: 2022-12-09

## 2022-12-09 RX ORDER — DOXYCYCLINE 100 MG/1
CAPSULE ORAL
Qty: 90 CAPSULE | Refills: 0 | Status: SHIPPED | OUTPATIENT
Start: 2022-12-09

## 2022-12-09 RX ORDER — M-VIT,TX,IRON,MINS/CALC/FOLIC 27MG-0.4MG
1 TABLET ORAL DAILY
Qty: 90 TABLET | Refills: 1 | Status: SHIPPED | OUTPATIENT
Start: 2022-12-09

## 2022-12-09 SDOH — ECONOMIC STABILITY: FOOD INSECURITY: WITHIN THE PAST 12 MONTHS, THE FOOD YOU BOUGHT JUST DIDN'T LAST AND YOU DIDN'T HAVE MONEY TO GET MORE.: NEVER TRUE

## 2022-12-09 SDOH — ECONOMIC STABILITY: TRANSPORTATION INSECURITY
IN THE PAST 12 MONTHS, HAS THE LACK OF TRANSPORTATION KEPT YOU FROM MEDICAL APPOINTMENTS OR FROM GETTING MEDICATIONS?: NO

## 2022-12-09 SDOH — ECONOMIC STABILITY: HOUSING INSECURITY: IN THE LAST 12 MONTHS, HOW MANY PLACES HAVE YOU LIVED?: 1

## 2022-12-09 SDOH — ECONOMIC STABILITY: INCOME INSECURITY: IN THE LAST 12 MONTHS, WAS THERE A TIME WHEN YOU WERE NOT ABLE TO PAY THE MORTGAGE OR RENT ON TIME?: NO

## 2022-12-09 SDOH — ECONOMIC STABILITY: TRANSPORTATION INSECURITY
IN THE PAST 12 MONTHS, HAS LACK OF TRANSPORTATION KEPT YOU FROM MEETINGS, WORK, OR FROM GETTING THINGS NEEDED FOR DAILY LIVING?: NO

## 2022-12-09 SDOH — ECONOMIC STABILITY: FOOD INSECURITY: WITHIN THE PAST 12 MONTHS, YOU WORRIED THAT YOUR FOOD WOULD RUN OUT BEFORE YOU GOT MONEY TO BUY MORE.: NEVER TRUE

## 2022-12-09 SDOH — ECONOMIC STABILITY: HOUSING INSECURITY
IN THE LAST 12 MONTHS, WAS THERE A TIME WHEN YOU DID NOT HAVE A STEADY PLACE TO SLEEP OR SLEPT IN A SHELTER (INCLUDING NOW)?: NO

## 2022-12-09 ASSESSMENT — SOCIAL DETERMINANTS OF HEALTH (SDOH): HOW HARD IS IT FOR YOU TO PAY FOR THE VERY BASICS LIKE FOOD, HOUSING, MEDICAL CARE, AND HEATING?: NOT HARD AT ALL

## 2022-12-09 ASSESSMENT — PATIENT HEALTH QUESTIONNAIRE - PHQ9
SUM OF ALL RESPONSES TO PHQ9 QUESTIONS 1 & 2: 0
2. FEELING DOWN, DEPRESSED OR HOPELESS: NOT AT ALL
1. LITTLE INTEREST OR PLEASURE IN DOING THINGS: NOT AT ALL

## 2022-12-09 ASSESSMENT — ENCOUNTER SYMPTOMS
BLOOD IN STOOL: 0
SHORTNESS OF BREATH: 0
CONSTIPATION: 0
COUGH: 0
SINUS PAIN: 0
ABDOMINAL PAIN: 0
BACK PAIN: 0
NAUSEA: 0
VOMITING: 0
DIARRHEA: 0

## 2022-12-09 ASSESSMENT — LIFESTYLE VARIABLES: HOW OFTEN DO YOU HAVE A DRINK CONTAINING ALCOHOL: NEVER

## 2022-12-09 NOTE — PROGRESS NOTES
[facility]    Attending Physician Statement  I have discussed the case, including pertinent history and exam findings with the resident physician. I agree with the assessment, plan and orders as documented by the resident. I have reviewed all pertinent PMHx, PSHx, FamHx, SocialHx, medications, and allergies and updated history as appropriate. Patient here for routine follow up of medical problems. HTN  -controlled  -The current medical regimen is effective;  continue present plan and medications. HLD  -continue current statin  The ASCVD Risk score (Eugenie DK, et al., 2019) failed to calculate for the following reasons: The valid total cholesterol range is 130 to 320 mg/dL    DVT   -currently on warfarin  -adjusting warfarin as indicated for INR     Remainder of medical problems as per resident note.     Christian Coelho DO  12/9/2022 2:41 PM    Encounter time including independent chart review, discussion with patient, interpreting test results and/or external communications: 30'

## 2022-12-09 NOTE — PATIENT INSTRUCTIONS
Please take warfarin 4 mg daily. Please contact general surgery office to schedule colonoscopy. Please continue to take all you medications as prescribed. Please keep your appointment with [facility].

## 2022-12-22 ENCOUNTER — NURSE ONLY (OUTPATIENT)
Dept: INTERNAL MEDICINE | Age: 62
End: 2022-12-22
Payer: COMMERCIAL

## 2022-12-22 DIAGNOSIS — I82.499 DEEP VEIN THROMBOSIS (DVT) OF OTHER VEIN OF LOWER EXTREMITY, UNSPECIFIED CHRONICITY, UNSPECIFIED LATERALITY (HCC): Primary | ICD-10-CM

## 2022-12-22 DIAGNOSIS — Z79.01 CHRONIC ANTICOAGULATION: ICD-10-CM

## 2022-12-22 DIAGNOSIS — I82.4Y9 DEEP VEIN THROMBOSIS (DVT) OF PROXIMAL LOWER EXTREMITY, UNSPECIFIED CHRONICITY, UNSPECIFIED LATERALITY (HCC): ICD-10-CM

## 2022-12-22 LAB
INTERNATIONAL NORMALIZATION RATIO, POC: 1.7
PROTHROMBIN TIME, POC: 19.8

## 2022-12-22 PROCEDURE — 85610 PROTHROMBIN TIME: CPT | Performed by: INTERNAL MEDICINE

## 2022-12-22 NOTE — PROGRESS NOTES
INR results reviewed with Dr. Sheng Miller. Orders received. Patient to start taking Coumadin 5 mg on Mondays and Fridays and Coumadin 4 mg on all other days. Recheck INR in 1 week (12/28/2022)   Patient notified via phone of these orders. Patient voices understanding of the information and follow-up. AVS printed and mailed to patient with appointment card for next INR check.

## 2022-12-22 NOTE — PATIENT INSTRUCTIONS
INR 1.7 in office today. Start taking Coumadin 5 mg on Monday and Friday and Coumadin 4 mg on all other days.     Recheck INR on 12/28/2022 at 1:30 pm.

## 2022-12-30 ENCOUNTER — NURSE ONLY (OUTPATIENT)
Dept: INTERNAL MEDICINE | Age: 62
End: 2022-12-30
Payer: COMMERCIAL

## 2022-12-30 DIAGNOSIS — I82.499 DEEP VEIN THROMBOSIS (DVT) OF OTHER VEIN OF LOWER EXTREMITY, UNSPECIFIED CHRONICITY, UNSPECIFIED LATERALITY (HCC): Primary | ICD-10-CM

## 2022-12-30 LAB
INTERNATIONAL NORMALIZATION RATIO, POC: 3.6
PROTHROMBIN TIME, POC: 42.9

## 2022-12-30 PROCEDURE — 85610 PROTHROMBIN TIME: CPT | Performed by: INTERNAL MEDICINE

## 2022-12-30 NOTE — PROGRESS NOTES
INR results reviewed with Dr. Selin Morgan. Orders received. Patient new  Coumadin orders are 4 mg daily starting today. Recheck PT/INR in 1 week on 1/6/23. Patient notified via voicemail of these orders. Detailed message left with new orders. Patient was instructed to call with any questions. AVS printed and mailed to patient with appointment card for next INR check.

## 2022-12-30 NOTE — PATIENT INSTRUCTIONS
Your new dose of Coumadin is 4 mg daily. Recheck PT/INR in 1 week on Jan. 6 2023 at 8 am. Please call the office with any problems of concern at [phone].      Thank you     Geovany Sethi LPN

## 2023-01-01 NOTE — PROGRESS NOTES
Left message on pts voice mail instructing that he is to hold coumadin today and tomorrow and follow his new dosing regimen which is 4mgs on tues thurs Saturday and all other days take 5mgs advised on his fu inr appointment and printed avs with fu appt mailed to patient None

## 2023-01-06 ENCOUNTER — NURSE ONLY (OUTPATIENT)
Dept: INTERNAL MEDICINE | Age: 63
End: 2023-01-06
Payer: COMMERCIAL

## 2023-01-06 DIAGNOSIS — Z79.01 CHRONIC ANTICOAGULATION: Primary | ICD-10-CM

## 2023-01-06 LAB
INTERNATIONAL NORMALIZATION RATIO, POC: 1.9
PROTHROMBIN TIME, POC: 23.3

## 2023-01-06 PROCEDURE — 85610 PROTHROMBIN TIME: CPT | Performed by: INTERNAL MEDICINE

## 2023-01-06 PROCEDURE — 93793 ANTICOAG MGMT PT WARFARIN: CPT | Performed by: INTERNAL MEDICINE

## 2023-01-06 ASSESSMENT — PATIENT HEALTH QUESTIONNAIRE - PHQ9
SUM OF ALL RESPONSES TO PHQ QUESTIONS 1-9: 0
4. FEELING TIRED OR HAVING LITTLE ENERGY: 0
5. POOR APPETITE OR OVEREATING: 0
8. MOVING OR SPEAKING SO SLOWLY THAT OTHER PEOPLE COULD HAVE NOTICED. OR THE OPPOSITE, BEING SO FIGETY OR RESTLESS THAT YOU HAVE BEEN MOVING AROUND A LOT MORE THAN USUAL: 0
SUM OF ALL RESPONSES TO PHQ QUESTIONS 1-9: 0
2. FEELING DOWN, DEPRESSED OR HOPELESS: 0
3. TROUBLE FALLING OR STAYING ASLEEP: 0
SUM OF ALL RESPONSES TO PHQ QUESTIONS 1-9: 0
1. LITTLE INTEREST OR PLEASURE IN DOING THINGS: 0
SUM OF ALL RESPONSES TO PHQ QUESTIONS 1-9: 0
9. THOUGHTS THAT YOU WOULD BE BETTER OFF DEAD, OR OF HURTING YOURSELF: 0
6. FEELING BAD ABOUT YOURSELF - OR THAT YOU ARE A FAILURE OR HAVE LET YOURSELF OR YOUR FAMILY DOWN: 0
7. TROUBLE CONCENTRATING ON THINGS, SUCH AS READING THE NEWSPAPER OR WATCHING TELEVISION: 0
10. IF YOU CHECKED OFF ANY PROBLEMS, HOW DIFFICULT HAVE THESE PROBLEMS MADE IT FOR YOU TO DO YOUR WORK, TAKE CARE OF THINGS AT HOME, OR GET ALONG WITH OTHER PEOPLE: 0
SUM OF ALL RESPONSES TO PHQ9 QUESTIONS 1 & 2: 0

## 2023-01-06 NOTE — PATIENT INSTRUCTIONS
Thank you for coming in today to have your INR checked. Please take your Coumadin as follows:  Coumadin 5 mg today 1/6/23, then 5 mg on Monday & Friday, and 4 mg on Saturday, Sunday, Tuesday, Wednesday, & Thursday  We will recheck your INR in 1 week  Your appointment is 1/13/23 at 8:00 am  Please call if you have any unusual bleeding or any concerns at [phone]. Thanks!      Mireille Amos LPN :)

## 2023-01-06 NOTE — PROGRESS NOTES
Patient denies forgetting any doses. States current dose is     Coumadin 4 mg daily (Patient admits to drinking a few on New Year's Hannah)    Denies any new medications or drug therapies, no other non-prescription drugs not already know to doctor, no changes to eating habits or drinking habits, no unusual bleeding or bruising. A full discussion of the nature of anticoagulants has been carried out. A benefit risk analysis has been presented to the patient, so that they understand the justification for choosing anticoagulation at this time. The need for frequent and regular monitoring, precise dosage adjustment and compliance is stressed. Side effects of potential bleeding are discussed. The patient should avoid any OTC items containing aspirin or ibuprofen, and should avoid great swings in general diet. Avoid alcohol consumption. Call if any signs of abnormal bleeding. Muna Pedersen       1/6/23 at [id number] Date / Time  Spoke with patient personally. Patient instructed on Coumadin dosing as follows:  PATIENT TO TAKE 5 MG TODAY, Then 5 mg on Monday & Friday, and 4 mg on Saturday, Sunday, Tuesday, Wednesday & Thursday. Patient expressed understanding and compliance. Patient states he has 4's and 1's of the Coumadin tablets. Follow up lab appointment made for 1 week 1/13/23 at 8:00 am.. AVS printed and mailed to patient's home.   Muna Pedersen LPN

## 2023-01-06 NOTE — PROGRESS NOTES
Patient denies forgetting any doses. States current dose is     Coumadin 4 mg daily    Denies any new medications or drug therapies, no other non-prescription drugs not already know to doctor, no changes to eating habits or drinking habits, (DOES STATE \"I HAD A FEW DRINKS ON NEW YEAR'S JEANNE) no unusual bleeding or bruising. A full discussion of the nature of anticoagulants has been carried out. A benefit risk analysis has been presented to the patient, so that they understand the justification for choosing anticoagulation at this time. The need for frequent and regular monitoring, precise dosage adjustment and compliance is stressed. Side effects of potential bleeding are discussed. The patient should avoid any OTC items containing aspirin or ibuprofen, and should avoid great swings in general diet. Avoid alcohol consumption. Call if any signs of abnormal bleeding. Steve Corey       1/6/23 at [phone] Date / Time  Spoke with patient personally. Patient instructed on Coumadin dosing as follows:  PATIENT TO TAKE 5 MG OF COUMADIN TODAY AND THEN  COUMADIN 4 MG ON Saturday, Sunday, Tuesday, Wednesday & THURSDAYS  COUMADIN 5 MG ON Monday & Friday  PATIENT STATES HE HAS 4'S AND 1'S AT THE HOUSE  Patient expressed understanding and compliance. Follow up lab appointment made for 1/13/23 at 8:00 am. AVS printed and mailed to patient's home.   Steve Corey LPN

## 2023-01-13 ENCOUNTER — NURSE ONLY (OUTPATIENT)
Dept: INTERNAL MEDICINE | Age: 63
End: 2023-01-13
Payer: COMMERCIAL

## 2023-01-13 DIAGNOSIS — Z79.01 CHRONIC ANTICOAGULATION: Primary | ICD-10-CM

## 2023-01-13 DIAGNOSIS — I82.499 DEEP VEIN THROMBOSIS (DVT) OF OTHER VEIN OF LOWER EXTREMITY, UNSPECIFIED CHRONICITY, UNSPECIFIED LATERALITY (HCC): ICD-10-CM

## 2023-01-13 LAB
INTERNATIONAL NORMALIZATION RATIO, POC: 1.5
PROTHROMBIN TIME, POC: 18.4

## 2023-01-13 PROCEDURE — 85610 PROTHROMBIN TIME: CPT | Performed by: INTERNAL MEDICINE

## 2023-01-13 PROCEDURE — 93793 ANTICOAG MGMT PT WARFARIN: CPT | Performed by: INTERNAL MEDICINE

## 2023-01-13 RX ORDER — WARFARIN SODIUM 5 MG/1
TABLET ORAL
Qty: 30 TABLET | Refills: 0 | Status: SHIPPED | OUTPATIENT
Start: 2023-01-13

## 2023-01-13 NOTE — PATIENT INSTRUCTIONS
Your new dosing of coumadin is 5mgs daily   Your intr will be rechecked at your appointment on 01/19/2022  Refill for 5 mgs was sent to your pharmacy

## 2023-01-13 NOTE — PROGRESS NOTES
INR results reviewed with Dr. Amalia Ramsey and orders received. Pt notified via phone of same.  AVS mailed to patient along with his next appt

## 2023-01-13 NOTE — PROGRESS NOTES
Patient denies forgetting any doses. States current dose is     Coumadin 5 mg on 1/6/23  Then Coumadin 5 mg on Monday & Friday  Coumadin 4 mg Saturday, Sunday, Tuesday, Wednesday, & Thursday    Denies any new medications or drug therapies, no other non-prescription drugs not already know to doctor, no changes to eating habits or drinking habits, no unusual bleeding or bruising. A full discussion of the nature of anticoagulants has been carried out. A benefit risk analysis has been presented to the patient, so that they understand the justification for choosing anticoagulation at this time. The need for frequent and regular monitoring, precise dosage adjustment and compliance is stressed. Side effects of potential bleeding are discussed. The patient should avoid any OTC items containing aspirin or ibuprofen, and should avoid great swings in general diet. Avoid alcohol consumption. Call if any signs of abnormal bleeding. Vu Pate       *** Date / Time  Spoke with patient personally. Patient instructed on Coumadin dosing as follows:  ***  Patient expressed understanding and compliance. Follow up lab appointment made for ***. AVS printed and mailed to patient's home. Vu Pate LPN    *** Date / Time  Patient did not answer phone. Detailed instructions left on voicemail  Patient instructed on Coumadin dosing as follows:  ***  Follow up lab appointment made for ***. AVS printed and mailed to patient's home.   Vu Pate LPN

## 2023-01-13 NOTE — PROGRESS NOTES
Lab Results       Component                Value               Date                       INR                      1.5                 01/13/2023              Patient Findings     Negatives:  Signs/symptoms of thrombosis, Signs/symptoms of bleeding,   Laboratory test error suspected, Change in health, Change in alcohol use,   Change in activity, Upcoming invasive procedure, Emergency department   visit, Upcoming dental procedure, Missed doses, Extra doses, Change in   medications, Change in diet/appetite, Hospital admission, Bruising, Other   complaints    Comments:  Patient was to be taking 5mgs mon-fri and 4 mgs all other days   but seems iunsure if missed any days        Plan: patient has been taking all his medications and denies any missing dosages. Patient has been subtherapeutic for the last 2 weeks. Per protocol, patient to have dosage increased by 20-20% based on current dosage. Plan to increase by 16% and have patient take 5 mg daily. Return to clinic for evaluation in 1 week.       Return date  As of 1/13/2023    TTR:  41.5 % (8.2 y)   Next INR check:  1/19/2023      Electronically signed by Abhi Moeller DO on 1/13/2023 at 2:44 PM

## 2023-01-19 ENCOUNTER — TELEPHONE (OUTPATIENT)
Dept: INTERNAL MEDICINE | Age: 63
End: 2023-01-19

## 2023-01-19 NOTE — TELEPHONE ENCOUNTER
Pt states has an appt. Today with Dr. Lauro Ritchie and states not feeling well Dr. Lauro Ritchie here at present and had spoken to pt.  Pt informing her he doesn't feel good , symptoms were discussed with pt by Dr. Preston Abrams was rescheduled to return here on Monday and was told by  If symptoms became worst to go to nearest ER

## 2023-01-22 NOTE — PROGRESS NOTES
[facility]  InternalMedicine Residency Program  ACC Note      SUBJECTIVE:  CC: had no chief complaint listed for this encounter. Austyn Durán presented to the [facility] for INR check. Pt presented to the office for INR check. He has been taking warfarin 5 mg daily since last visit (1/13) and reports he has been compliant with that. Today INR is 3.6.   (Last INR check on 1/13 was subtherapeutic and warfarin changed to 5 mg daily)     Has also reports symptoms of URTI since 5 days ago. He reports he tested negative for COVID x 2 at home. He feels much better today and Sx have significantly resolved. Patient denies any bleeding episode, headache, blurry vision, fever, chills, recent change in weight, chest pain, palpitations, SOB, LOWRY, cough, nausea, vomiting, diarrhea, constipation, abd pain, dysuria, hematuria, tingling, numbness, leg swelling. Review of Systems   Constitutional:  Negative for appetite change, chills, fatigue, fever and unexpected weight change. HENT:  Positive for congestion. Negative for ear pain, facial swelling, sinus pressure, sinus pain, sneezing and sore throat. Eyes:  Negative for pain, discharge, redness and itching. Respiratory:  Positive for cough. Negative for apnea, chest tightness, shortness of breath and wheezing. Cardiovascular:  Negative for chest pain, palpitations and leg swelling. Gastrointestinal:  Negative for abdominal distention, abdominal pain, constipation, diarrhea, nausea and vomiting. Genitourinary:  Negative for difficulty urinating and dysuria. Musculoskeletal:  Negative for back pain, myalgias and neck pain. Skin:  Negative for rash. Neurological:  Negative for dizziness, facial asymmetry, speech difficulty, weakness and headaches.      Current Outpatient Medications on File Prior to Visit   Medication Sig Dispense Refill    warfarin (COUMADIN) 5 MG tablet Take 1 tablet every daily 30 tablet 0 amLODIPine (NORVASC) 10 MG tablet Take 1 tablet by mouth daily 90 tablet 1    losartan (COZAAR) 100 MG tablet Take 1 tablet by mouth daily 90 tablet 1    doxycycline monohydrate (MONODOX) 100 MG capsule take 1 capsule by mouth once daily 90 capsule 0    magnesium oxide (MAG-OX) 400 MG tablet Take 1 tablet by mouth daily 90 tablet 1    atorvastatin (LIPITOR) 80 MG tablet Take 1 tablet by mouth nightly 90 tablet 1    Multiple Vitamins-Minerals (THERAPEUTIC MULTIVITAMIN-MINERALS) tablet Take 1 tablet by mouth daily 90 tablet 1    gabapentin (NEURONTIN) 400 MG capsule take 1 capsule by mouth three times a day 90 capsule 5    warfarin (COUMADIN) 4 MG tablet Take 1 tablet every other day (alternating with 5 mg). 30 tablet 0    VENTOLIN  (90 Base) MCG/ACT inhaler       buPROPion (WELLBUTRIN XL) 150 MG extended release tablet Take 150 mg by mouth daily      Blood Pressure Monitoring (BLOOD PRESSURE CUFF) MISC Dx:  Hypertension with labile blood pressure 1 each 0     No current facility-administered medications on file prior to visit. OBJECTIVE:    VS: BP (!) 140/92 (Site: Left Upper Arm, Position: Sitting, Cuff Size: Medium Adult)   Pulse 85   Temp 97 °F (36.1 °C) (Temporal)   Resp 18   Ht 6' 2\" (1.88 m)   Wt 165 lb (74.8 kg) Comment: self reported  SpO2 99% Comment: room air  BMI 21.18 kg/m²   Physical Exam  Constitutional:       General: He is not in acute distress. HENT:      Head: Normocephalic. Right Ear: External ear normal.      Left Ear: External ear normal.      Nose: No congestion or rhinorrhea. Mouth/Throat:      Mouth: Mucous membranes are moist.   Eyes:      General:         Right eye: No discharge. Left eye: No discharge. Conjunctiva/sclera: Conjunctivae normal.   Cardiovascular:      Rate and Rhythm: Normal rate and regular rhythm. Heart sounds: No murmur heard. Pulmonary:      Effort: Pulmonary effort is normal. No respiratory distress.       Breath sounds: Normal breath sounds. No wheezing, rhonchi or rales. Abdominal:      General: Bowel sounds are normal. There is no distension. Palpations: Abdomen is soft. There is no mass. Tenderness: There is no abdominal tenderness. There is no guarding. Musculoskeletal:      Cervical back: Neck supple. No tenderness. Right lower leg: No edema. Left lower leg: No edema. Lymphadenopathy:      Cervical: No cervical adenopathy. Skin:     General: Skin is warm. Coloration: Skin is not pale. Findings: No erythema or rash. Neurological:      General: No focal deficit present. Mental Status: He is alert and oriented to person, place, and time.    Psychiatric:         Mood and Affect: Mood normal.         Behavior: Behavior normal.       ASSESSMENT/PLAN:    Hx of DVT 2/2 in setting of APS   INR: 3.6 today  Was on warfarin 5 mg daily   Changed warfarin to 4 mg on Tuesday and Thursday and 5 mg the rest of the week     Hx of HTN   BP  140/92 today  Did not take his BP meds in the morning   Continue amlodipine and losartan      URTI, resolving   Continue hydration   Pt was educated about possible OTC medications effects on warfarin and is to check with us before taking them (currently not taking any)      RTC: 1 week for INR check      I have reviewed my findings and recommendations with Yanira Bruce and Dr Maurice Melo MD, PGY-2  1/22/2023 5:47 PM

## 2023-01-23 ENCOUNTER — OFFICE VISIT (OUTPATIENT)
Dept: INTERNAL MEDICINE | Age: 63
End: 2023-01-23
Payer: COMMERCIAL

## 2023-01-23 VITALS
WEIGHT: 165 LBS | HEART RATE: 85 BPM | DIASTOLIC BLOOD PRESSURE: 92 MMHG | BODY MASS INDEX: 21.17 KG/M2 | TEMPERATURE: 97 F | SYSTOLIC BLOOD PRESSURE: 140 MMHG | HEIGHT: 74 IN | OXYGEN SATURATION: 99 % | RESPIRATION RATE: 18 BRPM

## 2023-01-23 DIAGNOSIS — Z79.01 CHRONIC ANTICOAGULATION: Primary | ICD-10-CM

## 2023-01-23 DIAGNOSIS — I82.499 DEEP VEIN THROMBOSIS (DVT) OF OTHER VEIN OF LOWER EXTREMITY, UNSPECIFIED CHRONICITY, UNSPECIFIED LATERALITY (HCC): ICD-10-CM

## 2023-01-23 LAB
INTERNATIONAL NORMALIZATION RATIO, POC: 3.6
PROTHROMBIN TIME, POC: 42.6

## 2023-01-23 PROCEDURE — 3074F SYST BP LT 130 MM HG: CPT | Performed by: CHIROPRACTOR

## 2023-01-23 PROCEDURE — G8420 CALC BMI NORM PARAMETERS: HCPCS | Performed by: CHIROPRACTOR

## 2023-01-23 PROCEDURE — 4004F PT TOBACCO SCREEN RCVD TLK: CPT | Performed by: CHIROPRACTOR

## 2023-01-23 PROCEDURE — G8482 FLU IMMUNIZE ORDER/ADMIN: HCPCS | Performed by: CHIROPRACTOR

## 2023-01-23 PROCEDURE — 3017F COLORECTAL CA SCREEN DOC REV: CPT | Performed by: CHIROPRACTOR

## 2023-01-23 PROCEDURE — 99212 OFFICE O/P EST SF 10 MIN: CPT | Performed by: CHIROPRACTOR

## 2023-01-23 PROCEDURE — 3078F DIAST BP <80 MM HG: CPT | Performed by: CHIROPRACTOR

## 2023-01-23 PROCEDURE — G8427 DOCREV CUR MEDS BY ELIG CLIN: HCPCS | Performed by: CHIROPRACTOR

## 2023-01-23 PROCEDURE — 85610 PROTHROMBIN TIME: CPT | Performed by: CHIROPRACTOR

## 2023-01-23 PROCEDURE — 99213 OFFICE O/P EST LOW 20 MIN: CPT | Performed by: CHIROPRACTOR

## 2023-01-23 ASSESSMENT — ENCOUNTER SYMPTOMS
CHEST TIGHTNESS: 0
SINUS PRESSURE: 0
APNEA: 0
WHEEZING: 0
SINUS PAIN: 0
SORE THROAT: 0
EYE REDNESS: 0
DIARRHEA: 0
EYE ITCHING: 0
CONSTIPATION: 0
FACIAL SWELLING: 0
NAUSEA: 0
SHORTNESS OF BREATH: 0
ABDOMINAL DISTENTION: 0
BACK PAIN: 0
EYE DISCHARGE: 0
ABDOMINAL PAIN: 0
EYE PAIN: 0
COUGH: 1
VOMITING: 0

## 2023-01-23 NOTE — PROGRESS NOTES
[facility]  [facility]    Attending Physician Statement  I have discussed the case, including pertinent history and exam findings with the resident physician. I agree with the assessment, plan and orders as documented by the resident. I have reviewed all pertinent PMHx, PSHx, FamHx, SocialHx, medications, and allergies and updated history as appropriate. Patient presents for routine follow up of medical problems. History of DVT - INR last time was 3.6 on 12/30/2022. after Coumadin had been  changed to 5 mg on M and W and 4 the rest of the week. Coumadin was changed to 4 mg daily and INR became subtherapeutic. Coumadin was then adjusted to 5 mg daily. He states adherence to this regimen over the past week. INR is 3.6 today. Will adjust to 4 mg on T, Th and 5 mg the rest of the days. No bleeding. Re-check INR in one week for further evaluation. INR goal is 2.5 - 3.5. HTN - mild increase today but patient did not take his meds this morning. Continue same and recheck at next visit. Remainder of medical problems as per resident note.     Shwetha Houston MD  1/23/2023 8:44 AM

## 2023-01-23 NOTE — PATIENT INSTRUCTIONS
Dear Allyson Almonte,        Thank you for coming to your appointment today. I hope we have addressed all of your needs. Please make sure to do the following:  - Continue your medications as listed. Please take warfarin 4 mg on Tuesday and Thursday and 5 mg the rest of the week. - We will see each other again in one week    Have a great day!         Sincerely,  Suma Vazquez MD  1/23/2023  8:55 AM

## 2023-02-02 DIAGNOSIS — I82.499 DEEP VEIN THROMBOSIS (DVT) OF OTHER VEIN OF LOWER EXTREMITY, UNSPECIFIED CHRONICITY, UNSPECIFIED LATERALITY (HCC): ICD-10-CM

## 2023-02-02 RX ORDER — WARFARIN SODIUM 4 MG/1
TABLET ORAL
Qty: 30 TABLET | Refills: 0 | Status: SHIPPED | OUTPATIENT
Start: 2023-02-02

## 2023-02-02 NOTE — TELEPHONE ENCOUNTER
Last Appointment:  1/23/2023  Future Appointments   Date Time Provider Roberto Patino   2/6/2023  1:30 PM SCHEDULE,  [facility]   3/22/2023  8:30 AM Luis Angel Young MD [facility]

## 2023-02-06 ENCOUNTER — TELEPHONE (OUTPATIENT)
Dept: INTERNAL MEDICINE | Age: 63
End: 2023-02-06

## 2023-02-09 ENCOUNTER — NURSE ONLY (OUTPATIENT)
Dept: INTERNAL MEDICINE | Age: 63
End: 2023-02-09
Payer: COMMERCIAL

## 2023-02-09 DIAGNOSIS — I82.499 DEEP VEIN THROMBOSIS (DVT) OF OTHER VEIN OF LOWER EXTREMITY, UNSPECIFIED CHRONICITY, UNSPECIFIED LATERALITY (HCC): Primary | ICD-10-CM

## 2023-02-09 LAB
INTERNATIONAL NORMALIZATION RATIO, POC: 1.9
PROTHROMBIN TIME, POC: 22.8

## 2023-02-09 PROCEDURE — 85610 PROTHROMBIN TIME: CPT

## 2023-02-09 NOTE — PATIENT INSTRUCTIONS
INR today 1.9  Please take Coumadin 5 mg daily and we will repeat your INR in 1 week.  ( 2/16/2023 at 1:30 PM )  Thank you for coming in today

## 2023-02-09 NOTE — PROGRESS NOTES
Lab Results       Component                Value               Date                       INR                      1.9                 02/09/2023              Patient Findings     Negatives:  Signs/symptoms of thrombosis, Signs/symptoms of bleeding,   Laboratory test error suspected, Change in health, Change in alcohol use,   Change in activity, Upcoming invasive procedure, Emergency department   visit, Upcoming dental procedure, Missed doses, Extra doses, Change in   medications, Change in diet/appetite, Hospital admission, Bruising, Other   complaints    Comments:  Reports taking Coumadin 4 mg Tuesday and Thursday and 5 mg of   Coumadin all other days.  Denies missed doses        Plan: Plan to continue to take 5 mg daily and return to clinic in 1 week 2/2 labile INR     Return date  As of 2/9/2023    TTR:  41.5 % (8.3 y)   Next INR check:  2/16/2023       Electronically signed by Nabeel Washington DO on 2/9/2023 at 8:47 AM

## 2023-02-09 NOTE — PROGRESS NOTES
INR results reviewed with Dr. Norris Rivas. Orders received. Patient notified via phone of these orders. Patient voices understanding of the information and follow-up. AVS printed and mailed to patient with appointment card for next INR check.

## 2023-02-17 ENCOUNTER — NURSE ONLY (OUTPATIENT)
Dept: INTERNAL MEDICINE | Age: 63
End: 2023-02-17
Payer: COMMERCIAL

## 2023-02-17 DIAGNOSIS — I82.499 DEEP VEIN THROMBOSIS (DVT) OF OTHER VEIN OF LOWER EXTREMITY, UNSPECIFIED CHRONICITY, UNSPECIFIED LATERALITY (HCC): Primary | ICD-10-CM

## 2023-02-17 LAB
INTERNATIONAL NORMALIZATION RATIO, POC: 3.5
PROTHROMBIN TIME, POC: 41.5

## 2023-02-17 PROCEDURE — 85610 PROTHROMBIN TIME: CPT | Performed by: INTERNAL MEDICINE

## 2023-02-17 NOTE — PROGRESS NOTES
INR results reviewed with Dr. Juni Le. Orders received. Patient notified via voicemail of these orders. Detailed message left with new orders. Patient was instructed to call with any questions. AVS printed and mailed to patient with appointment card for next INR check.

## 2023-02-17 NOTE — PATIENT INSTRUCTIONS
INR today 3.5  Please continue to take Coumadin 5 mg daily and we will repeat INR 1 week ( 2/24/2023 at 1 PM )  Please call with all issues and thank you for coming in today

## 2023-02-17 NOTE — PROGRESS NOTES
Lab Results       Component                Value               Date                       INR                      3.5                 02/17/2023              Patient Findings     Negatives:  Signs/symptoms of thrombosis, Signs/symptoms of bleeding,   Laboratory test error suspected, Change in health, Change in alcohol use,   Change in activity, Upcoming invasive procedure, Emergency department   visit, Upcoming dental procedure, Missed doses, Extra doses, Change in   medications, Change in diet/appetite, Hospital admission, Bruising, Other   complaints    Comments:  States taking 5 mg of Coumadin daily with no missed doses        Plan: Continue 5 mg every day and return to have check in 1 week    Return date  As of 2/17/2023    TTR:  41.6 % (8.3 y)   Next INR check:  2/24/2023        Electronically signed by Maricarmen Owusu DO on 2/17/2023 at 1:29 PM

## 2023-02-21 DIAGNOSIS — I82.499 DEEP VEIN THROMBOSIS (DVT) OF OTHER VEIN OF LOWER EXTREMITY, UNSPECIFIED CHRONICITY, UNSPECIFIED LATERALITY (HCC): ICD-10-CM

## 2023-02-21 RX ORDER — WARFARIN SODIUM 5 MG/1
TABLET ORAL
Qty: 30 TABLET | Refills: 0 | Status: SHIPPED | OUTPATIENT
Start: 2023-02-21

## 2023-02-24 ENCOUNTER — NURSE ONLY (OUTPATIENT)
Dept: INTERNAL MEDICINE | Age: 63
End: 2023-02-24
Payer: COMMERCIAL

## 2023-02-24 DIAGNOSIS — I82.499 DEEP VEIN THROMBOSIS (DVT) OF OTHER VEIN OF LOWER EXTREMITY, UNSPECIFIED CHRONICITY, UNSPECIFIED LATERALITY (HCC): Primary | ICD-10-CM

## 2023-02-24 LAB
INTERNATIONAL NORMALIZATION RATIO, POC: 2.5
PROTHROMBIN TIME, POC: 29.5

## 2023-02-24 PROCEDURE — 85610 PROTHROMBIN TIME: CPT | Performed by: INTERNAL MEDICINE

## 2023-02-24 RX ORDER — WARFARIN SODIUM 4 MG/1
TABLET ORAL
Qty: 30 TABLET | Refills: 0 | Status: SHIPPED | OUTPATIENT
Start: 2023-02-24

## 2023-02-24 NOTE — PROGRESS NOTES
INR results reviewed with Dr. Robb Angeles. Orders received. Patient notified in office of these orders. Patient voices understanding of the information and follow-up. AVS printed and given to patient with appointment card for next INR check.

## 2023-02-27 ENCOUNTER — OFFICE VISIT (OUTPATIENT)
Dept: INTERNAL MEDICINE | Age: 63
End: 2023-02-27
Payer: COMMERCIAL

## 2023-02-27 VITALS
WEIGHT: 172 LBS | TEMPERATURE: 97 F | RESPIRATION RATE: 18 BRPM | SYSTOLIC BLOOD PRESSURE: 145 MMHG | OXYGEN SATURATION: 95 % | HEART RATE: 93 BPM | DIASTOLIC BLOOD PRESSURE: 78 MMHG | HEIGHT: 74 IN | BODY MASS INDEX: 22.07 KG/M2

## 2023-02-27 DIAGNOSIS — H00.012 HORDEOLUM EXTERNUM OF RIGHT LOWER EYELID: Primary | ICD-10-CM

## 2023-02-27 DIAGNOSIS — L97.521 ULCER OF LEFT FOOT, LIMITED TO BREAKDOWN OF SKIN (HCC): ICD-10-CM

## 2023-02-27 PROCEDURE — G8427 DOCREV CUR MEDS BY ELIG CLIN: HCPCS

## 2023-02-27 PROCEDURE — G8420 CALC BMI NORM PARAMETERS: HCPCS

## 2023-02-27 PROCEDURE — 99213 OFFICE O/P EST LOW 20 MIN: CPT

## 2023-02-27 PROCEDURE — 3074F SYST BP LT 130 MM HG: CPT

## 2023-02-27 PROCEDURE — 3017F COLORECTAL CA SCREEN DOC REV: CPT

## 2023-02-27 PROCEDURE — 3078F DIAST BP <80 MM HG: CPT

## 2023-02-27 PROCEDURE — 4004F PT TOBACCO SCREEN RCVD TLK: CPT

## 2023-02-27 PROCEDURE — G8482 FLU IMMUNIZE ORDER/ADMIN: HCPCS

## 2023-02-27 ASSESSMENT — ENCOUNTER SYMPTOMS
WHEEZING: 0
CHEST TIGHTNESS: 0
STRIDOR: 0
SHORTNESS OF BREATH: 0
ABDOMINAL DISTENTION: 0

## 2023-02-27 NOTE — PATIENT INSTRUCTIONS
Eye Stye  We placed referral for you with Dr. Ana Montero for removal of eye stye    Foot abrasion  We placed referral for you with Dr. Angus Grant for left foot wound  Please clean and wrap your foot as show in clinic today.      Please follow-up on 3/22/2023 with your PCP

## 2023-02-27 NOTE — PROGRESS NOTES
[address]  [facility]idency [facility]    Attending Physician Statement  I have discussed the case, including pertinent history and exam findings with the resident physician. I have seen and examined the patient and the key elements of the encounter have been performed by me. I agree with the assessment, plan and orders as documented by the resident. I have reviewed all pertinent PMHx, PSHx, FamHx, SocialHx, medications, and allergies and updated history as appropriate. Here for same day appointment. Right lower eyelid cyst -- possible chronic hordeolum   Present for years, asymptomatic   Recommend optho evaluation; needs routine eye evaluation with hx extensive vascular disease    Left foot callus   Recommend podiatry assessment, extensive hx PAD  Provided simple dressing to pad area pending podiatry assessment. Small area of abrasion, minimal bleeding    Remainder of medical problems as per resident note.     Tatyana Deleon,   2/27/2023 3:44 PM

## 2023-02-27 NOTE — PROGRESS NOTES
[facility] Internal Medicine      SUBJECTIVE:  Cameron Magana (:  1960) is a 60 y.o. male here for evaluation of the following chief complaint(s):  Stye (Rt eye , making vision blurry)      HPI:   Mr. Cameron Magana is a 60year old male with R eye stye and left foot sore. Eye stye has been there for over 1 year. Use warm compresses, did not help would like it to be removed. No fevers, headaches, no blood or drainage. No worsening vision symptoms, no blindness, no cellulitis, no pain, no swelling. Foot sore -patient has foot sore and the lateral aspect of his left foot. Recently exacerbated by taking martial art class, work around the house. Currently on warfarin, wound did not feel warm. No swelling, no purulent discharge, no foul discharge. Tenderness to palpation, serous discharge noted. Has history of extensive vascular disease. Key points to note:  -Referral to ophthalmology for removal eyes diet and eye exam  -Referral to podiatry for evaluation of foot ulcer. Review of Systems   Constitutional:  Negative for chills, fatigue and fever. HENT:  Negative for congestion. Respiratory:  Negative for chest tightness, shortness of breath, wheezing and stridor. Cardiovascular:  Negative for chest pain and leg swelling. Gastrointestinal:  Negative for abdominal distention. Genitourinary:  Negative for dysuria, flank pain and frequency. PMHx:  has a past medical history of AAA (abdominal aortic aneurysm), Asthma, Chronic anticoagulation, DVT (deep venous thrombosis) ([address] Hypertension, Peripheral vascular disease ([address] and Transfusion history. Allergies   Allergen Reactions    Ultram [Tramadol]         PSHx:  has a past surgical history that includes vascular surgery;  Femoral-femoral Bypass Graft (1/26/10); femoral bypass (Right, 1/26/10); thromboendarterectomy (Left, 1/26/10); fasciotomy (Right, 6/11/10); thromboendarterectomy (Right, 6/21/10); Aorto-femoral Bypass Graft (Bilateral, 6/27/10); Arterial aneurysm repair (Left, 9/28/10); hernia repair (6/25/14); Colonoscopy; Colonoscopy (N/A, 6/7/2022); and Colonoscopy (N/A, 7/11/2022).      Social Hx:   Social History       Tobacco History       Smoking Status  Every Day Smoking Frequency  0.25 packs/day for 43.00 years (10.75 pk-yrs) Smoking Tobacco Type  Cigarettes      Smokeless Tobacco Use  Never      Tobacco Comments  smokes a couple  cigarettes per day              Alcohol History       Alcohol Use Status  Yes Drinks/Week  1 Cans of beer per week Amount  1.0 standard drink of alcohol/wk Comment  occ/states quit drinking              Drug Use       Drug Use Status  Yes Types  Marijuana (Weed) Comment  uses every other day              Sexual Activity       Sexually Active  Not Asked                     Fam Hx:   Family History   Problem Relation Age of Onset    Diabetes Mother     High Blood Pressure Mother     Cancer Father     Diabetes Brother               Current Outpatient Medications on File Prior to Visit   Medication Sig Dispense Refill    warfarin (COUMADIN) 4 MG tablet take 1 tablet by mouth daily 30 tablet 0    warfarin (COUMADIN) 5 MG tablet take 1 tablet by mouth once daily 30 tablet 0    VENTOLIN  (90 Base) MCG/ACT inhaler Inhale 2 puffs into the lungs every 6 hours as needed for Wheezing or Shortness of Breath 18 g 1    amLODIPine (NORVASC) 10 MG tablet Take 1 tablet by mouth daily 90 tablet 1    losartan (COZAAR) 100 MG tablet Take 1 tablet by mouth daily 90 tablet 1    doxycycline monohydrate (MONODOX) 100 MG capsule take 1 capsule by mouth once daily 90 capsule 0    magnesium oxide (MAG-OX) 400 MG tablet Take 1 tablet by mouth daily 90 tablet 1    atorvastatin (LIPITOR) 80 MG tablet Take 1 tablet by mouth nightly 90 tablet 1    Multiple Vitamins-Minerals (THERAPEUTIC MULTIVITAMIN-MINERALS) tablet Take 1 tablet by mouth daily 90 tablet 1 gabapentin (NEURONTIN) 400 MG capsule take 1 capsule by mouth three times a day 90 capsule 5    buPROPion (WELLBUTRIN XL) 150 MG extended release tablet Take 150 mg by mouth daily      Blood Pressure Monitoring (BLOOD PRESSURE CUFF) MISC Dx:  Hypertension with labile blood pressure (Patient taking differently: Dx:  Hypertension with labile blood pressure) 1 each 0     No current facility-administered medications on file prior to visit. OBJECTIVE:    VS:   Vitals:    02/27/23 1512   BP: (!) 145/78   Site: Left Upper Arm   Position: Sitting   Cuff Size: Medium Adult   Pulse: 93   Resp: 18   Temp: 97 °F (36.1 °C)   TempSrc: Temporal   SpO2: 95%   Weight: 172 lb (78 kg)   Height: 6' 2\" (1.88 m)     Physical Exam  Constitutional:       Appearance: Normal appearance. HENT:      Head: Normocephalic and atraumatic. Nose: Nose normal.      Mouth/Throat:      Mouth: Mucous membranes are moist.   Eyes:      General:         Right eye: Hordeolum present. Extraocular Movements: Extraocular movements intact. Right eye: Normal extraocular motion and no nystagmus. Left eye: Normal extraocular motion and no nystagmus. Conjunctiva/sclera:      Right eye: Right conjunctiva is not injected. No exudate. Left eye: Left conjunctiva is not injected. No exudate. Cardiovascular:      Rate and Rhythm: Normal rate and regular rhythm. Pulmonary:      Effort: Pulmonary effort is normal.      Breath sounds: Normal breath sounds. Abdominal:      Palpations: Abdomen is soft. Musculoskeletal:        Feet:    Feet:      Left foot:      Skin integrity: Ulcer and skin breakdown present. No erythema, warmth or callus. Neurological:      Mental Status: He is alert. ASSESSMENT/PLAN:  1. Hordeolum externum of right lower eyelid  -     Tram Pena MD, Ophthalmology, [place] ([facility])  2.  Ulcer of left foot, limited to breakdown of skin [facility])  -     Ginna Gallegos DPM, Podiatry, [place]   - Gave patient 4 x 4 and stretch gauze to pack and wrapped his wound. Demonstrated wrapping in clinic. RTC:  Return in about 23 days (around 3/22/2023) for Foot ulcer, eye stye. I have reviewed my findings and recommendations with Selin Cuenca and Dr. Jenniffer Quintero.     Regi Hines MD   2/27/2023 4:15 PM

## 2023-03-10 ENCOUNTER — NURSE ONLY (OUTPATIENT)
Dept: INTERNAL MEDICINE | Age: 63
End: 2023-03-10
Payer: COMMERCIAL

## 2023-03-10 DIAGNOSIS — I82.499 DEEP VEIN THROMBOSIS (DVT) OF OTHER VEIN OF LOWER EXTREMITY, UNSPECIFIED CHRONICITY, UNSPECIFIED LATERALITY (HCC): Primary | ICD-10-CM

## 2023-03-10 LAB
INTERNATIONAL NORMALIZATION RATIO, POC: 1.4
PROTHROMBIN TIME, POC: 16.3

## 2023-03-10 PROCEDURE — 85610 PROTHROMBIN TIME: CPT | Performed by: INTERNAL MEDICINE

## 2023-03-10 PROCEDURE — 99211 OFF/OP EST MAY X REQ PHY/QHP: CPT | Performed by: INTERNAL MEDICINE

## 2023-03-10 NOTE — PATIENT INSTRUCTIONS
Thank you for coming in today to have your INR checked. Please take your Coumadin as follows:  Coumadin alternating 4 mg and 5 mg every other day  We will recheck your INR in 1 week  Your appointment is 3/17/23 at 1:00 pm  Please call if you have any unusual bleeding or any concerns at [phone]. Thanks!      Jessie Aiken LPN :)

## 2023-03-10 NOTE — PROGRESS NOTES
Patient denies forgetting any doses. States current dose is     Coumadin 4 mg daily    Denies any new medications or drug therapies, no other non-prescription drugs not already know to doctor, no changes to eating habits or drinking habits, no unusual bleeding or bruising. A full discussion of the nature of anticoagulants has been carried out. A benefit risk analysis has been presented to the patient, so that they understand the justification for choosing anticoagulation at this time. The need for frequent and regular monitoring, precise dosage adjustment and compliance is stressed. Side effects of potential bleeding are discussed. The patient should avoid any OTC items containing aspirin or ibuprofen, and should avoid great swings in general diet. Avoid alcohol consumption. Call if any signs of abnormal bleeding. Edgar Kaur     3/10/23 7451 Spoke with patient personally and told him to take Coumadin as follows:  Coumadin alternating 4 mg and 5 mg every other day. Follow up appointment made for one week on 3/17/23 at 1:00 pm. Patient stated understanding and compliance. AVS printed and mailed to home.   Subha Rowell LPN

## 2023-03-17 ENCOUNTER — NURSE ONLY (OUTPATIENT)
Dept: INTERNAL MEDICINE | Age: 63
End: 2023-03-17
Payer: COMMERCIAL

## 2023-03-17 DIAGNOSIS — Z98.890 HX OF ABDOMINAL SURGERY: ICD-10-CM

## 2023-03-17 DIAGNOSIS — I82.499 DEEP VEIN THROMBOSIS (DVT) OF OTHER VEIN OF LOWER EXTREMITY, UNSPECIFIED CHRONICITY, UNSPECIFIED LATERALITY (HCC): Primary | ICD-10-CM

## 2023-03-17 LAB
INTERNATIONAL NORMALIZATION RATIO, POC: 1.6
PROTHROMBIN TIME, POC: 19.6

## 2023-03-17 PROCEDURE — 85610 PROTHROMBIN TIME: CPT | Performed by: INTERNAL MEDICINE

## 2023-03-17 RX ORDER — DOXYCYCLINE 100 MG/1
CAPSULE ORAL
Qty: 90 CAPSULE | Refills: 0 | OUTPATIENT
Start: 2023-03-17

## 2023-03-17 RX ORDER — DOXYCYCLINE 100 MG/1
CAPSULE ORAL
Qty: 90 CAPSULE | Refills: 0 | Status: SHIPPED | OUTPATIENT
Start: 2023-03-17

## 2023-03-17 NOTE — PATIENT INSTRUCTIONS
Please take 8mg of Coumadin today then take 5mg all other days except on Monday and Fridays take 4mg only  Recheck INR at next office visit on 3/22/23    Eye Appointment was made for Right eye Akash at   Dr Willie Landa's office   [address]. [place] [address] [place] [address] , [facility] Arcadio Garcia   [phone]  March 22,2023@ 11:00a. m. be there at 10:45 a.m.  Bring Photo ID and insurance card

## 2023-03-17 NOTE — PROGRESS NOTES
INR results reviewed with Dr. Car Noble. Orders received. Patient notified via phone of these orders. Patient voices understanding of the information and follow-up. AVS printed and mailed to patient with appointment card for next INR check.    Pt voiced understanding of instructions back to personnel

## 2023-03-21 DIAGNOSIS — I82.499 DEEP VEIN THROMBOSIS (DVT) OF OTHER VEIN OF LOWER EXTREMITY, UNSPECIFIED CHRONICITY, UNSPECIFIED LATERALITY (HCC): ICD-10-CM

## 2023-03-21 RX ORDER — WARFARIN SODIUM 5 MG/1
TABLET ORAL
Qty: 30 TABLET | Refills: 0 | Status: SHIPPED | OUTPATIENT
Start: 2023-03-21

## 2023-03-21 ASSESSMENT — ENCOUNTER SYMPTOMS
BACK PAIN: 0
ABDOMINAL PAIN: 0
NAUSEA: 0
VOMITING: 0
DIARRHEA: 0
SHORTNESS OF BREATH: 0
COUGH: 0
BLOOD IN STOOL: 0
CONSTIPATION: 0

## 2023-03-22 ENCOUNTER — OFFICE VISIT (OUTPATIENT)
Dept: INTERNAL MEDICINE | Age: 63
End: 2023-03-22
Payer: COMMERCIAL

## 2023-03-22 VITALS
TEMPERATURE: 98.1 F | DIASTOLIC BLOOD PRESSURE: 93 MMHG | SYSTOLIC BLOOD PRESSURE: 191 MMHG | OXYGEN SATURATION: 98 % | HEIGHT: 74 IN | BODY MASS INDEX: 22.33 KG/M2 | WEIGHT: 174 LBS | RESPIRATION RATE: 20 BRPM | HEART RATE: 77 BPM

## 2023-03-22 DIAGNOSIS — I10 ESSENTIAL HYPERTENSION: ICD-10-CM

## 2023-03-22 DIAGNOSIS — D68.61 ANTI-PHOSPHOLIPID ANTIBODY SYNDROME (HCC): ICD-10-CM

## 2023-03-22 DIAGNOSIS — I82.499 DEEP VEIN THROMBOSIS (DVT) OF OTHER VEIN OF LOWER EXTREMITY, UNSPECIFIED CHRONICITY, UNSPECIFIED LATERALITY (HCC): Primary | ICD-10-CM

## 2023-03-22 LAB
INTERNATIONAL NORMALIZATION RATIO, POC: 2.4
PROTHROMBIN TIME, POC: 29.3

## 2023-03-22 PROCEDURE — 85610 PROTHROMBIN TIME: CPT | Performed by: INTERNAL MEDICINE

## 2023-03-22 PROCEDURE — 99212 OFFICE O/P EST SF 10 MIN: CPT | Performed by: INTERNAL MEDICINE

## 2023-03-22 RX ORDER — HYDROCHLOROTHIAZIDE 25 MG/1
25 TABLET ORAL EVERY MORNING
Qty: 90 TABLET | Refills: 1 | Status: SHIPPED | OUTPATIENT
Start: 2023-03-22

## 2023-03-22 NOTE — PROGRESS NOTES
[facility]  [facility]    Attending Physician Statement  I have discussed the case, including pertinent history and exam findings with the resident physician. I agree with the assessment, plan and orders as documented by the resident. I have reviewed all pertinent PMHx, PSHx, FamHx, SocialHx, medications, and allergies and updated history as appropriate. Patient here for routine follow up of medical problems. 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  -subtherapeutic INR; patient missed 1 dose of his warfarin 5 mg; return to clinic in 1 week for INR check     HTN  -uncontrolled;   -office appointment with vascular surgery has him taking other medications  (lisinopril and metoprolol)  - we have him prescribed losartan and amlodipine   -2/2 uncontrolled HTN starting HCTZ today   -will perform medication rec at next appointment     PAD  -continue clopidogrel and atorvastatin at this time   -patient is supposed to be taking ASA and rosuvastatin per [facility]     Medication Reconciliation  -our medication list is completely different than the medication list that his office note from 01/09/23 shows from [facility] vascular  -patient seems to have poor literary ability when it comes to his medications   -patient identifies medications better by color   -patient will be coming to office in 1 week for repeat check of BP and other chronic medical issues and will be bringing his medications with him   -patinet should bringhis medications with him from now on to all appointment     Remainder of medical problems as per resident note.     [address] DO  3/22/2023 9:16 AM    Encounter time including independent chart review, discussion with patient, interpreting test results and/or external communications: 30'
dry.   Neurological:      Mental Status: He is alert and oriented to person, place, and time. Psychiatric:         Behavior: Behavior normal.         Thought Content: Thought content normal.         Judgment: Judgment normal.          On this date 3/22/2023 I have spent 25 minutes reviewing previous notes, test results and face to face with the patient discussing the diagnosis and importance of compliance with the treatment plan as well as documenting on the day of the visit. An electronic signature was used to authenticate this note.     --Aleyda Lao MD

## 2023-03-22 NOTE — PATIENT INSTRUCTIONS
Start taking HCTZ 25 mg daily for blood pressure control. Please bring all your medication bottle next week for review and med reconciliation. Repeat INR next week.

## 2023-03-24 PROBLEM — I74.09 AORTOILIAC OCCLUSIVE DISEASE (HCC): Status: ACTIVE | Noted: 2020-08-10

## 2023-03-24 PROBLEM — E78.5 HYPERLIPIDEMIA: Status: ACTIVE | Noted: 2018-09-15

## 2023-03-26 DIAGNOSIS — I82.499 DEEP VEIN THROMBOSIS (DVT) OF OTHER VEIN OF LOWER EXTREMITY, UNSPECIFIED CHRONICITY, UNSPECIFIED LATERALITY (HCC): ICD-10-CM

## 2023-03-27 RX ORDER — WARFARIN SODIUM 4 MG/1
TABLET ORAL
Qty: 30 TABLET | Refills: 0 | OUTPATIENT
Start: 2023-03-27

## 2023-03-29 ENCOUNTER — OFFICE VISIT (OUTPATIENT)
Dept: INTERNAL MEDICINE | Age: 63
End: 2023-03-29
Payer: COMMERCIAL

## 2023-03-29 VITALS
WEIGHT: 167.4 LBS | HEIGHT: 74 IN | BODY MASS INDEX: 21.48 KG/M2 | OXYGEN SATURATION: 95 % | RESPIRATION RATE: 16 BRPM | HEART RATE: 87 BPM | TEMPERATURE: 98.2 F | SYSTOLIC BLOOD PRESSURE: 135 MMHG | DIASTOLIC BLOOD PRESSURE: 79 MMHG

## 2023-03-29 DIAGNOSIS — I10 ESSENTIAL HYPERTENSION: ICD-10-CM

## 2023-03-29 DIAGNOSIS — I74.09 AORTOILIAC OCCLUSIVE DISEASE (HCC): ICD-10-CM

## 2023-03-29 DIAGNOSIS — I82.499 DEEP VEIN THROMBOSIS (DVT) OF OTHER VEIN OF LOWER EXTREMITY, UNSPECIFIED CHRONICITY, UNSPECIFIED LATERALITY (HCC): Primary | ICD-10-CM

## 2023-03-29 LAB
INTERNATIONAL NORMALIZATION RATIO, POC: 1.3
PROTHROMBIN TIME, POC: 15.8

## 2023-03-29 PROCEDURE — 85610 PROTHROMBIN TIME: CPT

## 2023-03-29 PROCEDURE — 99212 OFFICE O/P EST SF 10 MIN: CPT

## 2023-03-29 ASSESSMENT — ENCOUNTER SYMPTOMS
BLOOD IN STOOL: 0
CONSTIPATION: 0
ABDOMINAL PAIN: 0
DIARRHEA: 0
SHORTNESS OF BREATH: 0
SORE THROAT: 0
NAUSEA: 0
VOMITING: 0
ABDOMINAL DISTENTION: 0
COUGH: 1
COLOR CHANGE: 0

## 2023-03-29 NOTE — PATIENT INSTRUCTIONS
BRING ALL YOUR MEDICATIONS AND YOUR PILL BOX WITH YOU TO YOUR NEXT APPOINTMENT      Changes in Medications  Take warfarin 5mg daily      Please follow up tomorrow with our clinic. Please call if your symptoms worsen or fail to improve. rheumatologist and kidney appointment at [facility].    For the time being we want she to stop the medicine lisinopril for blood pressure.  We will replace it with the medication atenolol.

## 2023-03-29 NOTE — PROGRESS NOTES
[facility]  [facility]    Attending Physician Statement  I have discussed the case, including pertinent history and exam findings with the resident physician. I agree with the assessment, plan and orders as documented by the resident. I have reviewed all pertinent PMHx, PSHx, FamHx, SocialHx, medications, and allergies and updated history as appropriate. Patient here for routine follow up of medical problems. HTN  -controlled today but patient has 2 bottles of losartan 100 mg and does not have his HCTZ or his amlodipine on him today   -decision made to have him come back tomorrow with medications and fill a one week pill pack to confirm that he is on the correct meds and taking the meds correctly    DVT with APS   -patient is subtherapeutic today; patient states that he has not been taking his medications   -having patient return in 1 week for an INR check and tomorrow for pill pack   -plan to increase to 5 mg daily     Remainder of medical problems as per resident note.     [address], DO  3/29/2023 8:44 AM    Encounter time including independent chart review, discussion with patient, interpreting test results and/or external communications: 30'

## 2023-03-29 NOTE — PROGRESS NOTES
[facility] Internal Medicine      SUBJECTIVE:  Mary Kong (:  1960) is a 60 y.o. male here for evaluation of the following chief complaint(s):  Hypertension and Office Visit for Anticoagulation Management    Mary Kong is a 61 yo male here today for follow-up on INR and HTN. Notably, he has a PMH of DVT d/t APL, HTN, PAD, HLD, AAA s/p repair. He was seen recently in our office for a stye on his R lower eye lid    Pt was asked to bring all of his medications to this visit. For BP meds, he had 2 bottles of losartan 100mg. Uncertain if he is putting a pill from each losartan bottle into his pill box. He did not bring amlodipine or HCTZ (left in suitcase at home). Called pharmacy filled amlodipine for 3 month supply in January and filled HCTZ 1 week ago. He also brought warfarin 5mg and 4 mg, an empty bottle of doxycycline, 400mg gabapentin, lipitor 80mg, and magnesium oxide. HTN: Today BP is better controlled. In office 135/79. States he has been taking new BP meds, but did not bring them with him today. They are in a suitcase from a recent trip. He will return to clinic tomorrow for a nurse visit to confirm medications and fill pill pack. - Continue current medications    - Return to clinic tomorrow for nurse visit to fill pill pack     Hx of DVT on warfarin: INR 1.3 today (goal 2.5-3.5). States he missed a dose recently due to a . Currently prescribed 8mg M and F and 5 mg remaining days. There may be some confusion about when to take his medications    - Change warfarin to 5mg daily    - INR check in 1 week     He is also asking for an inhaler. He started doing martial arts, which has increased his mobility and is a great exercise. He denies shortness of breath and wheezing with exertion. He does have a cough; states it is chronic and stable. He feels he should resume using an inhaler.  Discussed that his chart will be reviewed and

## 2023-03-30 ENCOUNTER — NURSE ONLY (OUTPATIENT)
Dept: INTERNAL MEDICINE | Age: 63
End: 2023-03-30

## 2023-03-30 DIAGNOSIS — I10 ESSENTIAL HYPERTENSION: ICD-10-CM

## 2023-03-30 RX ORDER — AMLODIPINE BESYLATE 10 MG/1
10 TABLET ORAL DAILY
Qty: 90 TABLET | Refills: 1 | Status: CANCELLED | OUTPATIENT
Start: 2023-03-30

## 2023-03-30 NOTE — PROGRESS NOTES
Patient brought his medications from home to be placed into a pill pack for one week   The only medication that he did not have is amlodipine   Called pharmacy and was told that patient picked up the amlodipine in January and can not be refilled for 28 more days   Called patient who insists that he does not have the amlodipine at home  Reviewed the above with Chery Gamble and will await rechecking pts bp at his appt next week and adjust medications as needed

## 2023-03-31 RX ORDER — WARFARIN SODIUM 5 MG/1
5 TABLET ORAL DAILY
Qty: 30 TABLET | Refills: 0 | Status: SHIPPED | OUTPATIENT
Start: 2023-03-31

## 2023-03-31 ASSESSMENT — ENCOUNTER SYMPTOMS
ABDOMINAL PAIN: 0
CONSTIPATION: 0
TROUBLE SWALLOWING: 0
COUGH: 0
ABDOMINAL DISTENTION: 0
NAUSEA: 0
SORE THROAT: 0
RECTAL PAIN: 0
BLOOD IN STOOL: 0
CHOKING: 0
SHORTNESS OF BREATH: 0
DIARRHEA: 0
BACK PAIN: 0
COLOR CHANGE: 0
VOMITING: 0
ANAL BLEEDING: 0

## 2023-03-31 NOTE — PROGRESS NOTES
SpO2 95%   BMI 21.31 kg/m²     PHYSICAL EXAM  Physical Exam  Constitutional:       General: He is not in acute distress. Appearance: Normal appearance. He is not ill-appearing. HENT:      Head: Normocephalic and atraumatic. Eyes:      General: No scleral icterus. Cardiovascular:      Rate and Rhythm: Normal rate and regular rhythm. Pulmonary:      Effort: No respiratory distress. Breath sounds: Normal breath sounds. Abdominal:      General: There is no distension. Palpations: Abdomen is soft. Tenderness: There is no abdominal tenderness. Musculoskeletal:         General: No swelling or tenderness. Cervical back: Neck supple. Skin:     General: Skin is warm and dry. Coloration: Skin is not jaundiced. Neurological:      General: No focal deficit present. Mental Status: He is alert and oriented to person, place, and time. Psychiatric:         Mood and Affect: Mood normal.         Behavior: Behavior normal.         Thought Content: Thought content normal.         Judgment: Judgment normal.         ASSESSMENT AND PLAN  Personal history of colon polyps-colonoscopy in July 2022 limited by poor prep. The July 2022 examination showed a polyp that was removed but unable to be retrieved. There was a cluster of polyps at the 40 cm gauri that were not able to be fully visualized and treated due to poor prep. I recommend a follow-up colonoscopy because of the extensive polyp history and limited examination in July 2022. The patient was explained the risks/benefits/alternatives/expected outcomes of the procedure. The patient was explained the risks of the procedure, including, but not limited to, the risk of reaction to the anesthesia medicine and the risk of perforation requiring further surgery. The patient was informed that they may require biopsy or polypectomy. These procedures may increase the risk of complication. All questions were answered.   The patient verbalized

## 2023-04-03 ENCOUNTER — OFFICE VISIT (OUTPATIENT)
Dept: SURGERY | Age: 63
End: 2023-04-03
Payer: COMMERCIAL

## 2023-04-03 ENCOUNTER — TELEPHONE (OUTPATIENT)
Dept: INTERNAL MEDICINE | Age: 63
End: 2023-04-03

## 2023-04-03 VITALS
BODY MASS INDEX: 21.3 KG/M2 | HEIGHT: 74 IN | OXYGEN SATURATION: 95 % | TEMPERATURE: 98 F | DIASTOLIC BLOOD PRESSURE: 91 MMHG | HEART RATE: 102 BPM | SYSTOLIC BLOOD PRESSURE: 135 MMHG | WEIGHT: 166 LBS

## 2023-04-03 DIAGNOSIS — K63.5 POLYP OF COLON, UNSPECIFIED PART OF COLON, UNSPECIFIED TYPE: ICD-10-CM

## 2023-04-03 DIAGNOSIS — Z86.010 HISTORY OF COLON POLYPS: Primary | ICD-10-CM

## 2023-04-03 PROCEDURE — 99024 POSTOP FOLLOW-UP VISIT: CPT | Performed by: SURGERY

## 2023-04-03 PROCEDURE — 99212 OFFICE O/P EST SF 10 MIN: CPT | Performed by: SURGERY

## 2023-04-03 RX ORDER — ALBUTEROL SULFATE 90 UG/1
2 AEROSOL, METERED RESPIRATORY (INHALATION) EVERY 6 HOURS PRN
Qty: 18 G | Refills: 1 | Status: SHIPPED | OUTPATIENT
Start: 2023-04-03

## 2023-04-03 NOTE — TELEPHONE ENCOUNTER
Per pt Dr was to change and order him an inhaler the patient asking to check into this. Please advise

## 2023-04-03 NOTE — PATIENT INSTRUCTIONS
pulp)  1 cup coffee or tea (without dairy products) or a soft drink     Dinner:  1 cup juice or water  1 cup broth  1 bowl gelatin  1 cup coffee or tea     Purchase this over the counter:  1. GATORADE/Clear liquid (64 ounces)   Pick these up at the pharmacy:  2. DULCOLAX 5 mg tablets (four tablets)  3. MIRALAX BOTTLE 238 grams (over the counter only)    The DAY BEFORE your colonoscopy:   Drink only clear liquids. (Absolutely no solid food)    COLONOSCOPY PREP:  3 PM: Take 2 DULCOLAX tablets    5 PM: Mix the entire bottle of MIRALAX into the 64 ounces of GATORADE. (Put half the bottle in each 32 ounce bottle). Shake the solution until fully dissolved. Drink an 8 ounce glass every 30 minutes until the solution is gone. 7 PM: Take the last 2 DULCOLAX tablets. **DO NOT EAT OR DRINK ANYTHING AFTER MIDNIGHT**          The DAY OF your colonoscopy: You may take any necessary medications with a sip of water. Bring along someone to take you home. REMEMBER: The preparation is very important. An adequate clean out allows for the best evaluation of your entire colon. During the prep, using baby wipes may ease some of your discomfort. You should NOT plan on working or driving the rest of the day due to sedation given at the procedure    Any Questions or concerns contact Livia at [phone].

## 2023-04-03 NOTE — Clinical Note
Todd Basurto and Kate Vazquez saw . Judythe Mcburney today in the office. Due to his extensive polyp history and incomplete colonoscopy in July 2022, I recommend a repeat colonoscopy for him in the near future as an outpatient. I reviewed his precolonoscopy instructions from July 2022 for management of his warfarin that Dr. Abdi Meier had outlined. I need your team to review the instructions and make recommendations to me on how to manage his anticoagulants around his upcoming procedure. I believe that he will likely undergo polypectomy at that time due to the multiple polyps that he has in place. Please reach out to me with any questions.

## 2023-04-04 DIAGNOSIS — I82.499 DEEP VEIN THROMBOSIS (DVT) OF OTHER VEIN OF LOWER EXTREMITY, UNSPECIFIED CHRONICITY, UNSPECIFIED LATERALITY (HCC): ICD-10-CM

## 2023-04-04 RX ORDER — WARFARIN SODIUM 4 MG/1
TABLET ORAL
Qty: 30 TABLET | Refills: 0 | OUTPATIENT
Start: 2023-04-04

## 2023-04-05 ENCOUNTER — TELEPHONE (OUTPATIENT)
Dept: SURGERY | Age: 63
End: 2023-04-05

## 2023-04-05 ENCOUNTER — PREP FOR PROCEDURE (OUTPATIENT)
Dept: SURGERY | Age: 63
End: 2023-04-05

## 2023-04-05 PROBLEM — Z86.010 HISTORY OF COLON POLYPS: Status: ACTIVE | Noted: 2023-04-05

## 2023-04-05 PROBLEM — Z86.0100 HISTORY OF COLON POLYPS: Status: ACTIVE | Noted: 2023-04-05

## 2023-04-05 NOTE — TELEPHONE ENCOUNTER
Patient is scheduled for colonoscopy with   on 23 at 9:00 with an arrival time of 8:00. Pt accepted date and time and verbalized understanding. Procedure letter mailed to patient as well. Prior Authorization Form:      DEMOGRAPHICS:                     Patient Name:  Abel Harrison  Patient :  1960            Insurance:  Payor: Maricarmen Chandler / Plan: [employer] / Product Type: *No Product type* /   Insurance ID Number:    Payer/Plan Subscr  Sex Relation Sub. Ins. ID Effective Group Num   1. [id number] Arachno* 1960 Male Self [id number] [id number]                                   [address]   2.  Ranjan Baxter* 1960 Male Self [id number] [address] [facility] [address]         DIAGNOSIS & PROCEDURE:                       Procedure/Operation: colonoscopy           CPT Code: 07973    Diagnosis:  history of colon polyps    ICD10 Code: Z86.010    Location:  [facility]    Surgeon:  Dr. Sindy Morley INFORMATION:                          Date: 23    Time: 9:00              Anesthesia:  MAC/TIVA                                                       Status:  Outpatient        Special Comments:  N/A       Electronically signed by Rakel Malagon MA on 2023 at 10:33 AM

## 2023-04-07 ENCOUNTER — OFFICE VISIT (OUTPATIENT)
Dept: INTERNAL MEDICINE | Age: 63
End: 2023-04-07
Payer: COMMERCIAL

## 2023-04-07 VITALS
HEIGHT: 74 IN | BODY MASS INDEX: 22.07 KG/M2 | OXYGEN SATURATION: 99 % | SYSTOLIC BLOOD PRESSURE: 129 MMHG | TEMPERATURE: 97.6 F | HEART RATE: 85 BPM | DIASTOLIC BLOOD PRESSURE: 82 MMHG | RESPIRATION RATE: 18 BRPM | WEIGHT: 172 LBS

## 2023-04-07 DIAGNOSIS — I82.499 DEEP VEIN THROMBOSIS (DVT) OF OTHER VEIN OF LOWER EXTREMITY, UNSPECIFIED CHRONICITY, UNSPECIFIED LATERALITY (HCC): Primary | ICD-10-CM

## 2023-04-07 LAB
INTERNATIONAL NORMALIZATION RATIO, POC: 3.1
PROTHROMBIN TIME, POC: 37.6

## 2023-04-07 PROCEDURE — 85610 PROTHROMBIN TIME: CPT

## 2023-04-07 PROCEDURE — 99212 OFFICE O/P EST SF 10 MIN: CPT

## 2023-04-07 RX ORDER — NEOMYCIN SULFATE, POLYMYXIN B SULFATE, AND DEXAMETHASONE 3.5; 10000; 1 MG/G; [USP'U]/G; MG/G
OINTMENT OPHTHALMIC
COMMUNITY
Start: 2023-03-22

## 2023-04-07 ASSESSMENT — ENCOUNTER SYMPTOMS
COLOR CHANGE: 0
ABDOMINAL DISTENTION: 0
CONSTIPATION: 0
ABDOMINAL PAIN: 0
NAUSEA: 0
DIARRHEA: 0
SORE THROAT: 0
BLOOD IN STOOL: 0
VOMITING: 0
SHORTNESS OF BREATH: 0
COUGH: 0

## 2023-04-07 NOTE — PATIENT INSTRUCTIONS
Please follow up on Monday with our clinic. BRING YOUR MEDICATIONS AND PILL BOX WITH YOU. Please call if your symptoms worsen or fail to improve.

## 2023-04-07 NOTE — PROGRESS NOTES
[facility]    Attending Physician Statement  I have discussed the case, including pertinent history and exam findings with the resident physician. I have seen and examined the patient and the key elements of the encounter have been performed by me. I agree with the assessment, plan and orders as documented by the resident. I have reviewed all pertinent PMHx, PSHx, FamHx, SocialHx, medications, and allergies and updated history as appropriate. Patient here for routine follow up of medical problems. He did not bring his pill box to be filled today. Patient still unable to  amlodipine - will be able to get new refill from pharmacy later this month. However still with controlled BP on losartan and hctz. Patient has PAD but states he has not been taking ASA or plavix. Previous discharge from [facility] in November states continue ASA. Plavix was last prescribed 2/17/21 for 1 year supply. Preop anticoag planning for colonoscopy 5/22/23  5/15/23 - stop warfarin  5/17/22 - INR check. Start lovenox 1mg/kg BID once INR less than 2.5. may need to resched additional INR checks. 5/21/23 - stop lovenox  5/22/23 - colonoscopy  5/24/23 - coordinate with office of Dr. Noe Zheng regarding intraop findings and make decision when to resume warfarin, need to schedule INR checks      Schedule for nursing visit next week for INR check and pill box refill. Bring all meds. Check if he has ASA and/or plavix. Remainder of medical problems as per resident note. Alejo Angeles MD  4/7/2023 8:34 AM

## 2023-04-07 NOTE — PROGRESS NOTES
[facility] Internal Medicine      SUBJECTIVE:  Carlos Mcadniels (:  1960) is a 60 y.o. male here for evaluation of the following chief complaint(s):  Follow-up    Carlos Mcdaniels is here today for a INR check and HTN follow-up. States he has been compliant with all the medications he has at home. He has not been able to take amlodipine because his pharmacy could not refill it. Confirmed with pharmacy. He was unable to bring his pill box to this visit for refill. He is feeling very stressed about a tree that fell in his yard. Hx of multiple DVT w APL syndrome   - INR goal 2.5 - 3.5; INR today 3.1  - Current warfarin dose: 5mg daily   PLAN: hold warfarin today, resume 5mg daily; INR check on Tuesday    HTN   - Previous visit: unsure what medications patient is taking. Returned for nurse visit to fill pill box; he did not have amlodipine with him at that time. Pharmacy called and they cannot refill it at this time d/t 3 month supply being filled in January.   - BP today: 129/82  - No hypotensive symptoms at home   - Meds: losartan 100mg daily, HCTZ 25 mg qAM. Not taking: amlodipine 10 mg daily   - States complaint with current medications; does not have amlodipine at home  PLAN:    - Continue current medications   - Bring pill box and meds for refill at next nurse visit/INR check ()    Hx of multiple sigmoid polyps- plan to repeat colonoscopy on 23  Has APS, DVT (INR goal of 2.5 - 3.5) on warfarin. Has upcoming colonoscopy on . Will need to bridge to lovenox as he is high risk for recurrent clot. Instructions for bridging   5/15 stop warfarin    &  days before - rpt INR.  Once less than or equal to 2.5 start lovenox (1mg/kg) BID   Continue BID Lovenox until 1 day before colonoscopy    - hold morning and night dose of lovenox    (Day of)- colonoscopy    (1 day after colonoscopy) coordinate with Dr. Markus Ross when to

## 2023-04-24 ENCOUNTER — NURSE ONLY (OUTPATIENT)
Dept: INTERNAL MEDICINE | Age: 63
End: 2023-04-24
Payer: COMMERCIAL

## 2023-04-24 VITALS — TEMPERATURE: 97.2 F

## 2023-04-24 DIAGNOSIS — I82.499 DEEP VEIN THROMBOSIS (DVT) OF OTHER VEIN OF LOWER EXTREMITY, UNSPECIFIED CHRONICITY, UNSPECIFIED LATERALITY (HCC): Primary | ICD-10-CM

## 2023-04-24 LAB
INR BLD: 6.3
INTERNATIONAL NORMALIZATION RATIO, POC: 5.2
PROTHROMBIN TIME, POC: 62.5
PROTHROMBIN TIME: 67.5 SEC (ref 9.3–12.4)

## 2023-04-24 PROCEDURE — 93793 ANTICOAG MGMT PT WARFARIN: CPT | Performed by: INTERNAL MEDICINE

## 2023-04-24 PROCEDURE — 85610 PROTHROMBIN TIME: CPT | Performed by: INTERNAL MEDICINE

## 2023-04-24 NOTE — PROGRESS NOTES
Lab Results       Component                Value               Date                       INR                      6.3 (Luis Briscoe)            04/24/2023              Patient Findings     Comments:  Patient taking Coumadin 5 mg daily         Plan: Hold dosage Monday, Tuesday and Wednesday, start taking 4 mg on Thursday, repeat INR on Friday 4/28/23; patient does not have any signs of bleeding; likely plan will be to change to 5 mg on MWF and 4 mg every other day for a 12% reduction in dosage.  Further titration on Friday based on INR    Return date  As of 4/24/2023    TTR:  41.1 % (8.5 y)   Next INR check:  4/28/2023        Electronically signed by Gerhardt New, DO on 4/24/2023 at 11:06 AM

## 2023-04-24 NOTE — PATIENT INSTRUCTIONS
Please HOLD today's , tomorrow(5/25) and Wednesday's (5/26) doses of Coumadin. Restart Coumadin 4 mg on Thursday on 5/27. Your next PT/INR will be on check on 4/28 at 8 am. Please call the office with any problems or concerns at [phone].     Thank you    Titi Stanley LPN

## 2023-04-24 NOTE — PROGRESS NOTES
Patient  PT/INR drawn STAT and sent to the lab. Patient did not want to wait for results. He stated that he wants to be called with his results and  Coumadin instructions. INR results reviewed with Dr. Veronica Gonzalez. Orders received. Patient notified to hold 3 days of Coumadin doses. Restart taking Coumadin 4 mg on Thursday. Recheck PT/INR on Friday 4/28/23.0 Patient notified via phone of these orders. Patient voices understanding of the information and follow-up. AVS printed and mailed to patient with appointment card for next INR check.

## 2023-04-26 ENCOUNTER — TELEPHONE (OUTPATIENT)
Dept: INTERNAL MEDICINE | Age: 63
End: 2023-04-26

## 2023-04-26 NOTE — TELEPHONE ENCOUNTER
Notified patient concerning insurance and prior authorization for colonoscopy. Patient states he would like to stay with [facility] and would be calling to cancel current insurance. Will wait for patient to call back with new insurance.

## 2023-04-28 ENCOUNTER — NURSE ONLY (OUTPATIENT)
Dept: INTERNAL MEDICINE | Age: 63
End: 2023-04-28
Payer: COMMERCIAL

## 2023-04-28 DIAGNOSIS — H00.012 HORDEOLUM EXTERNUM OF RIGHT LOWER EYELID: ICD-10-CM

## 2023-04-28 DIAGNOSIS — I82.499 DEEP VEIN THROMBOSIS (DVT) OF OTHER VEIN OF LOWER EXTREMITY, UNSPECIFIED CHRONICITY, UNSPECIFIED LATERALITY (HCC): Primary | ICD-10-CM

## 2023-04-28 LAB
INTERNATIONAL NORMALIZATION RATIO, POC: 1.5
PROTHROMBIN TIME, POC: 17.7

## 2023-04-28 PROCEDURE — 93793 ANTICOAG MGMT PT WARFARIN: CPT | Performed by: INTERNAL MEDICINE

## 2023-04-28 PROCEDURE — 85610 PROTHROMBIN TIME: CPT | Performed by: INTERNAL MEDICINE

## 2023-04-28 NOTE — PATIENT INSTRUCTIONS
Your new Coumadin instructs are  Coumadin 5 mg on Mondays, Wednesdays and Fridays along with Coumadin 4 mg all other days. Recheck PT/INR on Tuesday 5/2 at 1 pm. Please call the office with any problems or concerns at [phone].     Thank you    Ravi Becerril LPN

## 2023-04-28 NOTE — PROGRESS NOTES
INR results reviewed with Dr. Lashon Mendoza. Orders received. Patient new Coumadin instructs are Coumadin 5 mg on Mondays, Wednesdays and Fridays also Coumadin 4 mg all other days. Recheck PT/INR on Tuesdays on 5/2/23. Patient notified via phone of these orders. Patient voices understanding of the information and follow-up. AVS printed and mailed to patient with appointment card for next INR check.

## 2023-04-28 NOTE — PROGRESS NOTES
Lab Results       Component                Value               Date                       INR                      1.5                 04/28/2023              Patient Findings     Positives:  Missed doses    Comments:  Patient HELD coumadin for 3  days and took Coumadin 4 mg   yesterday        Plan: Patient held dosage for 3 days 2/2 supratherapeutic INR, plan to have patient take 5 mg on MWF and 4 mg every other day for a 12% reduction in dosage from original weekly dosage.      Return date  As of 4/28/2023    TTR:  41.1 % (8.5 y)   Next INR check:  5/2/2023          Electronically signed by Christian Coelho DO on 4/28/2023 at 8:51 AM

## 2023-05-02 ENCOUNTER — NURSE ONLY (OUTPATIENT)
Dept: INTERNAL MEDICINE | Age: 63
End: 2023-05-02
Payer: COMMERCIAL

## 2023-05-02 VITALS — TEMPERATURE: 97.7 F

## 2023-05-02 DIAGNOSIS — I82.499 DEEP VEIN THROMBOSIS (DVT) OF OTHER VEIN OF LOWER EXTREMITY, UNSPECIFIED CHRONICITY, UNSPECIFIED LATERALITY (HCC): Primary | ICD-10-CM

## 2023-05-02 LAB
INTERNATIONAL NORMALIZATION RATIO, POC: 3.3
PROTHROMBIN TIME, POC: 39.4

## 2023-05-02 PROCEDURE — 85610 PROTHROMBIN TIME: CPT | Performed by: INTERNAL MEDICINE

## 2023-05-02 PROCEDURE — 93793 ANTICOAG MGMT PT WARFARIN: CPT | Performed by: INTERNAL MEDICINE

## 2023-05-02 NOTE — PATIENT INSTRUCTIONS
Your new dose of Coumadin 4 mg daily. Recheck PT/INR in 2 weeks. Your next PT/INR lab visit will be on May 16 th at 1 pm. Please call the office with any problems or concerns at [phone].     Thank you     Kristen Rodriguez LPN

## 2023-05-02 NOTE — PROGRESS NOTES
INR results reviewed with Dr. Vee Benson. Orders received. Patient given Coumadin 4 mg daily. Recheck PT/INR in 2 weeks. Patient notified via phone of these orders. Patient voices understanding of the information and follow-up. AVS printed and mailed to patient with appointment card for next INR check.

## 2023-05-15 ENCOUNTER — TELEPHONE (OUTPATIENT)
Dept: INTERNAL MEDICINE | Age: 63
End: 2023-05-15

## 2023-05-15 NOTE — TELEPHONE ENCOUNTER
Patient aware not to take Coumadin today or tomorrow per Dr. Krystle Amin. Re instructed on repeat INR tomorrow at 1 pm. Patient verbalized understanding.

## 2023-05-15 NOTE — TELEPHONE ENCOUNTER
----- Message from Ayush Montenegro MD sent at 5/15/2023  9:29 AM EDT -----  Regarding: reminder regarding warfarin  Patient has colonoscopy scheduled 5/22/23. Please remind patient to stop taking warfarin starting today 5/15/23 and to ensure he keeps INR appointment tomorrow. Thank you.

## 2023-05-16 ENCOUNTER — NURSE ONLY (OUTPATIENT)
Dept: INTERNAL MEDICINE | Age: 63
End: 2023-05-16
Payer: COMMERCIAL

## 2023-05-16 ENCOUNTER — PREP FOR PROCEDURE (OUTPATIENT)
Dept: SURGERY | Age: 63
End: 2023-05-16

## 2023-05-16 DIAGNOSIS — I82.499 DEEP VEIN THROMBOSIS (DVT) OF OTHER VEIN OF LOWER EXTREMITY, UNSPECIFIED CHRONICITY, UNSPECIFIED LATERALITY (HCC): Primary | ICD-10-CM

## 2023-05-16 LAB
INTERNATIONAL NORMALIZATION RATIO, POC: 1.2
PROTHROMBIN TIME, POC: 13.8

## 2023-05-16 PROCEDURE — 93793 ANTICOAG MGMT PT WARFARIN: CPT | Performed by: INTERNAL MEDICINE

## 2023-05-16 PROCEDURE — 85610 PROTHROMBIN TIME: CPT | Performed by: INTERNAL MEDICINE

## 2023-05-16 RX ORDER — ENOXAPARIN SODIUM 100 MG/ML
1 INJECTION SUBCUTANEOUS 2 TIMES DAILY
Qty: 9 EACH | Refills: 0 | Status: SHIPPED | OUTPATIENT
Start: 2023-05-16

## 2023-05-16 RX ORDER — SODIUM CHLORIDE 9 MG/ML
INJECTION, SOLUTION INTRAVENOUS CONTINUOUS
Status: CANCELLED | OUTPATIENT
Start: 2023-05-16

## 2023-05-16 NOTE — PROGRESS NOTES
INR results reviewed with Dr. Tona Lagos. Orders received to restart 4 mg today , and recheck in 1 week . Patient notified via phone of these orders. Patient voices understanding of the information and follow-up. AVS printed and mailed to patient with appointment card for next INR check.

## 2023-05-16 NOTE — PATIENT INSTRUCTIONS
5/16/22 - Start lovenox 1mg/kg  twice a day  5/21/23 - STOP  lovenox  5/22/23 - colonoscopy  5/24/23 - INR rechecks.

## 2023-05-16 NOTE — PROGRESS NOTES
INR results reviewed with   Orders received. Pt to hold Coumadin until after C-scope on 5/24. Pt will start Lovenox  Bid until 5/20. And will  hold Lovenox  on 5/21/23. Patient notified via phone of these orders. Patient voices understanding of the information and follow-up.pt scheduled for 5/24 for INR. AVS printed and mailed to patient with appointment card for next INR check.

## 2023-05-18 DIAGNOSIS — I82.499 DEEP VEIN THROMBOSIS (DVT) OF OTHER VEIN OF LOWER EXTREMITY, UNSPECIFIED CHRONICITY, UNSPECIFIED LATERALITY (HCC): ICD-10-CM

## 2023-05-18 RX ORDER — WARFARIN SODIUM 5 MG/1
TABLET ORAL
Qty: 30 TABLET | Refills: 0 | OUTPATIENT
Start: 2023-05-18

## 2023-05-22 ENCOUNTER — HOSPITAL ENCOUNTER (OUTPATIENT)
Age: 63
Setting detail: OUTPATIENT SURGERY
Discharge: HOME | End: 2023-05-22
Attending: SURGERY | Admitting: SURGERY
Payer: COMMERCIAL

## 2023-05-22 ENCOUNTER — ANESTHESIA (OUTPATIENT)
Dept: ENDOSCOPY | Age: 63
End: 2023-05-22
Payer: COMMERCIAL

## 2023-05-22 ENCOUNTER — ANESTHESIA EVENT (OUTPATIENT)
Dept: ENDOSCOPY | Age: 63
End: 2023-05-22
Payer: COMMERCIAL

## 2023-05-22 VITALS
OXYGEN SATURATION: 97 % | TEMPERATURE: 97.1 F | HEART RATE: 75 BPM | BODY MASS INDEX: 21.82 KG/M2 | SYSTOLIC BLOOD PRESSURE: 140 MMHG | DIASTOLIC BLOOD PRESSURE: 87 MMHG | HEIGHT: 74 IN | RESPIRATION RATE: 16 BRPM | WEIGHT: 170 LBS

## 2023-05-22 DIAGNOSIS — Z86.010 HISTORY OF COLON POLYPS: ICD-10-CM

## 2023-05-22 DIAGNOSIS — Z01.812 PRE-OPERATIVE LABORATORY EXAMINATION: Primary | ICD-10-CM

## 2023-05-22 LAB
INR BLD: 1.3
PROTHROMBIN TIME: 14.4 SEC (ref 9.3–12.4)

## 2023-05-22 PROCEDURE — 3609010400 HC COLONOSCOPY POLYPECTOMY HOT BIOPSY: Performed by: SURGERY

## 2023-05-22 PROCEDURE — 88305 TISSUE EXAM BY PATHOLOGIST: CPT

## 2023-05-22 PROCEDURE — 7100000010 HC PHASE II RECOVERY - FIRST 15 MIN: Performed by: SURGERY

## 2023-05-22 PROCEDURE — 36415 COLL VENOUS BLD VENIPUNCTURE: CPT

## 2023-05-22 PROCEDURE — 2580000003 HC RX 258: Performed by: SURGERY

## 2023-05-22 PROCEDURE — 85610 PROTHROMBIN TIME: CPT

## 2023-05-22 PROCEDURE — 3700000001 HC ADD 15 MINUTES (ANESTHESIA): Performed by: SURGERY

## 2023-05-22 PROCEDURE — 3700000000 HC ANESTHESIA ATTENDED CARE: Performed by: SURGERY

## 2023-05-22 PROCEDURE — 2720000010 HC SURG SUPPLY STERILE: Performed by: SURGERY

## 2023-05-22 PROCEDURE — 6360000002 HC RX W HCPCS: Performed by: NURSE ANESTHETIST, CERTIFIED REGISTERED

## 2023-05-22 PROCEDURE — 2709999900 HC NON-CHARGEABLE SUPPLY: Performed by: SURGERY

## 2023-05-22 PROCEDURE — 45385 COLONOSCOPY W/LESION REMOVAL: CPT | Performed by: SURGERY

## 2023-05-22 PROCEDURE — 45384 COLONOSCOPY W/LESION REMOVAL: CPT | Performed by: SURGERY

## 2023-05-22 PROCEDURE — 7100000011 HC PHASE II RECOVERY - ADDTL 15 MIN: Performed by: SURGERY

## 2023-05-22 RX ORDER — PROPOFOL 10 MG/ML
INJECTION, EMULSION INTRAVENOUS PRN
Status: DISCONTINUED | OUTPATIENT
Start: 2023-05-22 | End: 2023-05-22 | Stop reason: SDUPTHER

## 2023-05-22 RX ORDER — SODIUM CHLORIDE 9 MG/ML
INJECTION, SOLUTION INTRAVENOUS CONTINUOUS
Status: DISCONTINUED | OUTPATIENT
Start: 2023-05-22 | End: 2023-05-22 | Stop reason: HOSPADM

## 2023-05-22 RX ADMIN — SODIUM CHLORIDE: 9 INJECTION, SOLUTION INTRAVENOUS at 07:40

## 2023-05-22 RX ADMIN — PROPOFOL 580 MG: 10 INJECTION, EMULSION INTRAVENOUS at 08:08

## 2023-05-22 ASSESSMENT — LIFESTYLE VARIABLES: SMOKING_STATUS: 1

## 2023-05-22 ASSESSMENT — PAIN SCALES - GENERAL: PAINLEVEL_OUTOF10: 0

## 2023-05-22 ASSESSMENT — PAIN - FUNCTIONAL ASSESSMENT: PAIN_FUNCTIONAL_ASSESSMENT: 0-10

## 2023-05-22 NOTE — ANESTHESIA PRE PROCEDURE
Department of Anesthesiology  Preprocedure Note       Name:  Ronda Ashley   Age:  60 y.o.  :  1960                                          MRN:  [id number]         Date:  2023      Surgeon: Miladys Martel):  Lm Puckett MD    Procedure: Procedure(s):  COLONOSCOPY DIAGNOSTIC    Medications prior to admission:   Prior to Admission medications    Medication Sig Start Date End Date Taking? Authorizing Provider   enoxaparin (LOVENOX) 80 MG/0.8ML Inject 0.8 mLs into the skin 2 times daily 23   Morena Raphael MD   VENTOLIN  (96 Base) MCG/ACT inhaler Inhale 2 puffs into the lungs every 6 hours as needed for Wheezing or Shortness of Breath 4/3/23   Peg Almeida,    warfarin (COUMADIN) 5 MG tablet Take 1 tablet by mouth daily 3/31/23   Frederick Formdaniel., DO   hydroCHLOROthiazide (HYDRODIURIL) 25 MG tablet Take 1 tablet by mouth every morning  Patient taking differently: Take 1 tablet by mouth Daily with lunch 3/22/23   Debbie Patton MD   doxycycline monohydrate (MONODOX) 100 MG capsule take 1 capsule by mouth once daily  Patient taking differently: 1 capsule Daily with lunch 3/17/23   Peg Almeida DO   amLODIPine (NORVASC) 10 MG tablet Take 1 tablet by mouth daily  Patient taking differently: Take 1 tablet by mouth Daily with lunch 22   Mode Ledesma MD   losartan (COZAAR) 100 MG tablet Take 1 tablet by mouth daily  Patient taking differently: Take 1 tablet by mouth Daily with lunch 22   Mode Ledesma MD   magnesium oxide (MAG-OX) 400 MG tablet Take 1 tablet by mouth daily 22   Mode Ledesma MD   atorvastatin (LIPITOR) 80 MG tablet Take 1 tablet by mouth nightly 22   Mode Ledesma MD   Multiple Vitamins-Minerals (THERAPEUTIC MULTIVITAMIN-MINERALS) tablet Take 1 tablet by mouth daily 22   Mode Ledesma MD   gabapentin (NEURONTIN) 400 MG capsule take 1 capsule by mouth three times a day  Patient taking differently: Take 1 capsule by mouth 3 times daily.  Takes

## 2023-05-22 NOTE — H&P
GENERAL SURGERY                                                               HISTORY AND PHYSICAL     CHIEF COMPLAINT       Chief Complaint   Patient presents with    Colonoscopy       Patient here today for colonoscopy consult. HPI  He is here for follow-up appointment to discuss repeat colonoscopy. On 7/11/2022, he underwent colonoscopy with cauterization of polyps at the 70 and 40 cm gauri from the anal verge. He also underwent polypectomy at the 40 cm gauri. The polyp at the 40 cm gauri was unable to be retrieved for pathology. Due to the poor prep and extensive polyp history I recommended follow-up colonoscopy in the 6 to 12-month timeframe. He has a history of DVT and lower extremity vascular disease for which he remains on Coumadin  Last coumadin dose was 4 days ago.   Ate last evening but claims stool is loose this am     Past Medical History        Past Medical History:   Diagnosis Date    AAA (abdominal aortic aneurysm) (HCC)       s/p repair    Asthma      Chronic anticoagulation 05/06/2014    DVT (deep venous thrombosis) ([facility] [address] 2010     R leg for 1 time    Hypertension      Peripheral vascular disease ([facility] [address] 2010     both legs, more on R leg    Transfusion history              Past Surgical History         Past Surgical History:   Procedure Laterality Date    AORTO-FEMORAL BYPASS GRAFT Bilateral 6/27/10    ARTERIAL ANEURYSM REPAIR Left 9/28/10     repair left femoral pseudoaneurysm    COLONOSCOPY        COLONOSCOPY N/A 6/7/2022     COLORECTAL CANCER SCREENING, HIGH RISK performed by Dianne Randall MD at [address] [facility] [place] N/A 7/11/2022     COLONOSCOPY POLYPECTOMY SNARE/COLD BIOPSY performed by Dianne Randall MD at [facility]    FASCIOTOMY Right 6/11/10     4 compartment    FEMORAL BYPASS Right 1/26/10     femoral-above knee popliteal    FEMORAL-FEMORAL BYPASS GRAFT   1/26/10     Dr. Kate Garnica right to left    HERNIA REPAIR   6/25/14     OPEN VENTRAL HERNIA REPAIR

## 2023-05-22 NOTE — ANESTHESIA POSTPROCEDURE EVALUATION
Department of Anesthesiology  Postprocedure Note    Patient: Rachel Carbone  MRN: [id number]  YOB: 1960  Date of evaluation: 5/22/2023      Procedure Summary     Date: 05/22/23 Room / Location: [address] [place] [address] / [facility]    Anesthesia Start: 4421 Anesthesia Stop:     Procedure: COLONOSCOPY POLYPECTOMY HOT BIOPSY Diagnosis:       History of colon polyps      (History of colon polyps [Z86.010])    Surgeons: Cassy French MD Responsible Provider: Eli Schmid MD    Anesthesia Type: MAC ASA Status: 3          Anesthesia Type: No value filed.     Con Phase I: Con Score: 10    Con Phase II:        Anesthesia Post Evaluation    Patient location during evaluation: PACU  Patient participation: complete - patient participated  Level of consciousness: awake  Pain score: 3  Airway patency: patent  Nausea & Vomiting: no nausea and no vomiting  Complications: no  Cardiovascular status: blood pressure returned to baseline  Respiratory status: acceptable  Hydration status: euvolemic

## 2023-05-22 NOTE — PROGRESS NOTES
Patient states he is still moving liquid,   nothing formed. He stated he started his bowel prep at 5 and ate mashed potatoes at 6 pm because he was hungry. Dr. Pedro Luis Jasmine here and aware.

## 2023-05-22 NOTE — OP NOTE
COLONOSCOPY OP NOTE     DATE OF PROCEDURE: 5/22/2023     SURGEON: Florencio Sanchez     ASSISTANT: none     PREOPERATIVE DIAGNOSIS: Personal history of colon polyps removed 1 year ago     POSTOPERATIVE DIAGNOSIS:   Multiple polyps at the following locations from the anal verge--- 65 cm, 55 cm, 50 cm, 45 cm, 35 cm and 25 cm  Prep fair-worse as the procedure progressed       OPERATION: Total colonoscopy with multiple hot biopsies of polyps and snare polypectomies. ANESTHESIA: Local monitored anesthesia. ESTIMATED BLOOD LOSS (ml): less than 50      COMPLICATIONS: None     SPECIMENS:  Was Obtained: Polyps at the 65 cm, 55 cm, 50 cm, 45 cm, 35 cm and 25 cm marks from the anal verge     HISTORY: The patient is a 60y.o. year old male with history of colon polyps. On 7/11/2022, he underwent colonoscopy with cauterization of polyps at the 70 and 40 cm gauri from the anal verge. He also underwent polypectomy at the 40 cm gauri. The polyp at the 40 cm gauri was unable to be retrieved for pathology. Due to the poor prep and extensive polyp history I recommended follow-up colonoscopy in the 6 to 12-month timeframe. I recommend colonoscopy with possible biopsy or polypectomy and I explained the risk, benefits, expected outcome, and alternatives to the procedure. Risks included but are not limited to bleeding, infection, respiratory distress, hypotension, and perforation of the colon. The patient understands and is in agreement. His primary care provider is managing his warfarin therapy around his procedure. PROCEDURE: The patient was given IV conscious sedation per anesthesia. The patient was given supplemental oxygen by nasal cannula. The colonoscope was inserted per rectum and advanced under direct vision to the cecum without difficulty. The prep was fair so exam was suboptimal and a small mucosal lesion could have been missed. FINDINGS:  Cecum/Ascending colon:  At the 65 cm gauri, there was a less than 1 cm polyp

## 2023-05-24 ENCOUNTER — NURSE ONLY (OUTPATIENT)
Dept: INTERNAL MEDICINE | Age: 63
End: 2023-05-24
Payer: COMMERCIAL

## 2023-05-24 VITALS — TEMPERATURE: 98.1 F

## 2023-05-24 DIAGNOSIS — I82.499 DEEP VEIN THROMBOSIS (DVT) OF OTHER VEIN OF LOWER EXTREMITY, UNSPECIFIED CHRONICITY, UNSPECIFIED LATERALITY (HCC): Primary | ICD-10-CM

## 2023-05-24 LAB
INTERNATIONAL NORMALIZATION RATIO, POC: 1.1
PROTHROMBIN TIME, POC: 13.5

## 2023-05-24 PROCEDURE — 36415 COLL VENOUS BLD VENIPUNCTURE: CPT

## 2023-05-24 PROCEDURE — 85610 PROTHROMBIN TIME: CPT

## 2023-05-24 RX ORDER — WARFARIN SODIUM 4 MG/1
4 TABLET ORAL DAILY
Qty: 30 TABLET | Refills: 0 | Status: SHIPPED | OUTPATIENT
Start: 2023-05-24

## 2023-05-24 NOTE — PATIENT INSTRUCTIONS
Continue taking Lovenox injections twice a day along with Coumadin 4 mg daily. Your next PT/INR will  need to be drawn at the lab on 5/28. A Resident will call you with your results and instructs on 5/28. Your refill was sent to your local pharmacy. Please call the office with any problems or concerns at [phone].     Thank Mitchell Meng LPN

## 2023-05-24 NOTE — PROGRESS NOTES
INR results reviewed with Dr. Roverto Sanchez . Orders received. Patient will resume taking Coumadin 4 mg daily. Also Lovenox injects twice a day. Recheck PT/INR in  4 days on 5/28. Resident will call patient with instructions. Patient notified in office of these orders. Patient voices understanding of the information and follow-up. AVS printed and given to patient with appointment card for next INR check.

## 2023-05-30 ENCOUNTER — NURSE ONLY (OUTPATIENT)
Dept: INTERNAL MEDICINE | Age: 63
End: 2023-05-30
Payer: COMMERCIAL

## 2023-05-30 DIAGNOSIS — I82.499 DEEP VEIN THROMBOSIS (DVT) OF OTHER VEIN OF LOWER EXTREMITY, UNSPECIFIED CHRONICITY, UNSPECIFIED LATERALITY (HCC): Primary | ICD-10-CM

## 2023-05-30 LAB
INTERNATIONAL NORMALIZATION RATIO, POC: 1.3
PROTHROMBIN TIME, POC: 15.6

## 2023-05-30 PROCEDURE — 93793 ANTICOAG MGMT PT WARFARIN: CPT | Performed by: INTERNAL MEDICINE

## 2023-05-30 PROCEDURE — 85610 PROTHROMBIN TIME: CPT | Performed by: INTERNAL MEDICINE

## 2023-05-30 RX ORDER — ENOXAPARIN SODIUM 100 MG/ML
1 INJECTION SUBCUTANEOUS 2 TIMES DAILY
Qty: 14 EACH | Refills: 0 | Status: SHIPPED | OUTPATIENT
Start: 2023-05-30

## 2023-05-30 NOTE — PROGRESS NOTES
Pt states did not get labs done as instructed , Lab was closed on Sunday and he did not take Coumadin 4mg yesterday, when asked if taking Lovenox \"states last time I had it was on Saturday 2 shots\" I question him how many shots left he said I think 7 but he takes twice a day We will need to order more Lovenox for him if continue this regime

## 2023-05-30 NOTE — PROGRESS NOTES
INR results reviewed with Dr. Josué Fuller and orders received. Pt notified via phone of same. AVS mailed to patient.

## 2023-05-30 NOTE — PATIENT INSTRUCTIONS
Continue with Lovenox injections twice daily   Take 10mg of Coumadin today  Increase Coumadin to 5mg on Tuesday, Thursday and Saturday and 4mg the rest of the days     Return on 6/5/23 for INR check

## 2023-06-02 PROBLEM — M79.605 LEFT LEG PAIN: Status: RESOLVED | Noted: 2019-05-22 | Resolved: 2023-06-02

## 2023-06-02 PROBLEM — I74.09 AORTOILIAC OCCLUSIVE DISEASE (HCC): Status: RESOLVED | Noted: 2020-08-10 | Resolved: 2023-06-02

## 2023-06-02 PROBLEM — D68.61 ANTI-PHOSPHOLIPID ANTIBODY SYNDROME (HCC): Status: RESOLVED | Noted: 2021-09-27 | Resolved: 2023-06-02

## 2023-06-02 PROBLEM — T14.8XXA HEMATOMA: Status: RESOLVED | Noted: 2019-05-22 | Resolved: 2023-06-02

## 2023-06-06 ENCOUNTER — NURSE ONLY (OUTPATIENT)
Dept: INTERNAL MEDICINE | Age: 63
End: 2023-06-06
Payer: COMMERCIAL

## 2023-06-06 VITALS — TEMPERATURE: 98.1 F

## 2023-06-06 DIAGNOSIS — Z79.01 CHRONIC ANTICOAGULATION: Primary | ICD-10-CM

## 2023-06-06 LAB
INTERNATIONAL NORMALIZATION RATIO, POC: 2.4
PROTHROMBIN TIME, POC: 29.4

## 2023-06-06 PROCEDURE — 93793 ANTICOAG MGMT PT WARFARIN: CPT | Performed by: INTERNAL MEDICINE

## 2023-06-06 PROCEDURE — 85610 PROTHROMBIN TIME: CPT | Performed by: INTERNAL MEDICINE

## 2023-06-06 NOTE — PROGRESS NOTES
INR results reviewed with Dr. Gayle Lane. Orders received. Patient will continue taking Coumadin 4 mg on Mondays, Wednesday and Fridays along with Coumadin 5 mg all other days. Patient will discontinue Lovenox injections. Patient notified via phone of these orders. Patient voices understanding of the information and follow-up. AVS printed and mailed to patient with appointment card for next INR check.

## 2023-06-06 NOTE — PATIENT INSTRUCTIONS
Continue taking Coumadin 4 mg on Mondays, Wednesdays and Fridays also continue taking Coumadin 5 mg all other days. Discontinue Lovenox injections. Your next PT/INR will be in 1 week on 6/13 at 1 pm. Please call the office with any problems or concerns at [phone].        Thank you    Anival Yee LPN

## 2023-06-19 ENCOUNTER — NURSE ONLY (OUTPATIENT)
Dept: INTERNAL MEDICINE | Age: 63
End: 2023-06-19
Payer: COMMERCIAL

## 2023-06-19 DIAGNOSIS — I10 ESSENTIAL HYPERTENSION: ICD-10-CM

## 2023-06-19 DIAGNOSIS — Z79.01 CHRONIC ANTICOAGULATION: Primary | ICD-10-CM

## 2023-06-19 LAB
INTERNATIONAL NORMALIZATION RATIO, POC: 1.8
PROTHROMBIN TIME, POC: 21.3

## 2023-06-19 PROCEDURE — 85610 PROTHROMBIN TIME: CPT | Performed by: INTERNAL MEDICINE

## 2023-06-19 PROCEDURE — 93793 ANTICOAG MGMT PT WARFARIN: CPT | Performed by: INTERNAL MEDICINE

## 2023-06-19 RX ORDER — LOSARTAN POTASSIUM 100 MG/1
100 TABLET ORAL DAILY
Qty: 90 TABLET | Refills: 1 | Status: SHIPPED | OUTPATIENT
Start: 2023-06-19

## 2023-06-19 RX ORDER — AMLODIPINE BESYLATE 10 MG/1
10 TABLET ORAL DAILY
Qty: 90 TABLET | Refills: 1 | Status: SHIPPED | OUTPATIENT
Start: 2023-06-19

## 2023-06-19 NOTE — PROGRESS NOTES
INR results reviewed with Dr. Ghazala Landis. Orders received to Start Coumadin 5 mg daily and repeat INr in one week. .  Patient notified via phone of these orders. Patient voices understanding of the information and follow-up. AVS printed and mailed to patient with appointment card for next INR check.

## 2023-06-20 RX ORDER — MULTIVITAMIN
TABLET ORAL
Qty: 90 TABLET | Refills: 1 | Status: SHIPPED | OUTPATIENT
Start: 2023-06-20

## 2023-06-26 DIAGNOSIS — I82.499 DEEP VEIN THROMBOSIS (DVT) OF OTHER VEIN OF LOWER EXTREMITY, UNSPECIFIED CHRONICITY, UNSPECIFIED LATERALITY (HCC): ICD-10-CM

## 2023-06-26 RX ORDER — WARFARIN SODIUM 4 MG/1
TABLET ORAL
Qty: 30 TABLET | Refills: 0 | Status: SHIPPED | OUTPATIENT
Start: 2023-06-26

## 2023-06-28 ENCOUNTER — NURSE ONLY (OUTPATIENT)
Dept: INTERNAL MEDICINE | Age: 63
End: 2023-06-28
Payer: COMMERCIAL

## 2023-06-28 DIAGNOSIS — Z79.01 CHRONIC ANTICOAGULATION: Primary | ICD-10-CM

## 2023-06-28 LAB
INTERNATIONAL NORMALIZATION RATIO, POC: 2.1
PROTHROMBIN TIME, POC: 25.3

## 2023-06-28 PROCEDURE — 85610 PROTHROMBIN TIME: CPT

## 2023-06-28 RX ORDER — WARFARIN SODIUM 1 MG/1
1 TABLET ORAL DAILY
Qty: 30 TABLET | Refills: 3 | Status: SHIPPED | OUTPATIENT
Start: 2023-06-28

## 2023-07-07 ENCOUNTER — OFFICE VISIT (OUTPATIENT)
Dept: INTERNAL MEDICINE | Age: 63
End: 2023-07-07
Payer: COMMERCIAL

## 2023-07-07 VITALS
BODY MASS INDEX: 20.41 KG/M2 | HEIGHT: 74 IN | DIASTOLIC BLOOD PRESSURE: 66 MMHG | HEART RATE: 104 BPM | WEIGHT: 159 LBS | SYSTOLIC BLOOD PRESSURE: 112 MMHG | TEMPERATURE: 98.3 F | OXYGEN SATURATION: 93 % | RESPIRATION RATE: 16 BRPM

## 2023-07-07 DIAGNOSIS — Z79.01 CHRONIC ANTICOAGULATION: Primary | ICD-10-CM

## 2023-07-07 DIAGNOSIS — E83.42 HYPOMAGNESEMIA: ICD-10-CM

## 2023-07-07 DIAGNOSIS — I73.9 PAD (PERIPHERAL ARTERY DISEASE) (HCC): ICD-10-CM

## 2023-07-07 DIAGNOSIS — M79.2 NEUROPATHIC PAIN: ICD-10-CM

## 2023-07-07 DIAGNOSIS — H00.012 HORDEOLUM EXTERNUM OF RIGHT LOWER EYELID: ICD-10-CM

## 2023-07-07 DIAGNOSIS — I10 ESSENTIAL HYPERTENSION: ICD-10-CM

## 2023-07-07 LAB
INTERNATIONAL NORMALIZATION RATIO, POC: 1.4
PROTHROMBIN TIME, POC: 16.2

## 2023-07-07 PROCEDURE — 99213 OFFICE O/P EST LOW 20 MIN: CPT | Performed by: INTERNAL MEDICINE

## 2023-07-07 PROCEDURE — G8420 CALC BMI NORM PARAMETERS: HCPCS | Performed by: INTERNAL MEDICINE

## 2023-07-07 PROCEDURE — 99212 OFFICE O/P EST SF 10 MIN: CPT | Performed by: INTERNAL MEDICINE

## 2023-07-07 PROCEDURE — 3074F SYST BP LT 130 MM HG: CPT | Performed by: INTERNAL MEDICINE

## 2023-07-07 PROCEDURE — 3078F DIAST BP <80 MM HG: CPT | Performed by: INTERNAL MEDICINE

## 2023-07-07 PROCEDURE — 3017F COLORECTAL CA SCREEN DOC REV: CPT | Performed by: INTERNAL MEDICINE

## 2023-07-07 PROCEDURE — G8427 DOCREV CUR MEDS BY ELIG CLIN: HCPCS | Performed by: INTERNAL MEDICINE

## 2023-07-07 PROCEDURE — 85610 PROTHROMBIN TIME: CPT | Performed by: INTERNAL MEDICINE

## 2023-07-07 PROCEDURE — 4004F PT TOBACCO SCREEN RCVD TLK: CPT | Performed by: INTERNAL MEDICINE

## 2023-07-07 RX ORDER — GABAPENTIN 400 MG/1
400 CAPSULE ORAL 3 TIMES DAILY
Qty: 270 CAPSULE | Refills: 1 | Status: SHIPPED | OUTPATIENT
Start: 2023-07-07 | End: 2024-01-03

## 2023-07-07 RX ORDER — MAGNESIUM OXIDE 400 MG/1
400 TABLET ORAL DAILY
Qty: 90 TABLET | Refills: 1 | Status: SHIPPED | OUTPATIENT
Start: 2023-07-07 | End: 2024-01-03

## 2023-07-07 RX ORDER — ATORVASTATIN CALCIUM 80 MG/1
80 TABLET, FILM COATED ORAL NIGHTLY
Qty: 90 TABLET | Refills: 1 | Status: SHIPPED | OUTPATIENT
Start: 2023-07-07 | End: 2024-01-03

## 2023-07-07 RX ORDER — HYDROCHLOROTHIAZIDE 25 MG/1
25 TABLET ORAL
Qty: 90 TABLET | Refills: 1 | Status: SHIPPED | OUTPATIENT
Start: 2023-07-07 | End: 2024-01-03

## 2023-07-07 RX ORDER — M-VIT,TX,IRON,MINS/CALC/FOLIC 27MG-0.4MG
1 TABLET ORAL DAILY
Qty: 90 TABLET | Refills: 1 | Status: SHIPPED | OUTPATIENT
Start: 2023-07-07 | End: 2024-01-03

## 2023-07-07 ASSESSMENT — ENCOUNTER SYMPTOMS
ABDOMINAL PAIN: 0
VOMITING: 0
DIARRHEA: 0
BLOOD IN STOOL: 0
NAUSEA: 0
CONSTIPATION: 0
COUGH: 0
SINUS PRESSURE: 0
CHEST TIGHTNESS: 0
SORE THROAT: 0
SHORTNESS OF BREATH: 0
ANAL BLEEDING: 0

## 2023-07-14 ENCOUNTER — NURSE ONLY (OUTPATIENT)
Dept: INTERNAL MEDICINE | Age: 63
End: 2023-07-14
Payer: COMMERCIAL

## 2023-07-14 DIAGNOSIS — Z79.01 CHRONIC ANTICOAGULATION: Primary | ICD-10-CM

## 2023-07-14 DIAGNOSIS — I82.499 DEEP VEIN THROMBOSIS (DVT) OF OTHER VEIN OF LOWER EXTREMITY, UNSPECIFIED CHRONICITY, UNSPECIFIED LATERALITY (HCC): ICD-10-CM

## 2023-07-14 LAB
INTERNATIONAL NORMALIZATION RATIO, POC: 1.5
PROTHROMBIN TIME, POC: 18.6

## 2023-07-14 PROCEDURE — 93793 ANTICOAG MGMT PT WARFARIN: CPT | Performed by: INTERNAL MEDICINE

## 2023-07-14 PROCEDURE — 85610 PROTHROMBIN TIME: CPT | Performed by: INTERNAL MEDICINE

## 2023-07-14 RX ORDER — WARFARIN SODIUM 5 MG/1
5 TABLET ORAL DAILY
Qty: 30 TABLET | Refills: 0 | Status: SHIPPED | OUTPATIENT
Start: 2023-07-14

## 2023-07-14 RX ORDER — WARFARIN SODIUM 4 MG/1
4 TABLET ORAL DAILY
Qty: 30 TABLET | Refills: 0 | Status: CANCELLED | OUTPATIENT
Start: 2023-07-14

## 2023-07-20 ENCOUNTER — NURSE ONLY (OUTPATIENT)
Dept: INTERNAL MEDICINE | Age: 63
End: 2023-07-20
Payer: COMMERCIAL

## 2023-07-20 VITALS — TEMPERATURE: 97 F

## 2023-07-20 DIAGNOSIS — I82.499 DEEP VEIN THROMBOSIS (DVT) OF OTHER VEIN OF LOWER EXTREMITY, UNSPECIFIED CHRONICITY, UNSPECIFIED LATERALITY (HCC): ICD-10-CM

## 2023-07-20 DIAGNOSIS — Z79.01 CHRONIC ANTICOAGULATION: Primary | ICD-10-CM

## 2023-07-20 LAB
INTERNATIONAL NORMALIZATION RATIO, POC: 2.5
PROTHROMBIN TIME, POC: 29.5

## 2023-07-20 PROCEDURE — 85610 PROTHROMBIN TIME: CPT | Performed by: INTERNAL MEDICINE

## 2023-07-20 PROCEDURE — 93793 ANTICOAG MGMT PT WARFARIN: CPT | Performed by: INTERNAL MEDICINE

## 2023-07-20 RX ORDER — WARFARIN SODIUM 5 MG/1
5 TABLET ORAL DAILY
Qty: 30 TABLET | Refills: 0 | Status: CANCELLED | OUTPATIENT
Start: 2023-07-20

## 2023-07-20 RX ORDER — WARFARIN SODIUM 4 MG/1
4 TABLET ORAL DAILY
Qty: 30 TABLET | Refills: 0 | Status: CANCELLED | OUTPATIENT
Start: 2023-07-20

## 2023-07-20 NOTE — PROGRESS NOTES
INR results reviewed with Dr. Buzz Freeman. Orders received. Patient notified in office of these orders. Patient voices understanding of the information and follow-up. AVS printed and given to patient with appointment card for next INR check.

## 2023-07-21 DIAGNOSIS — Z98.890 HX OF ABDOMINAL SURGERY: ICD-10-CM

## 2023-07-21 RX ORDER — DOXYCYCLINE 100 MG/1
CAPSULE ORAL
Qty: 90 CAPSULE | Refills: 0 | OUTPATIENT
Start: 2023-07-21

## 2023-07-24 DIAGNOSIS — Z98.890 HX OF ABDOMINAL SURGERY: ICD-10-CM

## 2023-07-24 RX ORDER — DOXYCYCLINE 100 MG/1
CAPSULE ORAL
Qty: 90 CAPSULE | Refills: 1 | Status: SHIPPED | OUTPATIENT
Start: 2023-07-24

## 2023-07-24 NOTE — TELEPHONE ENCOUNTER
----- Message from Rosanne Isra sent at 7/24/2023  9:38 AM EDT -----  Subject: Refill Request    QUESTIONS  Name of Medication? Other - antibiotics   Patient-reported dosage and instructions? everyday for the rest of his   life  How many days do you have left? 0  Preferred Pharmacy? [id number]  Pharmacy phone number (if available)? [phone]  ---------------------------------------------------------------------------  --------------  CALL BACK INFO  What is the best way for the office to contact you? OK to leave message on   voicemail  Preferred Call Back Phone Number? [phone]  ---------------------------------------------------------------------------  --------------  SCRIPT ANSWERS  Relationship to Patient?  Self

## 2023-07-28 ENCOUNTER — NURSE ONLY (OUTPATIENT)
Dept: INTERNAL MEDICINE | Age: 63
End: 2023-07-28
Payer: COMMERCIAL

## 2023-07-28 VITALS — TEMPERATURE: 97.9 F

## 2023-07-28 DIAGNOSIS — Z79.01 CHRONIC ANTICOAGULATION: Primary | ICD-10-CM

## 2023-07-28 LAB
INTERNATIONAL NORMALIZATION RATIO, POC: 3.3
PROTHROMBIN TIME, POC: 39.3

## 2023-07-28 PROCEDURE — 85610 PROTHROMBIN TIME: CPT | Performed by: INTERNAL MEDICINE

## 2023-07-28 PROCEDURE — 93793 ANTICOAG MGMT PT WARFARIN: CPT | Performed by: INTERNAL MEDICINE

## 2023-07-28 NOTE — PROGRESS NOTES
INR results reviewed with Dr. Jm Cheng. Patient will cotinue taking Coumadin 6 mg daily. Recheck PT/INR in 1 month on 8/28 at 1 pm. Orders received. Patient notified via phone of these orders. Patient voices understanding of the information and follow-up. AVS printed and mailed to patient with appointment card for next INR check.

## 2023-08-05 DIAGNOSIS — I82.499 DEEP VEIN THROMBOSIS (DVT) OF OTHER VEIN OF LOWER EXTREMITY, UNSPECIFIED CHRONICITY, UNSPECIFIED LATERALITY (HCC): ICD-10-CM

## 2023-08-07 RX ORDER — WARFARIN SODIUM 4 MG/1
TABLET ORAL
Qty: 30 TABLET | Refills: 0 | Status: SHIPPED | OUTPATIENT
Start: 2023-08-07

## 2023-08-11 DIAGNOSIS — Z79.01 CHRONIC ANTICOAGULATION: ICD-10-CM

## 2023-08-11 DIAGNOSIS — I82.499 DEEP VEIN THROMBOSIS (DVT) OF OTHER VEIN OF LOWER EXTREMITY, UNSPECIFIED CHRONICITY, UNSPECIFIED LATERALITY (HCC): ICD-10-CM

## 2023-08-11 RX ORDER — WARFARIN SODIUM 5 MG/1
TABLET ORAL
Qty: 30 TABLET | Refills: 0 | OUTPATIENT
Start: 2023-08-11

## 2023-08-26 ENCOUNTER — HOSPITAL ENCOUNTER (EMERGENCY)
Age: 63
Discharge: LEFT BEFORE DISCHARGE | End: 2023-08-26
Attending: EMERGENCY MEDICINE
Payer: COMMERCIAL

## 2023-08-26 ENCOUNTER — APPOINTMENT (OUTPATIENT)
Dept: CT IMAGING | Age: 63
End: 2023-08-26
Payer: COMMERCIAL

## 2023-08-26 ENCOUNTER — APPOINTMENT (OUTPATIENT)
Dept: GENERAL RADIOLOGY | Age: 63
End: 2023-08-26
Payer: COMMERCIAL

## 2023-08-26 ENCOUNTER — APPOINTMENT (OUTPATIENT)
Dept: ULTRASOUND IMAGING | Age: 63
End: 2023-08-26
Payer: COMMERCIAL

## 2023-08-26 VITALS
HEART RATE: 82 BPM | TEMPERATURE: 97.9 F | OXYGEN SATURATION: 99 % | DIASTOLIC BLOOD PRESSURE: 94 MMHG | RESPIRATION RATE: 18 BRPM | SYSTOLIC BLOOD PRESSURE: 148 MMHG

## 2023-08-26 DIAGNOSIS — Z98.890 HX OF VASCULAR SURGERY: ICD-10-CM

## 2023-08-26 DIAGNOSIS — M79.604 RIGHT LEG PAIN: Primary | ICD-10-CM

## 2023-08-26 LAB
ANION GAP SERPL CALCULATED.3IONS-SCNC: 14 MMOL/L (ref 7–16)
BASOPHILS # BLD: 0.05 K/UL (ref 0–0.2)
BASOPHILS NFR BLD: 1 % (ref 0–2)
BUN SERPL-MCNC: 17 MG/DL (ref 6–23)
CALCIUM SERPL-MCNC: 9.2 MG/DL (ref 8.6–10.2)
CHLORIDE SERPL-SCNC: 107 MMOL/L (ref 98–107)
CK SERPL-CCNC: 192 U/L (ref 20–200)
CO2 SERPL-SCNC: 21 MMOL/L (ref 22–29)
CREAT SERPL-MCNC: 1.2 MG/DL (ref 0.7–1.2)
EOSINOPHIL # BLD: 0.14 K/UL (ref 0.05–0.5)
EOSINOPHILS RELATIVE PERCENT: 2 % (ref 0–6)
ERYTHROCYTE [DISTWIDTH] IN BLOOD BY AUTOMATED COUNT: 12.2 % (ref 11.5–15)
GFR SERPL CREATININE-BSD FRML MDRD: >60 ML/MIN/1.73M2
GLUCOSE SERPL-MCNC: 98 MG/DL (ref 74–99)
HCT VFR BLD AUTO: 41.4 % (ref 37–54)
HGB BLD-MCNC: 14.5 G/DL (ref 12.5–16.5)
IMM GRANULOCYTES # BLD AUTO: 0.03 K/UL (ref 0–0.58)
IMM GRANULOCYTES NFR BLD: 0 % (ref 0–5)
INR PPP: 3.1
LYMPHOCYTES NFR BLD: 2.58 K/UL (ref 1.5–4)
LYMPHOCYTES RELATIVE PERCENT: 34 % (ref 20–42)
MCH RBC QN AUTO: 33 PG (ref 26–35)
MCHC RBC AUTO-ENTMCNC: 35 G/DL (ref 32–34.5)
MCV RBC AUTO: 94.3 FL (ref 80–99.9)
MONOCYTES NFR BLD: 0.56 K/UL (ref 0.1–0.95)
MONOCYTES NFR BLD: 7 % (ref 2–12)
NEUTROPHILS NFR BLD: 55 % (ref 43–80)
NEUTS SEG NFR BLD: 4.18 K/UL (ref 1.8–7.3)
PARTIAL THROMBOPLASTIN TIME: 40.4 SEC (ref 24.5–35.1)
PLATELET, FLUORESCENCE: 140 K/UL (ref 130–450)
PMV BLD AUTO: 10.6 FL (ref 7–12)
POTASSIUM SERPL-SCNC: 4.2 MMOL/L (ref 3.5–5)
PROTHROMBIN TIME: 34.1 SEC (ref 9.3–12.4)
RBC # BLD AUTO: 4.39 M/UL (ref 3.8–5.8)
SODIUM SERPL-SCNC: 142 MMOL/L (ref 132–146)
WBC OTHER # BLD: 7.5 K/UL (ref 4.5–11.5)

## 2023-08-26 PROCEDURE — 80048 BASIC METABOLIC PNL TOTAL CA: CPT

## 2023-08-26 PROCEDURE — 75635 CT ANGIO ABDOMINAL ARTERIES: CPT

## 2023-08-26 PROCEDURE — 82550 ASSAY OF CK (CPK): CPT

## 2023-08-26 PROCEDURE — 73590 X-RAY EXAM OF LOWER LEG: CPT

## 2023-08-26 PROCEDURE — 85025 COMPLETE CBC W/AUTO DIFF WBC: CPT

## 2023-08-26 PROCEDURE — 6360000002 HC RX W HCPCS: Performed by: EMERGENCY MEDICINE

## 2023-08-26 PROCEDURE — 96372 THER/PROPH/DIAG INJ SC/IM: CPT

## 2023-08-26 PROCEDURE — 93971 EXTREMITY STUDY: CPT

## 2023-08-26 PROCEDURE — 6360000004 HC RX CONTRAST MEDICATION: Performed by: RADIOLOGY

## 2023-08-26 PROCEDURE — 85730 THROMBOPLASTIN TIME PARTIAL: CPT

## 2023-08-26 PROCEDURE — 99285 EMERGENCY DEPT VISIT HI MDM: CPT

## 2023-08-26 PROCEDURE — 85610 PROTHROMBIN TIME: CPT

## 2023-08-26 RX ORDER — FENTANYL CITRATE 50 UG/ML
50 INJECTION, SOLUTION INTRAMUSCULAR; INTRAVENOUS ONCE
Status: COMPLETED | OUTPATIENT
Start: 2023-08-26 | End: 2023-08-26

## 2023-08-26 RX ORDER — FENTANYL CITRATE 50 UG/ML
50 INJECTION, SOLUTION INTRAMUSCULAR; INTRAVENOUS ONCE
Status: DISCONTINUED | OUTPATIENT
Start: 2023-08-26 | End: 2023-08-26

## 2023-08-26 RX ADMIN — IOPAMIDOL 75 ML: 755 INJECTION, SOLUTION INTRAVENOUS at 19:45

## 2023-08-26 RX ADMIN — FENTANYL CITRATE 50 MCG: 0.05 INJECTION, SOLUTION INTRAMUSCULAR; INTRAVENOUS at 21:01

## 2023-08-26 ASSESSMENT — PAIN DESCRIPTION - ORIENTATION: ORIENTATION: RIGHT

## 2023-08-26 ASSESSMENT — LIFESTYLE VARIABLES
HOW OFTEN DO YOU HAVE A DRINK CONTAINING ALCOHOL: NEVER
HOW MANY STANDARD DRINKS CONTAINING ALCOHOL DO YOU HAVE ON A TYPICAL DAY: PATIENT DOES NOT DRINK

## 2023-08-26 ASSESSMENT — PAIN DESCRIPTION - LOCATION: LOCATION: LEG

## 2023-08-26 ASSESSMENT — PAIN SCALES - GENERAL: PAINLEVEL_OUTOF10: 8

## 2023-08-26 ASSESSMENT — PAIN DESCRIPTION - DESCRIPTORS: DESCRIPTORS: STABBING;SHARP

## 2023-08-26 ASSESSMENT — PAIN - FUNCTIONAL ASSESSMENT: PAIN_FUNCTIONAL_ASSESSMENT: NONE - DENIES PAIN

## 2023-08-26 NOTE — ED PROVIDER NOTES
Department of Emergency Medicine   ED  Provider Note  Admit Date/RoomTime: 2023  8:19 PM  ED Room: 40/40          Written by: Tete Kaur DO  Patient Name: Shanda Lopez  Attending Provider: Natasha att. providers found  Admit Date: 2023  8:19 PM  MRN: [id number]    : 1960      History of Present Illness:  23, Time: 5:09 PM EDT  Chief Complaint   Patient presents with    Leg Pain     Right leg pain- states  of vascular surgery            HPI   Patient is a 60 y.o. male with a past medical history of failure disease, multiple occlusive vessels, DVT, chronic anticoagulation, peripheral vascular disease, AAA, DVT, aneurysm presenting to the Emergency Department for leg pain. History obtained by patient. Patient presents for right calf pain. States this started last night. Describes as a cramping and throbbing pain in his right lower extremity. He states his difficulty from sleep at night secondary to the pain. He had multiple vascular surgeries in the past.  He follows with vascular surgery at [facility]. He last saw them 2 months ago for follow-up. He denies any numbness or tingling in the legs. He states he been taking all of his medications as prescribed. Denies any trauma or injury. He states that ambulating actually improves his symptoms and rest makes his symptoms worse.         Review of Systems:   A complete review of systems was performed and pertinent positives and negatives are stated within HPI, all other systems reviewed and are negative.        --------------------------------------------- PAST HISTORY ---------------------------------------------  Past Medical History:  has a past medical history of AAA (abdominal aortic aneurysm) ([address]), Abdominal aortic aneurysm without rupture ([address]), Anti-phospholipid antibody syndrome ([address]), Aortoiliac occlusive disease ([address]), Asthma, Chronic anticoagulation, DVT (deep venous thrombosis) ([address]), DVT, lower addition to providing specific details for the plan of care and counseling regarding the diagnosis and prognosis and need to [facility] prior to discharge. Their questions are answered at this time and they are agreeable with the plan. This patient has chosen to leave against medical advice. Choosing to do so may result in permanent bodily harm or death. I discussed at length that without further evaluation and monitoring there may be unforeseen circumstances and deterioration resulting in permanent bodily harm or death as a result of their choice and the plan was to call [facility] surgery prior to disposition. They are alert, oriented, and competent at this time. They  wish to leave against medical advice. --------------------------------- ADDITIONAL PROVIDER NOTES ---------------------------------   They have remained hemodynamically stable throughout their entire ED visit and plan to discuss with [facility] surgery prior to disposition        Discharge Medication List as of 8/26/2023  9:54 PM          Diagnosis:  1. Right leg pain    2. Hx of vascular surgery        Disposition:  Patient's disposition: eloped/ama prior to vasc sx consult  Patient's condition is stable. NOTE: This report was transcribed using voice recognition software.  Every effort was made to ensure accuracy; however, inadvertent computerized transcription errors may be present        Josie Ca DO  08/27/23 1128

## 2023-08-27 NOTE — ED NOTES
Advised provider that patient was leaving against medical advice.      [place], [place]  08/26/23 9357

## 2023-08-28 ENCOUNTER — NURSE ONLY (OUTPATIENT)
Dept: INTERNAL MEDICINE | Age: 63
End: 2023-08-28
Payer: COMMERCIAL

## 2023-08-28 VITALS — TEMPERATURE: 98 F

## 2023-08-28 DIAGNOSIS — Z79.01 CHRONIC ANTICOAGULATION: Primary | ICD-10-CM

## 2023-08-28 LAB
INTERNATIONAL NORMALIZATION RATIO, POC: 3.1
PROTHROMBIN TIME, POC: 37.6

## 2023-08-28 PROCEDURE — 85610 PROTHROMBIN TIME: CPT | Performed by: INTERNAL MEDICINE

## 2023-08-28 PROCEDURE — 93793 ANTICOAG MGMT PT WARFARIN: CPT | Performed by: INTERNAL MEDICINE

## 2023-08-28 RX ORDER — WARFARIN SODIUM 3 MG/1
TABLET ORAL DAILY
COMMUNITY

## 2023-08-28 NOTE — PROGRESS NOTES
Lab Results       Component                Value               Date                       INR                      3.1                 08/28/2023              Patient Findings     Negatives:  Signs/symptoms of thrombosis, Signs/symptoms of bleeding,   Laboratory test error suspected, Change in health, Change in alcohol use,   Change in activity, Upcoming invasive procedure, Emergency department   visit, Upcoming dental procedure, Missed doses, Extra doses, Change in   medications, Change in diet/appetite, Hospital admission, Bruising, Other   complaints    Comments:  Patient is taking Coumadin 6 mg daily        Plan: Elevated last few draws; plan to reduce dosage by 5% and return to clinic in 2 weeks     Return date  As of 8/28/2023    TTR:  41.0 % (8.9 y)   Next INR check:  9/11/2023          Electronically signed by Olivia Smalls DO on 8/28/2023 at 2:03 PM

## 2023-08-28 NOTE — PATIENT INSTRUCTIONS
Your new dose of Coumadin will be 6 mg daily except on Tuesdays and Thursdays you will take Coumadin 5 mg. Please call the office with any problems or concerns at [phone].      Thank you     Parrish Grullon LPN

## 2023-08-28 NOTE — PROGRESS NOTES
INR results reviewed with Dr. Monico Rasheed. Orders received. Patient will take Coumadin 6 mg all days except on Tuesdays and Thursdays Coumadin dose will be  Coumadin 5 mg . Recheck PT/INR in 2 weeks on 9/11 at 1 pm.  Patient notified via voicemail of these orders. Detailed message left with new orders. Patient was instructed to call with any questions. AVS printed and mailed to patient with appointment card for next INR check.

## 2023-09-02 DIAGNOSIS — I82.499 DEEP VEIN THROMBOSIS (DVT) OF OTHER VEIN OF LOWER EXTREMITY, UNSPECIFIED CHRONICITY, UNSPECIFIED LATERALITY (HCC): ICD-10-CM

## 2023-09-05 RX ORDER — WARFARIN SODIUM 4 MG/1
TABLET ORAL
Qty: 30 TABLET | Refills: 0 | Status: SHIPPED | OUTPATIENT
Start: 2023-09-05

## 2023-09-08 ENCOUNTER — NURSE ONLY (OUTPATIENT)
Dept: INTERNAL MEDICINE | Age: 63
End: 2023-09-08
Payer: COMMERCIAL

## 2023-09-08 VITALS — TEMPERATURE: 97.5 F

## 2023-09-08 DIAGNOSIS — Z79.01 CHRONIC ANTICOAGULATION: Primary | ICD-10-CM

## 2023-09-08 LAB
INTERNATIONAL NORMALIZATION RATIO, POC: 1.8
PROTHROMBIN TIME, POC: 21.2

## 2023-09-08 PROCEDURE — 85610 PROTHROMBIN TIME: CPT | Performed by: INTERNAL MEDICINE

## 2023-09-08 PROCEDURE — 93793 ANTICOAG MGMT PT WARFARIN: CPT | Performed by: INTERNAL MEDICINE

## 2023-09-08 NOTE — PROGRESS NOTES
INR results reviewed with Dr. Major Wells. Orders received. Patient notified via phone of these orders. Patient voices understanding of the information and follow-up. AVS printed and mailed to patient with appointment card for next INR check.

## 2023-10-06 ENCOUNTER — NURSE ONLY (OUTPATIENT)
Dept: INTERNAL MEDICINE | Age: 63
End: 2023-10-06
Payer: COMMERCIAL

## 2023-10-06 DIAGNOSIS — Z79.01 CHRONIC ANTICOAGULATION: Primary | ICD-10-CM

## 2023-10-06 DIAGNOSIS — I82.499 DEEP VEIN THROMBOSIS (DVT) OF OTHER VEIN OF LOWER EXTREMITY, UNSPECIFIED CHRONICITY, UNSPECIFIED LATERALITY (HCC): ICD-10-CM

## 2023-10-06 LAB
INTERNATIONAL NORMALIZATION RATIO, POC: 1.2
PROTHROMBIN TIME, POC: 14.4

## 2023-10-06 PROCEDURE — 85610 PROTHROMBIN TIME: CPT | Performed by: INTERNAL MEDICINE

## 2023-10-06 PROCEDURE — 93793 ANTICOAG MGMT PT WARFARIN: CPT | Performed by: INTERNAL MEDICINE

## 2023-10-06 RX ORDER — WARFARIN SODIUM 5 MG/1
5 TABLET ORAL DAILY
Qty: 30 TABLET | Refills: 0 | Status: SHIPPED | OUTPATIENT
Start: 2023-10-06

## 2023-10-06 RX ORDER — WARFARIN SODIUM 1 MG/1
1 TABLET ORAL DAILY
Qty: 30 TABLET | Refills: 0 | Status: SHIPPED | OUTPATIENT
Start: 2023-10-06

## 2023-10-06 RX ORDER — WARFARIN SODIUM 4 MG/1
4 TABLET ORAL DAILY
Qty: 30 TABLET | Refills: 0 | Status: CANCELLED | OUTPATIENT
Start: 2023-10-06

## 2023-10-06 NOTE — PROGRESS NOTES
INR results reviewed with Dr. Davon Mathews. Orders received. Take 6 mg Coumadin daily and return in one week for INR recheck on 10/13/23. Patient notified via phone of these orders. Patient voices understanding of the information and follow-up. AVS printed and mailed to patient with appointment card for next INR check.

## 2023-10-06 NOTE — PATIENT INSTRUCTIONS
Take 6 mg of Coumadin daily. Return in one week for INR recheck 10/13/23 at 1:15 pm. Take one Coumadin 5 mg tablet which is orange in color and one Coumadin 1 mg tablet which is pink in color together daily. Please call the office with any concerns at [phone].     Thank Mirian OSULLIVAN LPN

## 2023-10-06 NOTE — PROGRESS NOTES
Lab Results       Component                Value               Date                       INR                      1.2                 10/06/2023              Patient Findings     Positives:  Missed doses    Comments:  Pt states he thinks he is taking Coumadin 4mg and 5 mg   alternating days. Pt states he thinks he has missed a couple doses.         Plan: Patient has been therapeutic in past and supratherapeutic on current dosage; plan to continue current dosage of 6 mg daily and recheck in 1 week    Return date  As of 10/6/2023    TTR:  41.1 % (9 y)   Next INR check:  10/13/2023          Electronically signed by Kaylen Garduno DO on 10/6/2023 at 2:16 PM

## 2023-10-13 ENCOUNTER — NURSE ONLY (OUTPATIENT)
Dept: INTERNAL MEDICINE | Age: 63
End: 2023-10-13
Payer: COMMERCIAL

## 2023-10-13 VITALS — TEMPERATURE: 97.7 F

## 2023-10-13 DIAGNOSIS — Z79.01 CHRONIC ANTICOAGULATION: Primary | ICD-10-CM

## 2023-10-13 LAB
INTERNATIONAL NORMALIZATION RATIO, POC: 3.8
PROTHROMBIN TIME, POC: 45.5

## 2023-10-13 PROCEDURE — 93793 ANTICOAG MGMT PT WARFARIN: CPT | Performed by: INTERNAL MEDICINE

## 2023-10-13 PROCEDURE — 85610 PROTHROMBIN TIME: CPT | Performed by: INTERNAL MEDICINE

## 2023-10-13 NOTE — PROGRESS NOTES
INR results reviewed with Dr. Zulema Perea. Orders received. Patient notified via phone of these orders. Patient voices understanding of the information and follow-up. AVS printed and mailed to patient with appointment card for next INR check.

## 2023-10-13 NOTE — PROGRESS NOTES
Lab Results       Component                Value               Date                       INR                      3.8                 10/13/2023              Patient Findings     Negatives:  Signs/symptoms of thrombosis, Signs/symptoms of bleeding,   Laboratory test error suspected, Change in health, Change in alcohol use,   Change in activity, Upcoming invasive procedure, Emergency department   visit, Upcoming dental procedure, Missed doses, Extra doses, Change in   medications, Change in diet/appetite, Hospital admission, Bruising, Other   complaints    Comments:  Pt taking coumadin 6mg daily, per pt had one  beer before   taking medication yesterday        Plan: Continue current dosage; stop drinking alcohol return in 1 week    Return date  As of 10/13/2023    TTR:  41.1 % (9 y)   Next INR check:            Electronically signed by Sandra Tam DO on 10/13/2023 at 1:51 PM

## 2023-10-23 ENCOUNTER — NURSE ONLY (OUTPATIENT)
Dept: INTERNAL MEDICINE | Age: 63
End: 2023-10-23
Payer: COMMERCIAL

## 2023-10-23 DIAGNOSIS — Z79.01 CHRONIC ANTICOAGULATION: Primary | ICD-10-CM

## 2023-10-23 LAB
INTERNATIONAL NORMALIZATION RATIO, POC: 4
PROTHROMBIN TIME, POC: 48.3

## 2023-10-23 PROCEDURE — 93793 ANTICOAG MGMT PT WARFARIN: CPT | Performed by: INTERNAL MEDICINE

## 2023-10-23 PROCEDURE — 85610 PROTHROMBIN TIME: CPT | Performed by: INTERNAL MEDICINE

## 2023-10-23 NOTE — PROGRESS NOTES
INR results reviewed with Dr. Jack Martinez. Orders received. Hold Coumadin 6 mg dose for 2 days, take Coumadin 3 mg Wednesday then resume Coumadin 6 mg daily. Patient notified in office of these orders. Patient voices understanding of the information and follow-up. AVS printed and given to patient with appointment card for next INR check.

## 2023-10-23 NOTE — PATIENT INSTRUCTIONS
You are to hold your Coumadin 6 mg dose for 2 days. Wednesday 11/25/2023 you are to take Coumadin 3 mg. Resume taking Coumadin 6 mg daily on Thursday 11/26/2023. Your next PT/INR check will be in one week on 11/30/2023 at 1:00 PM. If you have any questions or concerns, please call the office at [phone].        Thank you,     Reyes Gilbert., MA

## 2023-11-03 ENCOUNTER — NURSE ONLY (OUTPATIENT)
Dept: INTERNAL MEDICINE | Age: 63
End: 2023-11-03
Payer: COMMERCIAL

## 2023-11-03 DIAGNOSIS — I82.499 DEEP VEIN THROMBOSIS (DVT) OF OTHER VEIN OF LOWER EXTREMITY, UNSPECIFIED CHRONICITY, UNSPECIFIED LATERALITY (HCC): ICD-10-CM

## 2023-11-03 DIAGNOSIS — Z79.01 CHRONIC ANTICOAGULATION: Chronic | ICD-10-CM

## 2023-11-03 DIAGNOSIS — Z79.01 CHRONIC ANTICOAGULATION: Primary | Chronic | ICD-10-CM

## 2023-11-03 DIAGNOSIS — Z79.01 CHRONIC ANTICOAGULATION: ICD-10-CM

## 2023-11-03 LAB
INR BLD: 5.1
INTERNATIONAL NORMALIZATION RATIO, POC: 4.6
PROTHROMBIN TIME, POC: 55.7
PROTHROMBIN TIME: 55.1 SEC (ref 9.3–12.4)

## 2023-11-03 PROCEDURE — 93793 ANTICOAG MGMT PT WARFARIN: CPT | Performed by: INTERNAL MEDICINE

## 2023-11-03 PROCEDURE — 85610 PROTHROMBIN TIME: CPT | Performed by: INTERNAL MEDICINE

## 2023-11-03 RX ORDER — WARFARIN SODIUM 1 MG/1
1 TABLET ORAL DAILY
Qty: 30 TABLET | Refills: 0 | OUTPATIENT
Start: 2023-11-03

## 2023-11-03 RX ORDER — VARENICLINE TARTRATE 1 MG/1
1 TABLET, FILM COATED ORAL 2 TIMES DAILY
COMMUNITY
Start: 2023-10-19

## 2023-11-03 RX ORDER — WARFARIN SODIUM 5 MG/1
5 TABLET ORAL DAILY
Qty: 30 TABLET | Refills: 0 | OUTPATIENT
Start: 2023-11-03

## 2023-11-03 NOTE — PATIENT INSTRUCTIONS
Hold Coumadin today 11/3 and tomorrow  11/4 then take 5mg on  Monday, Wednesday and Friday and 6mg the rest of the other days  Recheck INR in 1 week

## 2023-11-03 NOTE — PROGRESS NOTES
INR 4.6  PT 55.7    STAT INR drawn by Alban Perez LPN at 0157 and sent via tube system at 1332. Patient refused to wait for results. States to call him.     Denies needing refills    Alejandra Guardado LPN

## 2023-11-03 NOTE — PROGRESS NOTES
03:58 p.m.  Called and spoke to pt , Coumadin instructions and next INR appt. were given, verbalized understanding pt was able to repeat instructions back as directed

## 2023-11-07 ENCOUNTER — TELEPHONE (OUTPATIENT)
Dept: INTERNAL MEDICINE | Age: 63
End: 2023-11-07

## 2023-11-07 NOTE — TELEPHONE ENCOUNTER
Spoke with patient and informed him of his coumadin dose and next INR, per pt he wrote it down and states understanding.

## 2023-11-07 NOTE — TELEPHONE ENCOUNTER
----- Message from Anayeli Casas MA sent at 11/7/2023  8:07 AM EST -----  Subject: Medication Problem    Medication: warfarin (COUMADIN) 5 MG tablet  Dosage: 5 mg  Ordering Provider: rosa maria    Question/Problem: Patient is unsure of how much warfarin he is suppose to   be taking. Please advise       Pharmacy: [id number] [place], [address] [place]ka Arias   [phone] [place] [phone]    ---------------------------------------------------------------------------  --------------  Cheyanne MAYBERRY  [phone];  Do not leave any message, patient will call back for answer  ---------------------------------------------------------------------------  --------------    SCRIPT ANSWERS  Relationship to Patient: Self

## 2023-11-10 ENCOUNTER — NURSE ONLY (OUTPATIENT)
Dept: INTERNAL MEDICINE | Age: 63
End: 2023-11-10
Payer: COMMERCIAL

## 2023-11-10 DIAGNOSIS — I82.499 DEEP VEIN THROMBOSIS (DVT) OF OTHER VEIN OF LOWER EXTREMITY, UNSPECIFIED CHRONICITY, UNSPECIFIED LATERALITY (HCC): ICD-10-CM

## 2023-11-10 DIAGNOSIS — Z79.01 CHRONIC ANTICOAGULATION: Primary | Chronic | ICD-10-CM

## 2023-11-10 DIAGNOSIS — Z79.01 CHRONIC ANTICOAGULATION: Chronic | ICD-10-CM

## 2023-11-10 LAB
INR BLD: 5
INTERNATIONAL NORMALIZATION RATIO, POC: 4.6
PROTHROMBIN TIME, POC: 55.1
PROTHROMBIN TIME: 53.6 SEC (ref 9.3–12.4)

## 2023-11-10 PROCEDURE — 85610 PROTHROMBIN TIME: CPT | Performed by: INTERNAL MEDICINE

## 2023-11-10 PROCEDURE — 93793 ANTICOAG MGMT PT WARFARIN: CPT | Performed by: INTERNAL MEDICINE

## 2023-11-10 NOTE — PROGRESS NOTES
INR 4.6 PT 55.1. STAT draw by Roberto Harrington LPN. Dr. Dez Hussein given pink card as visual aid and so he is aware to look for result.   Ralph Jorge LPN

## 2023-11-10 NOTE — PROGRESS NOTES
Coumadin instructions and next INR appt. Was given to pt.  And reviewed verbalizes understanding Printed AVS given

## 2023-11-10 NOTE — PROGRESS NOTES
Lab Results       Component                Value               Date                       INR                      5.0 (H)             11/10/2023                  Plan: Plan to hold today and tomorrow; restart taking warfarin 5 mg daily starting on Sunday; return for followup visit in 1 week for INR     Return date  As of 11/10/2023    TTR:  40.7 % (9.1 y)   Next INR check:  11/17/2023        Electronically signed by Miguel Montero DO on 11/10/2023 at 2:55 PM

## 2023-11-17 ENCOUNTER — NURSE ONLY (OUTPATIENT)
Dept: INTERNAL MEDICINE | Age: 63
End: 2023-11-17
Payer: COMMERCIAL

## 2023-11-17 DIAGNOSIS — I82.499 DEEP VEIN THROMBOSIS (DVT) OF OTHER VEIN OF LOWER EXTREMITY, UNSPECIFIED CHRONICITY, UNSPECIFIED LATERALITY (HCC): ICD-10-CM

## 2023-11-17 DIAGNOSIS — M79.2 NEUROPATHIC PAIN: ICD-10-CM

## 2023-11-17 DIAGNOSIS — Z79.01 CHRONIC ANTICOAGULATION: Primary | ICD-10-CM

## 2023-11-17 LAB
INTERNATIONAL NORMALIZATION RATIO, POC: 2.7
PROTHROMBIN TIME, POC: 32.3

## 2023-11-17 PROCEDURE — 85610 PROTHROMBIN TIME: CPT | Performed by: INTERNAL MEDICINE

## 2023-11-17 RX ORDER — WARFARIN SODIUM 4 MG/1
4 TABLET ORAL DAILY
Qty: 30 TABLET | Refills: 0 | Status: SHIPPED | OUTPATIENT
Start: 2023-11-17

## 2023-11-17 RX ORDER — ALBUTEROL SULFATE 90 UG/1
2 AEROSOL, METERED RESPIRATORY (INHALATION) EVERY 6 HOURS PRN
Qty: 18 G | Refills: 1 | Status: SHIPPED | OUTPATIENT
Start: 2023-11-17

## 2023-11-17 RX ORDER — M-VIT,TX,IRON,MINS/CALC/FOLIC 27MG-0.4MG
1 TABLET ORAL DAILY
Qty: 90 TABLET | Refills: 1 | Status: SHIPPED | OUTPATIENT
Start: 2023-11-17 | End: 2024-05-15

## 2023-11-17 NOTE — PATIENT INSTRUCTIONS
Please continue taking 4 mg of Coumadin daily. Return in 1 week for a recheck on 12/01/23 at 1pm. Please call the office with any questions or concerns at [phone].      Thank Sarah OSULLIVAN LPN

## 2023-11-17 NOTE — PROGRESS NOTES
INR results reviewed with Dr. Rohan Powell. . Orders received. Continue taking 4 mg daily and return in one week for a recheck  Patient notified via phone of these orders. Patient voices understanding of the information and follow-up. AVS printed and mailed to patient with appointment card for next INR check.

## 2023-11-20 ENCOUNTER — OFFICE VISIT (OUTPATIENT)
Dept: INTERNAL MEDICINE | Age: 63
End: 2023-11-20
Payer: COMMERCIAL

## 2023-11-20 VITALS
HEART RATE: 74 BPM | RESPIRATION RATE: 18 BRPM | OXYGEN SATURATION: 96 % | WEIGHT: 157.4 LBS | TEMPERATURE: 98 F | BODY MASS INDEX: 20.2 KG/M2 | HEIGHT: 74 IN | SYSTOLIC BLOOD PRESSURE: 123 MMHG | DIASTOLIC BLOOD PRESSURE: 81 MMHG

## 2023-11-20 DIAGNOSIS — Z98.890 HX OF ABDOMINAL SURGERY: ICD-10-CM

## 2023-11-20 DIAGNOSIS — I10 ESSENTIAL HYPERTENSION: ICD-10-CM

## 2023-11-20 DIAGNOSIS — E83.42 HYPOMAGNESEMIA: ICD-10-CM

## 2023-11-20 DIAGNOSIS — I73.9 PAD (PERIPHERAL ARTERY DISEASE) (HCC): ICD-10-CM

## 2023-11-20 DIAGNOSIS — M79.2 NEUROPATHIC PAIN: Primary | ICD-10-CM

## 2023-11-20 PROCEDURE — 4004F PT TOBACCO SCREEN RCVD TLK: CPT | Performed by: INTERNAL MEDICINE

## 2023-11-20 PROCEDURE — 3017F COLORECTAL CA SCREEN DOC REV: CPT | Performed by: INTERNAL MEDICINE

## 2023-11-20 PROCEDURE — 3074F SYST BP LT 130 MM HG: CPT | Performed by: INTERNAL MEDICINE

## 2023-11-20 PROCEDURE — G8482 FLU IMMUNIZE ORDER/ADMIN: HCPCS | Performed by: INTERNAL MEDICINE

## 2023-11-20 PROCEDURE — 3078F DIAST BP <80 MM HG: CPT | Performed by: INTERNAL MEDICINE

## 2023-11-20 PROCEDURE — 99212 OFFICE O/P EST SF 10 MIN: CPT | Performed by: INTERNAL MEDICINE

## 2023-11-20 PROCEDURE — 99213 OFFICE O/P EST LOW 20 MIN: CPT | Performed by: INTERNAL MEDICINE

## 2023-11-20 PROCEDURE — G8427 DOCREV CUR MEDS BY ELIG CLIN: HCPCS | Performed by: INTERNAL MEDICINE

## 2023-11-20 PROCEDURE — G8420 CALC BMI NORM PARAMETERS: HCPCS | Performed by: INTERNAL MEDICINE

## 2023-11-20 ASSESSMENT — ENCOUNTER SYMPTOMS
SORE THROAT: 0
VOMITING: 0
CHEST TIGHTNESS: 0
BACK PAIN: 0
SHORTNESS OF BREATH: 0
NAUSEA: 0
SINUS PRESSURE: 0
ABDOMINAL PAIN: 0

## 2023-11-20 NOTE — PROGRESS NOTES
[facility]  Internal Medicine Residency Clinic    Attending Physician Statement  I have discussed the case, including pertinent history and exam findings with the resident physician. I agree with the assessment, plan and orders as documented by the resident. I have reviewed all pertinent PMHx, PSHx, FamHx, SocialHx, medications, and allergies and updated history as appropriate. Patient here for routine follow up of medical problems. ED visit in August reviewed. CTA reviewed. Patient has seen his vascular surgeon on [facility] 8/31/23 and 10/19/23 - no changes in medication. He is currently asymptomatic  PAD. See ED visit as above. Continue statin, plavix. APLA, currently tx on coumadin. Follow up next wk for rpt INR check. HTN, stable  Tobacco cessation discussed. Currently on varenicline c/o [facility]. Rpt colonoscopy 2028 - hx of multiple sigmoid polyps (tubular adenoma)    Remainder of medical problems as per resident note. Lita Valdez MD  11/20/2023 2:05 PM
adenoma  -recommend follow-up colonoscopy in 5 years, 5/2028  Plan  -high fiber diet  -follow-up colonoscopy 5/2028, with Dr. Kya Casas B feet 2/2 Plantar fasciitis, improving  -follows up with Dr. Berenice Persaud (Podiatry at [facility])   -with custom-fit orthotic shoes  -on gabapentin 400 mg TID  -Vit B12 and folate 5/3/22: wnl  -HBA1C 5/3/22: 4.9%  Plan  -continue gabapentin 400 mg TID  -continue to follow with podiatry at [facility]  -repeat HBA1C next visit     Onychomycosis, resolved  --last seen by Dr. Berenice Persaud (Podiatry) 1/9/23 at [facility]: Onychomycosis, s/p debridement of mycotic nails, continued with athletic shoes)     Depression  -previously on paroxetine  -asymptomatic and stable, no SI, no HI  Plan  -will monitor      Tobacco abuse  -still smokes <5 cigarettes/day, started around 23 y/o x 53 years 1/2 pack/day  -not taking any medications  Plan  -advised and counseled on smoking cessation  -on varenicline     Health Maintenance  -Colonoscopy 5/22/2023: plan for repeat in 5 years 2028  -COVID booster  -lipid panel-ordered for next visit  -Pneumococcal vaccine    Return in about 3 months (around 2/20/2024) for Follow-up with me in 3 months. Subjective   SUBJECTIVE/OBJECTIVE:  This is C. C. A 57year old man, with a PMHx of HTN, HLD, PAD, AAA, s/p repair with chronic abdominal graft infection, hypercoagulable state d/t APLA in the setting of SLE with Hx DVT, metatarsalgia B feet, depression, tobacco abuse who comes into the clinic for follow-up. He was last seen on 7/7/23. He was seen in the ED on 8/26/23 for R leg pain.  CTA abdominal aorta with runoff showed  chronic thrombosed aneurysm of the abdominal aorta starting just below the level of the renal arteries, patent L axillary-femoral graft with reconstitution flow to the right and left lower extremity,  Poor contrast runoff in the right and left popliteal  arteries into arteries for the lower extremities, with very poor runoff to the area

## 2023-11-20 NOTE — PATIENT INSTRUCTIONS
Continue to take your medications regularly. Continue to follow at [facility] as directed.

## 2023-11-22 RX ORDER — LOSARTAN POTASSIUM 100 MG/1
100 TABLET ORAL DAILY
Qty: 90 TABLET | Refills: 1 | Status: SHIPPED | OUTPATIENT
Start: 2023-11-22

## 2023-11-22 RX ORDER — MAGNESIUM OXIDE 400 MG/1
400 TABLET ORAL DAILY
Qty: 90 TABLET | Refills: 1 | Status: SHIPPED | OUTPATIENT
Start: 2023-11-22 | End: 2024-05-20

## 2023-11-22 RX ORDER — DOXYCYCLINE 100 MG/1
CAPSULE ORAL
Qty: 90 CAPSULE | Refills: 1 | Status: SHIPPED | OUTPATIENT
Start: 2023-11-22

## 2023-11-22 RX ORDER — ATORVASTATIN CALCIUM 80 MG/1
80 TABLET, FILM COATED ORAL NIGHTLY
Qty: 90 TABLET | Refills: 1 | Status: SHIPPED | OUTPATIENT
Start: 2023-11-22 | End: 2024-05-20

## 2023-11-22 RX ORDER — ALBUTEROL SULFATE 90 UG/1
2 AEROSOL, METERED RESPIRATORY (INHALATION) EVERY 6 HOURS PRN
Qty: 18 G | Refills: 1 | Status: SHIPPED | OUTPATIENT
Start: 2023-11-22

## 2023-11-22 RX ORDER — M-VIT,TX,IRON,MINS/CALC/FOLIC 27MG-0.4MG
1 TABLET ORAL DAILY
Qty: 90 TABLET | Refills: 1 | Status: SHIPPED | OUTPATIENT
Start: 2023-11-22 | End: 2024-05-20

## 2023-11-22 RX ORDER — GABAPENTIN 400 MG/1
400 CAPSULE ORAL 3 TIMES DAILY
Qty: 270 CAPSULE | Refills: 1 | Status: SHIPPED | OUTPATIENT
Start: 2023-11-22 | End: 2024-05-20

## 2023-11-22 RX ORDER — HYDROCHLOROTHIAZIDE 25 MG/1
25 TABLET ORAL
Qty: 90 TABLET | Refills: 1 | Status: SHIPPED | OUTPATIENT
Start: 2023-11-22 | End: 2024-05-20

## 2023-11-22 RX ORDER — AMLODIPINE BESYLATE 10 MG/1
10 TABLET ORAL DAILY
Qty: 90 TABLET | Refills: 1 | Status: SHIPPED | OUTPATIENT
Start: 2023-11-22

## 2023-11-27 ENCOUNTER — NURSE ONLY (OUTPATIENT)
Dept: INTERNAL MEDICINE | Age: 63
End: 2023-11-27
Payer: COMMERCIAL

## 2023-11-27 DIAGNOSIS — Z79.01 CHRONIC ANTICOAGULATION: Primary | Chronic | ICD-10-CM

## 2023-11-27 DIAGNOSIS — I82.4Y9 DEEP VEIN THROMBOSIS (DVT) OF PROXIMAL LOWER EXTREMITY, UNSPECIFIED CHRONICITY, UNSPECIFIED LATERALITY (HCC): ICD-10-CM

## 2023-11-27 LAB
INTERNATIONAL NORMALIZATION RATIO, POC: 2.6
PROTHROMBIN TIME, POC: 32.1

## 2023-11-27 PROCEDURE — 93793 ANTICOAG MGMT PT WARFARIN: CPT | Performed by: INTERNAL MEDICINE

## 2023-11-27 PROCEDURE — 85610 PROTHROMBIN TIME: CPT | Performed by: INTERNAL MEDICINE

## 2023-11-27 PROCEDURE — 99211 OFF/OP EST MAY X REQ PHY/QHP: CPT | Performed by: INTERNAL MEDICINE

## 2023-11-27 NOTE — PROGRESS NOTES
Lab Results       Component                Value               Date                       INR                      2.6                 11/27/2023              Patient Findings     Negatives:  Signs/symptoms of thrombosis, Signs/symptoms of bleeding,   Laboratory test error suspected, Change in health, Change in alcohol use,   Change in activity, Upcoming invasive procedure, Emergency department   visit, Upcoming dental procedure, Missed doses, Extra doses, Change in   medications, Change in diet/appetite, Hospital admission, Bruising, Other   complaints        Plan: Continue current dosage and retunr in 2 weeks     Return date  As of 11/27/2023    TTR:  40.9 % (9.1 y)   Next INR check:  12/11/2023          Electronically signed by Dick Fuller DO on 11/27/2023 at 1:15 PM

## 2023-11-27 NOTE — PATIENT INSTRUCTIONS
Thank you for coming in today to have your INR checked. Please take your Coumadin as follows:  Coumadin 4 mg daily  We will recheck your INR in 2 weeks  Your appointment is Monday 12/11/23 at 1:00 pm  Please call if you have any unusual bleeding or any concerns at [phone]. Thanks!      Yara Porter LPN :)

## 2023-11-27 NOTE — PROGRESS NOTES
11/27/23 at 1325 - Spoke with patient personally. Instructed patient to continue Coumadin at 4 mg daily and return to office 2 weeks from today on 12/11/23 at 1:00 pm. Patient stated understanding and compliance.  Carlo Barney LPN

## 2023-12-04 ENCOUNTER — HOSPITAL ENCOUNTER (OUTPATIENT)
Age: 63
Discharge: HOME | End: 2023-12-04

## 2023-12-15 ENCOUNTER — NURSE ONLY (OUTPATIENT)
Dept: INTERNAL MEDICINE | Age: 63
End: 2023-12-15
Payer: COMMERCIAL

## 2023-12-15 DIAGNOSIS — I82.499 DEEP VEIN THROMBOSIS (DVT) OF OTHER VEIN OF LOWER EXTREMITY, UNSPECIFIED CHRONICITY, UNSPECIFIED LATERALITY (HCC): ICD-10-CM

## 2023-12-15 DIAGNOSIS — Z79.01 CHRONIC ANTICOAGULATION: Primary | ICD-10-CM

## 2023-12-15 LAB
INTERNATIONAL NORMALIZATION RATIO, POC: 1.9
PROTHROMBIN TIME, POC: 22.5

## 2023-12-15 PROCEDURE — 93793 ANTICOAG MGMT PT WARFARIN: CPT | Performed by: INTERNAL MEDICINE

## 2023-12-15 PROCEDURE — 85610 PROTHROMBIN TIME: CPT | Performed by: INTERNAL MEDICINE

## 2023-12-15 RX ORDER — WARFARIN SODIUM 4 MG/1
4 TABLET ORAL DAILY
Qty: 30 TABLET | Refills: 0 | Status: SHIPPED | OUTPATIENT
Start: 2023-12-15

## 2023-12-15 NOTE — PATIENT INSTRUCTIONS
Please take 6 mg of Coumadin tonight then resume 4 mg of Coumadin daily on 12/16/23. Return in one week for a recheck on 12/26/23 at 1pm. Please call the office with any questions or concerns at [phone].       Thank Shahid OSULLIVAN LPN

## 2023-12-15 NOTE — PROGRESS NOTES
INR results reviewed with Dr. Kaylee Tanner. Orders received. Take 6 mg of Coumadin tonight then resume 4 mg daily starting 12/16/23. Return in one week on 12/26/23 at 1pm. Patient notified via phone of these orders. Patient voices understanding of the information and follow-up. AVS printed and mailed to patient with appointment card for next INR check.

## 2023-12-29 ENCOUNTER — NURSE ONLY (OUTPATIENT)
Dept: INTERNAL MEDICINE | Age: 63
End: 2023-12-29
Payer: COMMERCIAL

## 2023-12-29 DIAGNOSIS — I82.499 DEEP VEIN THROMBOSIS (DVT) OF OTHER VEIN OF LOWER EXTREMITY, UNSPECIFIED CHRONICITY, UNSPECIFIED LATERALITY (HCC): ICD-10-CM

## 2023-12-29 DIAGNOSIS — Z79.01 CHRONIC ANTICOAGULATION: Primary | Chronic | ICD-10-CM

## 2023-12-29 LAB
INTERNATIONAL NORMALIZATION RATIO, POC: 1.4
PROTHROMBIN TIME, POC: 17.1

## 2023-12-29 PROCEDURE — 85610 PROTHROMBIN TIME: CPT | Performed by: INTERNAL MEDICINE

## 2023-12-29 PROCEDURE — 93793 ANTICOAG MGMT PT WARFARIN: CPT | Performed by: INTERNAL MEDICINE

## 2023-12-29 SDOH — ECONOMIC STABILITY: FOOD INSECURITY: WITHIN THE PAST 12 MONTHS, YOU WORRIED THAT YOUR FOOD WOULD RUN OUT BEFORE YOU GOT MONEY TO BUY MORE.: NEVER TRUE

## 2023-12-29 SDOH — ECONOMIC STABILITY: FOOD INSECURITY: WITHIN THE PAST 12 MONTHS, THE FOOD YOU BOUGHT JUST DIDN'T LAST AND YOU DIDN'T HAVE MONEY TO GET MORE.: NEVER TRUE

## 2023-12-29 SDOH — ECONOMIC STABILITY: INCOME INSECURITY: HOW HARD IS IT FOR YOU TO PAY FOR THE VERY BASICS LIKE FOOD, HOUSING, MEDICAL CARE, AND HEATING?: NOT HARD AT ALL

## 2023-12-29 NOTE — PATIENT INSTRUCTIONS
Pt instructed to take 6mg of Coumadin today and tomorrow 12/30/23 , then resume taking 4mg of Coumadin daily beginning 12/31/23 and repeat INR on 1/4/24

## 2023-12-29 NOTE — PROGRESS NOTES
INR results reviewed with Dr. Wilkerson.  Orders received.  Patient notified via phone of these orders.  Patient voices understanding of the information and follow-up.  AVS printed and mailed to patient with appointment card for next INR check.

## 2024-01-04 ENCOUNTER — NURSE ONLY (OUTPATIENT)
Dept: INTERNAL MEDICINE | Age: 64
End: 2024-01-04
Payer: COMMERCIAL

## 2024-01-04 DIAGNOSIS — Z79.01 CHRONIC ANTICOAGULATION: Primary | ICD-10-CM

## 2024-01-04 LAB
INTERNATIONAL NORMALIZATION RATIO, POC: 2
PROTHROMBIN TIME, POC: 24.3

## 2024-01-04 PROCEDURE — 93793 ANTICOAG MGMT PT WARFARIN: CPT | Performed by: INTERNAL MEDICINE

## 2024-01-04 PROCEDURE — 85610 PROTHROMBIN TIME: CPT | Performed by: INTERNAL MEDICINE

## 2024-01-04 NOTE — PATIENT INSTRUCTIONS
Take 6 mg Thurs 1/4 and Fri 1/5. After that take coumadin 4 mg daily starting Saturday. Recheck INR in 1 week

## 2024-01-04 NOTE — PROGRESS NOTES
Pt phoned in ,Coumadin instructions were given per ordered Pt  then repeated instructions back so understanding was confirmed informed next appt. 1/11/24 at 1:00p.m.

## 2024-01-09 ENCOUNTER — TELEPHONE (OUTPATIENT)
Dept: INTERNAL MEDICINE | Age: 64
End: 2024-01-09

## 2024-01-09 NOTE — TELEPHONE ENCOUNTER
Pt needs refill for gabapentin please send to [facility] on [address] per pt request. Pt states he is completely out

## 2024-01-09 NOTE — TELEPHONE ENCOUNTER
Spoke with Pharmacist at [facility], he states pt just has to go into [place] pharmacy and ask to transfer the script from them to [place] and pt has two scripts on record.   Pt notified and states understanding.

## 2024-01-12 ENCOUNTER — NURSE ONLY (OUTPATIENT)
Dept: INTERNAL MEDICINE | Age: 64
End: 2024-01-12
Payer: COMMERCIAL

## 2024-01-12 VITALS — TEMPERATURE: 97.2 F

## 2024-01-12 DIAGNOSIS — Z79.01 CHRONIC ANTICOAGULATION: Primary | ICD-10-CM

## 2024-01-12 DIAGNOSIS — M10.9 ACUTE GOUT, UNSPECIFIED CAUSE, UNSPECIFIED SITE: ICD-10-CM

## 2024-01-12 DIAGNOSIS — I82.499 DEEP VEIN THROMBOSIS (DVT) OF OTHER VEIN OF LOWER EXTREMITY, UNSPECIFIED CHRONICITY, UNSPECIFIED LATERALITY (HCC): ICD-10-CM

## 2024-01-12 LAB
INTERNATIONAL NORMALIZATION RATIO, POC: 1.9
PROTHROMBIN TIME, POC: 23.1

## 2024-01-12 PROCEDURE — 85610 PROTHROMBIN TIME: CPT | Performed by: INTERNAL MEDICINE

## 2024-01-12 RX ORDER — WARFARIN SODIUM 6 MG/1
6 TABLET ORAL DAILY
Qty: 30 TABLET | Refills: 0 | Status: SHIPPED | OUTPATIENT
Start: 2024-01-12

## 2024-01-12 RX ORDER — WARFARIN SODIUM 4 MG/1
4 TABLET ORAL DAILY
Qty: 30 TABLET | Refills: 0 | Status: SHIPPED | OUTPATIENT
Start: 2024-01-12

## 2024-01-12 NOTE — PATIENT INSTRUCTIONS
Your new Coumadin instruction take Coumadin 6 mg on Friday, Sunday, Tuesday and Thursday along with Coumadin 4 mg all other days. Your next PT/INR will be on 1/19 at 1 pm. Your Coumadin 4 mg and 6 mg tablets were called into local pharmacy. Please call the office with any problems or concerns at [phone].    Thank you     Zach CAT LPN

## 2024-01-12 NOTE — PROGRESS NOTES
Lab Results        Component                Value               Date                       INR                      1.9                 01/12/2024              Patient Findings     Comments:  Patient taking Coumadin 6 mg on 1/4 and 1/5. Patient resumed   taking Coumadin 4 mg daily after 1/5.        Plan: patient was taking 32 mg last week; plan to increase to 35 mg for the week this week by alternating 6 mg and 4 mg dosage to increase his weekly dosage by 9% not 25 % as described in the computer.       Return date  As of 1/12/2024    TTR:  40.4 % (9.2 y)   Next INR check:  1/19/2024          Electronically signed by Benito Cuellar Jr, DO on 1/12/2024 at 1:29 PM

## 2024-01-12 NOTE — PROGRESS NOTES
INR results reviewed with Dr. Cuellar.  Orders received.   Patient new Coumadin instructions are Coumadin 6 mg on Fridays, Sunday, Tuesday and Thursday along will Coumadin 4 mg all other days.. Recheck PT/INR in 1 week on 1/19 at 1 pm. Patient notified via phone of these orders.  Patient voices understanding of the information and follow-up.  AVS printed and mailed to patient with appointment card for next INR check.

## 2024-01-19 ENCOUNTER — NURSE ONLY (OUTPATIENT)
Dept: INTERNAL MEDICINE | Age: 64
End: 2024-01-19
Payer: COMMERCIAL

## 2024-01-19 DIAGNOSIS — Z79.01 CHRONIC ANTICOAGULATION: Primary | ICD-10-CM

## 2024-01-19 LAB
INTERNATIONAL NORMALIZATION RATIO, POC: 2.4
PROTHROMBIN TIME, POC: 29.3

## 2024-01-19 PROCEDURE — 85610 PROTHROMBIN TIME: CPT | Performed by: INTERNAL MEDICINE

## 2024-01-19 PROCEDURE — 93793 ANTICOAG MGMT PT WARFARIN: CPT | Performed by: INTERNAL MEDICINE

## 2024-01-19 NOTE — PROGRESS NOTES
INR results reviewed with Dr. Kerr.  Orders received. Continue alternating Coumadin 6 mg and Coumadin 4 mg daily. Patient notified via phone of these orders.  Patient voices understanding of the information and follow-up.  AVS printed and mailed to patient with appointment card for next INR check.

## 2024-01-19 NOTE — PATIENT INSTRUCTIONS
There has been no changes to your Coumadin dosage. Continue alternating Coumadin 6 mg and Coumadin 4 mg daily. Your next PT/INR will be on 02/16/2024 at 1:00 pm. If you have any further questions or concerns, please contact the office at [phone].      Thank you,    Ximena NEELY MA

## 2024-02-08 ENCOUNTER — HOSPITAL ENCOUNTER (OUTPATIENT)
Age: 64
Discharge: HOME | End: 2024-02-08
Payer: COMMERCIAL

## 2024-02-08 DIAGNOSIS — Z79.01 CHRONIC ANTICOAGULATION: ICD-10-CM

## 2024-02-08 DIAGNOSIS — I82.499 DEEP VEIN THROMBOSIS (DVT) OF OTHER VEIN OF LOWER EXTREMITY, UNSPECIFIED CHRONICITY, UNSPECIFIED LATERALITY (HCC): ICD-10-CM

## 2024-02-08 DIAGNOSIS — M10.9 ACUTE GOUT, UNSPECIFIED CAUSE, UNSPECIFIED SITE: ICD-10-CM

## 2024-02-08 LAB
ALBUMIN SERPL-MCNC: 4.6 G/DL (ref 3.5–5.2)
ALP SERPL-CCNC: 81 U/L (ref 40–129)
ALT SERPL-CCNC: 13 U/L (ref 0–40)
ANION GAP SERPL CALCULATED.3IONS-SCNC: 16 MMOL/L (ref 7–16)
ANION GAP SERPL CALCULATED.3IONS-SCNC: 17 MMOL/L (ref 7–16)
AST SERPL-CCNC: 19 U/L (ref 0–39)
BILIRUB SERPL-MCNC: 0.7 MG/DL (ref 0–1.2)
BUN SERPL-MCNC: 17 MG/DL (ref 6–23)
BUN SERPL-MCNC: 19 MG/DL (ref 6–23)
CALCIUM SERPL-MCNC: 9.6 MG/DL (ref 8.6–10.2)
CALCIUM SERPL-MCNC: 9.6 MG/DL (ref 8.6–10.2)
CHLORIDE SERPL-SCNC: 104 MMOL/L (ref 98–107)
CHLORIDE SERPL-SCNC: 105 MMOL/L (ref 98–107)
CHOLEST SERPL-MCNC: 213 MG/DL
CO2 SERPL-SCNC: 19 MMOL/L (ref 22–29)
CO2 SERPL-SCNC: 20 MMOL/L (ref 22–29)
CREAT SERPL-MCNC: 1.1 MG/DL (ref 0.7–1.2)
CREAT SERPL-MCNC: 1.1 MG/DL (ref 0.7–1.2)
GFR SERPL CREATININE-BSD FRML MDRD: >60 ML/MIN/1.73M2
GFR SERPL CREATININE-BSD FRML MDRD: >60 ML/MIN/1.73M2
GLUCOSE SERPL-MCNC: 98 MG/DL (ref 74–99)
GLUCOSE SERPL-MCNC: 98 MG/DL (ref 74–99)
HBA1C MFR BLD: 5 % (ref 4–5.6)
HDLC SERPL-MCNC: 43 MG/DL
LDLC SERPL CALC-MCNC: 141 MG/DL
MAGNESIUM SERPL-MCNC: 1.9 MG/DL (ref 1.6–2.6)
POTASSIUM SERPL-SCNC: 3.8 MMOL/L (ref 3.5–5)
POTASSIUM SERPL-SCNC: 3.8 MMOL/L (ref 3.5–5)
PROT SERPL-MCNC: 7.6 G/DL (ref 6.4–8.3)
SODIUM SERPL-SCNC: 140 MMOL/L (ref 132–146)
SODIUM SERPL-SCNC: 141 MMOL/L (ref 132–146)
TRIGL SERPL-MCNC: 143 MG/DL
URATE SERPL-MCNC: 9.9 MG/DL (ref 3.4–7)
VLDLC SERPL CALC-MCNC: 29 MG/DL

## 2024-02-08 PROCEDURE — 84550 ASSAY OF BLOOD/URIC ACID: CPT

## 2024-02-08 PROCEDURE — 36415 COLL VENOUS BLD VENIPUNCTURE: CPT

## 2024-02-08 PROCEDURE — 80061 LIPID PANEL: CPT

## 2024-02-08 PROCEDURE — 80048 BASIC METABOLIC PNL TOTAL CA: CPT

## 2024-02-08 PROCEDURE — 83735 ASSAY OF MAGNESIUM: CPT

## 2024-02-08 PROCEDURE — 83036 HEMOGLOBIN GLYCOSYLATED A1C: CPT

## 2024-02-08 PROCEDURE — 80053 COMPREHEN METABOLIC PANEL: CPT

## 2024-02-12 RX ORDER — WARFARIN SODIUM 6 MG/1
6 TABLET ORAL DAILY
Qty: 30 TABLET | Refills: 0 | Status: SHIPPED | OUTPATIENT
Start: 2024-02-12

## 2024-02-12 NOTE — TELEPHONE ENCOUNTER
Last Appointment:11/20/2023    Future Appointments   Date Time Provider Department Center   2/15/2024  1:00 PM SCHEDULE, [facility]

## 2024-02-15 ENCOUNTER — NURSE ONLY (OUTPATIENT)
Dept: INTERNAL MEDICINE | Age: 64
End: 2024-02-15
Payer: COMMERCIAL

## 2024-02-15 DIAGNOSIS — Z79.01 CHRONIC ANTICOAGULATION: Primary | ICD-10-CM

## 2024-02-15 LAB
INTERNATIONAL NORMALIZATION RATIO, POC: 1.5
PROTHROMBIN TIME, POC: 18.3

## 2024-02-15 PROCEDURE — 85610 PROTHROMBIN TIME: CPT | Performed by: INTERNAL MEDICINE

## 2024-02-15 PROCEDURE — 93793 ANTICOAG MGMT PT WARFARIN: CPT | Performed by: INTERNAL MEDICINE

## 2024-02-15 NOTE — PROGRESS NOTES
INR results reviewed with Dr. Pena.  Orders received. Begin alternating Coumadin 6 mg and Coumadin 5 mg daily. Recheck in one week. Patient notified via phone of these orders.  Patient voices understanding of the information and follow-up.  AVS printed and mailed to patient with appointment card for next INR check.

## 2024-02-15 NOTE — PATIENT INSTRUCTIONS
There has been a change to your Coumadin dosage. You are to begin alternating Coumadin 6 mg and Coumadin 5 mg daily. Your next PT/INR lab will be in one week on 02/22/2024 at 1:15 pm. If you have any furhter questions or concerns, please contact the office at [phone].      Thank you,    Ximena NEELY MA

## 2024-02-23 ENCOUNTER — NURSE ONLY (OUTPATIENT)
Dept: INTERNAL MEDICINE | Age: 64
End: 2024-02-23
Payer: COMMERCIAL

## 2024-02-23 DIAGNOSIS — Z79.01 CHRONIC ANTICOAGULATION: Primary | ICD-10-CM

## 2024-02-23 DIAGNOSIS — I82.499 DEEP VEIN THROMBOSIS (DVT) OF OTHER VEIN OF LOWER EXTREMITY, UNSPECIFIED CHRONICITY, UNSPECIFIED LATERALITY (HCC): ICD-10-CM

## 2024-02-23 LAB
INTERNATIONAL NORMALIZATION RATIO, POC: 1.3
PROTHROMBIN TIME, POC: 15

## 2024-02-23 PROCEDURE — 85610 PROTHROMBIN TIME: CPT | Performed by: INTERNAL MEDICINE

## 2024-02-23 NOTE — PROGRESS NOTES
INR results reviewed with Dr. Galvan.  Orders received. Start taking 6 mg daily and return in one week for a recheck. Patient notified via phone of these orders.  Patient voices understanding of the information and follow-up.  AVS printed and mailed to patient with appointment card for next INR check.

## 2024-02-23 NOTE — PATIENT INSTRUCTIONS
Start taking 6 mg of Coumadin daily. Return in one week for a recheck on 3/1/24 at 1 pm. Please call the office with any questions or concerns at [phone].    Thank You,    Celena OSULLIVAN LPN

## 2024-03-01 ENCOUNTER — NURSE ONLY (OUTPATIENT)
Dept: INTERNAL MEDICINE | Age: 64
End: 2024-03-01
Payer: COMMERCIAL

## 2024-03-01 DIAGNOSIS — Z79.01 CHRONIC ANTICOAGULATION: Primary | ICD-10-CM

## 2024-03-01 LAB
INTERNATIONAL NORMALIZATION RATIO, POC: 2.7
PROTHROMBIN TIME, POC: 32.2

## 2024-03-01 PROCEDURE — 85610 PROTHROMBIN TIME: CPT | Performed by: INTERNAL MEDICINE

## 2024-03-01 PROCEDURE — 93793 ANTICOAG MGMT PT WARFARIN: CPT | Performed by: INTERNAL MEDICINE

## 2024-03-01 NOTE — PROGRESS NOTES
Lab Results       Component                Value               Date                       INR                      2.7                 03/01/2024              Patient Findings     Negatives:  Signs/symptoms of thrombosis, Signs/symptoms of bleeding,   Laboratory test error suspected, Change in health, Change in alcohol use,   Change in activity, Upcoming invasive procedure, Emergency department   visit, Upcoming dental procedure, Missed doses, Extra doses, Change in   medications, Change in diet/appetite, Hospital admission, Bruising, Other   complaints    Comments:  Pt taking 6 mg daily        Plan: Continue current dosage of 6 mg daily and return to clinic in 1 week    Return date  As of 3/1/2024    TTR:  39.9 % (9.4 y)   Next INR check:  3/8/2024          Electronically signed by Benito Cuellar Jr, DO on 3/1/2024 at 1:28 PM

## 2024-03-01 NOTE — PROGRESS NOTES
INR results reviewed with Dr. Cuellar.  Orders received. Continue taking 6 mg daily. Recheck one week. Patient notified via phone of these orders.  Patient voices understanding of the information and follow-up.  AVS printed and mailed to patient with appointment card for next INR check.

## 2024-03-01 NOTE — PATIENT INSTRUCTIONS
There has been no change to your Coumadin dosage. You are to continue taking Coumadin 6 mg daily. Your next PT/INR lab will be during your next office visit on 03/06/2024 at 1:00 pm. If you have any further questions or concerns, please contact the office at [phone].      Thank you,     Ximena NEELY MA

## 2024-03-08 ENCOUNTER — OFFICE VISIT (OUTPATIENT)
Dept: INTERNAL MEDICINE | Age: 64
End: 2024-03-08
Payer: COMMERCIAL

## 2024-03-08 VITALS
BODY MASS INDEX: 21.88 KG/M2 | SYSTOLIC BLOOD PRESSURE: 134 MMHG | DIASTOLIC BLOOD PRESSURE: 79 MMHG | HEIGHT: 74 IN | OXYGEN SATURATION: 95 % | HEART RATE: 91 BPM | TEMPERATURE: 97.2 F | RESPIRATION RATE: 20 BRPM | WEIGHT: 170.5 LBS

## 2024-03-08 DIAGNOSIS — Z79.01 CHRONIC ANTICOAGULATION: Primary | ICD-10-CM

## 2024-03-08 LAB
INTERNATIONAL NORMALIZATION RATIO, POC: 2
PROTHROMBIN TIME, POC: 24.5

## 2024-03-08 PROCEDURE — 99212 OFFICE O/P EST SF 10 MIN: CPT

## 2024-03-08 PROCEDURE — 85610 PROTHROMBIN TIME: CPT

## 2024-03-08 RX ORDER — WARFARIN SODIUM 1 MG/1
1 TABLET ORAL DAILY
Qty: 30 TABLET | Refills: 0 | Status: CANCELLED | OUTPATIENT
Start: 2024-03-08

## 2024-03-08 RX ORDER — WARFARIN SODIUM 7.5 MG/1
7.5 TABLET ORAL DAILY
Qty: 30 TABLET | Refills: 0 | Status: SHIPPED
Start: 2024-03-08 | End: 2024-03-22

## 2024-03-08 NOTE — PATIENT INSTRUCTIONS
Dear Hector Read,      Thank you for coming to your appointment today. I hope we have addressed all of your needs.       Please make sure to do the following:  - Take warfarin 6 mg (teal pill) everyday except on Monday and Friday only take warfarin 7.5 mg (yellow pill)  - We will see each other again in 1 week for an INR check.      Have a great day!      Sincerely,  Phillip Lua MD  3/8/2024  3:18 PM

## 2024-03-08 NOTE — PROGRESS NOTES
[facility]  Internal Medicine Residency Clinic    Attending Physician Statement  I have discussed the case, including pertinent history and exam findings with the resident physician.  I agree with the assessment, plan and orders as documented by the resident. I have reviewed all pertinent PMHx, PSHx, FamHx, SocialHx, medications, and allergies and updated history as appropriate.    Patient here for routine follow up of medical problems.     HTN  -controlled; The current medical regimen is effective;  continue present plan and medications.  -refilling medication     HLD  -has not been taking his medciations; discussed with patient to take his   The 10-year ASCVD risk score (Eugenie DAVIS, et al., 2019) is: 28.9%    Values used to calculate the score:      Age: 63 years      Sex: Male      Is Non- : Yes      Diabetic: No      Tobacco smoker: Yes      Systolic Blood Pressure: 134 mmHg      Is BP treated: Yes      HDL Cholesterol: 43 mg/dL      Total Cholesterol: 213 mg/dL    Plantar Fasciitis   -continue gabapentin as prescribed     APLS  -increasing warfarin to 7.5 mg M and F and 6 mg every other day 2/2 aptient having difficulty with medications; titrate to therapeutic INR     Remainder of medical problems as per resident note.    Benito Cuellar Jr, DO  3/8/24    
Negative for chest pain.   Gastrointestinal:  Negative for abdominal pain, diarrhea and vomiting.   Genitourinary:  Positive for frequency. Negative for difficulty urinating, dysuria and urgency.   Skin:  Negative for rash.   Neurological:  Negative for dizziness, light-headedness and headaches.   Hematological:  Does not bruise/bleed easily.           Current Outpatient Medications on File Prior to Visit   Medication Sig Dispense Refill    warfarin (COUMADIN) 6 MG tablet take 1 tablet by mouth once daily 30 tablet 0    warfarin (COUMADIN) 4 MG tablet Take 1 tablet by mouth daily 30 tablet 0    VENTOLIN  (90 Base) MCG/ACT inhaler Inhale 2 puffs into the lungs every 6 hours as needed for Wheezing or Shortness of Breath 18 g 1    Multiple Vitamins-Minerals (THERAPEUTIC MULTIVITAMIN-MINERALS) tablet Take 1 tablet by mouth daily 90 tablet 1    magnesium oxide (MAG-OX) 400 MG tablet Take 1 tablet by mouth daily 90 tablet 1    losartan (COZAAR) 100 MG tablet Take 1 tablet by mouth daily 90 tablet 1    hydroCHLOROthiazide (HYDRODIURIL) 25 MG tablet Take 1 tablet by mouth Daily with lunch 90 tablet 1    gabapentin (NEURONTIN) 400 MG capsule Take 1 capsule by mouth 3 times daily for 180 days. Takes 2 pills with lunch and one pill at  capsule 1    doxycycline monohydrate (MONODOX) 100 MG capsule take 1 capsule by mouth once daily 90 capsule 1    atorvastatin (LIPITOR) 80 MG tablet Take 1 tablet by mouth nightly 90 tablet 1    amLODIPine (NORVASC) 10 MG tablet Take 1 tablet by mouth daily 90 tablet 1    varenicline (CHANTIX) 1 MG tablet Take 1 tablet by mouth 2 times daily      warfarin (COUMADIN) 3 MG tablet Take by mouth daily      Multiple Vitamin (MULTIVITAMIN) TABS take 1 tablet by mouth once daily 90 tablet 1    buPROPion (WELLBUTRIN XL) 150 MG extended release tablet Take 1 tablet by mouth Daily with lunch      Blood Pressure Monitoring (BLOOD PRESSURE CUFF) MISC Dx:  Hypertension with labile blood pressure

## 2024-03-15 ENCOUNTER — NURSE ONLY (OUTPATIENT)
Dept: INTERNAL MEDICINE | Age: 64
End: 2024-03-15
Payer: COMMERCIAL

## 2024-03-15 DIAGNOSIS — Z79.01 CHRONIC ANTICOAGULATION: Primary | ICD-10-CM

## 2024-03-15 LAB
INTERNATIONAL NORMALIZATION RATIO, POC: 1.8
PROTHROMBIN TIME, POC: 21.4

## 2024-03-15 PROCEDURE — 85610 PROTHROMBIN TIME: CPT | Performed by: INTERNAL MEDICINE

## 2024-03-15 NOTE — PATIENT INSTRUCTIONS
Start taking 6 mg of Coumadin daily and return in one week for INR recheck on 3/22/24 at 1 pm.Please call the office with any questions or concerns at [phone].     Thank You,  Celena OSULLIVAN LPN

## 2024-03-15 NOTE — PROGRESS NOTES
INR results reviewed with Dr. Galvan.  Orders received. Start taking 6 mg daily and return in one week. Patient notified via phone of these orders.  Patient voices understanding of the information and follow-up.  AVS printed and mailed to patient with appointment card for next INR check.

## 2024-03-15 NOTE — PROGRESS NOTES
Coordination of care completed due to concerns with consistency of medications. Initial contact with nursing staff with pharmacy team appreciated.      Spoke to pharmacist- patient has not picked up warfarin since January.  Concern with consistency of meds and last  was only 4 mg. Therefore, it appears this is not a failure of titration but not taking recommendations. Need to be conservative with adjustments. Will start with the 6 mg tablet only available at pharmacy to . Take 6 mg daily and repeat INR 1 week.     Encourage consistency at this time. Monitor closely.

## 2024-03-22 ENCOUNTER — NURSE ONLY (OUTPATIENT)
Dept: INTERNAL MEDICINE | Age: 64
End: 2024-03-22
Payer: COMMERCIAL

## 2024-03-22 DIAGNOSIS — Z79.01 CHRONIC ANTICOAGULATION: Primary | ICD-10-CM

## 2024-03-22 LAB
INTERNATIONAL NORMALIZATION RATIO, POC: 4.3
PROTHROMBIN TIME, POC: 51.7

## 2024-03-22 PROCEDURE — 85610 PROTHROMBIN TIME: CPT | Performed by: INTERNAL MEDICINE

## 2024-03-22 RX ORDER — WARFARIN SODIUM 5 MG/1
5 TABLET ORAL DAILY
Qty: 30 TABLET | Refills: 0 | Status: SHIPPED | OUTPATIENT
Start: 2024-03-22

## 2024-03-22 NOTE — PROGRESS NOTES
INR results reviewed with Dr. Francis.  Orders received.  Patient notified via phone of these orders.  Patient voices understanding of the information and follow-up.  AVS printed and mailed to patient with appointment card for next INR check.

## 2024-03-22 NOTE — PATIENT INSTRUCTIONS
Hold todays dosing of coumadin   Your new dosing of coumadin is as follows   On Mondays and Fridays take 5mgs(the tablet is light orange)  All other days of the week take 6mgs (the tablet is a blue-green color)  Your inr will be rechecked on 03/28/2024 at 100pm

## 2024-04-05 ENCOUNTER — NURSE ONLY (OUTPATIENT)
Dept: INTERNAL MEDICINE | Age: 64
End: 2024-04-05
Payer: COMMERCIAL

## 2024-04-05 DIAGNOSIS — Z79.01 CHRONIC ANTICOAGULATION: Primary | ICD-10-CM

## 2024-04-05 LAB
INTERNATIONAL NORMALIZATION RATIO, POC: 3.3
PROTHROMBIN TIME, POC: 39.9

## 2024-04-05 PROCEDURE — 85610 PROTHROMBIN TIME: CPT | Performed by: INTERNAL MEDICINE

## 2024-04-05 PROCEDURE — 99211 OFF/OP EST MAY X REQ PHY/QHP: CPT | Performed by: INTERNAL MEDICINE

## 2024-04-05 ASSESSMENT — PATIENT HEALTH QUESTIONNAIRE - PHQ9
SUM OF ALL RESPONSES TO PHQ QUESTIONS 1-9: 0
SUM OF ALL RESPONSES TO PHQ9 QUESTIONS 1 & 2: 0
SUM OF ALL RESPONSES TO PHQ QUESTIONS 1-9: 0
1. LITTLE INTEREST OR PLEASURE IN DOING THINGS: NOT AT ALL
SUM OF ALL RESPONSES TO PHQ QUESTIONS 1-9: 0
SUM OF ALL RESPONSES TO PHQ QUESTIONS 1-9: 0
2. FEELING DOWN, DEPRESSED OR HOPELESS: NOT AT ALL

## 2024-04-05 NOTE — PROGRESS NOTES
Lab Results       Component                Value               Date                       INR                      3.3 (H)             04/05/2024              Patient Findings     Negatives:  Signs/symptoms of thrombosis, Signs/symptoms of bleeding,   Laboratory test error suspected, Change in health, Change in alcohol use,   Change in activity, Upcoming invasive procedure, Emergency department   visit, Upcoming dental procedure, Missed doses, Extra doses, Change in   medications, Change in diet/appetite, Hospital admission, Bruising, Other   complaints        Plan: continue 6 mg M and F and 5 mg every other day; this was what he was supposed to be taking during his last visit but was put into the visit incorrectly.  Return in 2 weeks.     Return date  As of 4/5/2024    TTR:  39.7 % (9.5 y)   Next INR check:            Electronically signed by Benito Cuellar Jr, DO on 4/5/2024 at 2:18 PM

## 2024-04-05 NOTE — PROGRESS NOTES
Called patient to give Coumadin instructions and appointment time. AVS printed and mailed to home. Heather Blanchard LPN

## 2024-04-05 NOTE — PATIENT INSTRUCTIONS
Thank you for coming in today to have your INR checked.  Please take your Coumadin as follows:  Coumadin 6 mg on Monday & Friday  Coumadin 5 mg on Sunday, Tuesday, Wednesday, Thursday, and Saturday  We will recheck your INR in 2 weeks  Your appointment is Friday, April 19, 2024, at 1:00 pm  Please call if you have any unusual bleeding or any concerns at [phone].  Thanks!     Heather Reyes LPN :)

## 2024-04-09 RX ORDER — WARFARIN SODIUM 6 MG/1
6 TABLET ORAL DAILY
Qty: 30 TABLET | Refills: 0 | Status: SHIPPED | OUTPATIENT
Start: 2024-04-09

## 2024-04-09 NOTE — TELEPHONE ENCOUNTER
Last INR 3.3 on 4/5/24 pt is to take 6 and 5mg of Coumadin as instructed and trecheck INR on 4/19/24 per ordered

## 2024-04-19 ENCOUNTER — NURSE ONLY (OUTPATIENT)
Dept: INTERNAL MEDICINE | Age: 64
End: 2024-04-19
Payer: COMMERCIAL

## 2024-04-19 VITALS — TEMPERATURE: 97.9 F

## 2024-04-19 DIAGNOSIS — Z79.01 CHRONIC ANTICOAGULATION: Primary | ICD-10-CM

## 2024-04-19 LAB
INTERNATIONAL NORMALIZATION RATIO, POC: 3
PROTHROMBIN TIME, POC: 36.4

## 2024-04-19 PROCEDURE — 85610 PROTHROMBIN TIME: CPT | Performed by: INTERNAL MEDICINE

## 2024-04-19 RX ORDER — WARFARIN SODIUM 5 MG/1
5 TABLET ORAL DAILY
Qty: 30 TABLET | Refills: 0 | Status: SHIPPED | OUTPATIENT
Start: 2024-04-19

## 2024-04-19 NOTE — PATIENT INSTRUCTIONS
Please continue taking Coumadin 5 mg daily and except on Mondays and Fridays take  Coumadin 6 mg. Your next PT/INR will be on 5/9 at 1 pm. Your Coumadin 5 mg tablets were called into your local pharmacy. Please call the office with any problems or concerns at [phone].     Thank you    Zach CAT LPN

## 2024-04-19 NOTE — PROGRESS NOTES
INR results reviewed with Dr. Kerr.  Orders received.  Patient will continue the same dose of Coumadin 5  mg daily and Coumadin 6 mg on Mondays and Fridays. Next PT/INR will be done on 5/9.  Refills on Coumadin 5 mg sent to patient's local pharmacy.  Patient notified via phone of these orders.  Patient voices understanding of the information and follow-up.  AVS printed and mailed to patient with appointment card for next INR check.

## 2024-05-07 RX ORDER — WARFARIN SODIUM 6 MG/1
6 TABLET ORAL DAILY
Qty: 30 TABLET | Refills: 0 | OUTPATIENT
Start: 2024-05-07

## 2024-05-10 ENCOUNTER — NURSE ONLY (OUTPATIENT)
Dept: INTERNAL MEDICINE | Age: 64
End: 2024-05-10
Payer: COMMERCIAL

## 2024-05-10 DIAGNOSIS — Z79.01 CHRONIC ANTICOAGULATION: Primary | ICD-10-CM

## 2024-05-10 DIAGNOSIS — M79.2 NEUROPATHIC PAIN: ICD-10-CM

## 2024-05-10 DIAGNOSIS — I73.9 PAD (PERIPHERAL ARTERY DISEASE) (HCC): ICD-10-CM

## 2024-05-10 DIAGNOSIS — E83.42 HYPOMAGNESEMIA: ICD-10-CM

## 2024-05-10 DIAGNOSIS — I10 ESSENTIAL HYPERTENSION: ICD-10-CM

## 2024-05-10 DIAGNOSIS — Z98.890 HX OF ABDOMINAL SURGERY: ICD-10-CM

## 2024-05-10 LAB
INTERNATIONAL NORMALIZATION RATIO, POC: 2.2
PROTHROMBIN TIME, POC: 26.8

## 2024-05-10 PROCEDURE — 93793 ANTICOAG MGMT PT WARFARIN: CPT | Performed by: INTERNAL MEDICINE

## 2024-05-10 PROCEDURE — 85610 PROTHROMBIN TIME: CPT | Performed by: INTERNAL MEDICINE

## 2024-05-10 RX ORDER — LOSARTAN POTASSIUM 100 MG/1
100 TABLET ORAL DAILY
Qty: 90 TABLET | Refills: 1 | Status: SHIPPED | OUTPATIENT
Start: 2024-05-10

## 2024-05-10 RX ORDER — WARFARIN SODIUM 6 MG/1
6 TABLET ORAL DAILY
Qty: 30 TABLET | Refills: 0 | Status: SHIPPED | OUTPATIENT
Start: 2024-05-10

## 2024-05-10 RX ORDER — M-VIT,TX,IRON,MINS/CALC/FOLIC 27MG-0.4MG
1 TABLET ORAL DAILY
Qty: 90 TABLET | Refills: 1 | Status: SHIPPED | OUTPATIENT
Start: 2024-05-10 | End: 2024-11-06

## 2024-05-10 RX ORDER — ATORVASTATIN CALCIUM 80 MG/1
80 TABLET, FILM COATED ORAL NIGHTLY
Qty: 90 TABLET | Refills: 1 | Status: SHIPPED | OUTPATIENT
Start: 2024-05-10 | End: 2024-11-06

## 2024-05-10 RX ORDER — ALBUTEROL SULFATE 90 UG/1
2 AEROSOL, METERED RESPIRATORY (INHALATION) EVERY 6 HOURS PRN
Qty: 18 G | Refills: 3 | Status: SHIPPED | OUTPATIENT
Start: 2024-05-10

## 2024-05-10 RX ORDER — WARFARIN SODIUM 5 MG/1
5 TABLET ORAL DAILY
Qty: 30 TABLET | Refills: 0 | Status: SHIPPED | OUTPATIENT
Start: 2024-05-10

## 2024-05-10 RX ORDER — MAGNESIUM OXIDE 400 MG/1
400 TABLET ORAL DAILY
Qty: 90 TABLET | Refills: 1 | Status: SHIPPED | OUTPATIENT
Start: 2024-05-10 | End: 2024-11-06

## 2024-05-10 RX ORDER — AMLODIPINE BESYLATE 10 MG/1
10 TABLET ORAL DAILY
Qty: 90 TABLET | Refills: 1 | Status: SHIPPED | OUTPATIENT
Start: 2024-05-10

## 2024-05-10 RX ORDER — GABAPENTIN 400 MG/1
400 CAPSULE ORAL 3 TIMES DAILY
Qty: 270 CAPSULE | Refills: 1 | Status: SHIPPED | OUTPATIENT
Start: 2024-05-10 | End: 2024-11-06

## 2024-05-10 RX ORDER — DOXYCYCLINE 100 MG/1
CAPSULE ORAL
Qty: 90 CAPSULE | Refills: 1 | Status: SHIPPED | OUTPATIENT
Start: 2024-05-10

## 2024-05-10 RX ORDER — HYDROCHLOROTHIAZIDE 25 MG/1
25 TABLET ORAL
Qty: 90 TABLET | Refills: 1 | Status: SHIPPED | OUTPATIENT
Start: 2024-05-10 | End: 2024-11-06

## 2024-05-10 NOTE — PROGRESS NOTES
Lab Results       Component                Value               Date                       INR                      2.2                 05/10/2024              Patient Findings     Negatives:  Signs/symptoms of thrombosis, Signs/symptoms of bleeding,   Laboratory test error suspected, Change in health, Change in alcohol use,   Change in activity, Upcoming invasive procedure, Emergency department   visit, Upcoming dental procedure, Missed doses, Extra doses, Change in   medications, Change in diet/appetite, Hospital admission, Bruising, Other   complaints    Comments:  Patient states he taking  Coumadin 6 mg  Monday and Friday and   Coumadin 5 mg all other days.         Plan: Increase to 6 mg every MWF and 5 mg every other day.  Return in 2 weeks     Return date  As of 5/10/2024    TTR:  40.1 % (9.6 y)   Next INR check:  5/24/2024          Electronically signed by Benito Cuellar Jr, DO on 5/10/2024 at 1:32 PM

## 2024-05-10 NOTE — PATIENT INSTRUCTIONS
Please take Coumadin 6 mg on Mondays, Wednesdays and Fridays and Coumadin 5 mg all other days. Your next PT/INR  will be on 5/24/24 at 1 pm. Your prescriptions were sent to your local pharmacy. Please call the office with any issues or concerns at [phone].    Thank you   Zach CAT LPN

## 2024-05-10 NOTE — PROGRESS NOTES
INR results reviewed with Dr. Cuellar .  Orders received. Patient new Coumadin instructions are Coumadin 6 mg on Mondays, Wednesdays and Fridays  and Coumadin 5 mg all other days. Next PT/INR will be on 5/24 at 1 pm.  Patient notified via phone of these orders.  Patient voices understanding of the information and follow-up.  AVS printed and mailed to patient with appointment card for next INR check.

## 2024-05-24 ENCOUNTER — NURSE ONLY (OUTPATIENT)
Dept: INTERNAL MEDICINE | Age: 64
End: 2024-05-24
Payer: COMMERCIAL

## 2024-05-24 DIAGNOSIS — Z79.01 CHRONIC ANTICOAGULATION: Primary | ICD-10-CM

## 2024-05-24 LAB
INTERNATIONAL NORMALIZATION RATIO, POC: 1.4
PROTHROMBIN TIME, POC: 17.2

## 2024-05-24 PROCEDURE — 93793 ANTICOAG MGMT PT WARFARIN: CPT | Performed by: INTERNAL MEDICINE

## 2024-05-24 PROCEDURE — 85610 PROTHROMBIN TIME: CPT | Performed by: INTERNAL MEDICINE

## 2024-05-24 NOTE — PROGRESS NOTES
INR results reviewed with Dr. Cuellar.  Orders received. Begin taking Coumadin 6 mg on Tuesdays and Thursdays and Coumadin 7 mg all other days. Recheck in one week. Patient notified via phone of these orders.  Patient voices understanding of the information and follow-up.  AVS printed and mailed to patient with appointment card for next INR check.

## 2024-05-24 NOTE — PATIENT INSTRUCTIONS
There has been a change to your Coumadin dosage. You are to begin taking Coumadin 6 mg on Tuesdays and Thursdays and Coumadin 7 mg all other days. Your next PT/INR lab will be in one week on 05/30/2024 at 1:00 pm. If you have any questions or concerns, please contact the office at [phone].    Thank you,    Ximena NEELY MA

## 2024-05-24 NOTE — PROGRESS NOTES
Lab Results       Component                Value               Date                       INR                      1.4                 05/24/2024              Patient Findings     Positives:  Other complaints    Negatives:  Signs/symptoms of thrombosis, Signs/symptoms of bleeding,   Laboratory test error suspected, Change in health, Change in alcohol use,   Change in activity, Upcoming invasive procedure, Emergency department   visit, Upcoming dental procedure, Missed doses, Extra doses, Change in   medications, Change in diet/appetite, Hospital admission, Bruising    Comments:  Pt should be alternating Coumadin 5 mg and Coumadin 6 mg.   However, patient has been taking Coumadin 5 mg MWF and Coumadin 6 mg all   other days. Pt states having 3 toes swollen on his left foot for about 3   weeks.        Plan: Plan to increase him to 7 mg every Tuesday and Thursday and 6 mg every other day; return in 1 week    Return date  As of 5/24/2024    TTR:  39.9 % (9.6 y)   Next INR check:  5/31/2024      Electronically signed by Benito Cuellar Jr, DO on 5/24/2024 at 1:23 PM

## 2024-05-30 ENCOUNTER — TELEPHONE (OUTPATIENT)
Dept: INTERNAL MEDICINE | Age: 64
End: 2024-05-30

## 2024-06-04 ENCOUNTER — OFFICE VISIT (OUTPATIENT)
Dept: INTERNAL MEDICINE | Age: 64
End: 2024-06-04
Payer: COMMERCIAL

## 2024-06-04 VITALS
WEIGHT: 174.5 LBS | TEMPERATURE: 97.9 F | OXYGEN SATURATION: 96 % | HEIGHT: 74 IN | RESPIRATION RATE: 16 BRPM | SYSTOLIC BLOOD PRESSURE: 121 MMHG | DIASTOLIC BLOOD PRESSURE: 78 MMHG | HEART RATE: 81 BPM | BODY MASS INDEX: 22.39 KG/M2

## 2024-06-04 DIAGNOSIS — M79.675 PAIN OF TOE OF LEFT FOOT: ICD-10-CM

## 2024-06-04 DIAGNOSIS — B35.1 ONYCHOMYCOSIS: ICD-10-CM

## 2024-06-04 DIAGNOSIS — Z79.01 CHRONIC ANTICOAGULATION: Primary | ICD-10-CM

## 2024-06-04 LAB
INTERNATIONAL NORMALIZATION RATIO, POC: 1.2
PROTHROMBIN TIME, POC: 14.8

## 2024-06-04 PROCEDURE — 3078F DIAST BP <80 MM HG: CPT

## 2024-06-04 PROCEDURE — 4004F PT TOBACCO SCREEN RCVD TLK: CPT

## 2024-06-04 PROCEDURE — G8427 DOCREV CUR MEDS BY ELIG CLIN: HCPCS

## 2024-06-04 PROCEDURE — 85610 PROTHROMBIN TIME: CPT

## 2024-06-04 PROCEDURE — 3074F SYST BP LT 130 MM HG: CPT

## 2024-06-04 PROCEDURE — G8420 CALC BMI NORM PARAMETERS: HCPCS

## 2024-06-04 PROCEDURE — 99214 OFFICE O/P EST MOD 30 MIN: CPT

## 2024-06-04 PROCEDURE — 3017F COLORECTAL CA SCREEN DOC REV: CPT

## 2024-06-04 RX ORDER — WARFARIN SODIUM 7.5 MG/1
7.5 TABLET ORAL DAILY
Qty: 30 TABLET | Refills: 0 | Status: SHIPPED | OUTPATIENT
Start: 2024-06-04

## 2024-06-04 RX ORDER — KETOCONAZOLE 20 MG/G
CREAM TOPICAL
Qty: 30 G | Refills: 1 | Status: SHIPPED | OUTPATIENT
Start: 2024-06-04

## 2024-06-04 NOTE — PROGRESS NOTES
Hector Read (:  1960) is a 63 y.o. male,Established patient, here for evaluation of the following chief complaint(s):     Hector Read is a 63-year-old male who presented to our clinic for a follow-up visit.  He has a past medical history of hypertension, hyperlipidemia and SLE as well as antiphospholipid syndrome.  Acute concerns today: He mentioned that he started having pain in the left side of his foot for the last few weeks.  The pain is worse when he is walking or working and gets better with rest.  He has not tried any medication.    No med changes or OTC or dietary changes recently  Chronic doxycycline etc...  APS hx from CC hx  Inr goal 2-3 ? (uncertain 2.5-3.5? In hx in past)     Inr 1.4   Today down today 1.2  Claims he didn't miss doses of coumadin 7 vs 6 QOD     Was more in goal range last month  2.2 inr noted on 5/10  (3.0 in )     Plan 7.5mg daily reassess in one week     L foot lateral toe pain  Tender area +callus on 5th toe  Intact sensation- already on gabapentin  +onchymosis  Worse when walking - relieved when off   Podiatry and other options    HTN  Clinic BP: 121/78  Hypotension/orthostatic:  Regimen: losartan, HCTZ, amlodipine    HLD  Total 213, , HDL 43,   Regimen: atorvastatin 80 mg   The 10-year ASCVD risk score (Eugenie DAVIS, et al., 2019) is: 28.9%    Values used to calculate the score:      Age: 63 years      Sex: Male      Is Non- : Yes      Diabetic: No      Tobacco smoker: Yes      Systolic Blood Pressure: 134 mmHg      Is BP treated: Yes      HDL Cholesterol: 43 mg/dL      Total Cholesterol: 213 mg/dL    APS in the setting of SLE:  Patient is on chronic anticoagulation.  Last INR 1.4 .  Latest OAC regimen 7 mg T TH and 6 mg every other day.  Increase it to 7.5 mg daily.  Follow up in 1 week.    Left toe pain:  Patient will get over the counter callus protector.  Will refer the patient to

## 2024-06-04 NOTE — PATIENT INSTRUCTIONS
Dear Hector Read,        Thank you for coming to your appointment today. I hope we have addressed all of your needs.       Please make sure to do the following:  - Continue your medications as listed.  - Get labs drawn before our next follow up. We will call you with the results   - Referrals have been made to podiatry:  If you do not hear from the office in 1 week, please call the number listed.  - We will see each other again in 1 week for INR    Call for a sooner appointment if you develop worsening symptoms.    Have a great day!        Sincerely,  Eliazar Guerrier MD  6/4/2024  12:24 PM

## 2024-06-04 NOTE — PROGRESS NOTES
[facility]  Internal Medicine Residency Clinic  Attending Physician Statement:  Jere Francis M.D., F.A.C.P.  Patient is seen for fu visit today.  -- acute and chronic problems addressed  Addressed when applicable- Health maintenance issues of vaccinations, social determinants/depression/CA screening, tobacco cessation etc...  I have seen/discussed the case, including pertinent history and exam findings with the resident, review of last visit medical records/labs-   I agree with the assessment, plan and orders as documented by the resident.    -Billiing assessed by medical complexity of case  My assessment of high points of todays visit as follows:      No med changes or OTC or dietary changes recently  Chronic doxycycline etc...  APS hx from CC hx  Inr goal 2-3 ? (uncertain 2.5-3.5? In hx in past)    Inr 1.4 5/24  Today down today 1.2  Claims he didn't miss doses of coumadin 7 vs 6 QOD    Was more in goal range last month  2.2 inr noted on 5/10  (3.0 in 4/19)    Plan 7.5mg daily reassess in one week    L foot lateral toe pain  Tender area +callus on 5th toe  Intact sensation- already on gabapentin  +onchymosis  Worse when walking - relieved when off   Podiatry and other options

## 2024-06-07 RX ORDER — WARFARIN SODIUM 6 MG/1
6 TABLET ORAL DAILY
Qty: 30 TABLET | Refills: 0 | OUTPATIENT
Start: 2024-06-07

## 2024-06-11 ENCOUNTER — NURSE ONLY (OUTPATIENT)
Dept: INTERNAL MEDICINE | Age: 64
End: 2024-06-11
Payer: COMMERCIAL

## 2024-06-11 DIAGNOSIS — Z79.01 CHRONIC ANTICOAGULATION: Primary | ICD-10-CM

## 2024-06-11 DIAGNOSIS — I82.499 DEEP VEIN THROMBOSIS (DVT) OF OTHER VEIN OF LOWER EXTREMITY, UNSPECIFIED CHRONICITY, UNSPECIFIED LATERALITY (HCC): ICD-10-CM

## 2024-06-11 LAB
INR BLD: 6.3
INTERNATIONAL NORMALIZATION RATIO, POC: 5.8
PROTHROMBIN TIME, POC: 69.3
PROTHROMBIN TIME: 67.4 SEC (ref 9.3–12.4)

## 2024-06-11 PROCEDURE — 85610 PROTHROMBIN TIME: CPT | Performed by: INTERNAL MEDICINE

## 2024-06-11 PROCEDURE — 93793 ANTICOAG MGMT PT WARFARIN: CPT | Performed by: INTERNAL MEDICINE

## 2024-06-11 PROCEDURE — 36415 COLL VENOUS BLD VENIPUNCTURE: CPT | Performed by: INTERNAL MEDICINE

## 2024-06-11 RX ORDER — WARFARIN SODIUM 6 MG/1
6 TABLET ORAL DAILY
Qty: 30 TABLET | Refills: 0 | Status: SHIPPED | OUTPATIENT
Start: 2024-06-11

## 2024-06-11 NOTE — PROGRESS NOTES
INR results reviewed with Dr. Velásquez.  Orders received.  Patient notified in office of these orders.  Patient voices understanding of the information and follow-up.  AVS printed and given to patient with appointment card for next INR check.

## 2024-06-11 NOTE — PROGRESS NOTES
Prescribed warfarin 6 mg dosage for patient in case he does not have dosage at home.  Lollynet to hold his warfarin for 2 days and then to restart with 6 mg daily

## 2024-06-21 ENCOUNTER — NURSE ONLY (OUTPATIENT)
Dept: INTERNAL MEDICINE | Age: 64
End: 2024-06-21
Payer: COMMERCIAL

## 2024-06-21 VITALS — TEMPERATURE: 97.3 F

## 2024-06-21 DIAGNOSIS — Z79.01 CHRONIC ANTICOAGULATION: Primary | ICD-10-CM

## 2024-06-21 PROBLEM — H25.9 AGE-RELATED CATARACT OF BOTH EYES: Status: ACTIVE | Noted: 2023-09-11

## 2024-06-21 LAB
INTERNATIONAL NORMALIZATION RATIO, POC: 1.5
PROTHROMBIN TIME, POC: 17.5

## 2024-06-21 PROCEDURE — 85610 PROTHROMBIN TIME: CPT | Performed by: INTERNAL MEDICINE

## 2024-06-21 NOTE — PATIENT INSTRUCTIONS
Take Coumadin  6 mg daily. Your next PT/INR will be done on 6/28 at 1 pm. Please call the office with any problems or concerns at [phone].    Thank you,   Zach CAT LPN

## 2024-06-21 NOTE — PROGRESS NOTES
INR results reviewed with Dr. Galvan.   Orders received. Patient will taking Coumadin 6 mg daily  Recheck PT/INR in 1 week on 6/28.   Patient notified via voicemail of these orders. Detailed message left with new orders.  Patient was instructed to call with any questions.  AVS printed and mailed to patient with appointment card for next INR check.

## 2024-06-28 ENCOUNTER — NURSE ONLY (OUTPATIENT)
Dept: INTERNAL MEDICINE | Age: 64
End: 2024-06-28
Payer: COMMERCIAL

## 2024-06-28 VITALS — TEMPERATURE: 97.9 F

## 2024-06-28 DIAGNOSIS — Z79.01 CHRONIC ANTICOAGULATION: Primary | ICD-10-CM

## 2024-06-28 LAB
INR BLD: 4.5
INTERNATIONAL NORMALIZATION RATIO, POC: 4.5
PROTHROMBIN TIME, POC: 54.5
PROTHROMBIN TIME: 50.3 SEC (ref 9.3–12.4)

## 2024-06-28 PROCEDURE — 85610 PROTHROMBIN TIME: CPT | Performed by: INTERNAL MEDICINE

## 2024-06-28 PROCEDURE — 36415 COLL VENOUS BLD VENIPUNCTURE: CPT | Performed by: INTERNAL MEDICINE

## 2024-06-28 NOTE — PROGRESS NOTES
Patient lab work  was drawn STAT in the office due to high INR which was 4.5. Lab work sent to the office via the tube system. Patient tolerated well. Lab called to notify of STAT lab being sent via the tube system.

## 2024-06-28 NOTE — PATIENT INSTRUCTIONS
Please HOLD today's dose of Coumadin. Start tomorrow 6/29 taking Coumadin 6 mg daily. Recheck PT/INR in 1 week. Your next PT/INR will be done on 7/5 at 8:45 am. Please call the office with any problems or concerns at [phone].     Thank you     Zach CAT LPN

## 2024-06-28 NOTE — PROGRESS NOTES
Lab Results       Component                Value               Date                       INR                      4.5                 06/28/2024              Patient Findings     Negatives:  Signs/symptoms of thrombosis, Signs/symptoms of bleeding,   Laboratory test error suspected, Change in health, Change in alcohol use,   Change in activity, Upcoming invasive procedure, Emergency department   visit, Upcoming dental procedure, Missed doses, Extra doses, Change in   medications, Change in diet/appetite, Hospital admission, Bruising, Other   complaints    Comments:  Patient states that he took Coumadin 5 mg all days and took a   Coumadin 6 mg tablet one day.         Plan: Patient will hold for 1 day of dosage and restart 6 mg; return in 1 week.    Return date  As of 6/28/2024    TTR:  39.7 % (9.7 y)   Next INR check:  7/5/2024          Electronically signed by Benito Cuellar Jr, DO on 6/28/2024 at 2:44 PM

## 2024-06-28 NOTE — PROGRESS NOTES
INR results reviewed with Dr. Cuellar.  Orders received.  Patient given new instructs to HOLD today's dose of Coumadin and start taking Coumadin 6 mg daily. Recheck PT/INR in 1 week on 7/5/24 at 8:45 am. Patient notified via phone of these orders.  Patient voices understanding of the information and follow-up.  AVS printed and mailed to patient with appointment card for next INR check.

## 2024-07-09 ENCOUNTER — NURSE ONLY (OUTPATIENT)
Dept: INTERNAL MEDICINE | Age: 64
End: 2024-07-09
Payer: COMMERCIAL

## 2024-07-09 DIAGNOSIS — I82.499 DEEP VEIN THROMBOSIS (DVT) OF OTHER VEIN OF LOWER EXTREMITY, UNSPECIFIED CHRONICITY, UNSPECIFIED LATERALITY (HCC): ICD-10-CM

## 2024-07-09 DIAGNOSIS — Z79.01 CHRONIC ANTICOAGULATION: Primary | ICD-10-CM

## 2024-07-09 LAB
INTERNATIONAL NORMALIZATION RATIO, POC: 1.2
PROTHROMBIN TIME, POC: 13.9

## 2024-07-09 PROCEDURE — 93793 ANTICOAG MGMT PT WARFARIN: CPT | Performed by: INTERNAL MEDICINE

## 2024-07-09 PROCEDURE — 85610 PROTHROMBIN TIME: CPT | Performed by: INTERNAL MEDICINE

## 2024-07-09 NOTE — PROGRESS NOTES
Lab Results       Component                Value               Date                       INR                      1.2                 07/09/2024              Patient Findings     Positives:  Missed doses    Negatives:  Signs/symptoms of thrombosis, Signs/symptoms of bleeding,   Laboratory test error suspected, Change in health, Change in alcohol use,   Change in activity, Upcoming invasive procedure, Emergency department   visit, Upcoming dental procedure, Extra doses, Change in medications,   Change in diet/appetite, Hospital admission, Bruising, Other complaints    Comments:  Patient was taking 5 mg daily, thinks he missed a dose        Plan: Patient is having confusion with how to take his medications; was only taking 5 mg daily instead of 6 mg as prescribed; added warfarin 6 mg daily to a pill box and provided to patient.    Return date  As of 7/9/2024    TTR:  39.6 % (9.7 y)   Next INR check:  8/6/2024          Electronically signed by Benito Cuellar Jr, DO on 7/9/2024 at 3:11 PM

## 2024-07-10 DIAGNOSIS — M79.2 NEUROPATHIC PAIN: ICD-10-CM

## 2024-07-10 RX ORDER — GABAPENTIN 400 MG/1
400 CAPSULE ORAL 3 TIMES DAILY
Qty: 270 CAPSULE | Refills: 1 | Status: SHIPPED | OUTPATIENT
Start: 2024-07-10 | End: 2025-01-06

## 2024-07-10 NOTE — TELEPHONE ENCOUNTER
Patients pharmacy closed last week and pt was not aware of this   [facility] and will need a new script for gabapentin sent there

## 2024-07-16 ENCOUNTER — NURSE ONLY (OUTPATIENT)
Dept: INTERNAL MEDICINE | Age: 64
End: 2024-07-16
Payer: COMMERCIAL

## 2024-07-16 DIAGNOSIS — I82.499 DEEP VEIN THROMBOSIS (DVT) OF OTHER VEIN OF LOWER EXTREMITY, UNSPECIFIED CHRONICITY, UNSPECIFIED LATERALITY (HCC): ICD-10-CM

## 2024-07-16 DIAGNOSIS — Z79.01 CHRONIC ANTICOAGULATION: Primary | ICD-10-CM

## 2024-07-16 LAB
INTERNATIONAL NORMALIZATION RATIO, POC: 2.1
PROTHROMBIN TIME, POC: 25.1

## 2024-07-16 PROCEDURE — 85610 PROTHROMBIN TIME: CPT | Performed by: INTERNAL MEDICINE

## 2024-07-16 PROCEDURE — 93793 ANTICOAG MGMT PT WARFARIN: CPT | Performed by: INTERNAL MEDICINE

## 2024-07-16 NOTE — PROGRESS NOTES
INR results reviewed with .  Orders received.  Patient notified in office of these orders.  Patient voices understanding of the information and follow-up.  AVS printed and given to patient with appointment for next INR check.

## 2024-08-02 ENCOUNTER — NURSE ONLY (OUTPATIENT)
Dept: INTERNAL MEDICINE | Age: 64
End: 2024-08-02
Payer: COMMERCIAL

## 2024-08-02 DIAGNOSIS — I82.499 DEEP VEIN THROMBOSIS (DVT) OF OTHER VEIN OF LOWER EXTREMITY, UNSPECIFIED CHRONICITY, UNSPECIFIED LATERALITY (HCC): Primary | ICD-10-CM

## 2024-08-02 LAB
INTERNATIONAL NORMALIZATION RATIO, POC: 2.7
PROTHROMBIN TIME, POC: 32.7

## 2024-08-02 PROCEDURE — 85610 PROTHROMBIN TIME: CPT | Performed by: INTERNAL MEDICINE

## 2024-08-02 NOTE — PROGRESS NOTES
INR results reviewed with Dr. Francis and orders received. Pt notified via phone of same. AVS mailed to patient.

## 2024-08-30 ENCOUNTER — NURSE ONLY (OUTPATIENT)
Dept: INTERNAL MEDICINE | Age: 64
End: 2024-08-30
Payer: COMMERCIAL

## 2024-08-30 DIAGNOSIS — Z79.01 CHRONIC ANTICOAGULATION: Primary | ICD-10-CM

## 2024-08-30 LAB
INTERNATIONAL NORMALIZATION RATIO, POC: 3.2
PROTHROMBIN TIME, POC: 39

## 2024-08-30 PROCEDURE — 85610 PROTHROMBIN TIME: CPT

## 2024-08-30 RX ORDER — WARFARIN SODIUM 6 MG/1
6 TABLET ORAL DAILY
Qty: 30 TABLET | Refills: 0 | Status: SHIPPED | OUTPATIENT
Start: 2024-08-30

## 2024-08-30 NOTE — PROGRESS NOTES
Lab Results       Component                Value               Date                       INR                      3.2                 08/30/2024              Patient Findings     Negatives:  Signs/symptoms of thrombosis, Signs/symptoms of bleeding,   Laboratory test error suspected, Change in health, Change in alcohol use,   Change in activity, Upcoming invasive procedure, Emergency department   visit, Upcoming dental procedure, Missed doses, Extra doses, Change in   medications, Change in diet/appetite, Hospital admission, Bruising, Other   complaints    Comments:  Pt is taking 6 mg daily        Plan: Patinet to continue taking 6 mg daily and return in 1 month.    Return date  As of 8/30/2024    TTR:  40.0% (9.9 y)   Next INR check:  9/27/2024          Electronically signed by Benito Cuellar Jr, DO on 8/30/2024 at 1:23 PM

## 2024-08-30 NOTE — PROGRESS NOTES
INR results reviewed with Dr. Cuellar.  Orders received.  Patient notified via phone of these orders.  Patient voices understanding of the information and follow-up.  AVS printed and mailed to patient with appointment card for next INR check.

## 2024-08-30 NOTE — PATIENT INSTRUCTIONS
Continue taking 6 mg of Coumadin daily. Return in one month for an INR check on 9/27/24 at 1:15 pm. Please call the office with any questions or concerns at [phone].

## 2024-09-10 ENCOUNTER — HOSPITAL ENCOUNTER (OUTPATIENT)
Age: 64
Discharge: HOME | End: 2024-09-10
Payer: COMMERCIAL

## 2024-09-10 LAB
CHOLEST SERPL-MCNC: 148 MG/DL
HDLC SERPL-MCNC: 41 MG/DL
LDLC SERPL CALC-MCNC: 90 MG/DL
TRIGL SERPL-MCNC: 86 MG/DL
VLDLC SERPL CALC-MCNC: 17 MG/DL

## 2024-09-10 PROCEDURE — 80061 LIPID PANEL: CPT

## 2024-09-10 PROCEDURE — 36415 COLL VENOUS BLD VENIPUNCTURE: CPT

## 2024-09-27 ENCOUNTER — NURSE ONLY (OUTPATIENT)
Dept: INTERNAL MEDICINE | Age: 64
End: 2024-09-27
Payer: COMMERCIAL

## 2024-09-27 DIAGNOSIS — Z79.01 CHRONIC ANTICOAGULATION: Primary | ICD-10-CM

## 2024-09-27 LAB
INTERNATIONAL NORMALIZATION RATIO, POC: 1.2
PROTHROMBIN TIME, POC: 13.8

## 2024-09-27 PROCEDURE — 90656 IIV3 VACC NO PRSV 0.5 ML IM: CPT | Performed by: INTERNAL MEDICINE

## 2024-09-27 PROCEDURE — 85610 PROTHROMBIN TIME: CPT | Performed by: INTERNAL MEDICINE

## 2024-09-27 RX ORDER — WARFARIN SODIUM 6 MG/1
6 TABLET ORAL DAILY
Qty: 30 TABLET | Refills: 0 | Status: SHIPPED | OUTPATIENT
Start: 2024-09-27

## 2024-10-04 ENCOUNTER — NURSE ONLY (OUTPATIENT)
Dept: INTERNAL MEDICINE | Age: 64
End: 2024-10-04
Payer: COMMERCIAL

## 2024-10-04 DIAGNOSIS — Z79.01 CHRONIC ANTICOAGULATION: Primary | ICD-10-CM

## 2024-10-04 LAB
INTERNATIONAL NORMALIZATION RATIO, POC: 1.1
PROTHROMBIN TIME, POC: 13.6

## 2024-10-04 PROCEDURE — 85610 PROTHROMBIN TIME: CPT | Performed by: INTERNAL MEDICINE

## 2024-10-04 NOTE — PROGRESS NOTES
INR results reviewed with Dr. Kerr.  Orders received. Take 12 mg today and tomorrow then resume 6 mg starting Sunday. Return in one week. Patient notified via phone of these orders.  Patient voices understanding of the information and follow-up.  AVS printed and mailed to patient with appointment card for next INR check.

## 2024-10-04 NOTE — PATIENT INSTRUCTIONS
Take 12 mg today 10/4 and tomorrow 10/5 then resume taking 6 mg on [place]o 10/6. Return in one week for a recheck on 10/11/24 at 1 pm. Call the office with any questions or concerns at [phone].

## 2024-10-11 ENCOUNTER — NURSE ONLY (OUTPATIENT)
Dept: INTERNAL MEDICINE | Age: 64
End: 2024-10-11
Payer: COMMERCIAL

## 2024-10-11 DIAGNOSIS — Z79.01 CHRONIC ANTICOAGULATION: Primary | ICD-10-CM

## 2024-10-11 LAB
INTERNATIONAL NORMALIZATION RATIO, POC: 1.1
PROTHROMBIN TIME, POC: 13

## 2024-10-11 PROCEDURE — 85610 PROTHROMBIN TIME: CPT | Performed by: INTERNAL MEDICINE

## 2024-10-11 RX ORDER — WARFARIN SODIUM 7.5 MG/1
7.5 TABLET ORAL DAILY
Qty: 30 TABLET | Refills: 0 | Status: SHIPPED | OUTPATIENT
Start: 2024-10-11

## 2024-10-11 RX ORDER — WARFARIN SODIUM 6 MG/1
TABLET ORAL
Qty: 30 TABLET | Refills: 0
Start: 2024-10-11

## 2024-10-11 RX ORDER — WARFARIN SODIUM 6 MG/1
6 TABLET ORAL DAILY
Qty: 30 TABLET | Refills: 0 | Status: SHIPPED
Start: 2024-10-11 | End: 2024-10-11

## 2024-10-11 NOTE — PATIENT INSTRUCTIONS
There has been a change to your Coumadin dosage. You are to begin alternating Coumadin 7.5 mg and Coumadin 6 mg daily, starting with Coumadin 7.5 mg today. Your next PT/INR lab will be in about one week on 10/17/2024 at 1:45 pm. If you have any questions or concerns, please contact the office at [phone].      Thank you,    Ximena NEELY CMA

## 2024-10-17 ENCOUNTER — NURSE ONLY (OUTPATIENT)
Dept: INTERNAL MEDICINE | Age: 64
End: 2024-10-17
Payer: COMMERCIAL

## 2024-10-17 DIAGNOSIS — Z79.01 CHRONIC ANTICOAGULATION: ICD-10-CM

## 2024-10-17 DIAGNOSIS — I82.499 DEEP VEIN THROMBOSIS (DVT) OF OTHER VEIN OF LOWER EXTREMITY, UNSPECIFIED CHRONICITY, UNSPECIFIED LATERALITY (HCC): Primary | ICD-10-CM

## 2024-10-17 LAB
INR BLD: 5.8
INTERNATIONAL NORMALIZATION RATIO, POC: 5.3
PROTHROMBIN TIME, POC: 63.1
PROTHROMBIN TIME: 64.5 SEC (ref 9.3–12.4)

## 2024-10-17 PROCEDURE — 36415 COLL VENOUS BLD VENIPUNCTURE: CPT | Performed by: INTERNAL MEDICINE

## 2024-10-17 PROCEDURE — 85610 PROTHROMBIN TIME: CPT | Performed by: INTERNAL MEDICINE

## 2024-10-17 NOTE — PROGRESS NOTES
Called and spoke with lab regarding pt's INR results.   Pt with critical results.  Protime 64.5  INR 5.8  Dr. Velásquez notified of results.

## 2024-10-17 NOTE — PATIENT INSTRUCTIONS
There has been a change to your Coumadin dosage. You are to hold today and tomorrow's dose and begin taking Coumadin 7.5 mg Monday, Wednesday, and Friday and  Coumadin 6 mg all other days. Your next PT/INR lab will be in one week on 10/25/2024. If you have any questions or concerns, please contact the office at [phone].    Thank you.    Ximena NEELY CMA

## 2024-10-24 NOTE — PROGRESS NOTES
[facility]  Internal Medicine Residency Program  ACC Note      SUBJECTIVE:  CC: had concerns including Hypertension, Coagulation Disorder, and Office Visit for Anticoagulation Management.    HPI:Hector Read is a 64 y.o. male who  has a past medical history of AAA (abdominal aortic aneurysm) (HCC), Abdominal aortic aneurysm without rupture (HCC), Anti-phospholipid antibody syndrome (HCC), Aortoiliac occlusive disease (HCC), Asthma, Chronic anticoagulation, DVT (deep venous thrombosis) (HCC), DVT, lower extremity (HCC), Hematoma, History of blood transfusion, Hypertension, Incisional hernia, Infected prosthetic vascular graft (HCC), Left leg pain, Peripheral vascular disease (HCC), PVD (peripheral vascular disease) with claudication (HCC), and Rectal bleeding.     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 Today, patient reports feeling good, no issues or concerns at this time.     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  Patient is on chronic anticoagulation  INR 2.4 (10/25/24)  OAC regimen 7.5 mg TTS and 6 mg all the other days   No change of dose   INR check in 1 week       Health Maintenance Due   Topic Date Due    Pneumococcal 65+ years Vaccine (2 of 2 - PCV) 11/15/2017    Respiratory Syncytial Virus (RSV) Pregnant or age 60 yrs+ (1 - 1-dose 60+ series) Never done    COVID-19 Vaccine (4 - 2023-24 season) 09/01/2024    AAA screen  10/12/2024       --------------------------------------------------------------------------------------------------------------------------------------------------------  Lab Results   Component Value Date/Time

## 2024-10-25 ENCOUNTER — OFFICE VISIT (OUTPATIENT)
Dept: INTERNAL MEDICINE | Age: 64
End: 2024-10-25
Payer: COMMERCIAL

## 2024-10-25 VITALS
DIASTOLIC BLOOD PRESSURE: 70 MMHG | WEIGHT: 168.5 LBS | OXYGEN SATURATION: 96 % | SYSTOLIC BLOOD PRESSURE: 140 MMHG | RESPIRATION RATE: 16 BRPM | BODY MASS INDEX: 21.62 KG/M2 | HEIGHT: 74 IN | TEMPERATURE: 97.8 F | HEART RATE: 84 BPM

## 2024-10-25 DIAGNOSIS — I10 ESSENTIAL HYPERTENSION: ICD-10-CM

## 2024-10-25 DIAGNOSIS — I82.499 DEEP VEIN THROMBOSIS (DVT) OF OTHER VEIN OF LOWER EXTREMITY, UNSPECIFIED CHRONICITY, UNSPECIFIED LATERALITY (HCC): ICD-10-CM

## 2024-10-25 DIAGNOSIS — Z79.01 CHRONIC ANTICOAGULATION: Primary | ICD-10-CM

## 2024-10-25 DIAGNOSIS — E83.42 HYPOMAGNESEMIA: ICD-10-CM

## 2024-10-25 DIAGNOSIS — M79.2 NEUROPATHIC PAIN: ICD-10-CM

## 2024-10-25 LAB
INTERNATIONAL NORMALIZATION RATIO, POC: 2.4
PROTHROMBIN TIME, POC: 28.5

## 2024-10-25 PROCEDURE — 85610 PROTHROMBIN TIME: CPT

## 2024-10-25 PROCEDURE — 99212 OFFICE O/P EST SF 10 MIN: CPT

## 2024-10-25 RX ORDER — M-VIT,TX,IRON,MINS/CALC/FOLIC 27MG-0.4MG
1 TABLET ORAL DAILY
Qty: 90 TABLET | Refills: 1 | Status: SHIPPED | OUTPATIENT
Start: 2024-10-25 | End: 2025-04-23

## 2024-10-25 RX ORDER — HYDROCHLOROTHIAZIDE 25 MG/1
25 TABLET ORAL
Qty: 90 TABLET | Refills: 1 | Status: SHIPPED | OUTPATIENT
Start: 2024-10-25 | End: 2025-04-23

## 2024-10-25 RX ORDER — MAGNESIUM OXIDE 400 MG/1
400 TABLET ORAL DAILY
Qty: 90 TABLET | Refills: 1 | Status: SHIPPED | OUTPATIENT
Start: 2024-10-25 | End: 2025-04-23

## 2024-10-25 RX ORDER — LOSARTAN POTASSIUM 100 MG/1
100 TABLET ORAL DAILY
Qty: 90 TABLET | Refills: 1 | Status: SHIPPED | OUTPATIENT
Start: 2024-10-25

## 2024-10-25 RX ORDER — AMLODIPINE BESYLATE 10 MG/1
10 TABLET ORAL DAILY
Qty: 90 TABLET | Refills: 1 | Status: SHIPPED | OUTPATIENT
Start: 2024-10-25

## 2024-10-25 NOTE — PROGRESS NOTES
[facility]  Internal Medicine Residency Clinic    Attending Physician Statement  I have discussed the case, including pertinent history and exam findings with the resident physician.  I agree with the assessment, plan and orders as documented by the resident. I have reviewed the relevant PMHx, PSHx, FamHx, SocialHx, medications, and allergies and updated history as appropriate.    Patient presents for routine follow up of medical problems.   States feeling well today   INR is 2.4 today    INR has been labile- last reading supra-therapeutic- given right at lower limit of goal of 2.5 to 3.5- will continue at current dosing    NO additional changes   3 month labs encouraged prior to next PCP visit     Hypercoag State   INR 2.4   Continue warfarin regimen 7.5 mg on T/Th/Sat and 6 mg all other days   Repeat INR at 2 weeks    Hypertension   Multi-Drug regimen  BP stable on regimen  Continue current management    HPL   Continue high intensity statin          Remainder of medical problems as per resident note.    Tito Galvan MD  10/25/2024 2:22 PM

## 2024-10-25 NOTE — PATIENT INSTRUCTIONS
Dear Hector Read,    Thank you for coming to your appointment today. I hope we have addressed all of your needs.     Please make sure to do the following:  - Continue your medications as listed.  - We will see each other again in 1 weeks.    Call for a sooner appointment if you develop worsen of symptoms     Have a great day!        Sincerely,  Keila Berg MD  10/25/2024  2:26 PM

## 2024-11-01 ENCOUNTER — NURSE ONLY (OUTPATIENT)
Dept: INTERNAL MEDICINE | Age: 64
End: 2024-11-01
Payer: COMMERCIAL

## 2024-11-01 DIAGNOSIS — Z79.01 CHRONIC ANTICOAGULATION: ICD-10-CM

## 2024-11-01 DIAGNOSIS — R79.1 ABNORMAL INR: Primary | ICD-10-CM

## 2024-11-01 LAB
INR BLD: 7.8
INTERNATIONAL NORMALIZATION RATIO, POC: 7.2
PROTHROMBIN TIME, POC: 87.9
PROTHROMBIN TIME: 86.6 SEC (ref 9.3–12.4)

## 2024-11-01 PROCEDURE — 85610 PROTHROMBIN TIME: CPT

## 2024-11-01 PROCEDURE — 36415 COLL VENOUS BLD VENIPUNCTURE: CPT

## 2024-11-01 RX ORDER — WARFARIN SODIUM 5 MG/1
TABLET ORAL
Qty: 30 TABLET | Refills: 0 | Status: SHIPPED | OUTPATIENT
Start: 2024-11-01

## 2024-11-01 NOTE — PROGRESS NOTES
Per  pt ok to leave to go to  , pt had labs drawn,tolerated well.  INR results reviewed with Dr. Tapia.  Orders received.  Patient notified in office of these orders.  Patient voices understanding of the information and follow-up.  AVS printed and given to patient with appointment card for next INR check.      Lab called for critical lab pt INR is  7.8

## 2024-11-08 ENCOUNTER — TELEPHONE (OUTPATIENT)
Dept: INTERNAL MEDICINE | Age: 64
End: 2024-11-08

## 2024-11-08 ENCOUNTER — NURSE ONLY (OUTPATIENT)
Dept: INTERNAL MEDICINE | Age: 64
End: 2024-11-08
Payer: COMMERCIAL

## 2024-11-08 DIAGNOSIS — Z79.01 CHRONIC ANTICOAGULATION: Primary | ICD-10-CM

## 2024-11-08 LAB
INR BLD: 7.1
INTERNATIONAL NORMALIZATION RATIO, POC: 7.6
PROTHROMBIN TIME, POC: 90.8
PROTHROMBIN TIME: 78.7 SEC (ref 9.3–12.4)

## 2024-11-08 PROCEDURE — 85610 PROTHROMBIN TIME: CPT | Performed by: INTERNAL MEDICINE

## 2024-11-08 PROCEDURE — 36415 COLL VENOUS BLD VENIPUNCTURE: CPT | Performed by: INTERNAL MEDICINE

## 2024-11-08 NOTE — PROGRESS NOTES
Hector came in today 11/8/2024 to have his INR checked.  Today the INR was 7.6.  He has been taking Coumadin 5 mg daily.  Per Dr. Francis  he is to  hold 11/8 and 11/9 and start taking 4 mg daily starting 11/10 Return on 11/19 for an INR recheck. Pt verbalizes understanding.

## 2024-11-08 NOTE — PATIENT INSTRUCTIONS
There has been a change to your Coumadin dosage. You are to hold your Coumadin today 11/8 and tomorrow 11/9. You are to start taking 4 mg daily starting 11/10. Return on 11/19/24 at 1 pm for an INR recheck . Please call the office with any questions or concerns at [phone].

## 2024-11-08 NOTE — PROGRESS NOTES
Last visit INR 7.2 -- he was taking 7.5 +6mg alternating   Instructed to stop Coumadin 11/1 and 11/2  and then tart 5 mg daily - since 11/3  Confirmed he held 2 days  We confirmed that over past 5 days he has been taking 5mg QD--now INR 7.6 (higher- despite lower dose)   Comments:  Pt is using 5 mg daily x5 days.      Plan: hold 2 days and now plan lower dose 4mg daily - fu 11/19

## 2024-11-08 NOTE — TELEPHONE ENCOUNTER
Per Dr. Francis pt is to hold today's and tomorrows dose of Coumadin and start taking 4 mg daily on 11/10. Pt was given instructions in office. Pt verbalized understanding.

## 2024-11-18 ENCOUNTER — TELEPHONE (OUTPATIENT)
Dept: INTERNAL MEDICINE | Age: 64
End: 2024-11-18

## 2024-11-18 NOTE — TELEPHONE ENCOUNTER
Devin from [facility] Quality Department requested a copy of the office notes from patient's last visit on 11/8/24 for his blood pressure reading for the quality department. In preparing paperwork to fax to office, it is noted the visit of 11/8/24 is an INR visit with no BP reading. Last PCP visit was 10/25/24 with full vitals. Both visits faxed to [facility]. See media. Heather Blanchard, AISSATOUN

## 2024-11-22 ENCOUNTER — NURSE ONLY (OUTPATIENT)
Dept: INTERNAL MEDICINE | Age: 64
End: 2024-11-22
Payer: COMMERCIAL

## 2024-11-22 ENCOUNTER — TELEPHONE (OUTPATIENT)
Dept: INTERNAL MEDICINE | Age: 64
End: 2024-11-22

## 2024-11-22 DIAGNOSIS — I82.499 DEEP VEIN THROMBOSIS (DVT) OF OTHER VEIN OF LOWER EXTREMITY, UNSPECIFIED CHRONICITY, UNSPECIFIED LATERALITY (HCC): Primary | ICD-10-CM

## 2024-11-22 DIAGNOSIS — Z79.01 CHRONIC ANTICOAGULATION: ICD-10-CM

## 2024-11-22 LAB
INR BLD: 8.6
INTERNATIONAL NORMALIZATION RATIO, POC: 8
PROTHROMBIN TIME, POC: 96
PROTHROMBIN TIME: 96.8 SEC (ref 9.3–12.4)

## 2024-11-22 PROCEDURE — 36415 COLL VENOUS BLD VENIPUNCTURE: CPT | Performed by: INTERNAL MEDICINE

## 2024-11-22 PROCEDURE — 85610 PROTHROMBIN TIME: CPT | Performed by: INTERNAL MEDICINE

## 2024-11-22 NOTE — PROGRESS NOTES
INR results reviewed with Dr. Francis.  Orders received. Hold Coumadin today and tomorrow then start taking 4 mg daily starting 11/24. Return in one week. Patient notified in office of these orders.  Patient voices understanding of the information and follow-up.  AVS printed and given to patient with appointment card for next INR check.

## 2024-11-22 NOTE — TELEPHONE ENCOUNTER
Lab called with critical result    INR 8.6    Nurses notified Phyllis SCHMID and Cary Blanchard LPN

## 2024-11-22 NOTE — PATIENT INSTRUCTIONS
Hold your coumadin today and tomorrow then start taking 4 mg daily on 11/24.Return in one week for an INR check on 12/3 at 1 pm. Please call the office with any questions or concerns at [phone].

## 2024-11-26 DIAGNOSIS — M79.2 NEUROPATHIC PAIN: ICD-10-CM

## 2024-11-26 RX ORDER — GABAPENTIN 400 MG/1
CAPSULE ORAL
Qty: 270 CAPSULE | Refills: 1 | OUTPATIENT
Start: 2024-11-26

## 2024-12-06 ENCOUNTER — NURSE ONLY (OUTPATIENT)
Dept: INTERNAL MEDICINE | Age: 64
End: 2024-12-06
Payer: COMMERCIAL

## 2024-12-06 DIAGNOSIS — I82.499 DEEP VEIN THROMBOSIS (DVT) OF OTHER VEIN OF LOWER EXTREMITY, UNSPECIFIED CHRONICITY, UNSPECIFIED LATERALITY (HCC): Primary | ICD-10-CM

## 2024-12-06 LAB
INR BLD: 4.2
INTERNATIONAL NORMALIZATION RATIO, POC: 4.2
PROTHROMBIN TIME, POC: 50.9

## 2024-12-06 PROCEDURE — 85610 PROTHROMBIN TIME: CPT | Performed by: INTERNAL MEDICINE

## 2024-12-06 NOTE — PATIENT INSTRUCTIONS
Hold Coumadin x 2 days Friday and Saturday, then resume taking this Sunday 4mg's daily and recheck INR in 1 week

## 2024-12-06 NOTE — PROGRESS NOTES
Lab Results       Component                Value               Date                       INR                      4.2                 12/06/2024                  Plan: Patinet INR still elevated at this time plan to hold the next 2 days and then resume 4 mg daily; if still elevated at the next visit then we will need to reduce to 3 mg daily     Return date  As of 12/6/2024    TTR:  39.4% (10.1 y)   Next INR check:  12/13/2024          Electronically signed by Benito Cuellar Jr, DO on 12/6/2024 at 2:01 PM

## 2024-12-06 NOTE — PROGRESS NOTES
INR results reviewed with Dr. Cuellar and orders received. Pt notified via phone of same. AVS mailed to patient.  Verbalizes understanding when asked to repeat back

## 2024-12-13 ENCOUNTER — NURSE ONLY (OUTPATIENT)
Dept: INTERNAL MEDICINE | Age: 64
End: 2024-12-13
Payer: COMMERCIAL

## 2024-12-13 DIAGNOSIS — Z79.01 CHRONIC ANTICOAGULATION: Primary | ICD-10-CM

## 2024-12-13 LAB
INR BLD: 5.3
INTERNATIONAL NORMALIZATION RATIO, POC: 4.7
PROTHROMBIN TIME, POC: 56.7
PROTHROMBIN TIME: 58.6 SEC (ref 9.3–12.4)

## 2024-12-13 PROCEDURE — 85610 PROTHROMBIN TIME: CPT | Performed by: INTERNAL MEDICINE

## 2024-12-13 PROCEDURE — 36415 COLL VENOUS BLD VENIPUNCTURE: CPT | Performed by: INTERNAL MEDICINE

## 2024-12-13 RX ORDER — WARFARIN SODIUM 3 MG/1
3 TABLET ORAL DAILY
Qty: 30 TABLET | Refills: 1 | Status: SHIPPED | OUTPATIENT
Start: 2024-12-13

## 2024-12-13 NOTE — PROGRESS NOTES
he reports he is taking 4mg QD  Today 4.7 (higher than last visit)    Will hold ONE day today- Plan to lower to 3mg daily  (will call in new Rx)  Recheck INR in one week-- dec 20th

## 2024-12-13 NOTE — PROGRESS NOTES
Pt received venipuncture blood draw left hand. Tolerated well with no adverse reactions. Sent two blue tubes to lab.

## 2024-12-13 NOTE — PATIENT INSTRUCTIONS
There has been a change to your Coumadin dosage. You are to hold today's dose and begin taking Coumadin 3 mg daily. Your next PT/INR lab will be in one week on 12/20/2024 at 1:00 pm. If you have any questions or concerns, please contact the office at [phone].      Thank you,     Ximena NEELY CMA

## 2024-12-20 ENCOUNTER — NURSE ONLY (OUTPATIENT)
Dept: INTERNAL MEDICINE | Age: 64
End: 2024-12-20
Payer: COMMERCIAL

## 2024-12-20 DIAGNOSIS — I73.9 PAD (PERIPHERAL ARTERY DISEASE) (HCC): ICD-10-CM

## 2024-12-20 DIAGNOSIS — I10 ESSENTIAL HYPERTENSION: ICD-10-CM

## 2024-12-20 DIAGNOSIS — Z79.01 CHRONIC ANTICOAGULATION: Primary | ICD-10-CM

## 2024-12-20 LAB
INR BLD: 8.9
INTERNATIONAL NORMALIZATION RATIO, POC: 8
PROTHROMBIN TIME, POC: 96
PROTHROMBIN TIME: 99.6 SEC (ref 9.3–12.4)

## 2024-12-20 PROCEDURE — 85610 PROTHROMBIN TIME: CPT | Performed by: INTERNAL MEDICINE

## 2024-12-20 RX ORDER — AMLODIPINE BESYLATE 10 MG/1
10 TABLET ORAL DAILY
Qty: 90 TABLET | Refills: 1 | Status: SHIPPED | OUTPATIENT
Start: 2024-12-20

## 2024-12-20 RX ORDER — ALBUTEROL SULFATE 90 UG/1
2 AEROSOL, METERED RESPIRATORY (INHALATION) EVERY 6 HOURS PRN
Qty: 18 G | Refills: 3 | Status: SHIPPED | OUTPATIENT
Start: 2024-12-20

## 2024-12-20 RX ORDER — PHYTONADIONE 5 MG/1
2.5 TABLET ORAL ONCE
Qty: 0.5 TABLET | Refills: 0 | Status: SHIPPED | OUTPATIENT
Start: 2024-12-20 | End: 2024-12-20

## 2024-12-20 RX ORDER — ATORVASTATIN CALCIUM 80 MG/1
80 TABLET, FILM COATED ORAL NIGHTLY
Qty: 90 TABLET | Refills: 1 | Status: SHIPPED | OUTPATIENT
Start: 2024-12-20 | End: 2025-06-18

## 2024-12-20 NOTE — PROGRESS NOTES
Pt received venipuncture blood draw right hand. Tolerated well with no adverse reactions. Sent two blue tubes to the lab STAT.

## 2024-12-20 NOTE — PATIENT INSTRUCTIONS
There will be a change to your Coumadin dosage. You are to hold the next three days and then begin taking Coumadin 3 mg daily. You were also sent a dose of Vitamin K to your pharmacy to . Your next PT/INR lab will be in one week on 12/26/2024 at 8:30 am. If you have any questions or concerns, please contact the office at [phone].      Thank you,    Ximena NEELY CMA

## 2024-12-20 NOTE — PROGRESS NOTES
Lab Results       Component                Value               Date                       INR                      8.9 ()            12/20/2024              Patient Findings     Negatives:  Signs/symptoms of thrombosis, Signs/symptoms of bleeding,   Laboratory test error suspected, Change in health, Change in alcohol use,   Change in activity, Upcoming invasive procedure, Emergency department   visit, Upcoming dental procedure, Missed doses, Extra doses, Change in   medications, Change in diet/appetite, Hospital admission, Bruising, Other   complaints    Comments:  Pt taking Coumadin 3 mg daily.         Plan: Hold warfarin the next 3 days; take Vit K 2.5 mg once; no signs of overt bleeding today; no changes in mentation; restart warfarin 3 mg on Monday and return to clinic next Thursday for recheck.      Return date  As of 12/20/2024    TTR:  39.2% (10.2 y)   Next INR check:  12/26/2024          Electronically signed by Benito Cuellar Jr, DO on 12/20/2024 at 2:51 PM

## 2024-12-26 ENCOUNTER — NURSE ONLY (OUTPATIENT)
Dept: INTERNAL MEDICINE | Age: 64
End: 2024-12-26
Payer: COMMERCIAL

## 2024-12-26 VITALS — TEMPERATURE: 97.2 F

## 2024-12-26 DIAGNOSIS — Z79.01 CHRONIC ANTICOAGULATION: Primary | ICD-10-CM

## 2024-12-26 LAB
INTERNATIONAL NORMALIZATION RATIO, POC: 4.4
PROTHROMBIN TIME, POC: 52.2

## 2024-12-26 PROCEDURE — 85610 PROTHROMBIN TIME: CPT | Performed by: INTERNAL MEDICINE

## 2024-12-26 RX ORDER — WARFARIN SODIUM 2.5 MG/1
TABLET ORAL
Qty: 7 TABLET | Refills: 0 | Status: SHIPPED | OUTPATIENT
Start: 2024-12-26

## 2024-12-26 NOTE — PATIENT INSTRUCTIONS
There will be changes to your Coumadin dosage. You are to begin taking Coumadin 2.5 mg daily. STOP taking BROWN 3 mg and START taking GREEN 2.5mg.  Your next PT/INR lab will be in one week on 01/02/2024 at 1:15 pm. If you have any questions or concerns, please contact the office at [phone].      Thank you,     Ximena NEELY CMA

## 2024-12-26 NOTE — PROGRESS NOTES
Problem: Discharge Planning  Goal: Discharge to home or other facility with appropriate resources  Outcome: Completed     Problem: Safety - Adult  Goal: Free from fall injury  Outcome: Completed     Problem: Pain  Goal: Verbalizes/displays adequate comfort level or baseline comfort level  Outcome: Completed      Lab Results       Component                Value               Date                       INR                      4.4                 12/26/2024              Patient Findings     Negatives:  Signs/symptoms of thrombosis, Signs/symptoms of bleeding,   Laboratory test error suspected, Change in health, Change in alcohol use,   Change in activity, Upcoming invasive procedure, Emergency department   visit, Upcoming dental procedure, Missed doses, Extra doses, Change in   medications, Change in diet/appetite, Hospital admission, Bruising, Other   complaints    Comments:  Pt taking Coumadin 3 mg daily        Plan: Patinet INR improved from last week; plan to have patient stop taking the brown/tan warfarin 3 mg tablet and start taking the green warfarin 2.5 mg tablets and return for INR check in 1 week. 7 day supply given to evaluate efficacy     Return date  As of 12/26/2024    TTR:  39.2% (10.2 y)   Next INR check:  1/2/2025          Electronically signed by Benito Cuellar Jr, DO on 12/26/2024 at 1:09 PM

## 2025-01-02 ENCOUNTER — NURSE ONLY (OUTPATIENT)
Dept: INTERNAL MEDICINE | Age: 65
End: 2025-01-02
Payer: COMMERCIAL

## 2025-01-02 ENCOUNTER — TELEPHONE (OUTPATIENT)
Dept: INTERNAL MEDICINE | Age: 65
End: 2025-01-02

## 2025-01-02 VITALS — TEMPERATURE: 97.1 F

## 2025-01-02 DIAGNOSIS — Z79.01 CHRONIC ANTICOAGULATION: Primary | Chronic | ICD-10-CM

## 2025-01-02 LAB
INTERNATIONAL NORMALIZATION RATIO, POC: 3
PROTHROMBIN TIME, POC: 35.7

## 2025-01-02 PROCEDURE — 93793 ANTICOAG MGMT PT WARFARIN: CPT | Performed by: INTERNAL MEDICINE

## 2025-01-02 PROCEDURE — 85610 PROTHROMBIN TIME: CPT | Performed by: INTERNAL MEDICINE

## 2025-01-02 RX ORDER — WARFARIN SODIUM 2.5 MG/1
TABLET ORAL
Qty: 30 TABLET | Refills: 0 | Status: SHIPPED | OUTPATIENT
Start: 2025-01-02

## 2025-01-02 NOTE — TELEPHONE ENCOUNTER
Patient returned nurse's message explaining PT/INR instructions and next Pt/INR visit. Patient verbalized understanding. Patient stated he was going to  Coumadin 2.5 mg tablets at his local pharmacy.

## 2025-01-02 NOTE — PROGRESS NOTES
INR results reviewed with Dr. Velásquez.  Orders received.  Patient will continue taking Coumadin 2.5 mg . Next PT/INR will be done on 1/9/25 at 115 pm. Patient notified via voicemail of these orders. Detailed message left with new orders.  Patient was instructed to call with any questions.  AVS printed and mailed to patient with appointment card for next INR check.

## 2025-01-02 NOTE — PATIENT INSTRUCTIONS
Continue taking Coumadin  2.5 mg daily. Your next PT/INR will be done on 1/9/25 at 115 pm. Coumadin 2.5 mg tablets were called into your local pharmacy. Please call the office with any problems or concerns at [phone].     Thank you     Zach CAT LPN

## 2025-01-10 ENCOUNTER — NURSE ONLY (OUTPATIENT)
Dept: INTERNAL MEDICINE | Age: 65
End: 2025-01-10
Payer: COMMERCIAL

## 2025-01-10 DIAGNOSIS — I82.499 DEEP VEIN THROMBOSIS (DVT) OF OTHER VEIN OF LOWER EXTREMITY, UNSPECIFIED CHRONICITY, UNSPECIFIED LATERALITY (HCC): ICD-10-CM

## 2025-01-10 DIAGNOSIS — Z79.01 CHRONIC ANTICOAGULATION: Primary | ICD-10-CM

## 2025-01-10 LAB
INTERNATIONAL NORMALIZATION RATIO, POC: 2.4
PROTHROMBIN TIME, POC: 28.8

## 2025-01-10 PROCEDURE — 85610 PROTHROMBIN TIME: CPT | Performed by: INTERNAL MEDICINE

## 2025-01-10 NOTE — PROGRESS NOTES
Patient met with Dr. Francis and instructed to continue Comadin as he has been taking and return in 2 weeks. Appointment made for Friday 1/24/25 at 1300. AVW to be printed and mailed to home when Dr. Francis makes his note. Heather Blanchard LPN

## 2025-01-31 ENCOUNTER — NURSE ONLY (OUTPATIENT)
Dept: INTERNAL MEDICINE | Age: 65
End: 2025-01-31
Payer: COMMERCIAL

## 2025-01-31 VITALS
RESPIRATION RATE: 18 BRPM | HEIGHT: 74 IN | BODY MASS INDEX: 21.56 KG/M2 | SYSTOLIC BLOOD PRESSURE: 139 MMHG | WEIGHT: 168 LBS | TEMPERATURE: 97.1 F | HEART RATE: 71 BPM | DIASTOLIC BLOOD PRESSURE: 62 MMHG | OXYGEN SATURATION: 100 %

## 2025-01-31 DIAGNOSIS — I10 ESSENTIAL HYPERTENSION: ICD-10-CM

## 2025-01-31 DIAGNOSIS — J45.909 UNCOMPLICATED ASTHMA, UNSPECIFIED ASTHMA SEVERITY, UNSPECIFIED WHETHER PERSISTENT: ICD-10-CM

## 2025-01-31 DIAGNOSIS — M79.2 NEUROPATHIC PAIN: ICD-10-CM

## 2025-01-31 DIAGNOSIS — E83.42 HYPOMAGNESEMIA: ICD-10-CM

## 2025-01-31 DIAGNOSIS — Z79.01 CHRONIC ANTICOAGULATION: Primary | ICD-10-CM

## 2025-01-31 DIAGNOSIS — I73.9 PAD (PERIPHERAL ARTERY DISEASE) (HCC): ICD-10-CM

## 2025-01-31 DIAGNOSIS — Z98.890 HX OF ABDOMINAL SURGERY: ICD-10-CM

## 2025-01-31 DIAGNOSIS — B35.1 ONYCHOMYCOSIS: ICD-10-CM

## 2025-01-31 LAB
INTERNATIONAL NORMALIZATION RATIO, POC: 8
PROTHROMBIN TIME, POC: 96

## 2025-01-31 PROCEDURE — 85610 PROTHROMBIN TIME: CPT

## 2025-01-31 PROCEDURE — 99212 OFFICE O/P EST SF 10 MIN: CPT

## 2025-01-31 RX ORDER — LOSARTAN POTASSIUM 100 MG/1
100 TABLET ORAL DAILY
Qty: 90 TABLET | Refills: 1 | Status: SHIPPED | OUTPATIENT
Start: 2025-01-31

## 2025-01-31 RX ORDER — WARFARIN SODIUM 2.5 MG/1
TABLET ORAL
Qty: 30 TABLET | Refills: 0 | Status: CANCELLED | OUTPATIENT
Start: 2025-01-31

## 2025-01-31 RX ORDER — HYDROCHLOROTHIAZIDE 25 MG/1
25 TABLET ORAL
Qty: 90 TABLET | Refills: 1 | Status: SHIPPED | OUTPATIENT
Start: 2025-01-31 | End: 2025-07-30

## 2025-01-31 RX ORDER — GABAPENTIN 400 MG/1
400 CAPSULE ORAL 3 TIMES DAILY
Qty: 270 CAPSULE | Refills: 1 | Status: SHIPPED | OUTPATIENT
Start: 2025-01-31 | End: 2025-07-30

## 2025-01-31 RX ORDER — MAGNESIUM OXIDE 400 MG/1
400 TABLET ORAL DAILY
Qty: 90 TABLET | Refills: 1 | Status: SHIPPED | OUTPATIENT
Start: 2025-01-31 | End: 2025-07-30

## 2025-01-31 RX ORDER — ATORVASTATIN CALCIUM 80 MG/1
80 TABLET, FILM COATED ORAL NIGHTLY
Qty: 90 TABLET | Refills: 1 | Status: SHIPPED | OUTPATIENT
Start: 2025-01-31 | End: 2025-07-30

## 2025-01-31 RX ORDER — M-VIT,TX,IRON,MINS/CALC/FOLIC 27MG-0.4MG
1 TABLET ORAL DAILY
Qty: 90 TABLET | Refills: 1 | Status: SHIPPED | OUTPATIENT
Start: 2025-01-31 | End: 2025-07-30

## 2025-01-31 RX ORDER — AMLODIPINE BESYLATE 10 MG/1
10 TABLET ORAL DAILY
Qty: 90 TABLET | Refills: 1 | Status: SHIPPED | OUTPATIENT
Start: 2025-01-31

## 2025-01-31 RX ORDER — DOXYCYCLINE 100 MG/1
CAPSULE ORAL
Qty: 90 CAPSULE | Refills: 1 | Status: SHIPPED | OUTPATIENT
Start: 2025-01-31

## 2025-01-31 RX ORDER — MULTIVITAMIN
1 TABLET ORAL DAILY
Qty: 90 TABLET | Refills: 1 | Status: SHIPPED | OUTPATIENT
Start: 2025-01-31

## 2025-01-31 RX ORDER — PHYTONADIONE 5 MG/1
2.5 TABLET ORAL ONCE
Qty: 0.5 TABLET | Refills: 0 | Status: SHIPPED | OUTPATIENT
Start: 2025-01-31 | End: 2025-01-31

## 2025-01-31 RX ORDER — KETOCONAZOLE 20 MG/G
CREAM TOPICAL
Qty: 30 G | Refills: 1 | Status: SHIPPED | OUTPATIENT
Start: 2025-01-31

## 2025-01-31 RX ORDER — ALBUTEROL SULFATE 90 UG/1
2 AEROSOL, METERED RESPIRATORY (INHALATION) EVERY 6 HOURS PRN
Qty: 18 G | Refills: 3 | Status: SHIPPED | OUTPATIENT
Start: 2025-01-31

## 2025-01-31 SDOH — ECONOMIC STABILITY: FOOD INSECURITY: WITHIN THE PAST 12 MONTHS, THE FOOD YOU BOUGHT JUST DIDN'T LAST AND YOU DIDN'T HAVE MONEY TO GET MORE.: NEVER TRUE

## 2025-01-31 SDOH — ECONOMIC STABILITY: FOOD INSECURITY: WITHIN THE PAST 12 MONTHS, YOU WORRIED THAT YOUR FOOD WOULD RUN OUT BEFORE YOU GOT MONEY TO BUY MORE.: NEVER TRUE

## 2025-01-31 ASSESSMENT — PATIENT HEALTH QUESTIONNAIRE - PHQ9
9. THOUGHTS THAT YOU WOULD BE BETTER OFF DEAD, OR OF HURTING YOURSELF: NOT AT ALL
SUM OF ALL RESPONSES TO PHQ QUESTIONS 1-9: 5
5. POOR APPETITE OR OVEREATING: NOT AT ALL
3. TROUBLE FALLING OR STAYING ASLEEP: NOT AT ALL
1. LITTLE INTEREST OR PLEASURE IN DOING THINGS: NOT AT ALL
2. FEELING DOWN, DEPRESSED OR HOPELESS: SEVERAL DAYS
6. FEELING BAD ABOUT YOURSELF - OR THAT YOU ARE A FAILURE OR HAVE LET YOURSELF OR YOUR FAMILY DOWN: NOT AT ALL
7. TROUBLE CONCENTRATING ON THINGS, SUCH AS READING THE NEWSPAPER OR WATCHING TELEVISION: SEVERAL DAYS
SUM OF ALL RESPONSES TO PHQ QUESTIONS 1-9: 5
8. MOVING OR SPEAKING SO SLOWLY THAT OTHER PEOPLE COULD HAVE NOTICED. OR THE OPPOSITE, BEING SO FIGETY OR RESTLESS THAT YOU HAVE BEEN MOVING AROUND A LOT MORE THAN USUAL: NOT AT ALL
SUM OF ALL RESPONSES TO PHQ QUESTIONS 1-9: 5
SUM OF ALL RESPONSES TO PHQ QUESTIONS 1-9: 5
SUM OF ALL RESPONSES TO PHQ9 QUESTIONS 1 & 2: 1
4. FEELING TIRED OR HAVING LITTLE ENERGY: NEARLY EVERY DAY
10. IF YOU CHECKED OFF ANY PROBLEMS, HOW DIFFICULT HAVE THESE PROBLEMS MADE IT FOR YOU TO DO YOUR WORK, TAKE CARE OF THINGS AT HOME, OR GET ALONG WITH OTHER PEOPLE: NOT DIFFICULT AT ALL

## 2025-01-31 ASSESSMENT — ENCOUNTER SYMPTOMS
GASTROINTESTINAL NEGATIVE: 1
CHEST TIGHTNESS: 0
EYES NEGATIVE: 1
DIARRHEA: 0
ABDOMINAL PAIN: 0
ABDOMINAL DISTENTION: 0
VOMITING: 0
CONSTIPATION: 0
COUGH: 0
RESPIRATORY NEGATIVE: 1
NAUSEA: 0
BACK PAIN: 0
SHORTNESS OF BREATH: 0

## 2025-01-31 NOTE — PROGRESS NOTES
[facility]  Internal Medicine Residency Clinic    Attending Physician Statement  I have discussed the case, including pertinent history and exam findings with the resident physician.I have seen and examined the patient and the key elements of the encounter have been performed by me. I agree with the assessment, plan and orders as documented by the resident. I have reviewed the relevant PMHx, PSHx, FamHx, SocialHx, medications, and allergies and updated history as appropriate.    Patient presents for routine follow up of medical problems.       Remainder of medical problems as per resident note.    Beltran Tapia MD  1/31/2025 2:45 PM

## 2025-01-31 NOTE — PATIENT INSTRUCTIONS
Dear Hector Read,        Thank you for coming to your appointment today. I hope we have addressed all of your needs.       Please make sure to do the following:  - Continue your medications as listed.  - Get labs drawn before our next follow up.  - Referrals have been made to :  If you do not hear from the office in 1 week, please call the number listed.  - We will see each other again in     Call for a sooner appointment if you develop     Have a great day!        Sincerely,  Zan Cedeno MD  1/31/2025  2:13 PM

## 2025-01-31 NOTE — ASSESSMENT & PLAN NOTE
:: INR today is 8.0  :: On warfarin 2.5 mg QD  - Supratherapeutic, stop warfarin for 4 days, return to our clinic therafter  - Follow-up on 2/4/2025  Orders:    POCT INR    phytonadione (VITAMIN K) 5 MG tablet; Take 0.5 tablets by mouth once for 1 dose

## 2025-01-31 NOTE — ASSESSMENT & PLAN NOTE
Orders:    VENTOLIN  (90 Base) MCG/ACT inhaler; Inhale 2 puffs into the lungs every 6 hours as needed for Wheezing or Shortness of Breath

## 2025-01-31 NOTE — ASSESSMENT & PLAN NOTE
BP Readings from Last 3 Encounters:   01/31/25 139/62   10/25/24 (!) 140/70   06/04/24 121/78     Orders:    amLODIPine (NORVASC) 10 MG tablet; Take 1 tablet by mouth daily    losartan (COZAAR) 100 MG tablet; Take 1 tablet by mouth daily    hydroCHLOROthiazide (HYDRODIURIL) 25 MG tablet; Take 1 tablet by mouth Daily with lunch

## 2025-01-31 NOTE — PROGRESS NOTES
Hector Read (:  1960) is a 64 y.o. male,Established patient, here for evaluation of the following chief complaint(s):  Office Visit for Anticoagulation Management    Last visit 10/25/2024.     Assessment & Plan  Chronic anticoagulation  :: INR today is 8.0  :: On warfarin 2.5 mg QD  - Supratherapeutic, stop warfarin for 4 days, return to our clinic therafter  - Follow-up on 2025  Orders:    POCT INR    phytonadione (VITAMIN K) 5 MG tablet; Take 0.5 tablets by mouth once for 1 dose    Essential hypertension  BP Readings from Last 3 Encounters:   25 139/62   10/25/24 (!) 140/70   24 121/78     Orders:    amLODIPine (NORVASC) 10 MG tablet; Take 1 tablet by mouth daily    losartan (COZAAR) 100 MG tablet; Take 1 tablet by mouth daily    hydroCHLOROthiazide (HYDRODIURIL) 25 MG tablet; Take 1 tablet by mouth Daily with lunch    PAD (peripheral artery disease) (HCC)     Orders:    atorvastatin (LIPITOR) 80 MG tablet; Take 1 tablet by mouth nightly    Hx of abdominal surgery    Orders:    doxycycline monohydrate (MONODOX) 100 MG capsule; take 1 capsule by mouth once daily    Neuropathic pain    Orders:    gabapentin (NEURONTIN) 400 MG capsule; Take 1 capsule by mouth 3 times daily for 180 days. Takes 2 pills with lunch and one pill at HS    Multiple Vitamins-Minerals (THERAPEUTIC MULTIVITAMIN-MINERALS) tablet; Take 1 tablet by mouth daily    Onychomycosis    Orders:    ketoconazole (NIZORAL) 2 % cream; Apply topically daily.    Hypomagnesemia      Orders:    magnesium oxide (MAG-OX) 400 MG tablet; Take 1 tablet by mouth daily    Uncomplicated asthma, unspecified asthma severity, unspecified whether persistent      Orders:    VENTOLIN  (90 Base) MCG/ACT inhaler; Inhale 2 puffs into the lungs every 6 hours as needed for Wheezing or Shortness of Breath      Return in about 4 days (around 2025), or if symptoms worsen or fail to improve, for Follow-up, Lab results.

## 2025-02-04 ENCOUNTER — NURSE ONLY (OUTPATIENT)
Dept: INTERNAL MEDICINE | Age: 65
End: 2025-02-04
Payer: MEDICARE

## 2025-02-04 VITALS — TEMPERATURE: 97.3 F

## 2025-02-04 DIAGNOSIS — Z79.01 CHRONIC ANTICOAGULATION: Primary | ICD-10-CM

## 2025-02-04 LAB
INTERNATIONAL NORMALIZATION RATIO, POC: 2
PROTHROMBIN TIME, POC: 24.2

## 2025-02-04 PROCEDURE — 93793 ANTICOAG MGMT PT WARFARIN: CPT | Performed by: INTERNAL MEDICINE

## 2025-02-04 PROCEDURE — 85610 PROTHROMBIN TIME: CPT | Performed by: INTERNAL MEDICINE

## 2025-02-04 RX ORDER — WARFARIN SODIUM 2.5 MG/1
TABLET ORAL
Qty: 30 TABLET | Refills: 0 | Status: SHIPPED | OUTPATIENT
Start: 2025-02-04

## 2025-02-04 NOTE — PROGRESS NOTES
INR results reviewed with Dr. Tapia.  Orders received. Continue same dosage, recheck in one week.  Patient notified in office of these orders.  Patient voices understanding of the information and follow-up.  AVS printed and given to patient with appointment card for next INR check.

## 2025-02-04 NOTE — PATIENT INSTRUCTIONS
There has been no changes to your Coumadin dosage. You are to continue taking Coumadin 2.5 mg daily. Your next PT/INR lab will be in one week on Tuesday 02/11/2025 at 1:30 pm. If you have any further questions, please contact the office at [phone].        Thank You,    Ximena NEELY CMA

## 2025-02-11 ENCOUNTER — HOSPITAL ENCOUNTER (EMERGENCY)
Age: 65
Discharge: HOME | End: 2025-02-11
Payer: MEDICARE

## 2025-02-11 ENCOUNTER — APPOINTMENT (OUTPATIENT)
Dept: GENERAL RADIOLOGY | Age: 65
End: 2025-02-11
Payer: MEDICARE

## 2025-02-11 VITALS
HEART RATE: 82 BPM | BODY MASS INDEX: 22.47 KG/M2 | OXYGEN SATURATION: 97 % | DIASTOLIC BLOOD PRESSURE: 78 MMHG | RESPIRATION RATE: 18 BRPM | SYSTOLIC BLOOD PRESSURE: 132 MMHG | WEIGHT: 175 LBS | TEMPERATURE: 98.1 F

## 2025-02-11 DIAGNOSIS — S60.012A CONTUSION OF LEFT THUMB WITHOUT DAMAGE TO NAIL, INITIAL ENCOUNTER: Primary | ICD-10-CM

## 2025-02-11 PROCEDURE — 99283 EMERGENCY DEPT VISIT LOW MDM: CPT

## 2025-02-11 PROCEDURE — 73140 X-RAY EXAM OF FINGER(S): CPT

## 2025-02-11 ASSESSMENT — PAIN SCALES - GENERAL: PAINLEVEL_OUTOF10: 7

## 2025-02-11 ASSESSMENT — LIFESTYLE VARIABLES
HOW MANY STANDARD DRINKS CONTAINING ALCOHOL DO YOU HAVE ON A TYPICAL DAY: PATIENT DOES NOT DRINK
HOW OFTEN DO YOU HAVE A DRINK CONTAINING ALCOHOL: NEVER

## 2025-02-11 ASSESSMENT — PAIN - FUNCTIONAL ASSESSMENT: PAIN_FUNCTIONAL_ASSESSMENT: 0-10

## 2025-02-11 NOTE — ED PROVIDER NOTES
family/friend/caregiver expressed understanding. Vitals were stable and they were in no distress at discharge. Findings were discussed with the patient and reasons to immediately return to the ED were articulated to them.     I used an evidence-based tool along with my training and experience to weigh the risk of discharge against the risks of further testing, imaging, or hospitalization. At this time, I estimate the risks of additional testing, imaging, or hospitalization to be equal to or greater than the risk of discharge.  I discussed my risk assessment with the patient and the patient consents to the risk of discharge as well as the risk of uncertainty in estimating outcomes. At this time, the risk of acute decompensation with death before the patient can return for re-evaluation is most likely lower than the risk of death attributable to being in the hospital.     Plan of Care/Counseling:  Zan Panda NP reviewed today's visit with the patient in addition to providing specific details for the plan of care and counseling regarding the diagnosis and prognosis.  Questions are answered at this time and are agreeable with the plan.    ASSESSMENT     1. Contusion of left thumb without damage to nail, initial encounter        DISPOSITION   Discharged home.  Patient condition is stable.    Discharge Instructions:   Patient referred to  Nguyen Caro MD  [address] [id number]  [phone]    Call in 1 day  As needed, If symptoms worsen    NEW MEDICATIONS     Discharge Medication List as of 2/11/2025  1:40 PM        Electronically signed by EMILY Langford NP   DD: 2/11/25  **This report was transcribed using voice recognition software. Every effort was made to ensure accuracy; however, inadvertent computerized transcription errors may be present.  END OF ED PROVIDER NOTE

## 2025-02-13 ENCOUNTER — APPOINTMENT (OUTPATIENT)
Dept: GENERAL RADIOLOGY | Age: 65
End: 2025-02-13
Payer: MEDICARE

## 2025-02-13 ENCOUNTER — OFFICE VISIT (OUTPATIENT)
Dept: INTERNAL MEDICINE | Age: 65
End: 2025-02-13
Payer: MEDICARE

## 2025-02-13 ENCOUNTER — HOSPITAL ENCOUNTER (EMERGENCY)
Age: 65
Discharge: HOME | End: 2025-02-13
Payer: MEDICARE

## 2025-02-13 VITALS
RESPIRATION RATE: 20 BRPM | TEMPERATURE: 97.6 F | OXYGEN SATURATION: 97 % | SYSTOLIC BLOOD PRESSURE: 127 MMHG | HEART RATE: 99 BPM | DIASTOLIC BLOOD PRESSURE: 76 MMHG

## 2025-02-13 VITALS
WEIGHT: 151 LBS | TEMPERATURE: 97.2 F | BODY MASS INDEX: 19.38 KG/M2 | HEIGHT: 74 IN | HEART RATE: 95 BPM | RESPIRATION RATE: 18 BRPM | OXYGEN SATURATION: 97 % | SYSTOLIC BLOOD PRESSURE: 147 MMHG | DIASTOLIC BLOOD PRESSURE: 82 MMHG

## 2025-02-13 DIAGNOSIS — L02.512 ABSCESS OF LEFT THUMB: ICD-10-CM

## 2025-02-13 DIAGNOSIS — L03.012 PARONYCHIA OF FINGER OF LEFT HAND: Primary | ICD-10-CM

## 2025-02-13 DIAGNOSIS — Z79.01 CHRONIC ANTICOAGULATION: Primary | ICD-10-CM

## 2025-02-13 LAB
INTERNATIONAL NORMALIZATION RATIO, POC: 1.6
PROTHROMBIN TIME, POC: 18.8

## 2025-02-13 PROCEDURE — 99283 EMERGENCY DEPT VISIT LOW MDM: CPT

## 2025-02-13 PROCEDURE — 6370000000 HC RX 637 (ALT 250 FOR IP): Performed by: NURSE PRACTITIONER

## 2025-02-13 PROCEDURE — 36415 COLL VENOUS BLD VENIPUNCTURE: CPT

## 2025-02-13 PROCEDURE — 73140 X-RAY EXAM OF FINGER(S): CPT

## 2025-02-13 RX ORDER — IBUPROFEN 800 MG/1
800 TABLET, FILM COATED ORAL EVERY 8 HOURS PRN
Qty: 30 TABLET | Refills: 0 | Status: SHIPPED | OUTPATIENT
Start: 2025-02-13 | End: 2025-02-23

## 2025-02-13 RX ORDER — DOXYCYCLINE HYCLATE 100 MG
100 TABLET ORAL 2 TIMES DAILY
Qty: 14 TABLET | Refills: 0 | Status: SHIPPED | OUTPATIENT
Start: 2025-02-13 | End: 2025-02-20

## 2025-02-13 RX ORDER — IBUPROFEN 800 MG/1
800 TABLET, FILM COATED ORAL ONCE
Status: COMPLETED | OUTPATIENT
Start: 2025-02-13 | End: 2025-02-13

## 2025-02-13 RX ADMIN — IBUPROFEN 800 MG: 800 TABLET, FILM COATED ORAL at 17:27

## 2025-02-13 ASSESSMENT — ENCOUNTER SYMPTOMS
VOMITING: 0
ABDOMINAL PAIN: 0
DIARRHEA: 0
COUGH: 0
BACK PAIN: 0
EYE DISCHARGE: 0
SORE THROAT: 0
NAUSEA: 0
COLOR CHANGE: 0
SHORTNESS OF BREATH: 0

## 2025-02-13 ASSESSMENT — PAIN DESCRIPTION - LOCATION: LOCATION: FINGER (COMMENT WHICH ONE)

## 2025-02-13 NOTE — ED PROVIDER NOTES
Decision Making     Medications   ibuprofen (ADVIL;MOTRIN) tablet 800 mg (800 mg Oral Given 2/13/25 1727)        Consult(s):   None    Procedure(s):     PROCEDURE  2/13/25       Time: 1700    INCISION AND DRAINAGE  Risks, benefits and alternatives (for applicable procedures below) described.   Performed By: EMILY Arrington CNP.    Indication: paronychia  Informed consent obtained: The patient provided verbal consent for this procedure..  Prep: The skin was cleansed with chlorhexidine gluconate, wiped with isopropyl alcohol and draped in a sterile fashion.  Local Anesthesia:  obtained with Lidocaine 1% with epinephrine.  Incision: The paronychia was Incised by scalpal and copious fluid was drained. A wound culture was not obtained.  The wound  was irrigated.  The wound was not probed.  was not packed with iodoform gauze. The wound was then covered with a sterile dressing.  Patient tolerated the procedure well.               MDM:     64 y.o. old male with a history of hypertension, DVT, AAA, who presents to the emergency department by private vehicle, for evaluation of a wound to the distal end of his left thumb surrounding the nail.  He said that 2 days ago, he was petting his dog, his dog shook and the name tag from the collar struck his thumb.  He was evaluated here that day, had an x-ray performed, diagnosed with a contusion of the thumb and discharged home.  Last night into today, he developed worsening pain and abscess formation around the nail.  He was seen at his primary office, advised to come to the ER.    Chart review:  Office visit note reviewed by me.  He was evaluated for left thumb pain and swelling.  No fevers or chills.  Sent to ER for draining of abscess.    Examination: All vital signs stable.  He is awake, alert, oriented, nontoxic and in no acute distress.  GCS 15, fluent speech, ambulatory moves all 4 extremities.  Respirations regular, nonlabored, no tachypnea.  He has what appears to be a

## 2025-02-13 NOTE — PROGRESS NOTES
[facility]  Internal Medicine Residency Clinic    Attending Physician Statement  I have discussed the case, including pertinent history and exam findings with the resident physician.  I agree with the assessment, plan and orders as documented by the resident. I have reviewed all pertinent PMHx, PSHx, FamHx, SocialHx, medications, and allergies and updated history as appropriate.    Patient here for routine follow up of medical problems.     Left thumb pain d/t paronychia   Needs incision plus topical or systemic abx    Subtherapeutic INR   On 2.5 daily   Change the dose to 5 mg Tuesday and Thursday and 2.5 the rest of the week  Recheck in 10 days       Remainder of medical problems as per resident note.    Jie Broderick MD  2/13/25    
[facility] Internal Medicine      SUBJECTIVE:  Hector Read (:  1960) is a 64 y.o. male here for evaluation of the following chief complaint(s):  Follow-up (ED) and Office Visit for Anticoagulation Management      Previous Visit Imp Points: Last visit was on 10/25/2024 with Dr. Berg    HPI:   Hector Read 64 y.o. 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 He is having left thumb pain for 2 days, experiencing extreme pain, can not sleep properly, associated with swelling, went to ED yesterday, X ray finger was done, which showed Mild soft tissue swelling along the palmar aspect of the thumb. Today his thumb is filled with pus. Otherwise he denies any fever or any other systemic symptoms.     Health Maintenance Due   Topic Date Due    Pneumococcal 50+ years Vaccine (2 of 2 - PCV) 11/15/2017    Respiratory Syncytial Virus (RSV) Pregnant or age 60 yrs+ (1 - Risk 60-74 years 1-dose series) Never done    COVID-19 Vaccine ( -  season) 2024    AAA screen  10/12/2024    Annual Wellness Visit (Medicare Advantage)  Never done       Lab Results   Component Value Date/Time    LABA1C 5.0 2024 11:43 AM       Allergies   Allergen Reactions    Tramadol Hcl     Ultram [Tramadol] Itching         Review of Systems   Constitutional:  Negative for activity change and fatigue.   HENT:  Negative for congestion, sneezing and sore throat.    Eyes:  Negative for discharge and visual disturbance.   Respiratory:  Negative for cough and shortness of breath.    Cardiovascular:  Negative for chest pain, palpitations and leg swelling.   Gastrointestinal:  Negative for abdominal pain, diarrhea, nausea and vomiting.   Endocrine: Negative for cold intolerance, heat intolerance and polyuria.   Genitourinary:  Negative for difficulty urinating, frequency and urgency. 
[facility]  Internal Medicine Residency Program  ACC Note      SUBJECTIVE:  CC: had no chief complaint listed for this encounter.    HPI:Hector Read is a 64 y.o. male who  has a past medical history of AAA (abdominal aortic aneurysm) (HCC), Abdominal aortic aneurysm without rupture (HCC), Anti-phospholipid antibody syndrome (HCC), Aortoiliac occlusive disease (HCC), Asthma, Chronic anticoagulation, DVT (deep venous thrombosis) (HCC), DVT, lower extremity (HCC), Hematoma, History of blood transfusion, Hypertension, Incisional hernia, Infected prosthetic vascular graft (HCC), Left leg pain, Peripheral vascular disease (HCC), PVD (peripheral vascular disease) with claudication (HCC), and Rectal bleeding.     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 Today, patient reports feeling good, no issues or concerns at this time.      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 
no concerns

## 2025-02-13 NOTE — DISCHARGE INSTRUCTIONS
Take the antibiotics until completed  Soak in warm soapy water couple times a day.  Alternate Tylenol and ibuprofen for pain.  Follow-up with your primary doctor for reevaluation.  Return for worsening symptoms.

## 2025-02-13 NOTE — PATIENT INSTRUCTIONS
Dear Hector Read,    Thank you for coming to your appointment today. I hope we have addressed all of your needs.     Please make sure to do the following:  - Continue your medications as listed.  - please go to ER for abscess drainage  - Take 5 mg tablet on Tuesday and Thursday, and rest of the 5 days 2.5 mg tablet daily.   - We will see each other again in 10 days     Call for a sooner appointment if you develop, any new symptoms.     Have a great day!    Sincerely,  Johnna Bird MD  2/13/2025  3:05 PM

## 2025-02-13 NOTE — ED NOTES
Department of Emergency Medicine  FIRST PROVIDER TRIAGE NOTE             Independent MLP           2/13/25  4:16 PM EST    Date of Encounter: 2/13/25   MRN: [id number]      HPI: Hector Read is a 64 y.o. male who presents to the ED for Wound Check (Left thumb wound- sent by PCP)       ROS: Negative for cp or sob.    PE: Gen Appearance/Constitutional: alert  HEENT: NC/NT. PERRLA,  Airway patent.    Provider-Patient relationship only established for Provider In Triage (PIT)  Full assessment, HPI and examination not performed, therefore, it is not yet possible to state whether or not an emergency medical condition exists   Focused assessment only during triage. This is not a comprehensive evaluation due to no physical ER space, staff shortage and high numbers of boarding patients in the ER.       Initial Plan of Care: All treatment areas with department are currently occupied. Plan to order/Initiate the following while awaiting opening in ED.  Initiate Treatment-Testing, Proceed toTreatment Area When Bed Available for ED Attending/MLP to Continue Care    Electronically signed by EMILY Arrington CNP   DD: 2/13/25      Stefan Gramajo APRN - CNP  02/13/25 [id number]

## 2025-02-28 ENCOUNTER — NURSE ONLY (OUTPATIENT)
Dept: INTERNAL MEDICINE | Age: 65
End: 2025-02-28
Payer: MEDICARE

## 2025-02-28 DIAGNOSIS — I82.499 DEEP VEIN THROMBOSIS (DVT) OF OTHER VEIN OF LOWER EXTREMITY, UNSPECIFIED CHRONICITY, UNSPECIFIED LATERALITY (HCC): ICD-10-CM

## 2025-02-28 DIAGNOSIS — Z79.01 CHRONIC ANTICOAGULATION: Primary | Chronic | ICD-10-CM

## 2025-02-28 LAB
INTERNATIONAL NORMALIZATION RATIO, POC: 1.4
PROTHROMBIN TIME, POC: 16.9

## 2025-02-28 PROCEDURE — 36415 COLL VENOUS BLD VENIPUNCTURE: CPT | Performed by: INTERNAL MEDICINE

## 2025-02-28 PROCEDURE — 93793 ANTICOAG MGMT PT WARFARIN: CPT | Performed by: INTERNAL MEDICINE

## 2025-02-28 NOTE — PROGRESS NOTES
Lab Results       Component                Value               Date                       INR                      1.4 (L)             02/28/2025                  Plan: Patient has been sub and supratherapeutic on Warfarin 2.5 mg.  Plan to conitnue current dosage and have him return in 1 week.  If still subtherapeutic in 1 week then plan to increase dosage to 3 mg daily.      Return date  As of 2/28/2025    TTR:  38.9% (10.4 y)   Next INR check:  3/7/2025          Electronically signed by Benito Cuellar Jr, DO on 2/28/2025 at 2:34 PM

## 2025-03-04 RX ORDER — WARFARIN SODIUM 2.5 MG/1
TABLET ORAL
Qty: 30 TABLET | Refills: 0 | OUTPATIENT
Start: 2025-03-04

## 2025-03-05 ENCOUNTER — TELEPHONE (OUTPATIENT)
Dept: INTERNAL MEDICINE | Age: 65
End: 2025-03-05

## 2025-03-05 NOTE — TELEPHONE ENCOUNTER
Pharmacy called to inquire about prior auth on Vit K for pt, pt never picked up from pharmacy from 1/31. Please advise if still need to complete prior auth due to  pt's last INR was 1.4.

## 2025-03-07 ENCOUNTER — NURSE ONLY (OUTPATIENT)
Dept: INTERNAL MEDICINE | Age: 65
End: 2025-03-07
Payer: MEDICARE

## 2025-03-07 DIAGNOSIS — Z79.01 CHRONIC ANTICOAGULATION: Primary | ICD-10-CM

## 2025-03-07 LAB
INTERNATIONAL NORMALIZATION RATIO, POC: 1.2
PROTHROMBIN TIME, POC: 13.8

## 2025-03-07 PROCEDURE — 85610 PROTHROMBIN TIME: CPT | Performed by: INTERNAL MEDICINE

## 2025-03-07 PROCEDURE — 93793 ANTICOAG MGMT PT WARFARIN: CPT | Performed by: INTERNAL MEDICINE

## 2025-03-07 RX ORDER — WARFARIN SODIUM 3 MG/1
3 TABLET ORAL DAILY
Qty: 7 TABLET | Refills: 0 | Status: SHIPPED | OUTPATIENT
Start: 2025-03-07

## 2025-03-07 NOTE — PATIENT INSTRUCTIONS
There has been a change to your Coumadin dosage. You are to begin taking Coumadin 3 mg daily. It is a TAN OVAL TABLET WITH WARFARIN ON THE FRONT AND  #3 ON THE BACK. Your next PT/INR lab will be in one week on Friday 03/14/2025 at 1:15 pm. If you have any questions or concerns, please contact the office at [phone].      Thank you,    Celena MURO LPN

## 2025-03-07 NOTE — PROGRESS NOTES
INR results reviewed with Dr. uCellar.  Orders received. Begin taking Coumadin 3 mg daily. Recheck in one week. Patient notified via phone of these orders.  Patient voices understanding of the information and follow-up.  AVS printed and mailed to patient with appointment card for next INR check.

## 2025-03-07 NOTE — PROGRESS NOTES
Lab Results       Component                Value               Date                       INR                      1.2                 03/07/2025              Patient Findings     Negatives:  Signs/symptoms of thrombosis, Signs/symptoms of bleeding,   Laboratory test error suspected, Change in health, Change in alcohol use,   Change in activity, Upcoming invasive procedure, Emergency department   visit, Upcoming dental procedure, Missed doses, Extra doses, Change in   medications, Change in diet/appetite, Hospital admission, Bruising, Other   complaints    Comments:  Pt is taking 2.5 mg daily.        Plan: Plan to increase weekly dosage by 20% to 3 mg tablet daily and return in 1 week    Return date  As of 3/7/2025    TTR:  38.8% (10.4 y)   Next INR check:  3/14/2025          Electronically signed by Benito Cuellar Jr, DO on 3/7/2025 at 2:04 PM

## 2025-03-21 ENCOUNTER — TELEPHONE (OUTPATIENT)
Dept: INTERNAL MEDICINE | Age: 65
End: 2025-03-21

## 2025-03-21 ENCOUNTER — CLINICAL SUPPORT (OUTPATIENT)
Dept: INTERNAL MEDICINE | Age: 65
End: 2025-03-21
Payer: MEDICARE

## 2025-03-21 DIAGNOSIS — Z79.01 CHRONIC ANTICOAGULATION: ICD-10-CM

## 2025-03-21 DIAGNOSIS — R79.1 ABNORMAL INR: Primary | ICD-10-CM

## 2025-03-21 LAB
INR BLD: 6.4
INTERNATIONAL NORMALIZATION RATIO, POC: 5.5
PROTHROMBIN TIME, POC: 65.7
PROTHROMBIN TIME: 70.5 SEC (ref 9.3–12.4)

## 2025-03-21 PROCEDURE — 36415 COLL VENOUS BLD VENIPUNCTURE: CPT | Performed by: INTERNAL MEDICINE

## 2025-03-21 PROCEDURE — 85610 PROTHROMBIN TIME: CPT | Performed by: INTERNAL MEDICINE

## 2025-03-21 RX ORDER — WARFARIN SODIUM 2 MG/1
2 TABLET ORAL DAILY
COMMUNITY
End: 2025-03-21 | Stop reason: SDUPTHER

## 2025-03-21 RX ORDER — WARFARIN SODIUM 2 MG/1
2 TABLET ORAL DAILY
Qty: 15 TABLET | Refills: 0 | Status: SHIPPED | OUTPATIENT
Start: 2025-03-21

## 2025-03-21 NOTE — TELEPHONE ENCOUNTER
[facility]  lab called the office  with a critical value for a PT/INR  which was 6.4. Lab was drawn in the office. Please advise.

## 2025-04-04 ENCOUNTER — CLINICAL SUPPORT (OUTPATIENT)
Dept: INTERNAL MEDICINE | Age: 65
End: 2025-04-04
Payer: MEDICARE

## 2025-04-04 VITALS — TEMPERATURE: 97 F

## 2025-04-04 DIAGNOSIS — Z79.01 CHRONIC ANTICOAGULATION: Primary | ICD-10-CM

## 2025-04-04 LAB
INTERNATIONAL NORMALIZATION RATIO, POC: 1.2
PROTHROMBIN TIME, POC: 14.3

## 2025-04-04 PROCEDURE — 93793 ANTICOAG MGMT PT WARFARIN: CPT | Performed by: INTERNAL MEDICINE

## 2025-04-04 PROCEDURE — 85610 PROTHROMBIN TIME: CPT | Performed by: INTERNAL MEDICINE

## 2025-04-04 RX ORDER — WARFARIN SODIUM 1 MG/1
TABLET ORAL
Qty: 30 TABLET | Refills: 0 | Status: SHIPPED | OUTPATIENT
Start: 2025-04-04

## 2025-04-04 NOTE — PROGRESS NOTES
Patient INR subtherapeutic, noted dose reduction last INR visit and suspect is under dosed. He does endorse eating some more peas over the past week and taking OTC tylenol BID for past 2 weeks due thumb pain from paronychia.     Does adjusted to M/F 3mg all other 2 mg and recheck in 1 week.    Electronically signed by Brayan Kerr DO on 4/4/2025 at 1:41 PM

## 2025-04-04 NOTE — PROGRESS NOTES
INR results reviewed with Dr. Kerr.  Orders received.  Patient notified in office of these orders.  Patient voices understanding of the information and follow-up.  AVS printed and given to patient with appointment card for next INR check.

## 2025-04-11 ENCOUNTER — CLINICAL SUPPORT (OUTPATIENT)
Dept: INTERNAL MEDICINE | Age: 65
End: 2025-04-11
Payer: COMMERCIAL

## 2025-04-11 VITALS — TEMPERATURE: 97 F

## 2025-04-11 DIAGNOSIS — I82.499 DEEP VEIN THROMBOSIS (DVT) OF OTHER VEIN OF LOWER EXTREMITY, UNSPECIFIED CHRONICITY, UNSPECIFIED LATERALITY: ICD-10-CM

## 2025-04-11 DIAGNOSIS — Z79.01 CHRONIC ANTICOAGULATION: Primary | ICD-10-CM

## 2025-04-11 LAB
INTERNATIONAL NORMALIZATION RATIO, POC: 1.7
PROTHROMBIN TIME, POC: 20.1

## 2025-04-11 PROCEDURE — 85610 PROTHROMBIN TIME: CPT | Performed by: INTERNAL MEDICINE

## 2025-04-11 PROCEDURE — 93793 ANTICOAG MGMT PT WARFARIN: CPT | Performed by: INTERNAL MEDICINE

## 2025-04-11 RX ORDER — WARFARIN SODIUM 5 MG/1
5 TABLET ORAL DAILY
Qty: 7 TABLET | Refills: 0 | Status: SHIPPED | OUTPATIENT
Start: 2025-04-11

## 2025-04-11 RX ORDER — WARFARIN SODIUM 3 MG/1
3 TABLET ORAL DAILY
Qty: 30 TABLET | Refills: 0 | Status: CANCELLED | OUTPATIENT
Start: 2025-04-11

## 2025-04-11 NOTE — PATIENT INSTRUCTIONS
Your new Coumadin instructions are Coumadin 5 mg daily. Coumadin 5 mg tablets were called into your local pharmacy. Your next PT/INR will be on 4/17 at 145 pm. Please call the office with any problems or concerns at [phone].      Thank you   Zach CAT LPN

## 2025-04-11 NOTE — PROGRESS NOTES
INR results reviewed with Dr. Tapia.  Orders received. Patient given new Coumadin instructions. Patient will now take Coumadin 5 mg daily. Recheck PT/INR in week.  Patient notified via phone of these orders.  Patient voices understanding of the information and follow-up.  AVS printed and mailed to patient with appointment card for next INR check.

## 2025-04-17 ENCOUNTER — CLINICAL SUPPORT (OUTPATIENT)
Dept: INTERNAL MEDICINE | Age: 65
End: 2025-04-17
Payer: MEDICARE

## 2025-04-17 DIAGNOSIS — Z79.01 CHRONIC ANTICOAGULATION: Primary | ICD-10-CM

## 2025-04-17 LAB
INTERNATIONAL NORMALIZATION RATIO, POC: 2.4
PROTHROMBIN TIME, POC: 28.3

## 2025-04-17 PROCEDURE — 93793 ANTICOAG MGMT PT WARFARIN: CPT | Performed by: INTERNAL MEDICINE

## 2025-04-17 PROCEDURE — 85610 PROTHROMBIN TIME: CPT | Performed by: INTERNAL MEDICINE

## 2025-04-17 RX ORDER — WARFARIN SODIUM 1 MG/1
TABLET ORAL
Qty: 30 TABLET | Refills: 1 | Status: SHIPPED | OUTPATIENT
Start: 2025-04-17

## 2025-04-17 RX ORDER — WARFARIN SODIUM 3 MG/1
3 TABLET ORAL DAILY
Qty: 30 TABLET | Refills: 1 | Status: SHIPPED | OUTPATIENT
Start: 2025-04-17

## 2025-04-17 RX ORDER — WARFARIN SODIUM 5 MG/1
5 TABLET ORAL DAILY
Qty: 7 TABLET | Refills: 0 | Status: CANCELLED | OUTPATIENT
Start: 2025-04-17

## 2025-04-17 NOTE — PATIENT INSTRUCTIONS
Please start taking 3 mg MOn ,Wed and Fri, and 2 mg all other days and recheck INR at next Appointment 4/25/25 130 pm.

## 2025-05-02 ENCOUNTER — CLINICAL SUPPORT (OUTPATIENT)
Dept: INTERNAL MEDICINE | Age: 65
End: 2025-05-02
Payer: COMMERCIAL

## 2025-05-02 ENCOUNTER — TELEPHONE (OUTPATIENT)
Dept: INTERNAL MEDICINE | Age: 65
End: 2025-05-02

## 2025-05-02 DIAGNOSIS — Z79.01 CHRONIC ANTICOAGULATION: Primary | Chronic | ICD-10-CM

## 2025-05-02 DIAGNOSIS — I82.499 DEEP VEIN THROMBOSIS (DVT) OF OTHER VEIN OF LOWER EXTREMITY, UNSPECIFIED CHRONICITY, UNSPECIFIED LATERALITY (HCC): ICD-10-CM

## 2025-05-02 LAB
INTERNATIONAL NORMALIZATION RATIO, POC: 1.6
PROTHROMBIN TIME, POC: 19.3

## 2025-05-02 PROCEDURE — 85610 PROTHROMBIN TIME: CPT | Performed by: INTERNAL MEDICINE

## 2025-05-02 PROCEDURE — 93793 ANTICOAG MGMT PT WARFARIN: CPT | Performed by: INTERNAL MEDICINE

## 2025-05-09 ENCOUNTER — TELEPHONE (OUTPATIENT)
Dept: INTERNAL MEDICINE | Age: 65
End: 2025-05-09

## 2025-05-12 ENCOUNTER — CLINICAL SUPPORT (OUTPATIENT)
Dept: INTERNAL MEDICINE | Age: 65
End: 2025-05-12
Payer: COMMERCIAL

## 2025-05-12 VITALS — TEMPERATURE: 97 F

## 2025-05-12 DIAGNOSIS — I82.499 DEEP VEIN THROMBOSIS (DVT) OF OTHER VEIN OF LOWER EXTREMITY, UNSPECIFIED CHRONICITY, UNSPECIFIED LATERALITY (HCC): ICD-10-CM

## 2025-05-12 DIAGNOSIS — Z79.01 CHRONIC ANTICOAGULATION: Primary | Chronic | ICD-10-CM

## 2025-05-12 LAB
INTERNATIONAL NORMALIZATION RATIO, POC: 4.1
PROTHROMBIN TIME, POC: 48.9

## 2025-05-12 PROCEDURE — 85610 PROTHROMBIN TIME: CPT | Performed by: INTERNAL MEDICINE

## 2025-05-12 PROCEDURE — 93793 ANTICOAG MGMT PT WARFARIN: CPT | Performed by: INTERNAL MEDICINE

## 2025-05-12 RX ORDER — WARFARIN SODIUM 4 MG/1
TABLET ORAL
Qty: 30 TABLET | Refills: 0 | Status: SHIPPED | OUTPATIENT
Start: 2025-05-12

## 2025-05-12 RX ORDER — WARFARIN SODIUM 3 MG/1
TABLET ORAL
Qty: 30 TABLET | Refills: 1
Start: 2025-05-12

## 2025-05-12 NOTE — PROGRESS NOTES
INR results reviewed with Dr. Cuellar.  Orders received. Patient's new Coumadin instruction Coumadin 4 mg on Mondays and Fridays along with Coumadin 3 mg all other days. Recheck PT/INR on 5/ 19. Patient notified via phone of these orders.  Patient voices understanding of the information and follow-up.  AVS printed and mailed to patient with appointment card for next INR check.

## 2025-05-12 NOTE — PATIENT INSTRUCTIONS
Your new Coumadin  instructions are Coumadin 4 mg on Mondays and Fridays along with Coumadin 3 mg all other days. Your next PT/INR will be done on 5/19 at 1 pm. Your Coumadin 4 mg tablets will be sent to your local pharmacy.     Thank You  Zach CAT LPN

## 2025-05-23 ENCOUNTER — TELEPHONE (OUTPATIENT)
Age: 65
End: 2025-05-23

## 2025-05-23 NOTE — TELEPHONE ENCOUNTER
Spoke with patient who didn't want to come  back because he was already home. States he didn't feel well. When pressed, stated he was \"tired\". I stated I hoped he would feel better. I asked him if he wanted to make an appointment for Tuesday and he stated he already had an appointment for Tuesday at 1pm. I didn't see an appointment, but I added him to the schedule on Tuesday at 1:00 pm. Wished patient a relaxing weekend and stated I hoped he felt better. Heather Blanchard LPN

## 2025-05-28 ENCOUNTER — TELEPHONE (OUTPATIENT)
Age: 65
End: 2025-05-28

## 2025-05-28 NOTE — TELEPHONE ENCOUNTER
Called pt to reschedule missed INR appt. No answer , left detailed message to call clinic to reschedule.

## 2025-06-06 ENCOUNTER — CLINICAL SUPPORT (OUTPATIENT)
Age: 65
End: 2025-06-06
Payer: COMMERCIAL

## 2025-06-06 VITALS
WEIGHT: 151 LBS | BODY MASS INDEX: 19.38 KG/M2 | HEART RATE: 90 BPM | RESPIRATION RATE: 14 BRPM | OXYGEN SATURATION: 98 % | HEIGHT: 74 IN | SYSTOLIC BLOOD PRESSURE: 102 MMHG | DIASTOLIC BLOOD PRESSURE: 64 MMHG | TEMPERATURE: 97.3 F

## 2025-06-06 DIAGNOSIS — M79.675 PAIN OF TOE OF LEFT FOOT: ICD-10-CM

## 2025-06-06 DIAGNOSIS — M10.9 ACUTE GOUT INVOLVING TOE OF RIGHT FOOT, UNSPECIFIED CAUSE: Primary | ICD-10-CM

## 2025-06-06 DIAGNOSIS — I82.499 DEEP VEIN THROMBOSIS (DVT) OF OTHER VEIN OF LOWER EXTREMITY, UNSPECIFIED CHRONICITY, UNSPECIFIED LATERALITY (HCC): ICD-10-CM

## 2025-06-06 DIAGNOSIS — Z79.01 CHRONIC ANTICOAGULATION: ICD-10-CM

## 2025-06-06 LAB
INTERNATIONAL NORMALIZATION RATIO, POC: 2.5
PROTHROMBIN TIME, POC: 30.4

## 2025-06-06 PROCEDURE — 3078F DIAST BP <80 MM HG: CPT

## 2025-06-06 PROCEDURE — 3074F SYST BP LT 130 MM HG: CPT

## 2025-06-06 PROCEDURE — 99214 OFFICE O/P EST MOD 30 MIN: CPT

## 2025-06-06 PROCEDURE — 1124F ACP DISCUSS-NO DSCNMKR DOCD: CPT

## 2025-06-06 PROCEDURE — 85610 PROTHROMBIN TIME: CPT

## 2025-06-06 RX ORDER — WARFARIN SODIUM 4 MG/1
TABLET ORAL
Qty: 30 TABLET | Refills: 0 | Status: SHIPPED | OUTPATIENT
Start: 2025-06-06

## 2025-06-06 RX ORDER — METHYLPREDNISOLONE 4 MG/1
TABLET ORAL
Qty: 1 KIT | Refills: 0 | Status: SHIPPED | OUTPATIENT
Start: 2025-06-06 | End: 2025-06-12

## 2025-06-06 RX ORDER — NAPROXEN 500 MG/1
500 TABLET ORAL 2 TIMES DAILY WITH MEALS
Qty: 14 TABLET | Refills: 0 | Status: SHIPPED | OUTPATIENT
Start: 2025-06-06

## 2025-06-06 RX ORDER — PANTOPRAZOLE SODIUM 40 MG/1
40 TABLET, DELAYED RELEASE ORAL
Qty: 7 TABLET | Refills: 0 | Status: SHIPPED | OUTPATIENT
Start: 2025-06-06

## 2025-06-06 RX ORDER — WARFARIN SODIUM 3 MG/1
TABLET ORAL
Qty: 30 TABLET | Refills: 1 | Status: SHIPPED | OUTPATIENT
Start: 2025-06-06

## 2025-06-06 NOTE — PATIENT INSTRUCTIONS
Samuel Mera.  Continue taking Coumadin 4mg on Monday and Friday and take 3 mg all of the other days.     Return to the office on 6-20-25 for your office visit and to repeat the PT/INR    Haylee ARDON LPN    Thank you for coming to your follow up appointment   Please take your medications as directed and keep your follow up appointment in 2 weeks with his PCP.  Call our office if you have any questions or concerns at [phone]    Sj Simmons MD

## 2025-06-20 ENCOUNTER — OFFICE VISIT (OUTPATIENT)
Age: 65
End: 2025-06-20
Payer: COMMERCIAL

## 2025-06-20 VITALS
WEIGHT: 157.7 LBS | OXYGEN SATURATION: 94 % | SYSTOLIC BLOOD PRESSURE: 117 MMHG | DIASTOLIC BLOOD PRESSURE: 72 MMHG | TEMPERATURE: 97.5 F | HEIGHT: 74 IN | BODY MASS INDEX: 20.24 KG/M2 | RESPIRATION RATE: 18 BRPM | HEART RATE: 82 BPM

## 2025-06-20 DIAGNOSIS — M79.2 NEUROPATHIC PAIN: ICD-10-CM

## 2025-06-20 DIAGNOSIS — Z13.1 SCREENING FOR DIABETES MELLITUS: ICD-10-CM

## 2025-06-20 DIAGNOSIS — I10 ESSENTIAL HYPERTENSION: Primary | ICD-10-CM

## 2025-06-20 DIAGNOSIS — I82.499 DEEP VEIN THROMBOSIS (DVT) OF OTHER VEIN OF LOWER EXTREMITY, UNSPECIFIED CHRONICITY, UNSPECIFIED LATERALITY (HCC): ICD-10-CM

## 2025-06-20 DIAGNOSIS — M10.9 ACUTE GOUT INVOLVING TOE OF RIGHT FOOT, UNSPECIFIED CAUSE: ICD-10-CM

## 2025-06-20 DIAGNOSIS — B35.1 ONYCHOMYCOSIS: ICD-10-CM

## 2025-06-20 DIAGNOSIS — I73.9 PAD (PERIPHERAL ARTERY DISEASE): ICD-10-CM

## 2025-06-20 DIAGNOSIS — D12.6 ADENOMATOUS POLYP OF COLON, UNSPECIFIED PART OF COLON: ICD-10-CM

## 2025-06-20 DIAGNOSIS — E78.5 HYPERLIPIDEMIA, UNSPECIFIED HYPERLIPIDEMIA TYPE: ICD-10-CM

## 2025-06-20 DIAGNOSIS — Z79.01 CHRONIC ANTICOAGULATION: Chronic | ICD-10-CM

## 2025-06-20 DIAGNOSIS — Z98.890 HX OF ABDOMINAL SURGERY: ICD-10-CM

## 2025-06-20 DIAGNOSIS — E83.42 HYPOMAGNESEMIA: ICD-10-CM

## 2025-06-20 DIAGNOSIS — J45.909 UNCOMPLICATED ASTHMA, UNSPECIFIED ASTHMA SEVERITY, UNSPECIFIED WHETHER PERSISTENT: ICD-10-CM

## 2025-06-20 LAB
ALBUMIN: 4.3 G/DL (ref 3.5–5.2)
ALP BLD-CCNC: 84 U/L (ref 40–129)
ALT SERPL-CCNC: 7 U/L (ref 0–50)
ANION GAP SERPL CALCULATED.3IONS-SCNC: 15 MMOL/L (ref 7–16)
AST SERPL-CCNC: 17 U/L (ref 0–50)
BASOPHILS ABSOLUTE: 0.05 K/UL (ref 0–0.2)
BASOPHILS RELATIVE PERCENT: 1 % (ref 0–2)
BILIRUB SERPL-MCNC: 0.4 MG/DL (ref 0–1.2)
BUN BLDV-MCNC: 26 MG/DL (ref 8–23)
CALCIUM SERPL-MCNC: 9.3 MG/DL (ref 8.8–10.2)
CHLORIDE BLD-SCNC: 104 MMOL/L (ref 98–107)
CHOLESTEROL, TOTAL: 172 MG/DL
CO2: 22 MMOL/L (ref 22–29)
CREAT SERPL-MCNC: 1.1 MG/DL (ref 0.7–1.2)
EOSINOPHILS ABSOLUTE: 0.1 K/UL (ref 0.05–0.5)
EOSINOPHILS RELATIVE PERCENT: 1 % (ref 0–6)
GFR, ESTIMATED: 75 ML/MIN/1.73M2
GLUCOSE BLD-MCNC: 90 MG/DL (ref 74–99)
HCT VFR BLD CALC: 38.7 % (ref 37–54)
HDLC SERPL-MCNC: 41 MG/DL
HEMOGLOBIN: 13 G/DL (ref 12.5–16.5)
IMMATURE GRANULOCYTES %: 1 % (ref 0–5)
IMMATURE GRANULOCYTES ABSOLUTE: 0.06 K/UL (ref 0–0.58)
INTERNATIONAL NORMALIZATION RATIO, POC: 1.8
LDL CHOLESTEROL: 106 MG/DL
LYMPHOCYTES ABSOLUTE: 2.76 K/UL (ref 1.5–4)
LYMPHOCYTES RELATIVE PERCENT: 31 % (ref 20–42)
MCH RBC QN AUTO: 29.1 PG (ref 26–35)
MCHC RBC AUTO-ENTMCNC: 33.6 G/DL (ref 32–34.5)
MCV RBC AUTO: 86.8 FL (ref 80–99.9)
MONOCYTES ABSOLUTE: 0.54 K/UL (ref 0.1–0.95)
MONOCYTES RELATIVE PERCENT: 6 % (ref 2–12)
NEUTROPHILS ABSOLUTE: 5.41 K/UL (ref 1.8–7.3)
NEUTROPHILS RELATIVE PERCENT: 61 % (ref 43–80)
PDW BLD-RTO: 17.2 % (ref 11.5–15)
PLATELET # BLD: 192 K/UL (ref 130–450)
PMV BLD AUTO: 10.6 FL (ref 7–12)
POTASSIUM SERPL-SCNC: 3.8 MMOL/L (ref 3.5–5.1)
PROTHROMBIN TIME, POC: 22.1
RBC # BLD: 4.46 M/UL (ref 3.8–5.8)
SODIUM BLD-SCNC: 141 MMOL/L (ref 136–145)
TOTAL PROTEIN: 7.1 G/DL (ref 6.4–8.3)
TRIGL SERPL-MCNC: 128 MG/DL
URIC ACID: 9.3 MG/DL (ref 3.4–7)
VLDLC SERPL CALC-MCNC: 26 MG/DL
WBC # BLD: 8.9 K/UL (ref 4.5–11.5)

## 2025-06-20 PROCEDURE — 3078F DIAST BP <80 MM HG: CPT

## 2025-06-20 PROCEDURE — 3017F COLORECTAL CA SCREEN DOC REV: CPT

## 2025-06-20 PROCEDURE — G8427 DOCREV CUR MEDS BY ELIG CLIN: HCPCS

## 2025-06-20 PROCEDURE — G8420 CALC BMI NORM PARAMETERS: HCPCS

## 2025-06-20 PROCEDURE — 85610 PROTHROMBIN TIME: CPT

## 2025-06-20 PROCEDURE — 4004F PT TOBACCO SCREEN RCVD TLK: CPT

## 2025-06-20 PROCEDURE — 1124F ACP DISCUSS-NO DSCNMKR DOCD: CPT

## 2025-06-20 PROCEDURE — 3074F SYST BP LT 130 MM HG: CPT

## 2025-06-20 PROCEDURE — 83036 HEMOGLOBIN GLYCOSYLATED A1C: CPT

## 2025-06-20 PROCEDURE — 99214 OFFICE O/P EST MOD 30 MIN: CPT

## 2025-06-20 RX ORDER — AMLODIPINE BESYLATE 10 MG/1
10 TABLET ORAL DAILY
Qty: 90 TABLET | Refills: 1 | Status: SHIPPED | OUTPATIENT
Start: 2025-06-20

## 2025-06-20 RX ORDER — ROSUVASTATIN CALCIUM 40 MG/1
40 TABLET, COATED ORAL NIGHTLY
COMMUNITY
Start: 2025-03-10 | End: 2025-06-20

## 2025-06-20 RX ORDER — LOSARTAN POTASSIUM 100 MG/1
100 TABLET ORAL DAILY
Qty: 90 TABLET | Refills: 1 | Status: SHIPPED | OUTPATIENT
Start: 2025-06-20

## 2025-06-20 RX ORDER — M-VIT,TX,IRON,MINS/CALC/FOLIC 27MG-0.4MG
1 TABLET ORAL DAILY
Qty: 90 TABLET | Refills: 1 | Status: SHIPPED | OUTPATIENT
Start: 2025-06-20 | End: 2025-12-17

## 2025-06-20 RX ORDER — DOXYCYCLINE 100 MG/1
CAPSULE ORAL
Qty: 90 CAPSULE | Refills: 1 | Status: SHIPPED | OUTPATIENT
Start: 2025-06-20

## 2025-06-20 RX ORDER — ALBUTEROL SULFATE 90 UG/1
2 AEROSOL, METERED RESPIRATORY (INHALATION) EVERY 6 HOURS PRN
Qty: 18 G | Refills: 3 | Status: SHIPPED | OUTPATIENT
Start: 2025-06-20

## 2025-06-20 RX ORDER — PHYTONADIONE 5 MG/1
2.5 TABLET ORAL ONCE
Qty: 0.5 TABLET | Refills: 0 | Status: SHIPPED | OUTPATIENT
Start: 2025-06-20 | End: 2025-06-20

## 2025-06-20 RX ORDER — ATORVASTATIN CALCIUM 80 MG/1
80 TABLET, FILM COATED ORAL NIGHTLY
Qty: 90 TABLET | Refills: 1 | Status: SHIPPED | OUTPATIENT
Start: 2025-06-20 | End: 2025-12-17

## 2025-06-20 RX ORDER — GABAPENTIN 400 MG/1
400 CAPSULE ORAL 3 TIMES DAILY
Qty: 270 CAPSULE | Refills: 1 | Status: SHIPPED | OUTPATIENT
Start: 2025-06-20 | End: 2025-12-17

## 2025-06-20 RX ORDER — MULTIVITAMIN
1 TABLET ORAL DAILY
Qty: 90 TABLET | Refills: 1 | Status: SHIPPED | OUTPATIENT
Start: 2025-06-20

## 2025-06-20 RX ORDER — KETOCONAZOLE 20 MG/G
CREAM TOPICAL
Qty: 30 G | Refills: 1 | Status: SHIPPED | OUTPATIENT
Start: 2025-06-20

## 2025-06-20 RX ORDER — PREDNISONE 10 MG/1
10 TABLET ORAL DAILY
Qty: 7 TABLET | Refills: 0 | Status: SHIPPED | OUTPATIENT
Start: 2025-06-20 | End: 2025-06-27

## 2025-06-20 RX ORDER — MAGNESIUM OXIDE 400 MG/1
400 TABLET ORAL DAILY
Qty: 90 TABLET | Refills: 1 | Status: SHIPPED | OUTPATIENT
Start: 2025-06-20 | End: 2025-12-17

## 2025-06-20 RX ORDER — HYDROCHLOROTHIAZIDE 25 MG/1
25 TABLET ORAL
Qty: 90 TABLET | Refills: 1 | Status: CANCELLED | OUTPATIENT
Start: 2025-06-20 | End: 2025-12-17

## 2025-06-20 RX ORDER — DOXYCYCLINE 100 MG/1
CAPSULE ORAL
Qty: 90 CAPSULE | Refills: 1 | Status: CANCELLED | OUTPATIENT
Start: 2025-06-20

## 2025-06-20 NOTE — ASSESSMENT & PLAN NOTE
Chronic, not at goal (unstable), changes made today: take warfarin 4mg MWF, 3mg all other days  Lab Results   Component Value Date    INR 1.8 06/20/2025    INR 2.5 06/06/2025    INR 4.1 05/12/2025    PROTIME 22.1 06/20/2025    PROTIME 30.4 06/06/2025    PROTIME 48.9 05/12/2025   Warfarin 4mg Mon and Fri, 3mg rest of the week   Change to warfarin 4mg MWF, 3 mg all other days   Repeat INR in 1 week 6/27/25  Orders:    POCT INR    phytonadione (VITAMIN K) 5 MG tablet; Take 0.5 tablets by mouth once for 1 dose

## 2025-06-20 NOTE — ASSESSMENT & PLAN NOTE
Chronic, at goal (stable), continue current treatment plan discontinue HCTZ  BP at home:  BP Readings from Last 3 Encounters:   06/20/25 117/72   06/06/25 102/64   02/13/25 127/76     Lab Results   Component Value Date    CREATININE 1.1 02/08/2024    CREATININE 1.1 02/08/2024    CREATININE 1.2 08/26/2023      Lab Results   Component Value Date    K 3.8 02/08/2024    K 3.8 02/08/2024     Denies headaches, dizziness, lightheadedness, chest pain  On amlodipine 10 mg daily, losartan 100 mg daily, HCTZ  Discontinue HCTZ due to gout   Orders:    amLODIPine (NORVASC) 10 MG tablet; Take 1 tablet by mouth daily    losartan (COZAAR) 100 MG tablet; Take 1 tablet by mouth daily    Comprehensive Metabolic Panel; Future    CBC with Auto Differential; Future

## 2025-06-20 NOTE — ASSESSMENT & PLAN NOTE
Chronic, at goal (stable), continue current treatment plan  No attacks stable on ventolin inhaler   Orders:    VENTOLIN  (90 Base) MCG/ACT inhaler; Inhale 2 puffs into the lungs every 6 hours as needed for Wheezing or Shortness of Breath

## 2025-06-20 NOTE — PROGRESS NOTES
Hector Read (:  1960) is a 64 y.o. male,Established patient, here for evaluation of the following chief complaint(s):  Follow-up (Pt states there are no new updates at this time. ), Hypertension, and Hyperlipidemia         Assessment & Plan  Essential hypertension   Chronic, at goal (stable), continue current treatment plan discontinue HCTZ  BP at home:  BP Readings from Last 3 Encounters:   25 117/72   25 102/64   25 127/76     Lab Results   Component Value Date    CREATININE 1.1 2024    CREATININE 1.1 2024    CREATININE 1.2 2023      Lab Results   Component Value Date    K 3.8 2024    K 3.8 2024     Denies headaches, dizziness, lightheadedness, chest pain  On amlodipine 10 mg daily, losartan 100 mg daily, HCTZ  Discontinue HCTZ due to gout   Orders:    amLODIPine (NORVASC) 10 MG tablet; Take 1 tablet by mouth daily    losartan (COZAAR) 100 MG tablet; Take 1 tablet by mouth daily    Comprehensive Metabolic Panel; Future    CBC with Auto Differential; Future    Hyperlipidemia, unspecified hyperlipidemia type   Chronic, at goal (stable), changes made today: see below  Lab Results   Component Value Date    CHOL 148 09/10/2024    TRIG 86 09/10/2024    HDL 41 09/10/2024    LDL 90 09/10/2024    VLDL 17 09/10/2024   On Crestor 40 mg nightly   The 10-year ASCVD risk score (Eugenie DAVIS, et al., 2019) is: 22%    Values used to calculate the score:      Age: 64 years      Sex: Male      Is Non- : Yes      Diabetic: No      Tobacco smoker: Yes      Systolic Blood Pressure: 117 mmHg      Is BP treated: Yes      HDL Cholesterol: 41 mg/dL      Total Cholesterol: 148 mg/dL    Orders:    Lipid Panel; Future    Acute gout involving toe of right foot, unspecified cause   New, not at goal (unstable), changes made today: see below  Lab Results   Component Value Date    URICACID 9.9 (H) 2024   Stop HCTZ   Complaining of persistent right

## 2025-06-20 NOTE — ASSESSMENT & PLAN NOTE
New, not at goal (unstable), changes made today: see below  Lab Results   Component Value Date    URICACID 9.9 (H) 02/08/2024   Stop HCTZ   Complaining of persistent right toe pain   Will continue steroids: prednisone 10 mg daily for 7 days   Orders:    Uric Acid; Future    predniSONE (DELTASONE) 10 MG tablet; Take 1 tablet by mouth daily for 7 days

## 2025-06-20 NOTE — ASSESSMENT & PLAN NOTE
Chronic, at goal (stable), changes made today: see below  Lab Results   Component Value Date    CHOL 148 09/10/2024    TRIG 86 09/10/2024    HDL 41 09/10/2024    LDL 90 09/10/2024    VLDL 17 09/10/2024   On Crestor 40 mg nightly   The 10-year ASCVD risk score (Eugenie DAVIS, et al., 2019) is: 22%    Values used to calculate the score:      Age: 64 years      Sex: Male      Is Non- : Yes      Diabetic: No      Tobacco smoker: Yes      Systolic Blood Pressure: 117 mmHg      Is BP treated: Yes      HDL Cholesterol: 41 mg/dL      Total Cholesterol: 148 mg/dL    Orders:    Lipid Panel; Future

## 2025-06-20 NOTE — PATIENT INSTRUCTIONS
Dear Hector Read,    Thank you for coming to your appointment at [facility].    Please make sure to do the following:    - Continue medications as listed and contact our office if medications are unavailable at the pharmacy.    -Recommend recheck in 1 week. Take warfarin 4mg MWF, 3 mg all other days     - If lab work was ordered please complete as instructed.    - If a referral was placed to another doctor's office, please contact our office in 5 business days if you have not heard from that office regarding the referral.    - If any imaging test was ordered at today's visit (ultrasound, CT scan, or MRI), expect a phone call to schedule in the next 5 business days. You may also call [facility] Imaging Scheduling department at [phone] to schedule.    Contact our office if you develop any new or worsening symptoms or if you have any questions regarding today's visit.    We aim to address your healthcare needs to your satisfaction!    Sincerely,  Nguyen Caro MD  6/20/2025  2:48 PM

## 2025-06-20 NOTE — ASSESSMENT & PLAN NOTE
Last colonoscopy done on 5/16/2023: Multiple adenomatous appearing polyps found and removed.  There were still multiple small polyps that were left in place.  Recommend follow-up colonoscopy in 1 year.  Pathology revealing tubular adenomas  Will refer patient back to Dr. Vee    Orders:    Colby Bocanegra MD, [facility]

## 2025-06-20 NOTE — PROGRESS NOTES
[facility]  Internal Medicine Residency Clinic    Attending Physician Statement  I have discussed the case, including pertinent history and exam findings with the resident physician.I have seen and examined the patient and the key elements of the encounter have been performed by me. I agree with the assessment, plan and orders as documented by the resident. I have reviewed the relevant PMHx, PSHx, FamHx, SocialHx, medications, and allergies and updated history as appropriate.    Patient presents for routine follow up of medical problems.     APLA on chronic coumadin   INR    AAA s/p graft on chronic doxy for suppression    Gout flair with hx hyperuricemia   Improved pain but still residual symptoms   Normotensive and ok to stop HCTZ given recurrent gout    HLD   Continues on lipitor 80 mg      Multiple tubular adenomas   Prior colonoscopy 2023 -- referral to surgery for repeat        Remainder of medical problems as per resident note.        Brayan Kerr,   6/20/2025 2:34 PM

## 2025-06-27 ENCOUNTER — CLINICAL SUPPORT (OUTPATIENT)
Age: 65
End: 2025-06-27

## 2025-06-27 DIAGNOSIS — I82.499 DEEP VEIN THROMBOSIS (DVT) OF OTHER VEIN OF LOWER EXTREMITY, UNSPECIFIED CHRONICITY, UNSPECIFIED LATERALITY (HCC): ICD-10-CM

## 2025-06-27 DIAGNOSIS — Z79.01 CHRONIC ANTICOAGULATION: Primary | ICD-10-CM

## 2025-06-27 LAB
INTERNATIONAL NORMALIZATION RATIO, POC: 3
PROTHROMBIN TIME, POC: 35.5

## 2025-06-27 NOTE — ASSESSMENT & PLAN NOTE
Uncertain if true APS syndrome  No verification of labs of this   ?inr goal 2-3 vs more aggressive 2.5-3+

## 2025-06-27 NOTE — PROGRESS NOTES
INR results reviewed with Dr. Francis.  Orders received.  Patient notified in office of these orders.  Patient voices understanding of the information and follow-up.  AVS printed and given to patient with appointment card for next INR check.   Per Dr. Francis, Patient is to take 4mg on Mon, Wed, Fri and 3mg all other days. Per Dr. Francis patient to repeat PT/INR in 2 weeks. Patient scheduled for 7/11/25. Patient advised, verbalized understanding.

## 2025-07-01 DIAGNOSIS — I10 ESSENTIAL HYPERTENSION: ICD-10-CM

## 2025-07-02 RX ORDER — HYDROCHLOROTHIAZIDE 25 MG/1
25 TABLET ORAL
Qty: 90 TABLET | Refills: 1 | Status: SHIPPED | OUTPATIENT
Start: 2025-07-02 | End: 2025-12-29

## 2025-07-02 NOTE — TELEPHONE ENCOUNTER
Name of Medication(s) Requested:  Requested Prescriptions     Pending Prescriptions Disp Refills    hydroCHLOROthiazide (HYDRODIURIL) 25 MG tablet [Pharmacy Med Name: HYDROCHLOROTHIAZIDE 25MG TABLETS] 90 tablet 1     Sig: TAKE 1 TABLET BY MOUTH DAILY WITH LUNCH       Medication is on current medication list Yes    Dosage and directions were verified? Yes    Quantity verified: 90 day supply     Pharmacy Verified?  Yes    Last Appointment:  Visit date not found    Future appts:  Future Appointments   Date Time Provider Department Center   7/11/2025  1:15 PM SCHEDULE,  [facility] IM [facility] E[facility] DEP   7/29/2025  9:00 AM Jose R Maldonado MD [facility] Surgical [facility]   8/4/2025  1:30 PM Nguyen Caro MD [facility] DEP        (If no appt send self scheduling link. .REFILLAPPT)  Has appointment on 7-11-25  Scheduling request sent?     [] Yes  [x] No    Does patient need updated?  [] Yes  [] No

## 2025-07-11 ENCOUNTER — CLINICAL SUPPORT (OUTPATIENT)
Age: 65
End: 2025-07-11
Payer: COMMERCIAL

## 2025-07-11 VITALS — TEMPERATURE: 97.2 F

## 2025-07-11 DIAGNOSIS — Z79.01 CHRONIC ANTICOAGULATION: Primary | ICD-10-CM

## 2025-07-11 LAB
INTERNATIONAL NORMALIZATION RATIO, POC: 3.2
PROTHROMBIN TIME, POC: 38

## 2025-07-11 PROCEDURE — 36415 COLL VENOUS BLD VENIPUNCTURE: CPT | Performed by: INTERNAL MEDICINE

## 2025-07-11 PROCEDURE — 85610 PROTHROMBIN TIME: CPT | Performed by: INTERNAL MEDICINE

## 2025-07-11 PROCEDURE — 93793 ANTICOAG MGMT PT WARFARIN: CPT | Performed by: INTERNAL MEDICINE

## 2025-07-11 NOTE — PATIENT INSTRUCTIONS
There will be no change to your Coumadin dosage. You are to continue taking Coumadin 4 mg on Monday and Friday and Coumadin 3 mg all other days. Your next PT/INR lab will be in one month during your office visit on August 4th, 2025 at 1:30 pm. If you have any questions or concerns, please contact the office at [phone].      Thank you,    Branden Adan, CMA

## 2025-07-11 NOTE — PROGRESS NOTES
INR results reviewed with Dr. Tapia.  Orders received. Coumadin 4 mg Monday and Friday and Coumadin 4 mg all other days. Recheck in one month. Patient notified in office of these orders.  Patient voices understanding of the information and follow-up.  AVS printed and given to patient with appointment card for next INR check.

## 2025-08-04 ENCOUNTER — TELEPHONE (OUTPATIENT)
Age: 65
End: 2025-08-04

## 2025-08-05 ENCOUNTER — TELEPHONE (OUTPATIENT)
Age: 65
End: 2025-08-05

## 2025-08-08 ENCOUNTER — OFFICE VISIT (OUTPATIENT)
Age: 65
End: 2025-08-08
Payer: COMMERCIAL

## 2025-08-08 VITALS
BODY MASS INDEX: 19.76 KG/M2 | HEIGHT: 74 IN | SYSTOLIC BLOOD PRESSURE: 138 MMHG | OXYGEN SATURATION: 97 % | TEMPERATURE: 97.1 F | WEIGHT: 154 LBS | HEART RATE: 90 BPM | RESPIRATION RATE: 14 BRPM | DIASTOLIC BLOOD PRESSURE: 76 MMHG

## 2025-08-08 DIAGNOSIS — Z79.01 CHRONIC ANTICOAGULATION: Primary | ICD-10-CM

## 2025-08-08 DIAGNOSIS — D12.6 ADENOMATOUS POLYP OF COLON, UNSPECIFIED PART OF COLON: ICD-10-CM

## 2025-08-08 DIAGNOSIS — E78.5 HYPERLIPIDEMIA, UNSPECIFIED HYPERLIPIDEMIA TYPE: ICD-10-CM

## 2025-08-08 DIAGNOSIS — J45.909 UNCOMPLICATED ASTHMA, UNSPECIFIED ASTHMA SEVERITY, UNSPECIFIED WHETHER PERSISTENT: ICD-10-CM

## 2025-08-08 DIAGNOSIS — Z98.890 HX OF ABDOMINAL SURGERY: ICD-10-CM

## 2025-08-08 DIAGNOSIS — M10.9 ACUTE GOUT INVOLVING TOE OF RIGHT FOOT, UNSPECIFIED CAUSE: ICD-10-CM

## 2025-08-08 DIAGNOSIS — I10 ESSENTIAL HYPERTENSION: ICD-10-CM

## 2025-08-08 LAB
INTERNATIONAL NORMALIZATION RATIO, POC: 1.2
PROTHROMBIN TIME, POC: 14.6

## 2025-08-08 PROCEDURE — G8420 CALC BMI NORM PARAMETERS: HCPCS

## 2025-08-08 PROCEDURE — 3017F COLORECTAL CA SCREEN DOC REV: CPT

## 2025-08-08 PROCEDURE — 3075F SYST BP GE 130 - 139MM HG: CPT

## 2025-08-08 PROCEDURE — 4004F PT TOBACCO SCREEN RCVD TLK: CPT

## 2025-08-08 PROCEDURE — G8427 DOCREV CUR MEDS BY ELIG CLIN: HCPCS

## 2025-08-08 PROCEDURE — 85610 PROTHROMBIN TIME: CPT

## 2025-08-08 PROCEDURE — 99214 OFFICE O/P EST MOD 30 MIN: CPT

## 2025-08-08 PROCEDURE — 1124F ACP DISCUSS-NO DSCNMKR DOCD: CPT

## 2025-08-08 PROCEDURE — 3078F DIAST BP <80 MM HG: CPT

## 2025-08-08 RX ORDER — WARFARIN SODIUM 3 MG/1
TABLET ORAL
Qty: 30 TABLET | Refills: 1 | Status: SHIPPED | OUTPATIENT
Start: 2025-08-08

## 2025-08-08 RX ORDER — WARFARIN SODIUM 4 MG/1
TABLET ORAL
Qty: 30 TABLET | Refills: 0 | Status: SHIPPED | OUTPATIENT
Start: 2025-08-08

## 2025-08-15 ENCOUNTER — CLINICAL SUPPORT (OUTPATIENT)
Age: 65
End: 2025-08-15
Payer: COMMERCIAL

## 2025-08-15 VITALS — TEMPERATURE: 97.3 F

## 2025-08-15 DIAGNOSIS — Z79.01 CHRONIC ANTICOAGULATION: Primary | Chronic | ICD-10-CM

## 2025-08-15 LAB
INTERNATIONAL NORMALIZATION RATIO, POC: 2.4
PROTHROMBIN TIME, POC: 28.8

## 2025-08-15 PROCEDURE — 36415 COLL VENOUS BLD VENIPUNCTURE: CPT | Performed by: INTERNAL MEDICINE

## 2025-08-15 PROCEDURE — 85610 PROTHROMBIN TIME: CPT | Performed by: INTERNAL MEDICINE

## 2025-08-15 PROCEDURE — 93793 ANTICOAG MGMT PT WARFARIN: CPT | Performed by: INTERNAL MEDICINE

## 2025-09-05 ENCOUNTER — TELEPHONE (OUTPATIENT)
Age: 65
End: 2025-09-05

## (undated) DEVICE — FORCEPS BX L L240CM DIA2.4MM RAD JAW 4 HOT FOR POLYP DISP

## (undated) DEVICE — SPONGE GZ W4XL4IN RAYON POLY FILL CVR W/ NONWOVEN FAB

## (undated) DEVICE — GAUZE,SPONGE,4"X4",8PLY,STRL,LF,10/TRAY: Brand: MEDLINE

## (undated) DEVICE — ELECTRODE PT RET AD L9FT HI MOIST COND ADH HYDRGEL CORDED

## (undated) DEVICE — DEFENDO AIR WATER SUCTION AND BIOPSY VALVE KIT FOR  OLYMPUS: Brand: DEFENDO AIR/WATER/SUCTION AND BIOPSY VALVE

## (undated) DEVICE — CONNECTOR IRRIGATION AUXILIARY H2O JET W/ PRT MTL THRD HYDR

## (undated) DEVICE — CONTAINER SPEC 60ML PH 7NEUTRAL BUFF FRMLN RDY TO USE

## (undated) DEVICE — TRAP POLYP ETRAP

## (undated) DEVICE — Z DISCONTINUED NO SUB IDED TUBING ETCO2 AD L6.5FT NSL ORAL CVD PRNG NONFLARED TIP OVR

## (undated) DEVICE — SNARE ENDOSCP POLYP SM 2.4 MM 195 CM 13 MM 2.8 MM CAPTIVATOR

## (undated) DEVICE — SNARE ENDOSCP L240CM SHTH DIA2.4MM LOOP W30MM MIN WRK CHN